# Patient Record
Sex: FEMALE | Race: BLACK OR AFRICAN AMERICAN | NOT HISPANIC OR LATINO | Employment: OTHER | URBAN - METROPOLITAN AREA
[De-identification: names, ages, dates, MRNs, and addresses within clinical notes are randomized per-mention and may not be internally consistent; named-entity substitution may affect disease eponyms.]

---

## 2017-05-31 RX ORDER — SEVELAMER CARBONATE FOR ORAL SUSPENSION 2400 MG/1
2.4 POWDER, FOR SUSPENSION ORAL
Qty: 216 G | Refills: 11 | Status: SHIPPED | OUTPATIENT
Start: 2017-05-31 | End: 2018-12-26

## 2017-06-14 ENCOUNTER — HOSPITAL ENCOUNTER (EMERGENCY)
Facility: HOSPITAL | Age: 52
Discharge: HOME | End: 2017-06-14
Attending: EMERGENCY MEDICINE
Payer: COMMERCIAL

## 2017-06-14 VITALS
HEIGHT: 66 IN | HEART RATE: 80 BPM | BODY MASS INDEX: 43.55 KG/M2 | TEMPERATURE: 99 F | DIASTOLIC BLOOD PRESSURE: 70 MMHG | SYSTOLIC BLOOD PRESSURE: 157 MMHG | WEIGHT: 271 LBS | RESPIRATION RATE: 12 BRPM | OXYGEN SATURATION: 100 %

## 2017-06-14 DIAGNOSIS — R00.2 PALPITATIONS: Primary | ICD-10-CM

## 2017-06-14 DIAGNOSIS — N18.6 END STAGE RENAL DISEASE: ICD-10-CM

## 2017-06-14 DIAGNOSIS — R07.9 CHEST PAIN: ICD-10-CM

## 2017-06-14 LAB
ALBUMIN SERPL BCP-MCNC: 3.2 G/DL
ALP SERPL-CCNC: 64 U/L
ALT SERPL W/O P-5'-P-CCNC: 12 U/L
ANION GAP SERPL CALC-SCNC: 12 MMOL/L
APTT BLDCRRT: 25.4 SEC
AST SERPL-CCNC: 12 U/L
BASOPHILS # BLD AUTO: 0.02 K/UL
BASOPHILS NFR BLD: 0.2 %
BILIRUB SERPL-MCNC: 0.4 MG/DL
BNP SERPL-MCNC: 221 PG/ML
BUN SERPL-MCNC: 24 MG/DL
CALCIUM SERPL-MCNC: 9.3 MG/DL
CHLORIDE SERPL-SCNC: 100 MMOL/L
CO2 SERPL-SCNC: 27 MMOL/L
CREAT SERPL-MCNC: 6.4 MG/DL
DIFFERENTIAL METHOD: ABNORMAL
EOSINOPHIL # BLD AUTO: 0.2 K/UL
EOSINOPHIL NFR BLD: 2 %
ERYTHROCYTE [DISTWIDTH] IN BLOOD BY AUTOMATED COUNT: 14.9 %
EST. GFR  (AFRICAN AMERICAN): 8 ML/MIN/1.73 M^2
EST. GFR  (NON AFRICAN AMERICAN): 7 ML/MIN/1.73 M^2
GLUCOSE SERPL-MCNC: 264 MG/DL
HCT VFR BLD AUTO: 31.9 %
HGB BLD-MCNC: 10 G/DL
INR PPP: 1
LYMPHOCYTES # BLD AUTO: 1.9 K/UL
LYMPHOCYTES NFR BLD: 19.4 %
MAGNESIUM SERPL-MCNC: 2 MG/DL
MCH RBC QN AUTO: 27.3 PG
MCHC RBC AUTO-ENTMCNC: 31.3 %
MCV RBC AUTO: 87 FL
MONOCYTES # BLD AUTO: 0.6 K/UL
MONOCYTES NFR BLD: 5.7 %
NEUTROPHILS # BLD AUTO: 7.2 K/UL
NEUTROPHILS NFR BLD: 72.7 %
PHOSPHATE SERPL-MCNC: 3.6 MG/DL
PLATELET # BLD AUTO: 305 K/UL
PMV BLD AUTO: 8.3 FL
POTASSIUM SERPL-SCNC: 3.8 MMOL/L
PROT SERPL-MCNC: 7.5 G/DL
PROTHROMBIN TIME: 10.5 SEC
RBC # BLD AUTO: 3.66 M/UL
SODIUM SERPL-SCNC: 139 MMOL/L
TROPONIN I SERPL DL<=0.01 NG/ML-MCNC: 0.01 NG/ML
WBC # BLD AUTO: 9.89 K/UL

## 2017-06-14 PROCEDURE — 85025 COMPLETE CBC W/AUTO DIFF WBC: CPT

## 2017-06-14 PROCEDURE — 84100 ASSAY OF PHOSPHORUS: CPT

## 2017-06-14 PROCEDURE — 84484 ASSAY OF TROPONIN QUANT: CPT

## 2017-06-14 PROCEDURE — 85610 PROTHROMBIN TIME: CPT

## 2017-06-14 PROCEDURE — 93010 ELECTROCARDIOGRAM REPORT: CPT | Mod: S$GLB,,, | Performed by: INTERNAL MEDICINE

## 2017-06-14 PROCEDURE — 83880 ASSAY OF NATRIURETIC PEPTIDE: CPT

## 2017-06-14 PROCEDURE — 83735 ASSAY OF MAGNESIUM: CPT

## 2017-06-14 PROCEDURE — 93005 ELECTROCARDIOGRAM TRACING: CPT

## 2017-06-14 PROCEDURE — 25000003 PHARM REV CODE 250: Performed by: EMERGENCY MEDICINE

## 2017-06-14 PROCEDURE — 96374 THER/PROPH/DIAG INJ IV PUSH: CPT

## 2017-06-14 PROCEDURE — 99284 EMERGENCY DEPT VISIT MOD MDM: CPT | Mod: 25

## 2017-06-14 PROCEDURE — 85730 THROMBOPLASTIN TIME PARTIAL: CPT

## 2017-06-14 PROCEDURE — 80053 COMPREHEN METABOLIC PANEL: CPT

## 2017-06-14 RX ORDER — METOPROLOL SUCCINATE 50 MG/1
100 TABLET, EXTENDED RELEASE ORAL DAILY
Status: DISCONTINUED | OUTPATIENT
Start: 2017-06-14 | End: 2017-06-15 | Stop reason: HOSPADM

## 2017-06-14 RX ORDER — METOPROLOL TARTRATE 1 MG/ML
5 INJECTION, SOLUTION INTRAVENOUS
Status: COMPLETED | OUTPATIENT
Start: 2017-06-14 | End: 2017-06-14

## 2017-06-14 RX ADMIN — METOPROLOL TARTRATE 5 MG: 5 INJECTION INTRAVENOUS at 08:06

## 2017-06-14 RX ADMIN — METOPROLOL SUCCINATE 100 MG: 50 TABLET, EXTENDED RELEASE ORAL at 08:06

## 2017-06-15 NOTE — ED PROVIDER NOTES
SCRIBE #1 NOTE: I, Corinne Mack, am scribing for, and in the presence of, Denise Fraser MD. I have scribed the entire note.      History      Chief Complaint   Patient presents with    Chest Pain     reports feeling like she keeps going into a fib, had dialysis treatment today        Review of patient's allergies indicates:   Allergen Reactions    Sulfa (sulfonamide antibiotics) Rash        HPI   HPI    6/14/2017, 7:18 PM   History obtained from the patient      History of Present Illness: Cordell Angulo is a 52 y.o. female patient who presents to the Emergency Department for palpitations which onset 3 days ago. Symptoms are intermittent and moderate in severity. Pt's sxs first appeared while she was in bed on Monday. Since then, pt reports having palpitations intermittently since then. Pt had dialysis today. No mitigating or exacerbating factors reported. Associated sxs include nausea and light-headedness. Patient denies any fever, chills, SOB, CP, V/D, back pain, HA, and all other sxs at this time. No prior Tx reported outside of daily medications. Pt has Hx of CHF, CKD, PAF, and HTN. No further complaints or concerns at this time.         Arrival mode: Personal vehicle      PCP: Primary Doctor No       Past Medical History:  Past Medical History:   Diagnosis Date    Asthma     CHF (congestive heart failure)     CKD (chronic kidney disease) stage 5, GFR less than 15 ml/min     Depression     Diabetes mellitus     Hypercholesterolemia     Hypertension     PAF (paroxysmal atrial fibrillation)        Past Surgical History:  Past Surgical History:   Procedure Laterality Date    AV FISTULA PLACEMENT      CARDIAC ELECTROPHYSIOLOGY MAPPING AND ABLATION      cesarian section      TUMOR REMOVAL      L lung         Family History:  Family History   Problem Relation Age of Onset    Heart disease Mother     Diabetes Father        Social History:  Social History     Social History Main Topics     Smoking status: Never Smoker    Smokeless tobacco: Not given    Alcohol use No    Drug use: No    Sexual activity: Not given       ROS   Review of Systems   Constitutional: Negative for chills and fever.   Respiratory: Negative for cough and shortness of breath.    Cardiovascular: Positive for palpitations. Negative for chest pain.   Gastrointestinal: Positive for nausea. Negative for abdominal pain, diarrhea and vomiting.   Genitourinary: Negative for dysuria, flank pain and hematuria.   Musculoskeletal: Negative for back pain, neck pain and neck stiffness.   Skin: Negative for rash and wound.   Neurological: Positive for light-headedness. Negative for dizziness, numbness and headaches.   All other systems reviewed and are negative.    Physical Exam      Initial Vitals [06/14/17 1915]   BP Pulse Resp Temp SpO2   (!) 201/93 (!) 114 20 98.5 °F (36.9 °C) 99 %      Physical Exam  Nursing Notes and Vital Signs Reviewed.  Constitutional: Patient is in no apparent distress. Well-developed and well-nourished. Obese. Shunt to L upper arm.  Head: Atraumatic. Normocephalic.  Eyes: PERRL. EOM intact. Conjunctivae are not pale. No scleral icterus.  ENT: Mucous membranes are moist. Oropharynx is clear and symmetric.    Neck: Supple. Full ROM. No lymphadenopathy.  Cardiovascular: Mildly tachycardic. Regular rhythm. No murmurs, rubs, or gallops. Distal pulses are 2+ and symmetric. Shunt to left upper arm with good thrill.   Pulmonary/Chest: No respiratory distress. Clear to auscultation bilaterally. No wheezing, rales, or rhonchi.  Abdominal: Soft and non-distended.  There is no tenderness.  No rebound, guarding, or rigidity.   Musculoskeletal: Moves all extremities. No obvious deformities. No edema. No calf tenderness.  Skin: Warm and dry.  Neurological:  Alert, awake, and appropriate.  Normal speech.  No acute focal neurological deficits are appreciated.  Psychiatric: Normal affect. Good eye contact. Appropriate in  "content.    ED Course    Procedures  ED Vital Signs:  Vitals:    06/14/17 1915 06/14/17 2014 06/14/17 2059   BP: (!) 201/93  (!) 157/70   Pulse: (!) 114 84 80   Resp: 20  12   Temp: 98.5 °F (36.9 °C)     TempSrc: Oral     SpO2: 99%  100%   Weight: 122.9 kg (271 lb)     Height: 5' 6" (1.676 m)         Abnormal Lab Results:  Labs Reviewed   CBC W/ AUTO DIFFERENTIAL - Abnormal; Notable for the following:        Result Value    RBC 3.66 (*)     Hemoglobin 10.0 (*)     Hematocrit 31.9 (*)     MCHC 31.3 (*)     RDW 14.9 (*)     MPV 8.3 (*)     All other components within normal limits   COMPREHENSIVE METABOLIC PANEL - Abnormal; Notable for the following:     Glucose 264 (*)     BUN, Bld 24 (*)     Creatinine 6.4 (*)     Albumin 3.2 (*)     eGFR if  8 (*)     eGFR if non  7 (*)     All other components within normal limits   B-TYPE NATRIURETIC PEPTIDE - Abnormal; Notable for the following:      (*)     All other components within normal limits   TROPONIN I   MAGNESIUM   PHOSPHORUS   APTT   PROTIME-INR        All Lab Results:  Results for orders placed or performed during the hospital encounter of 06/14/17   CBC auto differential   Result Value Ref Range    WBC 9.89 3.90 - 12.70 K/uL    RBC 3.66 (L) 4.00 - 5.40 M/uL    Hemoglobin 10.0 (L) 12.0 - 16.0 g/dL    Hematocrit 31.9 (L) 37.0 - 48.5 %    MCV 87 82 - 98 fL    MCH 27.3 27.0 - 31.0 pg    MCHC 31.3 (L) 32.0 - 36.0 %    RDW 14.9 (H) 11.5 - 14.5 %    Platelets 305 150 - 350 K/uL    MPV 8.3 (L) 9.2 - 12.9 fL    Gran # 7.2 1.8 - 7.7 K/uL    Lymph # 1.9 1.0 - 4.8 K/uL    Mono # 0.6 0.3 - 1.0 K/uL    Eos # 0.2 0.0 - 0.5 K/uL    Baso # 0.02 0.00 - 0.20 K/uL    Gran% 72.7 38.0 - 73.0 %    Lymph% 19.4 18.0 - 48.0 %    Mono% 5.7 4.0 - 15.0 %    Eosinophil% 2.0 0.0 - 8.0 %    Basophil% 0.2 0.0 - 1.9 %    Differential Method Automated    Comprehensive metabolic panel   Result Value Ref Range    Sodium 139 136 - 145 mmol/L    Potassium 3.8 3.5 - " 5.1 mmol/L    Chloride 100 95 - 110 mmol/L    CO2 27 23 - 29 mmol/L    Glucose 264 (H) 70 - 110 mg/dL    BUN, Bld 24 (H) 6 - 20 mg/dL    Creatinine 6.4 (H) 0.5 - 1.4 mg/dL    Calcium 9.3 8.7 - 10.5 mg/dL    Total Protein 7.5 6.0 - 8.4 g/dL    Albumin 3.2 (L) 3.5 - 5.2 g/dL    Total Bilirubin 0.4 0.1 - 1.0 mg/dL    Alkaline Phosphatase 64 55 - 135 U/L    AST 12 10 - 40 U/L    ALT 12 10 - 44 U/L    Anion Gap 12 8 - 16 mmol/L    eGFR if African American 8 (A) >60 mL/min/1.73 m^2    eGFR if non African American 7 (A) >60 mL/min/1.73 m^2   Troponin I #1   Result Value Ref Range    Troponin I 0.010 0.000 - 0.026 ng/mL   B-Type natriuretic peptide (BNP)   Result Value Ref Range     (H) 0 - 99 pg/mL   Magnesium   Result Value Ref Range    Magnesium 2.0 1.6 - 2.6 mg/dL   Phosphorus   Result Value Ref Range    Phosphorus 3.6 2.7 - 4.5 mg/dL   APTT   Result Value Ref Range    aPTT 25.4 21.0 - 32.0 sec   Protime-INR   Result Value Ref Range    Prothrombin Time 10.5 9.0 - 12.5 sec    INR 1.0 0.8 - 1.2       Imaging Results:  Imaging Results          X-Ray Chest PA And Lateral (Final result)  Result time 06/14/17 20:40:32    Final result by Mirta Garcia MD (06/14/17 20:40:32)                 Impression:         No acute cardiopulmonary disease      Electronically signed by: MIRTA GARCIA MD  Date:     06/14/17  Time:    20:40              Narrative:    Exam: Two-view chest xray    History:     Chest Pain     Findings:     The heart is normal and the lungs are clear.                             The EKG was ordered, reviewed, and independently interpreted by the ED provider.  Interpretation time: 1919  Rate: 93 BPM  Rhythm: normal sinus rhythm  Interpretation: Moderate voltage criteria for LVH. Nonspecific ST and T wave abnormality. Abnormal ECG. No STEMI.         The Emergency Provider reviewed the vital signs and test results, which are outlined above.    ED Discussion     9:24 PM: Reassessed pt at this time. Pt is awake,  alert, and in no distress. Discussed with pt all pertinent ED information and results. Discussed pt dx and plan of tx. Gave pt all f/u and return to the ED instructions. All questions and concerns were addressed at this time. Pt expresses understanding of information and instructions, and is comfortable with plan to discharge. Pt is stable for discharge.    I discussed with patient and/or family/caretaker that evaluation in the ED does not suggest any emergent or life threatening medical conditions requiring immediate intervention beyond what was provided in the ED, and I believe patient is safe for discharge.  Regardless, an unremarkable evaluation in the ED does not preclude the development or presence of a serious of life threatening condition. As such, patient was instructed to return immediately for any worsening or change in current symptoms.    I have discussed with patient and/or family/caretaker chest pain precautions, specifically to return for worsening chest pain, shortness of breath, fever, or any concern.  I have low suspicion for cardiopulmonary, vascular, infectious, respiratory, or other emergent medical condition based on my evaluation in the ED.      ED Medication(s):  Medications   metoprolol injection 5 mg (5 mg Intravenous Given 6/14/17 2006)       Discharge Medication List as of 6/14/2017  9:29 PM          Follow-up Information     Local Primary Care Doctor. Schedule an appointment as soon as possible for a visit in 2 days.    Why:  Return to the [facility], If symptoms worsen                   Medical Decision Making    Medical Decision Making:   Clinical Tests:   Lab Tests: Reviewed and Ordered  Radiological Study: Reviewed and Ordered  Medical Tests: Reviewed and Ordered           Scribe Attestation:   Scribe #1: I performed the above scribed service and the documentation accurately describes the services I performed. I attest to the accuracy of the note.    Attending:   Physician  Attestation Statement for Scribe #1: I, Denise Fraser MD, personally performed the services described in this documentation, as scribed by Corinne Mack, in my presence, and it is both accurate and complete.          Clinical Impression       ICD-10-CM ICD-9-CM   1. Palpitations R00.2 785.1   2. Chest pain R07.9 786.50   3. End stage renal disease N18.6 585.6       Disposition:   Disposition: Discharged  Condition: Stable         Denise Fraser MD  06/16/17 1125

## 2017-12-11 ENCOUNTER — HOSPITAL ENCOUNTER (EMERGENCY)
Facility: HOSPITAL | Age: 52
Discharge: HOME | End: 2017-12-11
Attending: EMERGENCY MEDICINE
Payer: COMMERCIAL

## 2017-12-11 VITALS
HEART RATE: 81 BPM | HEIGHT: 67 IN | BODY MASS INDEX: 41.35 KG/M2 | RESPIRATION RATE: 24 BRPM | OXYGEN SATURATION: 97 % | TEMPERATURE: 98 F | WEIGHT: 263.44 LBS | DIASTOLIC BLOOD PRESSURE: 69 MMHG | SYSTOLIC BLOOD PRESSURE: 157 MMHG

## 2017-12-11 DIAGNOSIS — R06.02 SOB (SHORTNESS OF BREATH): ICD-10-CM

## 2017-12-11 DIAGNOSIS — N18.6 ESRD ON HEMODIALYSIS: Primary | ICD-10-CM

## 2017-12-11 DIAGNOSIS — Z99.2 ESRD ON HEMODIALYSIS: Primary | ICD-10-CM

## 2017-12-11 DIAGNOSIS — J81.0 ACUTE PULMONARY EDEMA: ICD-10-CM

## 2017-12-11 LAB
ALBUMIN SERPL BCP-MCNC: 2.9 G/DL
ALP SERPL-CCNC: 69 U/L
ALT SERPL W/O P-5'-P-CCNC: 49 U/L
ANION GAP SERPL CALC-SCNC: 11 MMOL/L
AST SERPL-CCNC: 43 U/L
BASOPHILS # BLD AUTO: 0.02 K/UL
BASOPHILS NFR BLD: 0.2 %
BILIRUB SERPL-MCNC: 0.5 MG/DL
BNP SERPL-MCNC: 1047 PG/ML
BUN SERPL-MCNC: 59 MG/DL
CALCIUM SERPL-MCNC: 9.5 MG/DL
CHLORIDE SERPL-SCNC: 108 MMOL/L
CK SERPL-CCNC: 62 U/L
CO2 SERPL-SCNC: 20 MMOL/L
CREAT SERPL-MCNC: 9 MG/DL
DIFFERENTIAL METHOD: ABNORMAL
EOSINOPHIL # BLD AUTO: 0.3 K/UL
EOSINOPHIL NFR BLD: 2.7 %
ERYTHROCYTE [DISTWIDTH] IN BLOOD BY AUTOMATED COUNT: 15.3 %
EST. GFR  (AFRICAN AMERICAN): 5 ML/MIN/1.73 M^2
EST. GFR  (NON AFRICAN AMERICAN): 4 ML/MIN/1.73 M^2
GLUCOSE SERPL-MCNC: 125 MG/DL
HCT VFR BLD AUTO: 36.5 %
HGB BLD-MCNC: 11.4 G/DL
LYMPHOCYTES # BLD AUTO: 1.8 K/UL
LYMPHOCYTES NFR BLD: 17.6 %
MCH RBC QN AUTO: 26.9 PG
MCHC RBC AUTO-ENTMCNC: 31.2 G/DL
MCV RBC AUTO: 86 FL
MONOCYTES # BLD AUTO: 0.5 K/UL
MONOCYTES NFR BLD: 4.7 %
NEUTROPHILS # BLD AUTO: 7.8 K/UL
NEUTROPHILS NFR BLD: 74.8 %
PLATELET # BLD AUTO: 246 K/UL
PMV BLD AUTO: 9.4 FL
POTASSIUM SERPL-SCNC: 5.2 MMOL/L
PROT SERPL-MCNC: 7.1 G/DL
RBC # BLD AUTO: 4.24 M/UL
SODIUM SERPL-SCNC: 139 MMOL/L
TROPONIN I SERPL DL<=0.01 NG/ML-MCNC: 0.02 NG/ML
WBC # BLD AUTO: 10.36 K/UL

## 2017-12-11 PROCEDURE — 83880 ASSAY OF NATRIURETIC PEPTIDE: CPT

## 2017-12-11 PROCEDURE — 84484 ASSAY OF TROPONIN QUANT: CPT

## 2017-12-11 PROCEDURE — 99284 EMERGENCY DEPT VISIT MOD MDM: CPT | Mod: 25

## 2017-12-11 PROCEDURE — 93010 ELECTROCARDIOGRAM REPORT: CPT | Mod: ,,, | Performed by: INTERNAL MEDICINE

## 2017-12-11 PROCEDURE — 80053 COMPREHEN METABOLIC PANEL: CPT

## 2017-12-11 PROCEDURE — 85025 COMPLETE CBC W/AUTO DIFF WBC: CPT

## 2017-12-11 PROCEDURE — 93005 ELECTROCARDIOGRAM TRACING: CPT

## 2017-12-11 PROCEDURE — 82550 ASSAY OF CK (CPK): CPT

## 2017-12-11 NOTE — ED NOTES
Received report from ANIYAH Bryant. Pt in NAD,VSS, RR equal and unlabored. Pt awaiting results. Pt's bed is low, locked, and call light in reach. SR up x 2. Will continue to monitor pt.

## 2017-12-11 NOTE — ED PROVIDER NOTES
SCRIBE #1 NOTE: I, Kaitlin Nieves, am scribing for, and in the presence of, Rivas Bobo MD. I have scribed the entire note.      History      Chief Complaint   Patient presents with    Shortness of Breath     with exertion and c/o chest tightness       Review of patient's allergies indicates:   Allergen Reactions    Sulfa (sulfonamide antibiotics) Rash        HPI   HPI    12/11/2017, 6:01 AM   History obtained from the patient      History of Present Illness: Cordell Angulo is a 52 y.o. female patient who presents to the Emergency Department for SOB which onset gradually 2 days ago. Pt states that her sxs worsened this morning. Pt reports a PMHx of asthma, DM,  kidney failure with dialysis, CHF, and HTN.  Pt states that she is suppose to undergo dialysis this morning, but decided to come to the ED instead secondary to worsening SOB with exertion. Symptoms are constant and moderate in severity.  No mitigating or exacerbating factors reported. Associated sxs include acute substernal chest tightness, subjective fever, and fluid retention. Patient denies any recent travel, chills, long car trips,  leg pain, cough, N/V, diaphoresis, palpitations, extremity weakness/numbness, dizziness, HA, syncope, and all other sxs at this time. No prior Tx reported. No further complaints or concerns at this time.         Arrival mode: Personal vehicle    PCP: Primary Doctor No       Past Medical History:  Past Medical History:   Diagnosis Date    Asthma     CHF (congestive heart failure)     CKD (chronic kidney disease) stage 5, GFR less than 15 ml/min     Depression     Diabetes mellitus     Hypercholesterolemia     Hypertension     PAF (paroxysmal atrial fibrillation)        Past Surgical History:  Past Surgical History:   Procedure Laterality Date    AV FISTULA PLACEMENT      CARDIAC ELECTROPHYSIOLOGY MAPPING AND ABLATION      cesarian section      TUMOR REMOVAL      L lung         Family History:  Family History    Problem Relation Age of Onset    Heart disease Mother     Diabetes Father        Social History:  Social History     Social History Main Topics    Smoking status: Never Smoker    Smokeless tobacco: Not on file    Alcohol use No    Drug use: No    Sexual activity: Not on file       ROS   Review of Systems   Constitutional: Positive for fever. Negative for chills.        + fluid retention   - recent travel/long car trips   HENT: Negative for sore throat.    Respiratory: Positive for chest tightness and shortness of breath. Negative for cough.    Cardiovascular: Negative for chest pain and palpitations.   Gastrointestinal: Negative for nausea and vomiting.   Genitourinary: Negative for dysuria.   Musculoskeletal: Negative for back pain.        - leg pain    Skin: Negative for rash.   Neurological: Negative for dizziness, syncope, weakness, numbness and headaches.   Hematological: Does not bruise/bleed easily.       Physical Exam      Initial Vitals [12/11/17 0548]   BP Pulse Resp Temp SpO2   (!) 187/90 88 20 98.3 °F (36.8 °C) (!) 94 %      MAP       122.33          Physical Exam  Nursing Notes and Vital Signs Reviewed.  Constitutional: Patient is in no apparent distress. Well-developed and well-nourished.  Head: Atraumatic. Normocephalic.  Eyes: PERRL. EOM intact. Conjunctivae are not pale. No scleral icterus.  ENT: Mucous membranes are moist. Oropharynx is clear and symmetric.    Neck: Supple. Full ROM. No lymphadenopathy.  Cardiovascular: Regular rate. Regular rhythm. No murmurs, rubs, or gallops. Distal pulses are 2+ and symmetric.  Pulmonary/Chest: No respiratory distress. Clear to auscultation bilaterally. No wheezing or rales.  Abdominal: Soft and non-distended.  There is no tenderness.  No rebound, guarding, or rigidity. Good bowel sounds.  Genitourinary: No CVA tenderness  Musculoskeletal: Moves all extremities. No obvious deformities. 1+ BLE edema. No calf tenderness.  Skin: Warm and  "dry.  Neurological:  Alert, awake, and appropriate.  Normal speech.  No acute focal neurological deficits are appreciated.  Psychiatric: Normal affect. Good eye contact. Appropriate in content.    ED Course    Procedures  ED Vital Signs:  Vitals:    12/11/17 0548 12/11/17 0620 12/11/17 0626 12/11/17 0647   BP: (!) 187/90 (!) 164/73  (!) 163/89   Pulse: 88 86 86 83   Resp: 20 (!) 26  (!) 23   Temp: 98.3 °F (36.8 °C)      SpO2: (!) 94% 98%  96%   Weight: 119.5 kg (263 lb 7.2 oz)      Height: 5' 7" (1.702 m)          Abnormal Lab Results:  Labs Reviewed   CBC W/ AUTO DIFFERENTIAL - Abnormal; Notable for the following:        Result Value    Hemoglobin 11.4 (*)     Hematocrit 36.5 (*)     MCH 26.9 (*)     MCHC 31.2 (*)     RDW 15.3 (*)     Gran # 7.8 (*)     Gran% 74.8 (*)     Lymph% 17.6 (*)     All other components within normal limits   COMPREHENSIVE METABOLIC PANEL - Abnormal; Notable for the following:     Potassium 5.2 (*)     CO2 20 (*)     Glucose 125 (*)     BUN, Bld 59 (*)     Creatinine 9.0 (*)     Albumin 2.9 (*)     AST 43 (*)     ALT 49 (*)     eGFR if  5 (*)     eGFR if non  4 (*)     All other components within normal limits   B-TYPE NATRIURETIC PEPTIDE - Abnormal; Notable for the following:     BNP 1,047 (*)     All other components within normal limits   CK   TROPONIN I        All Lab Results:  Results for orders placed or performed during the hospital encounter of 12/11/17   CBC auto differential   Result Value Ref Range    WBC 10.36 3.90 - 12.70 K/uL    RBC 4.24 4.00 - 5.40 M/uL    Hemoglobin 11.4 (L) 12.0 - 16.0 g/dL    Hematocrit 36.5 (L) 37.0 - 48.5 %    MCV 86 82 - 98 fL    MCH 26.9 (L) 27.0 - 31.0 pg    MCHC 31.2 (L) 32.0 - 36.0 g/dL    RDW 15.3 (H) 11.5 - 14.5 %    Platelets 246 150 - 350 K/uL    MPV 9.4 9.2 - 12.9 fL    Gran # 7.8 (H) 1.8 - 7.7 K/uL    Lymph # 1.8 1.0 - 4.8 K/uL    Mono # 0.5 0.3 - 1.0 K/uL    Eos # 0.3 0.0 - 0.5 K/uL    Baso # 0.02 0.00 - 0.20 " K/uL    Gran% 74.8 (H) 38.0 - 73.0 %    Lymph% 17.6 (L) 18.0 - 48.0 %    Mono% 4.7 4.0 - 15.0 %    Eosinophil% 2.7 0.0 - 8.0 %    Basophil% 0.2 0.0 - 1.9 %    Differential Method Automated    Comprehensive metabolic panel   Result Value Ref Range    Sodium 139 136 - 145 mmol/L    Potassium 5.2 (H) 3.5 - 5.1 mmol/L    Chloride 108 95 - 110 mmol/L    CO2 20 (L) 23 - 29 mmol/L    Glucose 125 (H) 70 - 110 mg/dL    BUN, Bld 59 (H) 6 - 20 mg/dL    Creatinine 9.0 (H) 0.5 - 1.4 mg/dL    Calcium 9.5 8.7 - 10.5 mg/dL    Total Protein 7.1 6.0 - 8.4 g/dL    Albumin 2.9 (L) 3.5 - 5.2 g/dL    Total Bilirubin 0.5 0.1 - 1.0 mg/dL    Alkaline Phosphatase 69 55 - 135 U/L    AST 43 (H) 10 - 40 U/L    ALT 49 (H) 10 - 44 U/L    Anion Gap 11 8 - 16 mmol/L    eGFR if African American 5 (A) >60 mL/min/1.73 m^2    eGFR if non African American 4 (A) >60 mL/min/1.73 m^2   Brain natriuretic peptide   Result Value Ref Range    BNP 1,047 (H) 0 - 99 pg/mL   CK   Result Value Ref Range    CPK 62 20 - 180 U/L   Troponin I   Result Value Ref Range    Troponin I 0.016 0.000 - 0.026 ng/mL         Imaging Results:  Imaging Results          X-Ray Chest AP Portable (Final result)  Result time 12/11/17 07:43:23    Final result by Milton Mccann MD (12/11/17 07:43:23)                 Impression:     Mild pulmonary vascular congestion suspected.       Electronically signed by: MILTON MCCANN MD  Date:     12/11/17  Time:    07:43              Narrative:    Clinical Data:SOB    Comparison:  6/14/17    Findings:  Single view of the chest.      Cardiac silhouette is normal. Mild pulmonary vascular congestion suspected. The lungs demonstrate no evidence of consolidation.  No evidence of pleural effusion or pneumothorax.  Bones demonstrate mild degenerative changes.                           Impression: No acute findings.          The EKG was ordered, reviewed, and independently interpreted by the ED provider.  Interpretation time: 0606  Rate: 83 BPM  Rhythm:  normal sinus rhythm  Interpretation: ST and T wave abnormality. Prolonged QT. No STEMI.        The Emergency Provider reviewed the vital signs and test results, which are outlined above.    ED Discussion   7:37 AM: Reassessed pt at this time.  Discussed with pt all pertinent ED information, results, and need for dialysis at this time. Discussed pt dx and plan of tx. Gave pt all f/u and return to the ED instructions. Pt should go to dialysis after discharged. All questions and concerns were addressed at this time. Pt expresses understanding of information and instructions, and is comfortable with plan to discharge. Pt is stable for discharge.          ED Medication(s):  Medications - No data to display    New Prescriptions    No medications on file       Follow-up Information     Lola Pina MD In 2 days.    Specialty:  Nephrology  Contact information:  [address]  [place] [address] [phone]                     Medical Decision Making    Medical Decision Making:   Clinical Tests:   Lab Tests: Ordered and Reviewed  Radiological Study: Ordered and Reviewed  Medical Tests: Ordered and Reviewed           Scribe Attestation:   Scribe #1: I performed the above scribed service and the documentation accurately describes the services I performed. I attest to the accuracy of the note.    Attending:   Physician Attestation Statement for Scribe #1: I, Rivas Bobo MD, personally performed the services described in this documentation, as scribed by Kaitlin Nieves, in my presence, and it is both accurate and complete.          Clinical Impression       ICD-10-CM ICD-9-CM   1. ESRD on hemodialysis N18.6 585.6    Z99.2 V45.11   2. SOB (shortness of breath) R06.02 786.05   3. Acute pulmonary edema J81.0 518.4       Disposition:   Disposition: Discharged  Condition: Stable         Rivas Bobo MD  12/11/17 0746

## 2018-06-06 ENCOUNTER — TELEPHONE (OUTPATIENT)
Dept: NEPHROLOGY | Facility: CLINIC | Age: 53
End: 2018-06-06

## 2018-06-06 DIAGNOSIS — G47.33 OSA (OBSTRUCTIVE SLEEP APNEA): Primary | ICD-10-CM

## 2018-06-06 NOTE — TELEPHONE ENCOUNTER
----- Message from Lola Pina MD sent at 6/6/2018 10:38 AM CDT -----  Needs appt with pul for sleep study ---Kelly Montgomery are fine too

## 2018-06-11 ENCOUNTER — TELEPHONE (OUTPATIENT)
Dept: NEPHROLOGY | Facility: CLINIC | Age: 53
End: 2018-06-11

## 2018-06-13 ENCOUNTER — TELEPHONE (OUTPATIENT)
Dept: PULMONOLOGY | Facility: CLINIC | Age: 53
End: 2018-06-13

## 2018-06-18 ENCOUNTER — HOSPITAL ENCOUNTER (EMERGENCY)
Facility: HOSPITAL | Age: 53
Discharge: HOME | End: 2018-06-18
Attending: EMERGENCY MEDICINE
Payer: COMMERCIAL

## 2018-06-18 VITALS
TEMPERATURE: 100 F | RESPIRATION RATE: 18 BRPM | DIASTOLIC BLOOD PRESSURE: 82 MMHG | HEIGHT: 67 IN | BODY MASS INDEX: 39.71 KG/M2 | SYSTOLIC BLOOD PRESSURE: 169 MMHG | HEART RATE: 94 BPM | OXYGEN SATURATION: 96 % | WEIGHT: 253 LBS

## 2018-06-18 DIAGNOSIS — H43.11 VITREOUS HEMORRHAGE OF RIGHT EYE: Primary | ICD-10-CM

## 2018-06-18 DIAGNOSIS — I63.9 STROKE: ICD-10-CM

## 2018-06-18 LAB
ALBUMIN SERPL BCP-MCNC: 3.2 G/DL
ALP SERPL-CCNC: 91 U/L
ALT SERPL W/O P-5'-P-CCNC: 16 U/L
ANION GAP SERPL CALC-SCNC: 17 MMOL/L
APTT BLDCRRT: 25.1 SEC
AST SERPL-CCNC: 13 U/L
BASOPHILS # BLD AUTO: 0.01 K/UL
BASOPHILS NFR BLD: 0.1 %
BILIRUB SERPL-MCNC: 0.3 MG/DL
BUN SERPL-MCNC: 23 MG/DL
CALCIUM SERPL-MCNC: 9.5 MG/DL
CHLORIDE SERPL-SCNC: 95 MMOL/L
CO2 SERPL-SCNC: 25 MMOL/L
CREAT SERPL-MCNC: 6.2 MG/DL
DIFFERENTIAL METHOD: ABNORMAL
EOSINOPHIL # BLD AUTO: 0.2 K/UL
EOSINOPHIL NFR BLD: 2.6 %
ERYTHROCYTE [DISTWIDTH] IN BLOOD BY AUTOMATED COUNT: 15.7 %
EST. GFR  (AFRICAN AMERICAN): 8 ML/MIN/1.73 M^2
EST. GFR  (NON AFRICAN AMERICAN): 7 ML/MIN/1.73 M^2
GLUCOSE SERPL-MCNC: 253 MG/DL
HCT VFR BLD AUTO: 37 %
HGB BLD-MCNC: 11.9 G/DL
INR PPP: 0.9
LYMPHOCYTES # BLD AUTO: 2 K/UL
LYMPHOCYTES NFR BLD: 21.8 %
MCH RBC QN AUTO: 28.4 PG
MCHC RBC AUTO-ENTMCNC: 32.2 G/DL
MCV RBC AUTO: 88 FL
MONOCYTES # BLD AUTO: 0.5 K/UL
MONOCYTES NFR BLD: 5.8 %
NEUTROPHILS # BLD AUTO: 6.5 K/UL
NEUTROPHILS NFR BLD: 69.7 %
PLATELET # BLD AUTO: 257 K/UL
PMV BLD AUTO: 8.3 FL
POTASSIUM SERPL-SCNC: 3.7 MMOL/L
PROT SERPL-MCNC: 7.5 G/DL
PROTHROMBIN TIME: 9.8 SEC
RBC # BLD AUTO: 4.19 M/UL
SODIUM SERPL-SCNC: 137 MMOL/L
TSH SERPL DL<=0.005 MIU/L-ACNC: 2.57 UIU/ML
WBC # BLD AUTO: 9.31 K/UL

## 2018-06-18 PROCEDURE — 80061 LIPID PANEL: CPT

## 2018-06-18 PROCEDURE — 99285 EMERGENCY DEPT VISIT HI MDM: CPT | Mod: 25

## 2018-06-18 PROCEDURE — 25000003 PHARM REV CODE 250: Performed by: EMERGENCY MEDICINE

## 2018-06-18 PROCEDURE — 36415 COLL VENOUS BLD VENIPUNCTURE: CPT

## 2018-06-18 PROCEDURE — 80053 COMPREHEN METABOLIC PANEL: CPT

## 2018-06-18 PROCEDURE — 84443 ASSAY THYROID STIM HORMONE: CPT

## 2018-06-18 PROCEDURE — 93010 ELECTROCARDIOGRAM REPORT: CPT | Mod: ,,, | Performed by: INTERNAL MEDICINE

## 2018-06-18 PROCEDURE — 93005 ELECTROCARDIOGRAM TRACING: CPT

## 2018-06-18 PROCEDURE — 85730 THROMBOPLASTIN TIME PARTIAL: CPT

## 2018-06-18 PROCEDURE — 85025 COMPLETE CBC W/AUTO DIFF WBC: CPT

## 2018-06-18 PROCEDURE — 85610 PROTHROMBIN TIME: CPT

## 2018-06-18 RX ORDER — MEDROXYPROGESTERONE ACETATE 10 MG/1
10 TABLET ORAL DAILY
Status: ON HOLD | COMMUNITY
End: 2020-06-27 | Stop reason: HOSPADM

## 2018-06-18 RX ORDER — AMOXICILLIN 875 MG/1
875 TABLET, FILM COATED ORAL
COMMUNITY
End: 2018-08-05 | Stop reason: SINTOL

## 2018-06-18 RX ORDER — TRAMADOL HYDROCHLORIDE AND ACETAMINOPHEN 37.5; 325 MG/1; MG/1
1 TABLET, FILM COATED ORAL EVERY 4 HOURS PRN
Status: ON HOLD | COMMUNITY
End: 2018-12-26

## 2018-06-18 RX ORDER — TETRACAINE HYDROCHLORIDE 5 MG/ML
2 SOLUTION OPHTHALMIC
Status: COMPLETED | OUTPATIENT
Start: 2018-06-18 | End: 2018-06-18

## 2018-06-18 RX ADMIN — TETRACAINE HYDROCHLORIDE 2 DROP: 5 SOLUTION OPHTHALMIC at 08:06

## 2018-06-19 ENCOUNTER — OFFICE VISIT (OUTPATIENT)
Dept: OPHTHALMOLOGY | Facility: CLINIC | Age: 53
End: 2018-06-19
Payer: COMMERCIAL

## 2018-06-19 DIAGNOSIS — H53.131 SUDDEN VISUAL LOSS OF RIGHT EYE: Primary | ICD-10-CM

## 2018-06-19 DIAGNOSIS — H26.20 CATARACT WITH COMPLICATION OF RIGHT EYE, UNSPECIFIED COMPLICATED CATARACT TYPE: ICD-10-CM

## 2018-06-19 LAB
CHOLEST SERPL-MCNC: 200 MG/DL
CHOLEST/HDLC SERPL: 9.1 {RATIO}
HDLC SERPL-MCNC: 22 MG/DL
HDLC SERPL: 11 %
LDLC SERPL CALC-MCNC: ABNORMAL MG/DL
NONHDLC SERPL-MCNC: 178 MG/DL
TRIGL SERPL-MCNC: 812 MG/DL

## 2018-06-19 PROCEDURE — 92004 COMPRE OPH EXAM NEW PT 1/>: CPT | Mod: S$GLB,,, | Performed by: OPHTHALMOLOGY

## 2018-06-19 PROCEDURE — 99999 PR PBB SHADOW E&M-EST. PATIENT-LVL II: CPT | Mod: PBBFAC,,, | Performed by: OPHTHALMOLOGY

## 2018-06-19 NOTE — ED NOTES
Per Dr. Laird, pt does not need cardiac monitoring, continuous pulse ox, swallow assessment, oxygen, neuro checks, or glucose checked.

## 2018-06-19 NOTE — ED PROVIDER NOTES
SCRIBE #1 NOTE: IMore, am scribing for, and in the presence of, Dilip Perez MD. I have scribed the HPI, ROS, and PEx.     SCRIBE #2 NOTE: I, Alis Oconnor , am scribing for, and in the presence of,  Mónica Laird MD. I have scribed the remaining portions of the note not scribed by Scribe #1.     History      Chief Complaint   Patient presents with    Loss of Vision     patient states she was vomiting last night and lost vision after vomiting, went to dialysis today and sent here after dialysis       Review of patient's allergies indicates:   Allergen Reactions    Sulfa (sulfonamide antibiotics) Rash        HPI   HPI    6/18/2018, 7:29 PM    History obtained from the patient      History of Present Illness: Cordell Angulo is a 53 y.o. female patient with PMHx of DM, HTN, CKD, CHF who presents to the Emergency Department for loss of vision in her R eye onset this morning when she woke up. Pt states she woke up with a pressure in her R eye and states she can see a little light but denies vision. Pt states she had dialysis today and was referred to ED. Symptoms are constant and moderate in severity.  No mitigating or exacerbating factors reported. No associated sxs reported. Patient denies any eye discharge,eye itching, eye redness, photophobia, eye redness and all other sxs at this time. No prior Tx ireported. No further complaints or concerns at this time.         Arrival mode: Personal vehicle    PCP: Primary Doctor No       Past Medical History:  Past Medical History:   Diagnosis Date    Asthma     CHF (congestive heart failure)     CKD (chronic kidney disease) stage 5, GFR less than 15 ml/min     Depression     Diabetes mellitus     Hypercholesterolemia     Hypertension     PAF (paroxysmal atrial fibrillation)        Past Surgical History:  Past Surgical History:   Procedure Laterality Date    AV FISTULA PLACEMENT      CARDIAC ELECTROPHYSIOLOGY MAPPING AND ABLATION      cesarian section       TUMOR REMOVAL      L lung         Family History:  Family History   Problem Relation Age of Onset    Heart disease Mother     Diabetes Father        Social History:  Social History     Social History Main Topics    Smoking status: Never Smoker    Smokeless tobacco: Never Used    Alcohol use No    Drug use: No    Sexual activity: Unknown       ROS   Review of Systems   Constitutional: Negative for activity change, appetite change, chills, diaphoresis and fever.   HENT: Negative for congestion, drooling, ear pain, mouth sores, rhinorrhea, sinus pain, sore throat and trouble swallowing.    Eyes: Positive for pain and visual disturbance. Negative for photophobia, discharge, redness and itching.   Respiratory: Negative for cough, chest tightness, shortness of breath, wheezing and stridor.    Cardiovascular: Negative for chest pain, palpitations and leg swelling.   Gastrointestinal: Negative for abdominal distention, abdominal pain, blood in stool, constipation, diarrhea, nausea and vomiting.   Genitourinary: Negative for difficulty urinating, dysuria, flank pain, frequency, hematuria and urgency.   Musculoskeletal: Negative for arthralgias, back pain and myalgias.   Skin: Negative for pallor, rash and wound.   Neurological: Negative for dizziness, syncope, weakness, light-headedness and numbness.   All other systems reviewed and are negative.      Physical Exam      Initial Vitals [06/18/18 1855]   BP Pulse Resp Temp SpO2   (!) 183/85 105 20 99.5 °F (37.5 °C) 95 %      MAP       --          Physical Exam  Nursing Notes and Vital Signs Reviewed.  Constitutional: Patient is in no acute distress. Well-developed and well-nourished.  Head: Atraumatic. Normocephalic.  ENT: Mucous membranes are moist. Oropharynx is clear and symmetric.    Neck: Supple. Full ROM. No lymphadenopathy.  Cardiovascular: Regular rate. Regular rhythm. No murmurs, rubs, or gallops. Distal pulses are 2+ and symmetric.  Pulmonary/Chest: No  "respiratory distress. Clear to auscultation bilaterally. No wheezing or rales.  Abdominal: Soft and non-distended.  There is no tenderness.  No rebound, guarding, or rigidity. Good bowel sounds.  Musculoskeletal: Moves all extremities. No obvious deformities. No edema. No calf tenderness.  Skin: Warm and dry.  Neurological:  Alert, awake, and appropriate.  Normal speech.  No acute focal neurological deficits are appreciated.  Psychiatric: Normal affect. Good eye contact. Appropriate in content.    ED Course    Procedures  ED Vital Signs:  Vitals:    06/18/18 1855 06/18/18 2131   BP: (!) 183/85 (!) 169/82   Pulse: 105 94   Resp: 20 18   Temp: 99.5 °F (37.5 °C)    TempSrc: Oral    SpO2: 95% 96%   Weight: 114.8 kg (253 lb)    Height: 5' 7" (1.702 m)        Abnormal Lab Results:  Labs Reviewed   CBC W/ AUTO DIFFERENTIAL - Abnormal; Notable for the following:        Result Value    Hemoglobin 11.9 (*)     RDW 15.7 (*)     MPV 8.3 (*)     All other components within normal limits   COMPREHENSIVE METABOLIC PANEL - Abnormal; Notable for the following:     Glucose 253 (*)     BUN, Bld 23 (*)     Creatinine 6.2 (*)     Albumin 3.2 (*)     Anion Gap 17 (*)     eGFR if  8 (*)     eGFR if non  7 (*)     All other components within normal limits   PROTIME-INR   TSH   APTT   LIPID PANEL        All Lab Results:  Results for orders placed or performed during the hospital encounter of 06/18/18   CBC W/ AUTO DIFFERENTIAL   Result Value Ref Range    WBC 9.31 3.90 - 12.70 K/uL    RBC 4.19 4.00 - 5.40 M/uL    Hemoglobin 11.9 (L) 12.0 - 16.0 g/dL    Hematocrit 37.0 37.0 - 48.5 %    MCV 88 82 - 98 fL    MCH 28.4 27.0 - 31.0 pg    MCHC 32.2 32.0 - 36.0 g/dL    RDW 15.7 (H) 11.5 - 14.5 %    Platelets 257 150 - 350 K/uL    MPV 8.3 (L) 9.2 - 12.9 fL    Gran # (ANC) 6.5 1.8 - 7.7 K/uL    Lymph # 2.0 1.0 - 4.8 K/uL    Mono # 0.5 0.3 - 1.0 K/uL    Eos # 0.2 0.0 - 0.5 K/uL    Baso # 0.01 0.00 - 0.20 K/uL    Gran% " 69.7 38.0 - 73.0 %    Lymph% 21.8 18.0 - 48.0 %    Mono% 5.8 4.0 - 15.0 %    Eosinophil% 2.6 0.0 - 8.0 %    Basophil% 0.1 0.0 - 1.9 %    Differential Method Automated    Comprehensive metabolic panel   Result Value Ref Range    Sodium 137 136 - 145 mmol/L    Potassium 3.7 3.5 - 5.1 mmol/L    Chloride 95 95 - 110 mmol/L    CO2 25 23 - 29 mmol/L    Glucose 253 (H) 70 - 110 mg/dL    BUN, Bld 23 (H) 6 - 20 mg/dL    Creatinine 6.2 (H) 0.5 - 1.4 mg/dL    Calcium 9.5 8.7 - 10.5 mg/dL    Total Protein 7.5 6.0 - 8.4 g/dL    Albumin 3.2 (L) 3.5 - 5.2 g/dL    Total Bilirubin 0.3 0.1 - 1.0 mg/dL    Alkaline Phosphatase 91 55 - 135 U/L    AST 13 10 - 40 U/L    ALT 16 10 - 44 U/L    Anion Gap 17 (H) 8 - 16 mmol/L    eGFR if African American 8 (A) >60 mL/min/1.73 m^2    eGFR if non African American 7 (A) >60 mL/min/1.73 m^2   Protime-INR   Result Value Ref Range    Prothrombin Time 9.8 9.0 - 12.5 sec    INR 0.9 0.8 - 1.2   TSH   Result Value Ref Range    TSH 2.568 0.400 - 4.000 uIU/mL   APTT   Result Value Ref Range    aPTT 25.1 21.0 - 32.0 sec       Imaging Results:  Imaging Results          CT Head Without Contrast (Final result)  Result time 06/18/18 20:25:22    Final result by Curt Ball MD (06/18/18 20:25:22)                 Impression:      No acute findings.  Intracranial vascular calcification.      Electronically signed by: Curt Ball MD  Date:    06/18/2018  Time:    20:25             Narrative:    EXAMINATION:  CT HEAD WITHOUT CONTRAST    CLINICAL HISTORY:  Visual disturbance.  TIA.    TECHNIQUE:  Standard noncontrast CT of the brain.    All CT scans at this facility use dose modulation, iterative reconstruction and/or weight based dosing when appropriate to reduce radiation dose to as low as reasonably achievable.    COMPARISON:  None.    FINDINGS:  The ventricles are nonenlarged.  No acute hemorrhage edema or mass effect is identified.  Calcification of the cavernous sinus segment of the internal  "carotid artery and of the vertebral arteries is identified.    The skull is grossly normal.                               X-Ray Chest AP Portable (Final result)  Result time 06/18/18 20:19:13    Final result by Curt Ball MD (06/18/18 20:19:13)                 Impression:      No acute findings.      Electronically signed by: Curt Ball MD  Date:    06/18/2018  Time:    20:19             Narrative:    EXAMINATION:  XR CHEST AP PORTABLE    CLINICAL HISTORY:  Respiratory distress., Stroke;    COMPARISON:  12/11/2017    FINDINGS:  Cardiac size is upper limit of normal.  The lung volumes are decreased.  No acute infiltrate is evident this time.                               The EKG was ordered, reviewed, and independently interpreted by the ED provider.  Interpretation time: 1956  Rate: 93 BPM  Rhythm: normal sinus rhythm  Interpretation: Possible left atrial enlargement. LVH. Nonspecific T wave abnormality. Prolonged QT. Abnormal ECG. No STEMI.         The Emergency Provider reviewed the vital signs and test results, which are outlined above.    ED Discussion     8:03 PM: Dr. Perez transfers care of pt to Dr. Laird, pending lab and imaging results.    8:34 PM: Dr. Laird evaluated pt. Pt is resting comfortably and is in no acute distress. D/w pt all pertinent results. D/w pt any concerns expressed at this time. Answered all questions. Pt expresses understanding at this time. Patient c/o blurred vision in R eye onset suddenly when waking up this morning. Patient reports waking up at 1:30am with vomiting and diarrhea and then went back to sleep and then woke up a few hours later with loss of vision in R eye. Associated sxs includes R eye "pressure". Patient reports only being able to see light and movement. Patient denies any HA. Patient reports having dialysis today. Patient reports hx of cataract surgery in L eye.  Eyes:   External: Lids are normal without edema or erythema. No conjunctival injection " or edema. Normal sclera. No drainage. No proptosis.   EOM: Normal. Conjugate gaze.   Pupils: PERRL. No photophobia. R pupil cloudy, cannot see retina.   Visual fields are intact.  Intraocular Pressure: Right eye 12 mmHg. Left eye 42 mmHg.   Funduscopic exam: No hyphema. No papilledema. Disc margins sharp. No foreign body.    9:14 PM: Dr. Laird discussed the pt's case with Dr. Johnson (Ophthalmology) who recommends outpatient f/u tomorrow morning. Dr. Johnson feels patient has vitreous hemorrhage of R eye and recommends patient sleep in upright position. Phone number provided by Dr. Johnson is [phone].     9:22 PM: Reassessed pt at this time.  Discussed with pt all pertinent ED information and results. Discussed pt dx and plan of tx. Gave pt all f/u and return to the ED instructions. All questions and concerns were addressed at this time. Pt expresses understanding of information and instructions, and is comfortable with plan to discharge. Pt is stable for discharge.  Discussed with patient Dr. Johnson's recommendations. Advised patient to sleep in upright position and f/u with ophthalmology.    I discussed with patient and/or family/caretaker that evaluation in the ED does not suggest any emergent or life threatening medical conditions requiring immediate intervention beyond what was provided in the ED, and I believe patient is safe for discharge.  Regardless, an unremarkable evaluation in the ED does not preclude the development or presence of a serious of life threatening condition. As such, patient was instructed to return immediately for any worsening or change in current symptoms.      ED Medication(s):  Medications   tetracaine HCl (PF) 0.5 % Drop 2 drop (2 drops Right Eye Given 6/18/18 2049)       Discharge Medication List as of 6/18/2018  9:25 PM          Follow-up Information     Justus Johnson MD In 1 day.    Specialties:  Ophthalmology, Surgery  Contact information:  [address]  [place]  [address]  [phone]             [facility]    Specialty:  Emergency Medicine  Why:  As needed, If symptoms worsen  Contact information:  [address]  [facility] [address] [phone]                   Medical Decision Making    Medical Decision Making:   Clinical Tests:   Lab Tests: Ordered and Reviewed  Radiological Study: Ordered and Reviewed  Medical Tests: Ordered and Reviewed           Scribe Attestation:   Scribe #1: I performed the above scribed service and the documentation accurately describes the services I performed. I attest to the accuracy of the note.    Attending:   Physician Attestation Statement for Scribe #1: I, Dilip Perez MD, personally performed the services described in this documentation, as scribed by More Bui, in my presence, and it is both accurate and complete.       Scribe Attestation:   Scribe #2: I performed the above scribed service and the documentation accurately describes the services I performed. I attest to the accuracy of the note.    Attending Attestation:           Physician Attestation for Scribe:    Physician Attestation Statement for Scribe #2: I, Mónica Laird MD , reviewed documentation, as scribed by Alis Oconnor  in my presence, and it is both accurate and complete. I also acknowledge and confirm the content of the note done by Scribe #1.          Clinical Impression       ICD-10-CM ICD-9-CM   1. Vitreous hemorrhage of right eye H43.11 379.23   2. Stroke I63.9 434.91       Disposition:   Disposition: Discharged  Condition: Stable         Mónica Laird MD  06/19/18 8630

## 2018-06-19 NOTE — PROGRESS NOTES
===============================  06/19/2018   Cordell Angulo,   53 y.o. female   Last visit [facility]: :Visit date not found   Last visit eye dept. Visit date not found  [facility]:  Uncorrected distance visual acuity was HM in the right eye and 20/20 in the left eye.  Tonometry     Tonometry (Applanation, 10:32 AM)       Right Left    Pressure 24 26               Not recorded         Not recorded        Chief Complaint   Patient presents with    Blurred Vision     loss of vision OD since yesterday. Pressure in right eye. Pt was sick sunday night and vomited. When she woke up her right eye vision was blurry. Hasn't changed since then. no pain in the eye. states her depth perception is a little off since the vision got worse.      Ophthalmic Medications     Ophthalmic - Local Anesthetic Esters Start End    tetracaine HCl (PF) 0.5 % Drop 2 drop 6/18/2018 6/18/2018    Sig: Place 2 drops into the right eye ED 1 Time.    Route: Right Eye         HPI     Blurred Vision    Additional comments: loss of vision OD since yesterday. Pressure in right   eye. Pt was sick sunday night and vomited. When she woke up her right eye   vision was blurry. Hasn't changed since then. no pain in the eye. states   her depth perception is a little off since the vision got worse.            Comments   [facility] OS  DM- on dialysis       Last edited by Marcus Sahu on 6/19/2018 10:48 AM. (History)          ________________  6/19/2018  Problem List Items Addressed This Visit        Eye/Vision problems    Cataract with complication of right eye      Other Visit Diagnoses     Sudden visual loss of right eye    -  Primary        Seen in ER with complaint of acute od vision loss    Gonio narrow 1-2, but functional - but plateau iris    OD white cataract, no view in  B Scan ok  No obvious v heme, single disc drusen  Pre retinal fibrosis on bscan  OS thinning Left PMB  No NV, no ME  Previous OS surgery with Dr. Roman  Per patient no previous OD surgery or  laser      Recommend CE OD, guarded prognosis  rtc with Dr. Roman   .       ===========================

## 2018-06-19 NOTE — DISCHARGE INSTRUCTIONS
Sleep in upright position.  Call [phone] in the morning to set up appointment with Dr Johnson (Ophthalmologist).

## 2018-06-19 NOTE — ED NOTES
Will try to move patient to bed 20 when cleared and clean in order to put patient on cardiac monitor.

## 2018-07-22 ENCOUNTER — HOSPITAL ENCOUNTER (EMERGENCY)
Facility: HOSPITAL | Age: 53
Discharge: HOME | End: 2018-07-22
Payer: COMMERCIAL

## 2018-07-22 VITALS
WEIGHT: 262 LBS | HEIGHT: 67 IN | SYSTOLIC BLOOD PRESSURE: 163 MMHG | BODY MASS INDEX: 41.12 KG/M2 | RESPIRATION RATE: 16 BRPM | DIASTOLIC BLOOD PRESSURE: 68 MMHG | HEART RATE: 85 BPM | TEMPERATURE: 98 F

## 2018-07-22 DIAGNOSIS — M17.11 PRIMARY OSTEOARTHRITIS OF RIGHT KNEE: ICD-10-CM

## 2018-07-22 DIAGNOSIS — M25.561 RIGHT MEDIAL KNEE PAIN: Primary | ICD-10-CM

## 2018-07-22 PROCEDURE — 99283 EMERGENCY DEPT VISIT LOW MDM: CPT

## 2018-07-22 NOTE — ED PROVIDER NOTES
Encounter Date: 7/22/2018       History     Chief Complaint   Patient presents with    Knee Pain     pt states her knee locked up on friday and she is having severe pain, R side     Subjective:    Cordell Angulo is a 53 y.o. female who presents with knee pain involving the right knee. Onset is chronic with increased pain starting about 3 months ago. She has seen her Primary Care Provider, Dr. Dutta about June 20, 2018 for same complaint of right knee pain. She was prescribed Tramadol/acetaminphen 37.5/325 mg every 4 hrs as needed for pain. Patient currently taking Tramadol twice daily. She finds Tramadol makes her sleepy, then when awakens pain is still present.    Inciting event: began after a fall injury which occurred 2008.   Current symptoms include: crepitus sensation, giving out, locking, pain located medial region right knee, popping sensation, stiffness and swelling.   Pain is aggravated by going from sit to stand and when first up in morning out of bed. If at rest no pain of right knee. Patient has had prior knee problems. Evaluation to date: Orthropedics at [facility], no orthopedics over past 3-4 years. Treatment to date: tramadol/acetaminphen 37.5/325mg. No ice, no bracing, no PT.   No recent trauma.             Review of patient's allergies indicates:   Allergen Reactions    Sulfa (sulfonamide antibiotics) Rash     Past Medical History:   Diagnosis Date    Asthma     CHF (congestive heart failure)     CKD (chronic kidney disease) stage 5, GFR less than 15 ml/min     Depression     Diabetes mellitus     Hypercholesterolemia     Hypertension     PAF (paroxysmal atrial fibrillation)      Past Surgical History:   Procedure Laterality Date    AV FISTULA PLACEMENT      CARDIAC ELECTROPHYSIOLOGY MAPPING AND ABLATION      cesarian section      TUMOR REMOVAL      L lung     Family History   Problem Relation Age of Onset    Heart disease Mother     Diabetes Father      Social History   Substance Use  Topics    Smoking status: Never Smoker    Smokeless tobacco: Never Used    Alcohol use No     Review of Systems   Constitutional: Positive for activity change (right knee pain with walking).   HENT: Negative.    Eyes: Negative.    Respiratory: Negative.    Cardiovascular: Negative.    Gastrointestinal: Negative.    Genitourinary: Negative.    Musculoskeletal: Positive for arthralgias (right knee).   Skin: Negative.  Negative for color change.   Allergic/Immunologic: Negative for environmental allergies and food allergies.   Neurological: Negative.    Psychiatric/Behavioral: Negative.        Physical Exam     Initial Vitals [07/22/18 1409]   BP Pulse Resp Temp SpO2   (!) 163/68 85 16 98.4 °F (36.9 °C) --      MAP       --         Physical Exam    Nursing note and vitals reviewed.  Constitutional: She appears well-developed and well-nourished.   HENT:   Right Ear: External ear normal.   Left Ear: External ear normal.   Nose: Nose normal.   Mouth/Throat: Oropharynx is clear and moist. No oropharyngeal exudate.   Eyes: Conjunctivae are normal.   Neck: Normal range of motion. Neck supple.   Cardiovascular: Normal rate, regular rhythm, normal heart sounds and intact distal pulses.   Pulmonary/Chest: Breath sounds normal.   Abdominal: Soft.   Musculoskeletal: She exhibits tenderness (right medial knee). She exhibits no edema.        Right hip: Normal.        Left hip: Normal.        Right knee: She exhibits decreased range of motion, swelling ( mild to moderate) and effusion (small ). She exhibits no laceration, no erythema, normal alignment, no LCL laxity, normal patellar mobility, no bony tenderness, normal meniscus and no MCL laxity. Tenderness found. Medial joint line tenderness noted. No lateral joint line, no MCL and no patellar tendon tenderness noted.        Left knee: Normal.        Right ankle: Normal.        Left ankle: Normal.   Neurological: She is alert and oriented to person, place, and time. She has  normal strength and normal reflexes. No cranial nerve deficit.   Skin: Skin is warm and dry. Capillary refill takes less than 2 seconds. No erythema.         ED Course   Procedures  Labs Reviewed - No data to display       Imaging Results    None        2016 xray right knee revealed Tricompartmental djd.                        Clinical Impression:   The primary encounter diagnosis was Right medial knee pain. A diagnosis of Primary osteoarthritis of right knee was also pertinent to this visit.       follow up with Primary Care Provider, [facility], patient opts to determine which orthopedic physician her Primary Care Provider would like for her to be evaluated by.    Other conservative care per after visit summary                    Lee Ann Montgomery, TARAH  07/22/18 4566

## 2018-07-22 NOTE — Clinical Note
Improved pain with ace wrap, declines pain med in ER, has tramadol at home, NSAIDS contraindicated with renal failure.   Bp slightly elevated, patient did not take BP medication this morning.

## 2018-07-22 NOTE — DISCHARGE INSTRUCTIONS
Follow up with Primary Care Provider and or orthopedics for continued care. Patient's opts to follow up with her Primary Care Provider to determine who he would like for her to see at [facility].

## 2018-07-25 ENCOUNTER — TELEPHONE (OUTPATIENT)
Dept: ORTHOPEDICS | Facility: CLINIC | Age: 53
End: 2018-07-25

## 2018-07-25 RX ORDER — MELOXICAM 15 MG/1
TABLET ORAL
Qty: 90 TABLET | Refills: 0 | Status: SHIPPED | OUTPATIENT
Start: 2018-07-25 | End: 2018-08-05 | Stop reason: SINTOL

## 2018-07-25 RX ORDER — MELOXICAM 15 MG/1
15 TABLET ORAL DAILY
Qty: 15 TABLET | Refills: 0 | Status: SHIPPED | OUTPATIENT
Start: 2018-07-25 | End: 2018-07-25 | Stop reason: SDUPTHER

## 2018-07-25 NOTE — TELEPHONE ENCOUNTER
----- Message from Lola Pina MD sent at 7/25/2018  9:58 AM CDT -----  Patient was seen in ER for right knee pain. She has DJD with effusion. She is on dialysis MWF.     Can yall have someone in your staff see her for right knee pain with possible aspiration and steroid injection    I would really appreciate it     Willis Pina MD

## 2018-07-26 ENCOUNTER — TELEPHONE (OUTPATIENT)
Dept: ORTHOPEDICS | Facility: CLINIC | Age: 53
End: 2018-07-26

## 2018-07-26 NOTE — TELEPHONE ENCOUNTER
Called pt to schedule EDFU for Right knee pain. Pt was unavailable. Left message for pt to call back at earliest convenience.

## 2018-07-26 NOTE — TELEPHONE ENCOUNTER
----- Message from Jennifer Isidro sent at 7/26/2018  9:06 AM CDT -----  Contact: pt  She's returning a missed call from yesterday, please advise [phone] (home)

## 2018-08-05 ENCOUNTER — HOSPITAL ENCOUNTER (EMERGENCY)
Facility: HOSPITAL | Age: 53
Discharge: HOME | End: 2018-08-05
Attending: EMERGENCY MEDICINE
Payer: MEDICARE

## 2018-08-05 VITALS
HEIGHT: 67 IN | OXYGEN SATURATION: 99 % | SYSTOLIC BLOOD PRESSURE: 154 MMHG | DIASTOLIC BLOOD PRESSURE: 67 MMHG | RESPIRATION RATE: 20 BRPM | HEART RATE: 66 BPM | TEMPERATURE: 98 F

## 2018-08-05 DIAGNOSIS — E11.43 GASTROPARESIS DUE TO DM: Primary | ICD-10-CM

## 2018-08-05 DIAGNOSIS — R07.9 CHEST PAIN: ICD-10-CM

## 2018-08-05 DIAGNOSIS — K31.84 GASTROPARESIS DUE TO DM: Primary | ICD-10-CM

## 2018-08-05 LAB
ALBUMIN SERPL BCP-MCNC: 3.1 G/DL
ALP SERPL-CCNC: 75 U/L
ALT SERPL W/O P-5'-P-CCNC: 5 U/L
ANION GAP SERPL CALC-SCNC: 14 MMOL/L
AST SERPL-CCNC: 8 U/L
BACTERIA #/AREA URNS HPF: NORMAL /HPF
BASOPHILS # BLD AUTO: 0.01 K/UL
BASOPHILS NFR BLD: 0.1 %
BILIRUB SERPL-MCNC: 0.5 MG/DL
BILIRUB UR QL STRIP: NEGATIVE
BNP SERPL-MCNC: 243 PG/ML
BUN SERPL-MCNC: 33 MG/DL
CALCIUM SERPL-MCNC: 9.2 MG/DL
CHLORIDE SERPL-SCNC: 102 MMOL/L
CLARITY UR: CLEAR
CO2 SERPL-SCNC: 23 MMOL/L
COLOR UR: YELLOW
CREAT SERPL-MCNC: 8.2 MG/DL
DIFFERENTIAL METHOD: ABNORMAL
EOSINOPHIL # BLD AUTO: 0.3 K/UL
EOSINOPHIL NFR BLD: 3.6 %
ERYTHROCYTE [DISTWIDTH] IN BLOOD BY AUTOMATED COUNT: 16.6 %
EST. GFR  (AFRICAN AMERICAN): 6 ML/MIN/1.73 M^2
EST. GFR  (NON AFRICAN AMERICAN): 5 ML/MIN/1.73 M^2
GLUCOSE SERPL-MCNC: 178 MG/DL
GLUCOSE UR QL STRIP: ABNORMAL
HCT VFR BLD AUTO: 32.2 %
HGB BLD-MCNC: 10.1 G/DL
HGB UR QL STRIP: ABNORMAL
HYALINE CASTS #/AREA URNS LPF: 0 /LPF
KETONES UR QL STRIP: NEGATIVE
LEUKOCYTE ESTERASE UR QL STRIP: NEGATIVE
LIPASE SERPL-CCNC: 41 U/L
LYMPHOCYTES # BLD AUTO: 1.9 K/UL
LYMPHOCYTES NFR BLD: 21.1 %
MCH RBC QN AUTO: 28.3 PG
MCHC RBC AUTO-ENTMCNC: 31.4 G/DL
MCV RBC AUTO: 90 FL
MICROSCOPIC COMMENT: NORMAL
MONOCYTES # BLD AUTO: 0.5 K/UL
MONOCYTES NFR BLD: 5.3 %
NEUTROPHILS # BLD AUTO: 6.3 K/UL
NEUTROPHILS NFR BLD: 69.9 %
NITRITE UR QL STRIP: NEGATIVE
PH UR STRIP: >8 [PH] (ref 5–8)
PLATELET # BLD AUTO: 237 K/UL
PMV BLD AUTO: 8.2 FL
POTASSIUM SERPL-SCNC: 4 MMOL/L
PROT SERPL-MCNC: 6.6 G/DL
PROT UR QL STRIP: ABNORMAL
RBC # BLD AUTO: 3.57 M/UL
RBC #/AREA URNS HPF: 0 /HPF (ref 0–4)
SODIUM SERPL-SCNC: 139 MMOL/L
SP GR UR STRIP: 1.01 (ref 1–1.03)
SQUAMOUS #/AREA URNS HPF: 20 /HPF
TROPONIN I SERPL DL<=0.01 NG/ML-MCNC: 0.01 NG/ML
URN SPEC COLLECT METH UR: ABNORMAL
UROBILINOGEN UR STRIP-ACNC: NEGATIVE EU/DL
WBC # BLD AUTO: 9.05 K/UL
WBC #/AREA URNS HPF: 3 /HPF (ref 0–5)

## 2018-08-05 PROCEDURE — 84484 ASSAY OF TROPONIN QUANT: CPT

## 2018-08-05 PROCEDURE — 99284 EMERGENCY DEPT VISIT MOD MDM: CPT | Mod: 25

## 2018-08-05 PROCEDURE — 81000 URINALYSIS NONAUTO W/SCOPE: CPT

## 2018-08-05 PROCEDURE — 96374 THER/PROPH/DIAG INJ IV PUSH: CPT

## 2018-08-05 PROCEDURE — 25000003 PHARM REV CODE 250: Performed by: EMERGENCY MEDICINE

## 2018-08-05 PROCEDURE — 85025 COMPLETE CBC W/AUTO DIFF WBC: CPT

## 2018-08-05 PROCEDURE — 93010 ELECTROCARDIOGRAM REPORT: CPT | Mod: ,,, | Performed by: INTERNAL MEDICINE

## 2018-08-05 PROCEDURE — 83690 ASSAY OF LIPASE: CPT

## 2018-08-05 PROCEDURE — 83880 ASSAY OF NATRIURETIC PEPTIDE: CPT

## 2018-08-05 PROCEDURE — 80053 COMPREHEN METABOLIC PANEL: CPT

## 2018-08-05 PROCEDURE — 63600175 PHARM REV CODE 636 W HCPCS: Performed by: EMERGENCY MEDICINE

## 2018-08-05 RX ORDER — LORAZEPAM 1 MG/1
1 TABLET ORAL NIGHTLY
Qty: 30 TABLET | Refills: 2 | Status: ON HOLD | OUTPATIENT
Start: 2018-08-05 | End: 2018-12-28 | Stop reason: HOSPADM

## 2018-08-05 RX ORDER — ASPIRIN 325 MG
325 TABLET ORAL
Status: COMPLETED | OUTPATIENT
Start: 2018-08-05 | End: 2018-08-05

## 2018-08-05 RX ORDER — ONDANSETRON 2 MG/ML
4 INJECTION INTRAMUSCULAR; INTRAVENOUS
Status: COMPLETED | OUTPATIENT
Start: 2018-08-05 | End: 2018-08-05

## 2018-08-05 RX ORDER — LISINOPRIL 40 MG/1
40 TABLET ORAL DAILY
COMMUNITY

## 2018-08-05 RX ORDER — INSULIN GLARGINE 100 [IU]/ML
10 INJECTION, SOLUTION SUBCUTANEOUS NIGHTLY
COMMUNITY
End: 2020-11-10

## 2018-08-05 RX ORDER — LORAZEPAM 1 MG/1
1 TABLET ORAL NIGHTLY
Qty: 30 TABLET | Refills: 2 | Status: SHIPPED | OUTPATIENT
Start: 2018-08-05 | End: 2018-08-05

## 2018-08-05 RX ADMIN — ONDANSETRON 4 MG: 2 INJECTION, SOLUTION INTRAMUSCULAR; INTRAVENOUS at 04:08

## 2018-08-05 RX ADMIN — ASPIRIN 325 MG ORAL TABLET 325 MG: 325 PILL ORAL at 04:08

## 2018-08-05 NOTE — ED NOTES
Pt AAOx3, resting in bed, side rails up x 2, call bell within reach. Family at bedside. NAD at this time. Will continue to monitor.

## 2018-08-05 NOTE — DISCHARGE INSTRUCTIONS
With take Ativan at nighttime for anxiety and gastroparesis.  Make sure that you walk everyday at least 15 min a day to improve the gastric emptying.  Avoid eating any food after 7:00 p.m.     Place blocks underneath the head end of the bed to raise the head end of the bed by 45°    Elevate head end 45 degrees with blocks under the head end of the bed     Zantac 150 mg bed time    No food after 7 pm     No caffeine or alcohol or tea or smoking exposure at night

## 2018-08-05 NOTE — ED PROVIDER NOTES
"SCRIBE #1 NOTE: IAlis, am scribing for, and in the presence of, Iain Mcclendon Jr., MD. I have scribed the HPI, ROS, and PEx.     SCRIBE #2 NOTE: I, More Bui, am scribing for, and in the presence of,  Willis Pina MD. I have scribed the remaining portions of the note not scribed by Scribe #1.     History      Chief Complaint   Patient presents with    Emesis      chest pain        Review of patient's allergies indicates:   Allergen Reactions    Sulfa (sulfonamide antibiotics) Rash        HPI   HPI    8/5/2018, 4:52 AM   History obtained from the patient      History of Present Illness: Cordell Angulo is a 53 y.o. female dialysis patient with PMHx of CHF, CKD, DM, and HTN who presents to the Emergency Department for emesis which onset gradually at 3am. Patient reports waking up from sleep diaphoretic and then vomited. Symptoms are episodic and moderate in severity. Patient reports 1 episode of emesis at home. No mitigating or exacerbating factors reported. Patient reports having "burning" CP after vomiting, which has improved at this time. Patient denies any fever, chills, HA, dizziness, SOB, abd pain, diarrhea, dysuria, extremity weakness/numbness, and all other sxs at this time. Patient states she sees Dr. Pina for nephrology, has dialysis on Mondays, Wednesdays, and Fridays. No further complaints or concerns at this time.     Arrival mode: EMS      PCP: Primary Doctor No       Past Medical History:  Past Medical History:   Diagnosis Date    Asthma     CHF (congestive heart failure)     CKD (chronic kidney disease) stage 5, GFR less than 15 ml/min     Depression     Diabetes mellitus     Hypercholesterolemia     Hypertension     PAF (paroxysmal atrial fibrillation)        Past Surgical History:  Past Surgical History:   Procedure Laterality Date    AV FISTULA PLACEMENT      CARDIAC ELECTROPHYSIOLOGY MAPPING AND ABLATION      cesarian section      TUMOR REMOVAL      L lung         Family " History:  Family History   Problem Relation Age of Onset    Heart disease Mother     Diabetes Father        Social History:  Social History     Social History Main Topics    Smoking status: Never Smoker    Smokeless tobacco: Never Used    Alcohol use No    Drug use: No    Sexual activity: Unknown        ROS   Review of Systems   Constitutional: Positive for diaphoresis. Negative for chills and fever.   HENT: Negative for sore throat.    Respiratory: Negative for shortness of breath.    Cardiovascular: Positive for chest pain.   Gastrointestinal: Positive for nausea and vomiting. Negative for abdominal pain and diarrhea.   Genitourinary: Negative for dysuria.   Musculoskeletal: Negative for back pain.   Skin: Negative for rash.   Neurological: Negative for dizziness, weakness, numbness and headaches.   Hematological: Does not bruise/bleed easily.   All other systems reviewed and are negative.    Physical Exam      Initial Vitals [08/05/18 0403]   BP Pulse Resp Temp SpO2   (!) 161/96 (!) 18 20 98.3 °F (36.8 °C) 96 %      MAP       --          Physical Exam  Nursing Notes and Vital Signs Reviewed.  Constitutional: Patient is in no acute distress. Well-developed and well-nourished.  Head: Atraumatic. Normocephalic.  Eyes: PERRL. EOM intact. Conjunctivae are not pale. No scleral icterus.  ENT: Mucous membranes are moist. Oropharynx is clear and symmetric.    Neck: Supple. Full ROM. No lymphadenopathy.  Cardiovascular: Regular rate. Regular rhythm. Systolic murmur. No rubs or gallops. Distal pulses are 2+ and symmetric.  Pulmonary/Chest: No respiratory distress. Clear to auscultation bilaterally. No wheezing or rales.  Abdominal: Soft and non-distended.  There is epigastric tenderness.  No rebound, guarding, or rigidity. Good bowel sounds.  Genitourinary: No CVA tenderness  Musculoskeletal: Moves all extremities. No obvious deformities. No edema. No calf tenderness. L fistula, good thrill.  Skin: Warm and  "dry.  Neurological:  Alert, awake, and appropriate.  Normal speech.  No acute focal neurological deficits are appreciated.  Psychiatric: Normal affect. Good eye contact. Appropriate in content.    ED Course    Procedures  ED Vital Signs:  Vitals:    08/05/18 0403 08/05/18 0430 08/05/18 0505 08/05/18 0626   BP: (!) 161/96   (!) 146/69   Pulse: (!) 18 73 86 68   Resp: 20   18   Temp: 98.3 °F (36.8 °C)   98.4 °F (36.9 °C)   TempSrc: Oral   Oral   SpO2: 96%   99%   Height: 5' 7" (1.702 m)       08/05/18 0743   BP: (!) 154/67   Pulse: 66   Resp: 20   Temp:    TempSrc:    SpO2: 99%   Height:        Abnormal Lab Results:  Labs Reviewed   CBC W/ AUTO DIFFERENTIAL - Abnormal; Notable for the following:        Result Value    RBC 3.57 (*)     Hemoglobin 10.1 (*)     Hematocrit 32.2 (*)     MCHC 31.4 (*)     RDW 16.6 (*)     MPV 8.2 (*)     All other components within normal limits   COMPREHENSIVE METABOLIC PANEL - Abnormal; Notable for the following:     Glucose 178 (*)     BUN, Bld 33 (*)     Creatinine 8.2 (*)     Albumin 3.1 (*)     AST 8 (*)     ALT 5 (*)     eGFR if  6 (*)     eGFR if non  5 (*)     All other components within normal limits   B-TYPE NATRIURETIC PEPTIDE - Abnormal; Notable for the following:      (*)     All other components within normal limits   URINALYSIS - Abnormal; Notable for the following:     pH, UA >8.0 (*)     Protein, UA 2+ (*)     Glucose, UA 1+ (*)     Occult Blood UA Trace (*)     All other components within normal limits   TROPONIN I   LIPASE   URINALYSIS MICROSCOPIC   LIPASE        All Lab Results:  Results for orders placed or performed during the hospital encounter of 08/05/18   CBC auto differential   Result Value Ref Range    WBC 9.05 3.90 - 12.70 K/uL    RBC 3.57 (L) 4.00 - 5.40 M/uL    Hemoglobin 10.1 (L) 12.0 - 16.0 g/dL    Hematocrit 32.2 (L) 37.0 - 48.5 %    MCV 90 82 - 98 fL    MCH 28.3 27.0 - 31.0 pg    MCHC 31.4 (L) 32.0 - 36.0 g/dL    RDW " 16.6 (H) 11.5 - 14.5 %    Platelets 237 150 - 350 K/uL    MPV 8.2 (L) 9.2 - 12.9 fL    Gran # (ANC) 6.3 1.8 - 7.7 K/uL    Lymph # 1.9 1.0 - 4.8 K/uL    Mono # 0.5 0.3 - 1.0 K/uL    Eos # 0.3 0.0 - 0.5 K/uL    Baso # 0.01 0.00 - 0.20 K/uL    Gran% 69.9 38.0 - 73.0 %    Lymph% 21.1 18.0 - 48.0 %    Mono% 5.3 4.0 - 15.0 %    Eosinophil% 3.6 0.0 - 8.0 %    Basophil% 0.1 0.0 - 1.9 %    Differential Method Automated    Comprehensive metabolic panel   Result Value Ref Range    Sodium 139 136 - 145 mmol/L    Potassium 4.0 3.5 - 5.1 mmol/L    Chloride 102 95 - 110 mmol/L    CO2 23 23 - 29 mmol/L    Glucose 178 (H) 70 - 110 mg/dL    BUN, Bld 33 (H) 6 - 20 mg/dL    Creatinine 8.2 (H) 0.5 - 1.4 mg/dL    Calcium 9.2 8.7 - 10.5 mg/dL    Total Protein 6.6 6.0 - 8.4 g/dL    Albumin 3.1 (L) 3.5 - 5.2 g/dL    Total Bilirubin 0.5 0.1 - 1.0 mg/dL    Alkaline Phosphatase 75 55 - 135 U/L    AST 8 (L) 10 - 40 U/L    ALT 5 (L) 10 - 44 U/L    Anion Gap 14 8 - 16 mmol/L    eGFR if African American 6 (A) >60 mL/min/1.73 m^2    eGFR if non African American 5 (A) >60 mL/min/1.73 m^2   Troponin I #1   Result Value Ref Range    Troponin I 0.010 0.000 - 0.026 ng/mL   B-Type natriuretic peptide (BNP)   Result Value Ref Range     (H) 0 - 99 pg/mL   Urinalysis - Clean Catch   Result Value Ref Range    Specimen UA Urine, Clean Catch     Color, UA Yellow Yellow, Straw, Hallie    Appearance, UA Clear Clear    pH, UA >8.0 (A) 5.0 - 8.0    Specific Gravity, UA 1.015 1.005 - 1.030    Protein, UA 2+ (A) Negative    Glucose, UA 1+ (A) Negative    Ketones, UA Negative Negative    Bilirubin (UA) Negative Negative    Occult Blood UA Trace (A) Negative    Nitrite, UA Negative Negative    Urobilinogen, UA Negative <2.0 EU/dL    Leukocytes, UA Negative Negative   Urinalysis Microscopic   Result Value Ref Range    RBC, UA 0 0 - 4 /hpf    WBC, UA 3 0 - 5 /hpf    Bacteria, UA Rare None-Occ /hpf    Squam Epithel, UA 20 /hpf    Hyaline Casts, UA 0 0-1/lpf /lpf     Microscopic Comment SEE COMMENT    Lipase   Result Value Ref Range    Lipase 41 4 - 60 U/L     Imaging Results:  Imaging Results          CT Abdomen Pelvis  Without Contrast (Final result)  Result time 08/05/18 09:59:42    Final result by Kvng Banks MD (08/05/18 09:59:42)                 Impression:      1.  Negative for acute process involving the abdomen or pelvis.  Negative for inflammatory changes.  Normal appendix.  Negative for renal stone disease or hydronephrosis.    2. Numerous nonemergent findings as described above, to include large rim calcified uterine fibroid, atrophy of the kidneys, increased density within the liver which can be within the broad range of normal or related to a deposition disorder, marked degenerative changes of the spine, 1.5 cm left adrenal adenoma, 2 mm left lower lobe pulmonary nodule with dependent atelectasis in the lung bases and small fat filled umbilical hernia.    3.  I agree with the preliminary interpretation rendered.    All CT scans at this facility are performed  using dose modulation techniques as appropriate to performed exam including the following:  automated exposure control; adjustment of mA and/or kV according to the patients size (this includes techniques or standardized protocols for targeted exams where dose is matched to indication/reason for exam: i.e. extremities or head);  iterative reconstruction technique.      Electronically signed by: Kvng Banks MD  Date:    08/05/2018  Time:    09:59             Narrative:    EXAMINATION:  CT ABDOMEN PELVIS WITHOUT CONTRAST    CLINICAL HISTORY:  Generalized abdominal pain.  Vomiting    TECHNIQUE:  Axial images through the abdomen and pelvis were obtained without the use of IV contrast.  Sagittal and coronal reconstructions are provided for review.  Oral contrast was not utilized.    COMPARISON:  None    FINDINGS:  LUNG BASES: Mild to moderate dependent atelectasis in the lung bases.  2 mm left lower lobe  pulmonary nodule incidentally noted.  Lung bases are otherwise clear.  Negative for pleural or pericardial effusions. The distal esophagus is normal.  Cardiac chambers are enlarged.    ABDOMEN: 1.5 cm low-density left adrenal nodule with some calcification within it, consistent with an adrenal adenoma.  Moderate atrophy of the bilateral kidneys.  Possible mild increased density in the liver.  The liver, spleen and gallbladder appear normal.  The pancreas is unremarkable.  The right adrenal gland is normal.    Negative for adenopathy, free fluid or inflammatory change noted within the abdomen or pelvis.  Vascular calcifications are present without aneurysmal changes. Portal vein is patent.    The bowel appears normal. Normal appendix.    PELVIS: The urinary bladder is unremarkable.  There is a rim calcified 8.7 cm uterine lesion.  The rest of the female pelvic organs are normal. There are pelvic phleboliths.    No significant osseous abnormality is identified.  Negative for significant spinal canal stenosis. Moderate to severe multilevel marginal spondylosis and degenerative facet arthropathy most significantly involving the thoracic region.    Negative for groin adenopathy.    Small fat filled umbilical hernia.  The abdominal wall is otherwise intact.                               X-Ray Chest AP Portable (Final result)  Result time 08/05/18 08:13:32    Final result by Kvng Banks MD (08/05/18 08:13:32)                 Impression:      1.  Negative for acute process involving the chest, considering there are low lung volumes on this lordotic lead position radiograph.    2.  Stable findings as noted above.      Electronically signed by: Kvng Banks MD  Date:    08/05/2018  Time:    08:13             Narrative:    EXAMINATION:  XR CHEST AP PORTABLE    CLINICAL HISTORY:  Chest Pain;    COMPARISON:  June 18, 2018, as well as studies dating back to August 23, 2016.    FINDINGS:  EKG leads overlie the chest which is  lordotic in position.  Low lung volumes.  The lungs are clear. The cardiac silhouette size is enlarged.  The trachea is midline and the mediastinal width is normal. Negative for focal infiltrate, effusion or pneumothorax. Pulmonary vasculature is normal. Negative for osseous abnormalities. Marginal spondylosis.  Tortuous aorta.  Degenerative changes of the shoulder girdles again seen.                               RADIOLOGY REPORT (Final result)  Result time 08/10/18 08:46:32               The EKG was ordered, reviewed, and independently interpreted by the ED provider.  Interpretation time: 0413  Rate: 78 BPM  Rhythm: normal sinus rhythm  Interpretation: Voltage criteria for left ventricular hypertrophy. T wave abnormality, consider lateral ischemia. Prolonged QT. Abnormal ECG. No STEMI.    Per [facility], pt's CT results mild diffuse increase in the attenuation of the live. Wide-ranging differential including iron deposition, glycogen storage diseases, and amiodarone hepatotoxicity. Enlarged fibroid uterus. Watery throughout the colon, which may represent diarrheal disease.            The Emergency Provider reviewed the vital signs and test results, which are outlined above.    ED Discussion     6:00 AM: Dr. Mcclendon transfers care of pt to Dr. Pina, pending lab and imaging results.    6:49 AM: Reassessed pt at this time. Discussed with pt all pertinent ED information and results. Discussed pt dx and plan of tx. Instructed to pt to take Ativan at night for her anxiety. Recommended pt f/u with a GI doctor regarding her gastroparesis. Gave pt all f/u and return to the ED instructions. All questions and concerns were addressed at this time. Pt expresses understanding of information and instructions, and is comfortable with plan to discharge. Pt is stable for discharge.    I discussed with patient and/or family/caretaker that evaluation in the ED does not suggest any emergent or life threatening medical  conditions requiring immediate intervention beyond what was provided in the ED, and I believe patient is safe for discharge.  Regardless, an unremarkable evaluation in the ED does not preclude the development or presence of a serious of life threatening condition. As such, patient was instructed to return immediately for any worsening or change in current symptoms.      ED Medication(s):  Medications   aspirin tablet 325 mg (325 mg Oral Given 8/5/18 0441)   ondansetron injection 4 mg (4 mg Intravenous Given 8/5/18 0441)       Discharge Medication List as of 8/5/2018  6:51 AM          Follow-up Information     Go to  [facility] - .    Specialty:  Emergency Medicine  Why:  If symptoms worsen  Contact information:  [id number]  [facility] [address] [phone]                   Medical Decision Making    Medical Decision Making:   Clinical Tests:   Lab Tests: Reviewed and Ordered  Radiological Study: Reviewed and Ordered  Medical Tests: Reviewed and Ordered           Scribe Attestation:   Scribe #1: I performed the above scribed service and the documentation accurately describes the services I performed. I attest to the accuracy of the note.    Attending:   Physician Attestation Statement for Scribe #1: I, Iain Mcclendon Jr., MD, personally performed the services described in this documentation, as scribed by Alis Oconnor, in my presence, and it is both accurate and complete.       Scribe Attestation:   Scribe #2: I performed the above scribed service and the documentation accurately describes the services I performed. I attest to the accuracy of the note.    Attending Attestation:           Physician Attestation for Scribe:    Physician Attestation Statement for Scribe #2: I, Willis Pina MD, reviewed documentation, as scribed by More Bui in my presence, and it is both accurate and complete. I also acknowledge and confirm the content of the note done by Shahriar  #1.          Clinical Impression       ICD-10-CM ICD-9-CM   1. Gastroparesis due to DM E11.43 250.60    K31.84 536.3   2. Chest pain R07.9 786.50       Disposition:   Disposition: Discharged  Condition: Stable         Lola Pina MD  08/10/18 [id number]

## 2018-08-10 DIAGNOSIS — M25.562 PAIN IN BOTH KNEES, UNSPECIFIED CHRONICITY: Primary | ICD-10-CM

## 2018-08-10 DIAGNOSIS — M25.561 PAIN IN BOTH KNEES, UNSPECIFIED CHRONICITY: Primary | ICD-10-CM

## 2018-09-14 RX ORDER — LEVOCETIRIZINE DIHYDROCHLORIDE 2.5 MG/5ML
2.5 SOLUTION ORAL NIGHTLY
Qty: 150 ML | Refills: 0 | Status: ON HOLD | OUTPATIENT
Start: 2018-09-14 | End: 2018-12-28 | Stop reason: HOSPADM

## 2018-10-10 ENCOUNTER — TELEPHONE (OUTPATIENT)
Dept: PULMONOLOGY | Facility: CLINIC | Age: 53
End: 2018-10-10

## 2018-10-10 NOTE — TELEPHONE ENCOUNTER
----- Message from Lola Pina MD sent at 10/10/2018  8:42 AM CDT -----  Patient has ESRD with sleep apnea.  Also having a lot of cough and upper respiratory symptoms.  She can be seen at your convenience.  She also works.  Her cell phone [phone]    Any provider would be fine    Her dialysis days are Monday Wednesday Friday in the morning

## 2018-10-14 RX ORDER — MELOXICAM 15 MG/1
TABLET ORAL
Qty: 15 TABLET | Refills: 0 | Status: ON HOLD | OUTPATIENT
Start: 2018-10-14 | End: 2018-12-26

## 2018-11-02 DIAGNOSIS — R11.2 NAUSEA AND VOMITING, INTRACTABILITY OF VOMITING NOT SPECIFIED, UNSPECIFIED VOMITING TYPE: Primary | ICD-10-CM

## 2018-11-28 DIAGNOSIS — I48.0 PAF (PAROXYSMAL ATRIAL FIBRILLATION): ICD-10-CM

## 2018-11-28 DIAGNOSIS — I50.32 CHRONIC DIASTOLIC CONGESTIVE HEART FAILURE: ICD-10-CM

## 2018-11-28 DIAGNOSIS — I20.89 STABLE ANGINA: ICD-10-CM

## 2018-11-28 DIAGNOSIS — I10 ESSENTIAL HYPERTENSION: Primary | Chronic | ICD-10-CM

## 2018-11-29 ENCOUNTER — HOSPITAL ENCOUNTER (EMERGENCY)
Facility: HOSPITAL | Age: 53
Discharge: HOME | End: 2018-11-30
Attending: EMERGENCY MEDICINE
Payer: MEDICARE

## 2018-11-29 DIAGNOSIS — R07.9 CHEST PAIN: Primary | ICD-10-CM

## 2018-11-29 DIAGNOSIS — N18.5 CKD (CHRONIC KIDNEY DISEASE) STAGE 5, GFR LESS THAN 15 ML/MIN: ICD-10-CM

## 2018-11-29 LAB
ALBUMIN SERPL BCP-MCNC: 3.3 G/DL
ALP SERPL-CCNC: 71 U/L
ALT SERPL W/O P-5'-P-CCNC: 13 U/L
ANION GAP SERPL CALC-SCNC: 16 MMOL/L
APTT BLDCRRT: <21 SEC
AST SERPL-CCNC: 15 U/L
BASOPHILS # BLD AUTO: 0.02 K/UL
BASOPHILS NFR BLD: 0.2 %
BILIRUB SERPL-MCNC: 0.4 MG/DL
BNP SERPL-MCNC: 973 PG/ML
BUN SERPL-MCNC: 31 MG/DL
CALCIUM SERPL-MCNC: 10.1 MG/DL
CHLORIDE SERPL-SCNC: 102 MMOL/L
CK MB SERPL-MCNC: 0.5 NG/ML
CK MB SERPL-RTO: 0.9 %
CK SERPL-CCNC: 53 U/L
CK SERPL-CCNC: 53 U/L
CO2 SERPL-SCNC: 24 MMOL/L
CREAT SERPL-MCNC: 7.3 MG/DL
DIFFERENTIAL METHOD: ABNORMAL
EOSINOPHIL # BLD AUTO: 0.3 K/UL
EOSINOPHIL NFR BLD: 3.2 %
ERYTHROCYTE [DISTWIDTH] IN BLOOD BY AUTOMATED COUNT: 15.7 %
EST. GFR  (AFRICAN AMERICAN): 7 ML/MIN/1.73 M^2
EST. GFR  (NON AFRICAN AMERICAN): 6 ML/MIN/1.73 M^2
GLUCOSE SERPL-MCNC: 138 MG/DL
HCT VFR BLD AUTO: 36.4 %
HGB BLD-MCNC: 11.7 G/DL
INR PPP: 0.9
LYMPHOCYTES # BLD AUTO: 2.1 K/UL
LYMPHOCYTES NFR BLD: 23.6 %
MCH RBC QN AUTO: 28.3 PG
MCHC RBC AUTO-ENTMCNC: 32.1 G/DL
MCV RBC AUTO: 88 FL
MONOCYTES # BLD AUTO: 0.6 K/UL
MONOCYTES NFR BLD: 6.5 %
NEUTROPHILS # BLD AUTO: 5.9 K/UL
NEUTROPHILS NFR BLD: 66.5 %
PLATELET # BLD AUTO: 250 K/UL
PMV BLD AUTO: 10 FL
POTASSIUM SERPL-SCNC: 4 MMOL/L
PROT SERPL-MCNC: 7.2 G/DL
PROTHROMBIN TIME: 10.2 SEC
RBC # BLD AUTO: 4.13 M/UL
SODIUM SERPL-SCNC: 142 MMOL/L
TROPONIN I SERPL DL<=0.01 NG/ML-MCNC: 0.01 NG/ML
WBC # BLD AUTO: 8.88 K/UL

## 2018-11-29 PROCEDURE — 85025 COMPLETE CBC W/AUTO DIFF WBC: CPT

## 2018-11-29 PROCEDURE — 93005 ELECTROCARDIOGRAM TRACING: CPT

## 2018-11-29 PROCEDURE — 82553 CREATINE MB FRACTION: CPT

## 2018-11-29 PROCEDURE — 99285 EMERGENCY DEPT VISIT HI MDM: CPT | Mod: 25

## 2018-11-29 PROCEDURE — 83880 ASSAY OF NATRIURETIC PEPTIDE: CPT

## 2018-11-29 PROCEDURE — 80053 COMPREHEN METABOLIC PANEL: CPT

## 2018-11-29 PROCEDURE — 93010 ELECTROCARDIOGRAM REPORT: CPT | Mod: ,,, | Performed by: INTERNAL MEDICINE

## 2018-11-29 PROCEDURE — 84484 ASSAY OF TROPONIN QUANT: CPT

## 2018-11-29 PROCEDURE — 85610 PROTHROMBIN TIME: CPT

## 2018-11-29 PROCEDURE — 82550 ASSAY OF CK (CPK): CPT

## 2018-11-29 PROCEDURE — 85730 THROMBOPLASTIN TIME PARTIAL: CPT

## 2018-11-30 VITALS
OXYGEN SATURATION: 97 % | BODY MASS INDEX: 39.87 KG/M2 | TEMPERATURE: 99 F | SYSTOLIC BLOOD PRESSURE: 146 MMHG | WEIGHT: 254 LBS | DIASTOLIC BLOOD PRESSURE: 67 MMHG | RESPIRATION RATE: 22 BRPM | HEART RATE: 67 BPM | HEIGHT: 67 IN

## 2018-11-30 NOTE — ED PROVIDER NOTES
SCRIBE #1 NOTE: I, Andreia Glover, am scribing for, and in the presence of, Bernard Miller MD. I have scribed the entire note.       History     Chief Complaint   Patient presents with    Chest Pain     states having chest tightness with exertion and weak feeling past 5 days Dialysis yesterday, Dr Pina states no fluid overload. Appt with cardiology Monday     Review of patient's allergies indicates:   Allergen Reactions    Sulfa (sulfonamide antibiotics) Rash         History of Present Illness     HPI    11/29/2018, 9:10 PM  History obtained from the patient      History of Present Illness: Cordell Angulo is a 53 y.o. female patient with a PMHx of asthma, CHF, CKD, DM, and HTN who presents to the Emergency Department for evaluation of chest tightness which onset gradually 4 days ago. Symptoms are constant and moderate in severity.  No mitigating or exacerbating factors reported. Associated sxs include SOB w/ exertion and generalized weakness. Patient denies any fever, chills, cough, leg swelling, diaphoresis, dizziness, nausea, vomiting, and all other sxs at this time. Pt received dialysis Tx and was told she doesn't have fluid overload. Pt has an appointment with cardiology in 4 days. No prior Tx. No further complaints or concerns at this time.         Arrival mode: Personal vehicle     PCP: Primary Doctor No        Past Medical History:  Past Medical History:   Diagnosis Date    Asthma     CHF (congestive heart failure)     CKD (chronic kidney disease) stage 5, GFR less than 15 ml/min     Depression     Diabetes mellitus     Dialysis patient     Hypercholesterolemia     Hypertension     PAF (paroxysmal atrial fibrillation)        Past Surgical History:  Past Surgical History:   Procedure Laterality Date    AV FISTULA PLACEMENT      CARDIAC ELECTROPHYSIOLOGY MAPPING AND ABLATION      cesarian section      TUMOR REMOVAL      L lung    VASCULAR CATH INSERTION N/A 10/19/2015    Performed by Oren  MD Randy at [facility] CATH LAB         Family History:  Family History   Problem Relation Age of Onset    Heart disease Mother     Diabetes Father        Social History:  Social History     Tobacco Use    Smoking status: Never Smoker    Smokeless tobacco: Never Used   Substance and Sexual Activity    Alcohol use: No    Drug use: No    Sexual activity: Unknown        Review of Systems     Review of Systems   Constitutional: Negative for chills, diaphoresis and fever.   HENT: Negative for sore throat.    Respiratory: Positive for chest tightness and shortness of breath.    Cardiovascular: Negative for palpitations and leg swelling.   Gastrointestinal: Negative for nausea and vomiting.   Genitourinary: Negative for dysuria.   Musculoskeletal: Negative for back pain.   Skin: Negative for rash.   Neurological: Positive for weakness (generalized). Negative for dizziness.   Hematological: Does not bruise/bleed easily.   All other systems reviewed and are negative.       Physical Exam     Initial Vitals [11/29/18 2007]   BP Pulse Resp Temp SpO2   (!) 202/101 100 (!) 24 98.5 °F (36.9 °C) 100 %      MAP       --          Physical Exam  Nursing Notes and Vital Signs Reviewed.  Constitutional: Patient is in no acute distress. Well-developed and well-nourished.  Head: Atraumatic. Normocephalic.  Eyes: PERRL. EOM intact. Conjunctivae are not pale. No scleral icterus.  ENT: Mucous membranes are moist. Oropharynx is clear and symmetric.    Neck: Supple. Full ROM. No lymphadenopathy.  Cardiovascular: Regular rate. Regular rhythm. No murmurs, rubs, or gallops. Distal pulses are 2+ and symmetric.  Pulmonary/Chest: No respiratory distress. Clear to auscultation bilaterally. No wheezing or rales.  Abdominal: Soft and non-distended.  There is no tenderness.  No rebound, guarding, or rigidity. Good bowel sounds.  Genitourinary: No CVA tenderness  Musculoskeletal: Moves all extremities. No obvious deformities. No edema. No calf  "tenderness.  Skin: Warm and dry. L AV fistula positive thrill.  Neurological:  Alert, awake, and appropriate.  Normal speech.  No acute focal neurological deficits are appreciated.  Psychiatric: Normal affect. Good eye contact. Appropriate in content.     ED Course   Procedures  ED Vital Signs:  Vitals:    11/29/18 2007 11/29/18 2120 11/29/18 2203 11/29/18 2232   BP: (!) 202/101  (!) 164/70 (!) 158/72   Pulse: 100 92 67 66   Resp: (!) 24  (!) 22 (!) 22   Temp: 98.5 °F (36.9 °C)      TempSrc: Oral      SpO2: 100%  96% 96%   Weight: 115.2 kg (253 lb 15.5 oz)      Height: 5' 7" (1.702 m)       11/29/18 2302 11/29/18 2332 11/30/18 0002   BP: (!) 159/72 (!) 148/68 (!) 146/67   Pulse: 68 68 67   Resp: (!) 22 (!) 22 (!) 22   Temp:      TempSrc:      SpO2: 98% 97% 97%   Weight:      Height:          Abnormal Lab Results:  Labs Reviewed   CBC W/ AUTO DIFFERENTIAL - Abnormal; Notable for the following components:       Result Value    Hemoglobin 11.7 (*)     Hematocrit 36.4 (*)     RDW 15.7 (*)     All other components within normal limits   COMPREHENSIVE METABOLIC PANEL - Abnormal; Notable for the following components:    Glucose 138 (*)     BUN, Bld 31 (*)     Creatinine 7.3 (*)     Albumin 3.3 (*)     eGFR if  7 (*)     eGFR if non  6 (*)     All other components within normal limits   B-TYPE NATRIURETIC PEPTIDE - Abnormal; Notable for the following components:     (*)     All other components within normal limits   CK-MB   CK   TROPONIN I   APTT   PROTIME-INR        All Lab Results:  Results for orders placed or performed during the hospital encounter of 11/29/18   CBC auto differential   Result Value Ref Range    WBC 8.88 3.90 - 12.70 K/uL    RBC 4.13 4.00 - 5.40 M/uL    Hemoglobin 11.7 (L) 12.0 - 16.0 g/dL    Hematocrit 36.4 (L) 37.0 - 48.5 %    MCV 88 82 - 98 fL    MCH 28.3 27.0 - 31.0 pg    MCHC 32.1 32.0 - 36.0 g/dL    RDW 15.7 (H) 11.5 - 14.5 %    Platelets 250 150 - 350 K/uL "    MPV 10.0 9.2 - 12.9 fL    Gran # (ANC) 5.9 1.8 - 7.7 K/uL    Lymph # 2.1 1.0 - 4.8 K/uL    Mono # 0.6 0.3 - 1.0 K/uL    Eos # 0.3 0.0 - 0.5 K/uL    Baso # 0.02 0.00 - 0.20 K/uL    Gran% 66.5 38.0 - 73.0 %    Lymph% 23.6 18.0 - 48.0 %    Mono% 6.5 4.0 - 15.0 %    Eosinophil% 3.2 0.0 - 8.0 %    Basophil% 0.2 0.0 - 1.9 %    Differential Method Automated    Comprehensive metabolic panel   Result Value Ref Range    Sodium 142 136 - 145 mmol/L    Potassium 4.0 3.5 - 5.1 mmol/L    Chloride 102 95 - 110 mmol/L    CO2 24 23 - 29 mmol/L    Glucose 138 (H) 70 - 110 mg/dL    BUN, Bld 31 (H) 6 - 20 mg/dL    Creatinine 7.3 (H) 0.5 - 1.4 mg/dL    Calcium 10.1 8.7 - 10.5 mg/dL    Total Protein 7.2 6.0 - 8.4 g/dL    Albumin 3.3 (L) 3.5 - 5.2 g/dL    Total Bilirubin 0.4 0.1 - 1.0 mg/dL    Alkaline Phosphatase 71 55 - 135 U/L    AST 15 10 - 40 U/L    ALT 13 10 - 44 U/L    Anion Gap 16 8 - 16 mmol/L    eGFR if African American 7 (A) >60 mL/min/1.73 m^2    eGFR if non African American 6 (A) >60 mL/min/1.73 m^2   CK-MB   Result Value Ref Range    CPK 53 20 - 180 U/L    CPK MB 0.5 0.1 - 6.5 ng/mL    MB% 0.9 0.0 - 5.0 %   CK   Result Value Ref Range    CPK 53 20 - 180 U/L   Troponin I   Result Value Ref Range    Troponin I 0.010 0.000 - 0.026 ng/mL   Brain natriuretic peptide   Result Value Ref Range     (H) 0 - 99 pg/mL   APTT   Result Value Ref Range    aPTT <21.0 21.0 - 32.0 sec   Protime-INR   Result Value Ref Range    Prothrombin Time 10.2 9.0 - 12.5 sec    INR 0.9 0.8 - 1.2         Imaging Results:  Imaging Results          X-Ray Chest 1 View (Final result)  Result time 11/29/18 22:20:49    Final result by Milton Hernandez III, MD (11/29/18 22:20:49)                 Impression:      Negative one view chest x-ray.      Electronically signed by: Milton Hernandez MD  Date:    11/29/2018  Time:    22:20             Narrative:    EXAMINATION:  XR CHEST 1 VIEW    CLINICAL  HISTORY:  cp;    COMPARISON:  August    FINDINGS:  Heart size is upper limits of normal.  Lung fields are clear.                                 The EKG was ordered, reviewed, and independently interpreted by the ED provider.  Interpretation time: 2014  Rate: 83 BPM  Rhythm: normal sinus rhythm  Interpretation: Possible Left atrial enlargement. LVH. Cannot rule out septal infarct, age undetermined.  No STEMI.  When compared to EKG performed 8/5/18, minimal criteria for septal infarct are now present.          The Emergency Provider reviewed the vital signs and test results, which are outlined above.     ED Discussion     12:15 AM: Reassessed pt at this time.  Pt states her condition has improved at this time. Discussed with pt all pertinent ED information and results. Discussed pt dx and plan of tx. Gave pt all f/u and return to the ED instructions. All questions and concerns were addressed at this time. Pt expresses understanding of information and instructions, and is comfortable with plan to discharge. Pt is stable for discharge.    I discussed with patient and/or family/caretaker that evaluation in the ED does not suggest any emergent or life threatening medical conditions requiring immediate intervention beyond what was provided in the ED, and I believe patient is safe for discharge.  Regardless, an unremarkable evaluation in the ED does not preclude the development or presence of a serious of life threatening condition. As such, patient was instructed to return immediately for any worsening or change in current symptoms.      ED Medication(s):  Medications - No data to display    Follow-up Information     [facility] In 3 days.    Specialty:  Cardiology  Contact information:  [address] [facility] [address] [phone]  Additional information:  (off [facility]) 2nd floor           [facility]    Specialty:  Emergency Medicine  Why:  If symptoms  worsen  Contact information:  [address] [facility] [address] [phone]                   Medical Decision Making:   Clinical Tests:   Lab Tests: Ordered and Reviewed  Radiological Study: Ordered and Reviewed  Medical Tests: Ordered and Reviewed             Scribe Attestation:   Scribe #1: I performed the above scribed service and the documentation accurately describes the services I performed. I attest to the accuracy of the note.     Attending:   Physician Attestation Statement for Scribe #1: I, Bernard Miller MD, personally performed the services described in this documentation, as scribed by Andreia Glover, in my presence, and it is both accurate and complete.           Clinical Impression       ICD-10-CM ICD-9-CM   1. Chest pain R07.9 786.50   2. CKD (chronic kidney disease) stage 5, GFR less than 15 ml/min N18.5 585.5       Disposition:   Disposition: Discharged  Condition: Stable         Bernard Miller MD  11/30/18 0052

## 2018-12-02 ENCOUNTER — HOSPITAL ENCOUNTER (EMERGENCY)
Facility: HOSPITAL | Age: 53
Discharge: HOME | End: 2018-12-02
Attending: EMERGENCY MEDICINE
Payer: MEDICARE

## 2018-12-02 VITALS
WEIGHT: 254 LBS | RESPIRATION RATE: 18 BRPM | OXYGEN SATURATION: 100 % | BODY MASS INDEX: 39.87 KG/M2 | HEIGHT: 67 IN | SYSTOLIC BLOOD PRESSURE: 138 MMHG | HEART RATE: 82 BPM | DIASTOLIC BLOOD PRESSURE: 70 MMHG | TEMPERATURE: 99 F

## 2018-12-02 DIAGNOSIS — N18.6 ESRD (END STAGE RENAL DISEASE): ICD-10-CM

## 2018-12-02 DIAGNOSIS — R06.02 SHORTNESS OF BREATH: ICD-10-CM

## 2018-12-02 DIAGNOSIS — J81.0 ACUTE PULMONARY EDEMA: Primary | ICD-10-CM

## 2018-12-02 LAB
ALBUMIN SERPL BCP-MCNC: 3 G/DL
ALP SERPL-CCNC: 65 U/L
ALT SERPL W/O P-5'-P-CCNC: 19 U/L
ANION GAP SERPL CALC-SCNC: 11 MMOL/L
APTT BLDCRRT: 26.5 SEC
AST SERPL-CCNC: 16 U/L
BASOPHILS # BLD AUTO: 0.02 K/UL
BASOPHILS NFR BLD: 0.2 %
BILIRUB SERPL-MCNC: 0.4 MG/DL
BUN SERPL-MCNC: 39 MG/DL
CALCIUM SERPL-MCNC: 9.9 MG/DL
CHLORIDE SERPL-SCNC: 105 MMOL/L
CO2 SERPL-SCNC: 25 MMOL/L
CREAT SERPL-MCNC: 8 MG/DL
D DIMER PPP IA.FEU-MCNC: 0.55 MG/L FEU
DIFFERENTIAL METHOD: ABNORMAL
EOSINOPHIL # BLD AUTO: 0.2 K/UL
EOSINOPHIL NFR BLD: 2.5 %
ERYTHROCYTE [DISTWIDTH] IN BLOOD BY AUTOMATED COUNT: 15.9 %
EST. GFR  (AFRICAN AMERICAN): 6 ML/MIN/1.73 M^2
EST. GFR  (NON AFRICAN AMERICAN): 5 ML/MIN/1.73 M^2
GLUCOSE SERPL-MCNC: 154 MG/DL
HCT VFR BLD AUTO: 36 %
HGB BLD-MCNC: 11.3 G/DL
INR PPP: 0.9
LYMPHOCYTES # BLD AUTO: 1.8 K/UL
LYMPHOCYTES NFR BLD: 20 %
MCH RBC QN AUTO: 28 PG
MCHC RBC AUTO-ENTMCNC: 31.4 G/DL
MCV RBC AUTO: 89 FL
MONOCYTES # BLD AUTO: 0.4 K/UL
MONOCYTES NFR BLD: 4.9 %
NEUTROPHILS # BLD AUTO: 6.5 K/UL
NEUTROPHILS NFR BLD: 72.4 %
PLATELET # BLD AUTO: 264 K/UL
PMV BLD AUTO: 8.7 FL
POTASSIUM SERPL-SCNC: 3.8 MMOL/L
PROT SERPL-MCNC: 6.6 G/DL
PROTHROMBIN TIME: 10.3 SEC
RBC # BLD AUTO: 4.04 M/UL
SODIUM SERPL-SCNC: 141 MMOL/L
TROPONIN I SERPL DL<=0.01 NG/ML-MCNC: 0.01 NG/ML
WBC # BLD AUTO: 9.02 K/UL

## 2018-12-02 PROCEDURE — 96374 THER/PROPH/DIAG INJ IV PUSH: CPT

## 2018-12-02 PROCEDURE — 85025 COMPLETE CBC W/AUTO DIFF WBC: CPT

## 2018-12-02 PROCEDURE — 85610 PROTHROMBIN TIME: CPT

## 2018-12-02 PROCEDURE — 99284 EMERGENCY DEPT VISIT MOD MDM: CPT | Mod: 25

## 2018-12-02 PROCEDURE — 36415 COLL VENOUS BLD VENIPUNCTURE: CPT

## 2018-12-02 PROCEDURE — 85379 FIBRIN DEGRADATION QUANT: CPT

## 2018-12-02 PROCEDURE — 84484 ASSAY OF TROPONIN QUANT: CPT

## 2018-12-02 PROCEDURE — 80053 COMPREHEN METABOLIC PANEL: CPT

## 2018-12-02 PROCEDURE — 63600175 PHARM REV CODE 636 W HCPCS: Performed by: EMERGENCY MEDICINE

## 2018-12-02 PROCEDURE — 85730 THROMBOPLASTIN TIME PARTIAL: CPT

## 2018-12-02 RX ORDER — FLUTICASONE PROPIONATE 50 MCG
1 SPRAY, SUSPENSION (ML) NASAL 2 TIMES DAILY PRN
Qty: 15 G | Refills: 0 | Status: ON HOLD | OUTPATIENT
Start: 2018-12-02 | End: 2018-12-26

## 2018-12-02 RX ORDER — FUROSEMIDE 10 MG/ML
60 INJECTION INTRAMUSCULAR; INTRAVENOUS
Status: COMPLETED | OUTPATIENT
Start: 2018-12-02 | End: 2018-12-02

## 2018-12-02 RX ORDER — ONDANSETRON 4 MG/1
4 TABLET, FILM COATED ORAL EVERY 8 HOURS PRN
Qty: 12 TABLET | Refills: 0 | Status: ON HOLD | OUTPATIENT
Start: 2018-12-02 | End: 2018-12-26

## 2018-12-02 RX ADMIN — FUROSEMIDE 60 MG: 10 INJECTION, SOLUTION INTRAMUSCULAR; INTRAVENOUS at 08:12

## 2018-12-03 NOTE — ED PROVIDER NOTES
SCRIBE #1 NOTE: I, Padmini Cuba, am scribing for, and in the presence of, Milton Vega MD. I have scribed the HPI, ROS, and PEx of the note.     SCRIBE #2 NOTE: I, Zoila Wood, am scribing for, and in the presence of,  Jonny Swenson MD. I have scribed the remaining portions of the note not scribed by Scribe #1.     History      Chief Complaint   Patient presents with    Shortness of Breath     with exertion x 1 week- also c/o chest wall pain       Review of patient's allergies indicates:   Allergen Reactions    Sulfa (sulfonamide antibiotics) Rash        HPI   HPI    12/2/2018, 6:02 PM   History obtained from the patient      History of Present Illness: Cordell Angulo is a 53 y.o. female patient with hx of CHF, CKD, HTN, and DM who presents to the Emergency Department for shortness of breath which onset gradually 1 week ago. Symptoms are constant and moderate in severity. Pt reports sxs are exacerbated with mild exertion. Pt states she is a dialysis pt and her last dialysis was 2 days ago. Pt reports her last stress test and heart catheter was in 2006. Pt states she has an appointment to set up her next stress test. Pt reports her sister passed away due to blood clots. No mitigating or exacerbating factors reported. Pt denies any recent long distance traveling. Patient denies any fever, chills, lightheadedness, diaphoresis, cough, CP, nausea, emesis, leg swelling, and all other sxs at this time. No further complaints or concerns at this time.         Arrival mode: Personal vehicle     PCP: Primary Doctor No       Past Medical History:  Past Medical History:   Diagnosis Date    Asthma     CHF (congestive heart failure)     CKD (chronic kidney disease) stage 5, GFR less than 15 ml/min     Depression     Diabetes mellitus     Dialysis patient     Hypercholesterolemia     Hypertension     PAF (paroxysmal atrial fibrillation)        Past Surgical History:  Past Surgical History:   Procedure  Laterality Date    AV FISTULA PLACEMENT      CARDIAC ELECTROPHYSIOLOGY MAPPING AND ABLATION      cesarian section      TUMOR REMOVAL      L lung    VASCULAR CATH INSERTION N/A 10/19/2015    Performed by Oren Padilla MD at [facility] CATH LAB         Family History:  Family History   Problem Relation Age of Onset    Heart disease Mother     Diabetes Father        Social History:  Social History     Tobacco Use    Smoking status: Never Smoker    Smokeless tobacco: Never Used   Substance and Sexual Activity    Alcohol use: No    Drug use: No    Sexual activity: Not given       ROS   Review of Systems   Constitutional: Negative for chills, diaphoresis, fatigue and fever.   HENT: Negative for congestion, rhinorrhea and sore throat.    Respiratory: Positive for shortness of breath. Negative for cough and chest tightness.    Cardiovascular: Negative for chest pain, palpitations and leg swelling.   Gastrointestinal: Negative for abdominal pain, diarrhea, nausea and vomiting.   Musculoskeletal: Negative for joint swelling.   Skin: Negative for rash.   Neurological: Negative for dizziness, syncope, weakness, numbness and headaches.   All other systems reviewed and are negative.      Physical Exam      Initial Vitals [12/02/18 1737]   BP Pulse Resp Temp SpO2   (!) 161/87 90 (!) 21 99 °F (37.2 °C) 97 %      MAP       --          Physical Exam  Nursing Notes and Vital Signs Reviewed.  Constitutional: Patient is in mild distress. Well-developed and well-nourished.  Head: Atraumatic. Normocephalic.  Eyes: PERRL. EOM intact. Conjunctivae are not pale. No scleral icterus.  ENT: Mucous membranes are moist. Oropharynx is clear and symmetric.    Neck: Supple. Full ROM. No lymphadenopathy.  Cardiovascular: Regular rate. Regular rhythm. No murmurs, rubs, or gallops. Distal pulses are 2+ and symmetric.  Pulmonary/Chest: No respiratory distress. Clear to auscultation bilaterally. No wheezing or rales.  Abdominal: Soft and  "non-distended.  There is no tenderness.  No rebound, guarding, or rigidity. Good bowel sounds.  Genitourinary: No CVA tenderness  Musculoskeletal: Moves all extremities. No obvious deformities. No edema. No calf tenderness.  Skin: Warm and dry.  Neurological:  Alert, awake, and appropriate.  Normal speech.  No acute focal neurological deficits are appreciated.  Psychiatric: Normal affect. Good eye contact. Appropriate in content.    ED Course    Procedures  ED Vital Signs:  Vitals:    12/02/18 1737 12/02/18 1748 12/02/18 2011   BP: (!) 161/87  (!) 144/61   Pulse: 90 85    Resp: (!) 21     Temp: 99 °F (37.2 °C)     TempSrc: Oral     SpO2: 97%  95%   Weight: 115.2 kg (253 lb 15.5 oz)     Height: 5' 7" (1.702 m)         Abnormal Lab Results:  Labs Reviewed   CBC W/ AUTO DIFFERENTIAL - Abnormal; Notable for the following components:       Result Value    Hemoglobin 11.3 (*)     Hematocrit 36.0 (*)     MCHC 31.4 (*)     RDW 15.9 (*)     MPV 8.7 (*)     All other components within normal limits   COMPREHENSIVE METABOLIC PANEL - Abnormal; Notable for the following components:    Glucose 154 (*)     BUN, Bld 39 (*)     Creatinine 8.0 (*)     Albumin 3.0 (*)     eGFR if  6 (*)     eGFR if non  5 (*)     All other components within normal limits   D DIMER, QUANTITATIVE - Abnormal; Notable for the following components:    D-Dimer 0.55 (*)     All other components within normal limits   TROPONIN I   APTT   PROTIME-INR        All Lab Results:  Results for orders placed or performed during the hospital encounter of 12/02/18   CBC auto differential   Result Value Ref Range    WBC 9.02 3.90 - 12.70 K/uL    RBC 4.04 4.00 - 5.40 M/uL    Hemoglobin 11.3 (L) 12.0 - 16.0 g/dL    Hematocrit 36.0 (L) 37.0 - 48.5 %    MCV 89 82 - 98 fL    MCH 28.0 27.0 - 31.0 pg    MCHC 31.4 (L) 32.0 - 36.0 g/dL    RDW 15.9 (H) 11.5 - 14.5 %    Platelets 264 150 - 350 K/uL    MPV 8.7 (L) 9.2 - 12.9 fL    Gran # (ANC) 6.5 1.8 " - 7.7 K/uL    Lymph # 1.8 1.0 - 4.8 K/uL    Mono # 0.4 0.3 - 1.0 K/uL    Eos # 0.2 0.0 - 0.5 K/uL    Baso # 0.02 0.00 - 0.20 K/uL    Gran% 72.4 38.0 - 73.0 %    Lymph% 20.0 18.0 - 48.0 %    Mono% 4.9 4.0 - 15.0 %    Eosinophil% 2.5 0.0 - 8.0 %    Basophil% 0.2 0.0 - 1.9 %    Differential Method Automated    Comprehensive metabolic panel   Result Value Ref Range    Sodium 141 136 - 145 mmol/L    Potassium 3.8 3.5 - 5.1 mmol/L    Chloride 105 95 - 110 mmol/L    CO2 25 23 - 29 mmol/L    Glucose 154 (H) 70 - 110 mg/dL    BUN, Bld 39 (H) 6 - 20 mg/dL    Creatinine 8.0 (H) 0.5 - 1.4 mg/dL    Calcium 9.9 8.7 - 10.5 mg/dL    Total Protein 6.6 6.0 - 8.4 g/dL    Albumin 3.0 (L) 3.5 - 5.2 g/dL    Total Bilirubin 0.4 0.1 - 1.0 mg/dL    Alkaline Phosphatase 65 55 - 135 U/L    AST 16 10 - 40 U/L    ALT 19 10 - 44 U/L    Anion Gap 11 8 - 16 mmol/L    eGFR if African American 6 (A) >60 mL/min/1.73 m^2    eGFR if non African American 5 (A) >60 mL/min/1.73 m^2   D dimer, quantitative   Result Value Ref Range    D-Dimer 0.55 (H) <0.50 mg/L FEU   Troponin I   Result Value Ref Range    Troponin I 0.013 0.000 - 0.026 ng/mL   APTT   Result Value Ref Range    aPTT 26.5 21.0 - 32.0 sec   Protime-INR   Result Value Ref Range    Prothrombin Time 10.3 9.0 - 12.5 sec    INR 0.9 0.8 - 1.2       Imaging Results:  Imaging Results          X-Ray Chest PA And Lateral (Final result)  Result time 12/02/18 19:07:18    Final result by Eblert Lombardi MD (12/02/18 19:07:18)                 Impression:      Congestive heart failure with interstitial pulmonary edema and mild cardiomegaly.      Electronically signed by: Elbert Lombardi MD  Date:    12/02/2018  Time:    19:07             Narrative:    EXAMINATION:  XR CHEST PA AND LATERAL    CLINICAL HISTORY:  Shortness of breath    COMPARISON:  Chest x-ray, 11/29/2018.    FINDINGS:  An implantable cardiac monitors noted overlying the aortic arch.  There is interstitial pulmonary edema.  No consolidation.  The  heart size is mildly enlarged.  No pleural effusion or pneumothorax. The bones are intact.                               US Lower Extremity Veins Bilateral (Final result)  Result time 12/02/18 18:55:42    Final result by Elbert Lombardi MD (12/02/18 18:55:42)                 Impression:      Negative for bilateral lower extremity DVT.      Electronically signed by: Elbert Lombardi MD  Date:    12/02/2018  Time:    18:55             Narrative:    EXAMINATION:  US LOWER EXTREMITY VEINS BILATERAL    CLINICAL HISTORY:  Shortness of breath    COMPARISON:  None    FINDINGS:  Grayscale and color Doppler sonography was performed through the bilateral lower extremities.    The bilateral lower extremity veins are widely patent with normal augmentation, and compressibility and respiratory variability.                               The EKG was ordered, reviewed, and independently interpreted by the ED provider.  Interpretation time: 1743  Rate: 82 BPM  Rhythm: normal sinus rhythm  Interpretation: 1st degree AV block. Normal QRS intervals. Normal QT intervals. No STEMI.  When compared to EKG performed 8/5/18, there are old T wave conversion changes. No ST depression. No ST elevation.             The Emergency Provider reviewed the vital signs and test results, which are outlined above.    ED Discussion     8:10 PM: Dr. Vega transfers care of pt to Dr. Swenson, pending renal consult.    8:18 PM: Evaluated pt at this time. Pt is AAOx3, in NAD, and VSS. O2 saturation is 98%. She reports orthopnea and that the SOB is consistent with pulmonary edema.     8:29 PM: Discussed case with Dr. Christy (Nephrology) who states it is okay to d/c pt and have her f/u at dialysis tomorrow, he will contact the dialysis clinic for further orders. Pt's D-dimer is unremarkable and she has very low probability for a PE with negative BLE US.     8:31 PM: Reassessed pt at this time.  Pt is resting comfortably and in NAD. VSS. She understands she needs to  f/u with the dialysis clinic tomorrow. Pt is reports she has been suffering from nausea and is requesting a Rx for an anti-emetic. Discussed with pt all pertinent ED information and results. Discussed pt dx and plan of tx. Gave pt all f/u and return to the ED instructions. All questions and concerns were addressed at this time. Pt expresses understanding of information and instructions, and is comfortable with plan to discharge. Pt is stable for discharge.    I discussed with patient and/or family/caretaker that evaluation in the ED does not suggest any emergent or life threatening medical conditions requiring immediate intervention beyond what was provided in the ED, and I believe patient is safe for discharge.  Regardless, an unremarkable evaluation in the ED does not preclude the development or presence of a serious of life threatening condition. As such, patient was instructed to return immediately for any worsening or change in current symptoms.    ED Medication(s):  Medications   furosemide injection 60 mg (not administered)     Current Discharge Medication List      START taking these medications    Details   fluticasone (FLONASE) 50 mcg/actuation nasal spray 1 spray (50 mcg total) by Each Nare route 2 (two) times daily as needed for Rhinitis.  Qty: 15 g, Refills: 0      ondansetron (ZOFRAN) 4 MG tablet Take 1 tablet (4 mg total) by mouth every 8 (eight) hours as needed.  Qty: 12 tablet, Refills: 0               Medical Decision Making    Medical Decision Making:   Clinical Tests:   Lab Tests: Ordered and Reviewed  Radiological Study: Reviewed and Ordered  Medical Tests: Reviewed and Ordered           Scribe Attestation:   Scribe #1: I performed the above scribed service and the documentation accurately describes the services I performed. I attest to the accuracy of the note.    Attending:   Physician Attestation Statement for Scribe #1: I, Milton Vega MD, personally performed the services described in  this documentation, as scribed by Padmini Cuba, in my presence, and it is both accurate and complete.       Scribe Attestation:   Scribe #2: I performed the above scribed service and the documentation accurately describes the services I performed. I attest to the accuracy of the note.    Attending Attestation:           Physician Attestation for Scribe:    Physician Attestation Statement for Scribe #2: I, Jonny Swenson MD, reviewed documentation, as scribed by Zoila Wood in my presence, and it is both accurate and complete. I also acknowledge and confirm the content of the note done by Scribe #1.          Clinical Impression       ICD-10-CM ICD-9-CM   1. Acute pulmonary edema J81.0 518.4   2. Shortness of breath R06.02 786.05   3. ESRD (end stage renal disease) N18.6 585.6       Disposition:   Disposition: Discharged  Condition: Stable         Jonny Swenson MD  12/03/18 0422

## 2018-12-26 ENCOUNTER — HOSPITAL ENCOUNTER (OUTPATIENT)
Facility: HOSPITAL | Age: 53
Discharge: HOME | End: 2018-12-28
Attending: EMERGENCY MEDICINE | Admitting: INTERNAL MEDICINE
Payer: COMMERCIAL

## 2018-12-26 DIAGNOSIS — Z99.2 DEPENDENT ON HEMODIALYSIS: ICD-10-CM

## 2018-12-26 DIAGNOSIS — R79.89 ELEVATED TROPONIN: ICD-10-CM

## 2018-12-26 DIAGNOSIS — E11.65 TYPE 2 DIABETES MELLITUS WITH HYPERGLYCEMIA, WITH LONG-TERM CURRENT USE OF INSULIN: ICD-10-CM

## 2018-12-26 DIAGNOSIS — Z79.4 TYPE 2 DIABETES MELLITUS WITH HYPERGLYCEMIA, WITH LONG-TERM CURRENT USE OF INSULIN: ICD-10-CM

## 2018-12-26 DIAGNOSIS — J40 BRONCHITIS: ICD-10-CM

## 2018-12-26 DIAGNOSIS — R09.02 HYPOXIA: Primary | ICD-10-CM

## 2018-12-26 DIAGNOSIS — I10 ESSENTIAL HYPERTENSION: Chronic | ICD-10-CM

## 2018-12-26 DIAGNOSIS — E87.70 HYPERVOLEMIA, UNSPECIFIED HYPERVOLEMIA TYPE: ICD-10-CM

## 2018-12-26 DIAGNOSIS — R06.02 SHORTNESS OF BREATH: ICD-10-CM

## 2018-12-26 DIAGNOSIS — E11.69 DIABETES MELLITUS TYPE 2 IN OBESE: Chronic | ICD-10-CM

## 2018-12-26 DIAGNOSIS — R06.00 DYSPNEA: ICD-10-CM

## 2018-12-26 DIAGNOSIS — N18.6 ESRD (END STAGE RENAL DISEASE): ICD-10-CM

## 2018-12-26 DIAGNOSIS — R07.9 CHEST PAIN: ICD-10-CM

## 2018-12-26 DIAGNOSIS — I16.9 HYPERTENSIVE CRISIS: ICD-10-CM

## 2018-12-26 DIAGNOSIS — E66.9 DIABETES MELLITUS TYPE 2 IN OBESE: Chronic | ICD-10-CM

## 2018-12-26 DIAGNOSIS — I50.9 CHF (CONGESTIVE HEART FAILURE): ICD-10-CM

## 2018-12-26 DIAGNOSIS — I50.32 CHRONIC DIASTOLIC CONGESTIVE HEART FAILURE: ICD-10-CM

## 2018-12-26 DIAGNOSIS — I50.30 DIASTOLIC CONGESTIVE HEART FAILURE, UNSPECIFIED HF CHRONICITY: ICD-10-CM

## 2018-12-26 LAB
ALBUMIN SERPL BCP-MCNC: 3.1 G/DL
ALLENS TEST: ABNORMAL
ALP SERPL-CCNC: 76 U/L
ALT SERPL W/O P-5'-P-CCNC: 20 U/L
ANION GAP SERPL CALC-SCNC: 19 MMOL/L
APTT BLDCRRT: 21.9 SEC
AST SERPL-CCNC: 32 U/L
BASOPHILS # BLD AUTO: 0.03 K/UL
BASOPHILS NFR BLD: 0.2 %
BILIRUB SERPL-MCNC: 0.5 MG/DL
BNP SERPL-MCNC: 919 PG/ML
BUN SERPL-MCNC: 48 MG/DL
CALCIUM SERPL-MCNC: 9.3 MG/DL
CHLORIDE SERPL-SCNC: 100 MMOL/L
CO2 SERPL-SCNC: 19 MMOL/L
CREAT SERPL-MCNC: 9.5 MG/DL
DELSYS: ABNORMAL
DIFFERENTIAL METHOD: ABNORMAL
EOSINOPHIL # BLD AUTO: 0.3 K/UL
EOSINOPHIL NFR BLD: 2.2 %
ERYTHROCYTE [DISTWIDTH] IN BLOOD BY AUTOMATED COUNT: 16.1 %
EST. GFR  (AFRICAN AMERICAN): 5 ML/MIN/1.73 M^2
EST. GFR  (NON AFRICAN AMERICAN): 4 ML/MIN/1.73 M^2
FIO2: 100
GLUCOSE SERPL-MCNC: 375 MG/DL
GLUCOSE SERPL-MCNC: 404 MG/DL (ref 70–110)
HCO3 UR-SCNC: 24.1 MMOL/L (ref 24–28)
HCT VFR BLD AUTO: 36 %
HCT VFR BLD CALC: 36 %PCV (ref 36–54)
HGB BLD-MCNC: 10.9 G/DL
INR PPP: 1
LYMPHOCYTES # BLD AUTO: 2.6 K/UL
LYMPHOCYTES NFR BLD: 18.4 %
MAGNESIUM SERPL-MCNC: 2.2 MG/DL
MCH RBC QN AUTO: 27.6 PG
MCHC RBC AUTO-ENTMCNC: 30.3 G/DL
MCV RBC AUTO: 91 FL
MODE: ABNORMAL
MONOCYTES # BLD AUTO: 0.7 K/UL
MONOCYTES NFR BLD: 4.8 %
NEUTROPHILS # BLD AUTO: 10.6 K/UL
NEUTROPHILS NFR BLD: 74.4 %
PCO2 BLDA: 60.6 MMHG (ref 35–45)
PEEP: 5
PH SMN: 7.21 [PH] (ref 7.35–7.45)
PLATELET # BLD AUTO: 332 K/UL
PMV BLD AUTO: 9.1 FL
PO2 BLDA: 122 MMHG (ref 80–100)
POC BE: -4 MMOL/L
POC IONIZED CALCIUM: 1.22 MMOL/L (ref 1.06–1.42)
POC SATURATED O2: 98 % (ref 95–100)
POCT GLUCOSE: 146 MG/DL (ref 70–110)
POCT GLUCOSE: 204 MG/DL (ref 70–110)
POTASSIUM BLD-SCNC: 3.7 MMOL/L (ref 3.5–5.1)
POTASSIUM SERPL-SCNC: 4.2 MMOL/L
PROT SERPL-MCNC: 6.9 G/DL
PROTHROMBIN TIME: 10.8 SEC
RBC # BLD AUTO: 3.95 M/UL
SAMPLE: ABNORMAL
SITE: ABNORMAL
SODIUM BLD-SCNC: 138 MMOL/L (ref 136–145)
SODIUM SERPL-SCNC: 138 MMOL/L
SPONT RATE: 30
TROPONIN I SERPL DL<=0.01 NG/ML-MCNC: 0.01 NG/ML
TROPONIN I SERPL DL<=0.01 NG/ML-MCNC: 0.54 NG/ML
WBC # BLD AUTO: 14.27 K/UL

## 2018-12-26 PROCEDURE — 36415 COLL VENOUS BLD VENIPUNCTURE: CPT

## 2018-12-26 PROCEDURE — 82565 ASSAY OF CREATININE: CPT | Mod: 91

## 2018-12-26 PROCEDURE — 27000221 HC OXYGEN, UP TO 24 HOURS

## 2018-12-26 PROCEDURE — 93010 ELECTROCARDIOGRAM REPORT: CPT | Mod: ,,, | Performed by: INTERNAL MEDICINE

## 2018-12-26 PROCEDURE — 25000003 PHARM REV CODE 250: Performed by: NURSE PRACTITIONER

## 2018-12-26 PROCEDURE — 82803 BLOOD GASES ANY COMBINATION: CPT

## 2018-12-26 PROCEDURE — 83735 ASSAY OF MAGNESIUM: CPT

## 2018-12-26 PROCEDURE — 36600 WITHDRAWAL OF ARTERIAL BLOOD: CPT

## 2018-12-26 PROCEDURE — 99284 PR EMERGENCY DEPT VISIT,LEVEL IV: ICD-10-PCS | Mod: ,,, | Performed by: INTERNAL MEDICINE

## 2018-12-26 PROCEDURE — 96374 THER/PROPH/DIAG INJ IV PUSH: CPT

## 2018-12-26 PROCEDURE — 63600175 PHARM REV CODE 636 W HCPCS: Performed by: NURSE PRACTITIONER

## 2018-12-26 PROCEDURE — S5571 INSULIN DISPOS PEN 3 ML: HCPCS | Performed by: NURSE PRACTITIONER

## 2018-12-26 PROCEDURE — 80100016 HC MAINTENANCE HEMODIALYSIS

## 2018-12-26 PROCEDURE — 63600175 PHARM REV CODE 636 W HCPCS: Performed by: EMERGENCY MEDICINE

## 2018-12-26 PROCEDURE — 27000190 HC CPAP FULL FACE MASK W/VALVE

## 2018-12-26 PROCEDURE — 84132 ASSAY OF SERUM POTASSIUM: CPT

## 2018-12-26 PROCEDURE — 99900035 HC TECH TIME PER 15 MIN (STAT)

## 2018-12-26 PROCEDURE — 94640 AIRWAY INHALATION TREATMENT: CPT

## 2018-12-26 PROCEDURE — 82330 ASSAY OF CALCIUM: CPT

## 2018-12-26 PROCEDURE — 25000003 PHARM REV CODE 250: Performed by: EMERGENCY MEDICINE

## 2018-12-26 PROCEDURE — 84484 ASSAY OF TROPONIN QUANT: CPT

## 2018-12-26 PROCEDURE — 85025 COMPLETE CBC W/AUTO DIFF WBC: CPT

## 2018-12-26 PROCEDURE — 99291 CRITICAL CARE FIRST HOUR: CPT | Mod: 25

## 2018-12-26 PROCEDURE — G0378 HOSPITAL OBSERVATION PER HR: HCPCS

## 2018-12-26 PROCEDURE — 99284 EMERGENCY DEPT VISIT MOD MDM: CPT | Mod: ,,, | Performed by: INTERNAL MEDICINE

## 2018-12-26 PROCEDURE — 93010 EKG 12-LEAD: ICD-10-PCS | Mod: ,,, | Performed by: INTERNAL MEDICINE

## 2018-12-26 PROCEDURE — 83036 HEMOGLOBIN GLYCOSYLATED A1C: CPT

## 2018-12-26 PROCEDURE — 25000242 PHARM REV CODE 250 ALT 637 W/ HCPCS: Performed by: NURSE PRACTITIONER

## 2018-12-26 PROCEDURE — 80053 COMPREHEN METABOLIC PANEL: CPT

## 2018-12-26 PROCEDURE — 25000242 PHARM REV CODE 250 ALT 637 W/ HCPCS: Performed by: INTERNAL MEDICINE

## 2018-12-26 PROCEDURE — 84484 ASSAY OF TROPONIN QUANT: CPT | Mod: 91

## 2018-12-26 PROCEDURE — 82962 GLUCOSE BLOOD TEST: CPT

## 2018-12-26 PROCEDURE — 84295 ASSAY OF SERUM SODIUM: CPT | Mod: 91

## 2018-12-26 PROCEDURE — 94660 CPAP INITIATION&MGMT: CPT

## 2018-12-26 PROCEDURE — 85730 THROMBOPLASTIN TIME PARTIAL: CPT

## 2018-12-26 PROCEDURE — 83880 ASSAY OF NATRIURETIC PEPTIDE: CPT

## 2018-12-26 PROCEDURE — 85610 PROTHROMBIN TIME: CPT

## 2018-12-26 RX ORDER — LISINOPRIL 20 MG/1
40 TABLET ORAL DAILY
Status: DISCONTINUED | OUTPATIENT
Start: 2018-12-27 | End: 2018-12-28 | Stop reason: HOSPADM

## 2018-12-26 RX ORDER — ONDANSETRON 2 MG/ML
4 INJECTION INTRAMUSCULAR; INTRAVENOUS ONCE
Status: DISCONTINUED | OUTPATIENT
Start: 2018-12-26 | End: 2018-12-26

## 2018-12-26 RX ORDER — IBUPROFEN 200 MG
16 TABLET ORAL
Status: DISCONTINUED | OUTPATIENT
Start: 2018-12-26 | End: 2018-12-28 | Stop reason: HOSPADM

## 2018-12-26 RX ORDER — IBUPROFEN 200 MG
24 TABLET ORAL
Status: DISCONTINUED | OUTPATIENT
Start: 2018-12-26 | End: 2018-12-28 | Stop reason: HOSPADM

## 2018-12-26 RX ORDER — SEVELAMER CARBONATE FOR ORAL SUSPENSION 2400 MG/1
2.4 POWDER, FOR SUSPENSION ORAL
Status: DISCONTINUED | OUTPATIENT
Start: 2018-12-27 | End: 2018-12-26

## 2018-12-26 RX ORDER — METOPROLOL SUCCINATE 50 MG/1
200 TABLET, EXTENDED RELEASE ORAL NIGHTLY
Status: DISCONTINUED | OUTPATIENT
Start: 2018-12-26 | End: 2018-12-28 | Stop reason: HOSPADM

## 2018-12-26 RX ORDER — IPRATROPIUM BROMIDE AND ALBUTEROL SULFATE 2.5; .5 MG/3ML; MG/3ML
3 SOLUTION RESPIRATORY (INHALATION)
Status: DISCONTINUED | OUTPATIENT
Start: 2018-12-26 | End: 2018-12-27

## 2018-12-26 RX ORDER — AZITHROMYCIN 250 MG/1
250 TABLET, FILM COATED ORAL DAILY
Status: DISCONTINUED | OUTPATIENT
Start: 2018-12-27 | End: 2018-12-28 | Stop reason: HOSPADM

## 2018-12-26 RX ORDER — SEVELAMER CARBONATE 800 MG/1
1600 TABLET, FILM COATED ORAL
Status: DISCONTINUED | OUTPATIENT
Start: 2018-12-27 | End: 2018-12-28 | Stop reason: HOSPADM

## 2018-12-26 RX ORDER — NITROGLYCERIN 20 MG/100ML
5 INJECTION INTRAVENOUS CONTINUOUS
Status: DISCONTINUED | OUTPATIENT
Start: 2018-12-26 | End: 2018-12-26

## 2018-12-26 RX ORDER — ONDANSETRON 8 MG/1
8 TABLET, ORALLY DISINTEGRATING ORAL EVERY 8 HOURS PRN
Status: DISCONTINUED | OUTPATIENT
Start: 2018-12-26 | End: 2018-12-28 | Stop reason: HOSPADM

## 2018-12-26 RX ORDER — GLUCAGON 1 MG
1 KIT INJECTION
Status: DISCONTINUED | OUTPATIENT
Start: 2018-12-26 | End: 2018-12-28 | Stop reason: HOSPADM

## 2018-12-26 RX ORDER — FUROSEMIDE 10 MG/ML
80 INJECTION INTRAMUSCULAR; INTRAVENOUS
Status: COMPLETED | OUTPATIENT
Start: 2018-12-26 | End: 2018-12-26

## 2018-12-26 RX ORDER — ONDANSETRON 4 MG/1
4 TABLET, ORALLY DISINTEGRATING ORAL
Status: COMPLETED | OUTPATIENT
Start: 2018-12-26 | End: 2018-12-26

## 2018-12-26 RX ORDER — HEPARIN SODIUM 1000 [USP'U]/ML
2000 INJECTION, SOLUTION INTRAVENOUS; SUBCUTANEOUS ONCE
Status: DISCONTINUED | OUTPATIENT
Start: 2018-12-26 | End: 2018-12-26

## 2018-12-26 RX ORDER — PRAVASTATIN SODIUM 20 MG/1
40 TABLET ORAL NIGHTLY
Status: DISCONTINUED | OUTPATIENT
Start: 2018-12-26 | End: 2018-12-28 | Stop reason: HOSPADM

## 2018-12-26 RX ORDER — MEDROXYPROGESTERONE ACETATE 5 MG/1
10 TABLET ORAL 2 TIMES DAILY
Status: DISCONTINUED | OUTPATIENT
Start: 2018-12-26 | End: 2018-12-28 | Stop reason: HOSPADM

## 2018-12-26 RX ORDER — POLYETHYLENE GLYCOL 3350 17 G/17G
17 POWDER, FOR SOLUTION ORAL EVERY 12 HOURS PRN
Status: DISCONTINUED | OUTPATIENT
Start: 2018-12-26 | End: 2018-12-28 | Stop reason: HOSPADM

## 2018-12-26 RX ORDER — ACETAMINOPHEN 325 MG/1
650 TABLET ORAL EVERY 4 HOURS PRN
Status: DISCONTINUED | OUTPATIENT
Start: 2018-12-26 | End: 2018-12-28 | Stop reason: HOSPADM

## 2018-12-26 RX ORDER — HEPARIN SODIUM 5000 [USP'U]/ML
5000 INJECTION, SOLUTION INTRAVENOUS; SUBCUTANEOUS EVERY 8 HOURS
Status: DISCONTINUED | OUTPATIENT
Start: 2018-12-26 | End: 2018-12-27

## 2018-12-26 RX ORDER — AZITHROMYCIN 250 MG/1
500 TABLET, FILM COATED ORAL DAILY
Status: COMPLETED | OUTPATIENT
Start: 2018-12-26 | End: 2018-12-26

## 2018-12-26 RX ORDER — INSULIN ASPART 100 [IU]/ML
0-5 INJECTION, SOLUTION INTRAVENOUS; SUBCUTANEOUS
Status: DISCONTINUED | OUTPATIENT
Start: 2018-12-26 | End: 2018-12-28 | Stop reason: HOSPADM

## 2018-12-26 RX ORDER — BUDESONIDE 0.5 MG/2ML
0.5 INHALANT ORAL EVERY 12 HOURS
Status: DISCONTINUED | OUTPATIENT
Start: 2018-12-26 | End: 2018-12-28 | Stop reason: HOSPADM

## 2018-12-26 RX ORDER — SODIUM CHLORIDE 0.9 % (FLUSH) 0.9 %
5 SYRINGE (ML) INJECTION
Status: DISCONTINUED | OUTPATIENT
Start: 2018-12-26 | End: 2018-12-28 | Stop reason: HOSPADM

## 2018-12-26 RX ORDER — PANTOPRAZOLE SODIUM 40 MG/1
40 TABLET, DELAYED RELEASE ORAL DAILY
Status: DISCONTINUED | OUTPATIENT
Start: 2018-12-27 | End: 2018-12-28 | Stop reason: HOSPADM

## 2018-12-26 RX ADMIN — BUDESONIDE 0.5 MG: 0.5 SUSPENSION RESPIRATORY (INHALATION) at 07:12

## 2018-12-26 RX ADMIN — ONDANSETRON 4 MG: 4 TABLET, ORALLY DISINTEGRATING ORAL at 09:12

## 2018-12-26 RX ADMIN — IPRATROPIUM BROMIDE AND ALBUTEROL SULFATE 3 ML: .5; 3 SOLUTION RESPIRATORY (INHALATION) at 07:12

## 2018-12-26 RX ADMIN — FUROSEMIDE 80 MG: 10 INJECTION, SOLUTION INTRAVENOUS at 07:12

## 2018-12-26 RX ADMIN — AZITHROMYCIN 500 MG: 250 TABLET, FILM COATED ORAL at 09:12

## 2018-12-26 RX ADMIN — INSULIN DETEMIR 10 UNITS: 100 INJECTION, SOLUTION SUBCUTANEOUS at 10:12

## 2018-12-26 RX ADMIN — PRAVASTATIN SODIUM 40 MG: 20 TABLET ORAL at 09:12

## 2018-12-26 RX ADMIN — MEDROXYPROGESTERONE ACETATE 10 MG: 5 TABLET ORAL at 09:12

## 2018-12-26 RX ADMIN — HEPARIN SODIUM 5000 UNITS: 5000 INJECTION, SOLUTION INTRAVENOUS; SUBCUTANEOUS at 09:12

## 2018-12-26 RX ADMIN — METOPROLOL SUCCINATE 200 MG: 50 TABLET, EXTENDED RELEASE ORAL at 09:12

## 2018-12-26 NOTE — HPI
Cordell Angulo is a 53 y old AA woman with history of end-stage kidney disease on hemodialysis, [place], [facility] [place] Pina , her last dialysis about 2 days ago, due to holiday schedule, she went to the dialysis unit today with significant shortness of breath, it was felt that it was not safe to dialyze at the unit so patient was transferred to the emergency room by ambulance, her blood pressure on presentation was significantly elevated, patient was started on nitroglycerin gtt , this was stopped once her blood pressure improved, patient was started on emergent dialysis in the emergency room, clinically improved after starting dialysis, she was placed on BiPAP support,

## 2018-12-26 NOTE — ED NOTES
Received report from Annette DILL, assumed care of pt. Pt found sitting up in bed with bipap in place. CM showing ST rate of 104, pt responded by opening her eyes to loud verbal. Respirations labored at 32 per minute. Loud crackles noted in all anterior lobes. Skin warm and dry.

## 2018-12-26 NOTE — ED NOTES
Pt more awake, asking if I saw her son. Advised pt that I have not seen any family since I arrived in her room. Pt also reported that she goes to [facility] for dialysis. Pt normally goes on M,W, & F. Monday was replaced with Sunday because of the holiday, and pt was supposed to go to dialysis today.

## 2018-12-26 NOTE — SUBJECTIVE & OBJECTIVE
Past Medical History:   Diagnosis Date    Asthma     CHF (congestive heart failure)     CKD (chronic kidney disease) stage 5, GFR less than 15 ml/min     Depression     Diabetes mellitus     Dialysis patient     Hypercholesterolemia     Hypertension     PAF (paroxysmal atrial fibrillation)        Past Surgical History:   Procedure Laterality Date    AV FISTULA PLACEMENT      CARDIAC ELECTROPHYSIOLOGY MAPPING AND ABLATION      cesarian section      TUMOR REMOVAL      L lung    VASCULAR CATH INSERTION N/A 10/19/2015    Performed by Oren Padilla MD at [facility]       Review of patient's allergies indicates:   Allergen Reactions    Sulfa (sulfonamide antibiotics) Rash     No current facility-administered medications for this encounter.      Current Outpatient Medications   Medication    albuterol 90 mcg/actuation inhaler    aspirin 81 MG Chew    fluticasone (FLONASE) 50 mcg/actuation nasal spray    fluticasone (VERAMYST) 27.5 mcg/actuation nasal spray    fluticasone-salmeterol 100-50 mcg/dose (ADVAIR) 100-50 mcg/dose diskus inhaler    hydrALAZINE (APRESOLINE) 25 MG tablet    insulin glargine (LANTUS) 100 unit/mL injection    insulin NPH-insulin regular, 70/30, (NOVOLIN 70/30) 100 unit/mL (70-30) injection    levocetirizine (XYZAL) 2.5 mg/5 mL solution    liraglutide (VICTOZA 3-SHYANN) 0.6 mg/0.1 mL (18 mg/3 mL) PnIj    lisinopril (PRINIVIL,ZESTRIL) 40 MG tablet    LORazepam (ATIVAN) 1 MG tablet    medroxyPROGESTERone (PROVERA) 10 MG tablet    meloxicam (MOBIC) 15 MG tablet    metoprolol succinate (TOPROL-XL) 100 MG 24 hr tablet    montelukast (SINGULAIR) 10 mg tablet    ondansetron (ZOFRAN) 4 MG tablet    pravastatin (PRAVACHOL) 40 MG tablet    sevelamer carbonate (RENVELA) 2.4 gram PwPk    tramadol-acetaminophen 37.5-325 mg (ULTRACET) 37.5-325 mg Tab    vitamin renal formula, B-complex-vitamin c-folic acid, (NEPHROCAP) 1 mg Cap     Family History     Problem Relation (Age of  Onset)    Diabetes Father    Heart disease Mother        Tobacco Use    Smoking status: Never Smoker    Smokeless tobacco: Never Used   Substance and Sexual Activity    Alcohol use: No    Drug use: No    Sexual activity: Not on file     Review of Systems   Constitutional: Positive for activity change. Negative for appetite change, fatigue and fever.   HENT: Negative for congestion, facial swelling, sore throat, trouble swallowing and voice change.    Eyes: Negative for redness and visual disturbance.   Respiratory: Positive for cough and shortness of breath. Negative for apnea, chest tightness and wheezing.    Cardiovascular: Negative for chest pain, palpitations and leg swelling.   Gastrointestinal: Negative for abdominal distention, abdominal pain, blood in stool, constipation, diarrhea, nausea and vomiting.   Genitourinary: Negative for decreased urine volume, difficulty urinating, dysuria, flank pain, frequency, hematuria, pelvic pain and urgency.   Musculoskeletal: Negative for back pain, gait problem and joint swelling.   Skin: Negative for color change and rash.   Neurological: Negative for dizziness, syncope, weakness and headaches.   Hematological: Does not bruise/bleed easily.   Psychiatric/Behavioral: Negative for agitation, behavioral problems and confusion. The patient is not nervous/anxious.      Objective:     Vital Signs (Most Recent):  Temp: 98.7 °F (37.1 °C) (12/26/18 0628)  Pulse: 70 (12/26/18 1115)  Resp: (!) 25 (12/26/18 1115)  BP: (!) 95/55 (12/26/18 1115)  SpO2: 100 % (12/26/18 1115)  O2 Device (Oxygen Therapy): BiPAP (12/26/18 0928) Vital Signs (24h Range):  Temp:  [98.7 °F (37.1 °C)] 98.7 °F (37.1 °C)  Pulse:  [] 70  Resp:  [23-39] 25  SpO2:  [85 %-100 %] 100 %  BP: ()/() 95/55     Weight: 118.6 kg (261 lb 9 oz) (12/26/18 0709)  Body mass index is 40.97 kg/m².  Body surface area is 2.37 meters squared.    No intake/output data recorded.    Physical Exam    Constitutional: She is oriented to person, place, and time. She appears well-developed and well-nourished. No distress.   HENT:   Head: Normocephalic and atraumatic.   Mouth/Throat: Oropharynx is clear and moist. No oropharyngeal exudate.   BIPAP in place    Eyes: Conjunctivae and EOM are normal. Pupils are equal, round, and reactive to light.   Neck: Normal range of motion. Neck supple. No JVD present. Carotid bruit is not present. No tracheal deviation present. No thyroid mass and no thyromegaly present.   Cardiovascular: Normal rate, regular rhythm and intact distal pulses. Exam reveals no gallop and no friction rub.   Murmur heard.  Pulmonary/Chest: Effort normal. No respiratory distress. She has wheezes. She has rales. She exhibits no tenderness.   Abdominal: Soft. Bowel sounds are normal. She exhibits no distension, no abdominal bruit, no ascites and no mass. There is no hepatosplenomegaly. There is no tenderness. There is no rebound, no guarding and no CVA tenderness.   Musculoskeletal: She exhibits no edema or tenderness.   Lymphadenopathy:     She has no cervical adenopathy.   Neurological: She is alert and oriented to person, place, and time. She has normal reflexes. She displays normal reflexes. No cranial nerve deficit. She exhibits normal muscle tone. Coordination normal.   Skin: Skin is warm and intact. No rash noted. No erythema. No pallor.   Psychiatric: She has a normal mood and affect. Her behavior is normal. Judgment normal.       Significant Labs:  Cardiac Markers: No results for input(s): CKMB, TROPONINT, MYOGLOBIN in the last 168 hours.  CBC:   Recent Labs   Lab 12/26/18  0644   WBC 14.27*   RBC 3.95*   HGB 10.9*   HCT 36.0*      MCV 91   MCH 27.6   MCHC 30.3*     CMP:   Recent Labs   Lab 12/26/18  0644   *   CALCIUM 9.3   ALBUMIN 3.1*   PROT 6.9      K 4.2   CO2 19*      BUN 48*   CREATININE 9.5*   ALKPHOS 76   ALT 20   AST 32   BILITOT 0.5     Coagulation:   Recent  Labs   Lab 12/26/18  0644   INR 1.0   APTT 21.9     All labs within the past 24 hours have been reviewed.    Significant Imaging:  Reviewed     Lab Results   Component Value Date    .0 (H) 05/08/2015    CALCIUM 9.3 12/26/2018    PHOS 3.6 06/14/2017       Lab Results   Component Value Date    ALBUMIN 3.1 (L) 12/26/2018

## 2018-12-26 NOTE — CONSULTS
[facility] -   Nephrology  Consult Note    Patient Name: Cordell Angulo  MRN: [id number]  Admission Date: 12/26/2018  Hospital Length of Stay: 0 days  Attending Provider: Nena Guerra DO   Primary Care Physician: Primary Doctor No  Principal Problem:<principal problem not specified>    Consults  Subjective:     HPI: Cordell Angulo is a 53 y old AA woman with history of end-stage kidney disease on hemodialysis, [facility], [facility] Pina , her last dialysis about 2 days ago, due to holiday schedule, she went to the dialysis unit today with significant shortness of breath, it was felt that it was not safe to dialyze at the unit so patient was transferred to the emergency room by ambulance, her blood pressure on presentation was significantly elevated, patient was started on nitroglycerin gtt , this was stopped once her blood pressure improved, patient was started on emergent dialysis in the emergency room, clinically improved after starting dialysis, she was placed on BiPAP support,    Past Medical History:   Diagnosis Date    Asthma     CHF (congestive heart failure)     CKD (chronic kidney disease) stage 5, GFR less than 15 ml/min     Depression     Diabetes mellitus     Dialysis patient     Hypercholesterolemia     Hypertension     PAF (paroxysmal atrial fibrillation)        Past Surgical History:   Procedure Laterality Date    AV FISTULA PLACEMENT      CARDIAC ELECTROPHYSIOLOGY MAPPING AND ABLATION      cesarian section      TUMOR REMOVAL      L lung    VASCULAR CATH INSERTION N/A 10/19/2015    Performed by Oren Padilla MD at [facility] CATH [facility]       Review of patient's allergies indicates:   Allergen Reactions    Sulfa (sulfonamide antibiotics) Rash     No current facility-administered medications for this encounter.      Current Outpatient Medications   Medication    albuterol 90 mcg/actuation inhaler    aspirin 81 MG Chew    fluticasone (FLONASE) 50 mcg/actuation nasal spray     fluticasone (VERAMYST) 27.5 mcg/actuation nasal spray    fluticasone-salmeterol 100-50 mcg/dose (ADVAIR) 100-50 mcg/dose diskus inhaler    hydrALAZINE (APRESOLINE) 25 MG tablet    insulin glargine (LANTUS) 100 unit/mL injection    insulin NPH-insulin regular, 70/30, (NOVOLIN 70/30) 100 unit/mL (70-30) injection    levocetirizine (XYZAL) 2.5 mg/5 mL solution    liraglutide (VICTOZA 3-SHYANN) 0.6 mg/0.1 mL (18 mg/3 mL) PnIj    lisinopril (PRINIVIL,ZESTRIL) 40 MG tablet    LORazepam (ATIVAN) 1 MG tablet    medroxyPROGESTERone (PROVERA) 10 MG tablet    meloxicam (MOBIC) 15 MG tablet    metoprolol succinate (TOPROL-XL) 100 MG 24 hr tablet    montelukast (SINGULAIR) 10 mg tablet    ondansetron (ZOFRAN) 4 MG tablet    pravastatin (PRAVACHOL) 40 MG tablet    sevelamer carbonate (RENVELA) 2.4 gram PwPk    tramadol-acetaminophen 37.5-325 mg (ULTRACET) 37.5-325 mg Tab    vitamin renal formula, B-complex-vitamin c-folic acid, (NEPHROCAP) 1 mg Cap     Family History     Problem Relation (Age of Onset)    Diabetes Father    Heart disease Mother        Tobacco Use    Smoking status: Never Smoker    Smokeless tobacco: Never Used   Substance and Sexual Activity    Alcohol use: No    Drug use: No    Sexual activity: Not on file     Review of Systems   Constitutional: Positive for activity change. Negative for appetite change, fatigue and fever.   HENT: Negative for congestion, facial swelling, sore throat, trouble swallowing and voice change.    Eyes: Negative for redness and visual disturbance.   Respiratory: Positive for cough and shortness of breath. Negative for apnea, chest tightness and wheezing.    Cardiovascular: Negative for chest pain, palpitations and leg swelling.   Gastrointestinal: Negative for abdominal distention, abdominal pain, blood in stool, constipation, diarrhea, nausea and vomiting.   Genitourinary: Negative for decreased urine volume, difficulty urinating, dysuria, flank pain, frequency,  hematuria, pelvic pain and urgency.   Musculoskeletal: Negative for back pain, gait problem and joint swelling.   Skin: Negative for color change and rash.   Neurological: Negative for dizziness, syncope, weakness and headaches.   Hematological: Does not bruise/bleed easily.   Psychiatric/Behavioral: Negative for agitation, behavioral problems and confusion. The patient is not nervous/anxious.      Objective:     Vital Signs (Most Recent):  Temp: 98.7 °F (37.1 °C) (12/26/18 0628)  Pulse: 70 (12/26/18 1115)  Resp: (!) 25 (12/26/18 1115)  BP: (!) 95/55 (12/26/18 1115)  SpO2: 100 % (12/26/18 1115)  O2 Device (Oxygen Therapy): BiPAP (12/26/18 0928) Vital Signs (24h Range):  Temp:  [98.7 °F (37.1 °C)] 98.7 °F (37.1 °C)  Pulse:  [] 70  Resp:  [23-39] 25  SpO2:  [85 %-100 %] 100 %  BP: ()/() 95/55     Weight: 118.6 kg (261 lb 9 oz) (12/26/18 0709)  Body mass index is 40.97 kg/m².  Body surface area is 2.37 meters squared.    No intake/output data recorded.    Physical Exam   Constitutional: She is oriented to person, place, and time. She appears well-developed and well-nourished. No distress.   HENT:   Head: Normocephalic and atraumatic.   Mouth/Throat: Oropharynx is clear and moist. No oropharyngeal exudate.   BIPAP in place    Eyes: Conjunctivae and EOM are normal. Pupils are equal, round, and reactive to light.   Neck: Normal range of motion. Neck supple. No JVD present. Carotid bruit is not present. No tracheal deviation present. No thyroid mass and no thyromegaly present.   Cardiovascular: Normal rate, regular rhythm and intact distal pulses. Exam reveals no gallop and no friction rub.   Murmur heard.  Pulmonary/Chest: Effort normal. No respiratory distress. She has wheezes. She has rales. She exhibits no tenderness.   Abdominal: Soft. Bowel sounds are normal. She exhibits no distension, no abdominal bruit, no ascites and no mass. There is no hepatosplenomegaly. There is no tenderness. There is no  rebound, no guarding and no CVA tenderness.   Musculoskeletal: She exhibits no edema or tenderness.   Lymphadenopathy:     She has no cervical adenopathy.   Neurological: She is alert and oriented to person, place, and time. She has normal reflexes. She displays normal reflexes. No cranial nerve deficit. She exhibits normal muscle tone. Coordination normal.   Skin: Skin is warm and intact. No rash noted. No erythema. No pallor.   Psychiatric: She has a normal mood and affect. Her behavior is normal. Judgment normal.       Significant Labs:  Cardiac Markers: No results for input(s): CKMB, TROPONINT, MYOGLOBIN in the last 168 hours.  CBC:   Recent Labs   Lab 12/26/18  0644   WBC 14.27*   RBC 3.95*   HGB 10.9*   HCT 36.0*      MCV 91   MCH 27.6   MCHC 30.3*     CMP:   Recent Labs   Lab 12/26/18  0644   *   CALCIUM 9.3   ALBUMIN 3.1*   PROT 6.9      K 4.2   CO2 19*      BUN 48*   CREATININE 9.5*   ALKPHOS 76   ALT 20   AST 32   BILITOT 0.5     Coagulation:   Recent Labs   Lab 12/26/18  0644   INR 1.0   APTT 21.9     All labs within the past 24 hours have been reviewed.    Significant Imaging:  Reviewed     Lab Results   Component Value Date    .0 (H) 05/08/2015    CALCIUM 9.3 12/26/2018    PHOS 3.6 06/14/2017       Lab Results   Component Value Date    ALBUMIN 3.1 (L) 12/26/2018           Assessment/Plan:     ESRD (end stage renal disease)    1. ESRD on HD : MWF , LANA Perez, seen at HD , presents to the hospital with shortness of breath with fluid overload, ultrafiltration on dialysis as tolerated,    2.  Hypertension - it blood pressure improved, UF as tolerated,    3.  Anemia of chronic kidney disease - CAL with HD     4. SHPT - renal diet     5. Disposition - d/c home is stable after HD and f/u out pt HD on Friday          Thank you for your consult. I will follow-up with patient. Please contact us if you have any additional questions.     Total time spent 60 minutes including time  needed to review the records,  patient  evaluation, documentation, face-to-face discussion with the patient, family, primary team,   more than 50% of the time was spent on coordination of care and counseling.       Donovan Gardner MD   Nephrology  [facility]

## 2018-12-26 NOTE — ED PROVIDER NOTES
"SCRIBE #1 NOTE: I, Corinne Mack, am scribing for, and in the presence of, Nena Guerra DO. I have scribed the entire note.      History      Chief Complaint   Patient presents with    Chest Pain     chest tightness, SOB, arrived to ED on CPAP       Review of patient's allergies indicates:   Allergen Reactions    Sulfa (sulfonamide antibiotics) Rash        HPI   HPI    12/26/2018, 6:29 AM   History obtained from the patient      History of Present Illness: Cordell Angulo is a 53 y.o. female patient with PMHx of asthma, CHF, CKD, DM, HTN, and PAF who presents to the Emergency Department for CP which onset gradually today. Pt's family states that the pt was "perfect" yesterday, but awoke today "gasping for breath". Pt was at dialysis this morning, but did not get dialyzed. Pt's last dialysis was 3 days ago. Symptoms are constant and moderate in severity. No mitigating or exacerbating factors reported. Associated sxs include diaphoresis, chest tightness, SOB, and cough. Patient denies any fever, chills, congestion, rhinorrhea, wheezing, N/V/D, abd pain, back pain, neck pain, HA, dizziness, numbness, and all other sxs at this time. Prior Tx includes 2 NTG given en route. Pt was placed on CPAP en route. No further complaints or concerns at this time.         Arrival mode: EMS    PCP: Primary Doctor No       Past Medical History:  Past Medical History:   Diagnosis Date    Asthma     CHF (congestive heart failure)     CKD (chronic kidney disease) stage 5, GFR less than 15 ml/min     Depression     Diabetes mellitus     Dialysis patient     Hypercholesterolemia     Hypertension     PAF (paroxysmal atrial fibrillation)        Past Surgical History:  Past Surgical History:   Procedure Laterality Date    AV FISTULA PLACEMENT      CARDIAC ELECTROPHYSIOLOGY MAPPING AND ABLATION      cesarian section      TUMOR REMOVAL      L lung    VASCULAR CATH INSERTION N/A 10/19/2015    Performed by Oren Padilla MD " at [facility] CATH LAB         Family History:  Family History   Problem Relation Age of Onset    Heart disease Mother     Diabetes Father        Social History:  Social History     Tobacco Use    Smoking status: Never Smoker    Smokeless tobacco: Never Used   Substance and Sexual Activity    Alcohol use: No    Drug use: No    Sexual activity: Unknown       ROS   Review of Systems   Constitutional: Positive for diaphoresis. Negative for chills and fever.   HENT: Negative for congestion, rhinorrhea and sore throat.    Respiratory: Positive for cough, chest tightness and shortness of breath.    Cardiovascular: Positive for chest pain. Negative for leg swelling.   Gastrointestinal: Negative for abdominal pain, diarrhea, nausea and vomiting.   Genitourinary: Negative for dysuria.   Musculoskeletal: Negative for back pain, neck pain and neck stiffness.   Skin: Negative for rash and wound.   Neurological: Negative for dizziness, weakness, light-headedness, numbness and headaches.   Hematological: Does not bruise/bleed easily.   Psychiatric/Behavioral: The patient is nervous/anxious.    All other systems reviewed and are negative.    Physical Exam      Initial Vitals   BP Pulse Resp Temp SpO2   12/26/18 0622 12/26/18 0622 12/26/18 0622 12/26/18 0628 12/26/18 0622   (!) 170/110 (!) 120 (!) 32 98.7 °F (37.1 °C) (!) 85 %      MAP       --                 Physical Exam  Nursing Notes and Vital Signs Reviewed.  Constitutional:  Patient is in moderate distress.  She is diaphoretic.  On BiPAP.  Head: Atraumatic. Normocephalic.  Eyes: PERRL. EOM intact. Conjunctivae are not pale. No scleral icterus.  ENT: Mucous membranes are moist. Oropharynx is clear and symmetric.    Neck: Supple. Full ROM. No lymphadenopathy.  Cardiovascular: Tachycardic. Regular rhythm. Positive JVD. No murmurs, rubs, or gallops. Distal pulses are 2+ and symmetric.  Pulmonary/Chest: Mild respiratory distress. Tachypnic. Diffuse crackles. No  wheezing.  Abdominal: Soft and non-distended.  There is no tenderness.  No rebound, guarding, or rigidity. Good bowel sounds. Obese.  Musculoskeletal: Moves all extremities. No obvious deformities. No edema. No calf tenderness.  Skin: cool and clammy.  Neurological:  Awake. Pt opens her eyes to verbal stimuli, but then quickly falls asleep.    No acute focal neurological deficits are appreciated.  Psychiatric: Normal affect. Good eye contact. Appropriate in content.    ED Course    Critical Care  Date/Time: 12/26/2018 1:45 PM  Performed by: Nena Guerra DO  Authorized by: Nena Guerra DO   Direct patient critical care time: 23 minutes  Additional history critical care time: 7 minutes  Ordering / reviewing critical care time: 8 minutes  Documentation critical care time: 10 minutes  Consulting other physicians critical care time: 12 minutes  Total critical care time (exclusive of procedural time) : 60 minutes  Critical care time was exclusive of separately billable procedures and treating other patients and teaching time.  Critical care was necessary to treat or prevent imminent or life-threatening deterioration of the following conditions: respiratory failure (hypoxia).  Critical care was time spent personally by me on the following activities: blood draw for specimens, development of treatment plan with patient or surrogate, discussions with consultants, interpretation of cardiac output measurements, evaluation of patient's response to treatment, examination of patient, ordering and performing treatments and interventions, obtaining history from patient or surrogate, ordering and review of laboratory studies, ordering and review of radiographic studies, pulse oximetry, review of old charts and re-evaluation of patient's condition.        ED Vital Signs:  12/26/18 1302 -- -- -- 82 -- 19 123/61 -- -- 86 -- -- -- 88 % Abnormal  -- LV   12/26/18 1236 -- -- -- 74 -- 23 Abnormal  -- -- -- -- -- -- -- 100 %  nasal cannula VTT   12/26/18 1217 -- -- -- 81 -- 23 Abnormal  127/64 -- -- 89 -- -- -- 100 % --    12/26/18 1130 -- -- -- -- -- -- 108/57 Abnormal  -- -- 79 -- -- -- -- --    12/26/18 1130 -- -- -- 67 -- 23 Abnormal  -- -- -- -- -- -- -- 100 % BiPAP VTT   12/26/18 1120 -- -- -- 68 -- 23 Abnormal  100/60 -- -- 70 -- -- -- 100 % --    12/26/18 1115 -- -- -- 70 -- 25 Abnormal  95/55 Abnormal  -- -- 70 -- -- -- 100 % --    12/26/18 1100 -- -- -- 68 -- 28 Abnormal  92/55 Abnormal  -- -- 66 -- -- -- 100 % --    12/26/18 1045 -- -- -- 69 -- 27 Abnormal  96/55 Abnormal  -- -- 69 -- -- -- 100 % --    12/26/18 1030 -- -- -- 72 -- 34 Abnormal  97/56 Abnormal  -- -- 71 -- -- -- 100 % --    12/26/18 1015 -- -- -- 66 -- 24 Abnormal  102/57 Abnormal  -- -- 67 -- -- -- 100 % --    12/26/18 1000 -- -- -- 71 -- 25 Abnormal  95/58 Abnormal  -- -- 72 -- -- -- 100 % --    12/26/18 0945 -- -- -- 78 -- 27 Abnormal  90/55 Abnormal  -- -- 69 -- -- -- 100 % --    12/26/18 0930 -- -- -- 79 -- 30 Abnormal  97/55 Abnormal  -- -- 70 -- -- -- 100 % --    12/26/18 0928 -- -- -- 80 -- 28 Abnormal  -- -- -- -- -- -- -- 100 % BiPAP VTT   12/26/18 0915 -- -- -- 75 -- 23 Abnormal  100/61 -- -- 76 -- -- -- 100 % --    12/26/18 0905 -- -- -- 82 -- 30 Abnormal  99/57 Abnormal  -- -- 72 -- -- -- 100 % --    12/26/18 0900 -- -- -- 81 -- 29 Abnormal  81/56 Abnormal  -- -- 64 -- -- -- 100 % --    12/26/18 0845 -- -- -- 88 -- 27 Abnormal  110/49 Abnormal  -- -- 71 -- -- -- 100 % --    12/26/18 0830 -- -- -- 87 -- 32 Abnormal  108/63 -- -- 78 -- -- -- 100 % --    12/26/18 0815 -- -- -- 91 -- 30 Abnormal  114/56 Abnormal  -- -- 80 -- -- -- 100 % --    12/26/18 0800 -- -- -- 93 -- 31 Abnormal  123/62 Right arm Automatic 83 Lying -- -- 100 % --    12/26/18 0746 -- -- -- 97 -- 33 Abnormal  127/70 -- -- 93 -- -- -- 100 % -- LV   12/26/18 0743 -- -- -- 98 -- 33 Abnormal  133/72 -- -- 96 -- -- -- 100 % -- LV   12/26/18 0740 -- -- --  99 -- 34 Abnormal  133/76 -- -- 100 -- -- -- 100 % -- LV   12/26/18 0738 -- -- -- 98 -- 34 Abnormal  -- -- -- -- -- -- -- 100 % BiPAP VTT   12/26/18 0734 -- -- -- 98 -- 33 Abnormal  139/73 -- -- 98 -- -- -- 100 % -- LV   12/26/18 0731 -- -- -- 99 -- 34 Abnormal  135/73 -- -- 98 -- -- -- 99 % -- LV   12/26/18 0728 -- -- -- 98 -- 36 Abnormal  143/73 Abnormal  -- -- 100 -- -- -- 99 % -- LV   12/26/18 0725 -- -- -- 100 -- 35 Abnormal  145/72 Abnormal  -- -- 102 -- -- -- 99 % -- LV   12/26/18 0722 -- -- -- 101 -- 36 Abnormal  150/79 Abnormal  -- -- 108 -- -- -- 100 % -- LV   12/26/18 0719 -- -- -- 103 -- 37 Abnormal  156/92 Abnormal  -- -- 118 -- -- -- 98 % -- LV   12/26/18 0716 -- -- -- 103 -- 36 Abnormal  157/89 Abnormal  -- -- 117 -- -- -- 99 % -- LV   12/26/18 0713 -- -- -- 103 -- 38 Abnormal  160/91 Abnormal  -- -- 120 -- -- -- 97 % -- LV   12/26/18 0710 -- -- -- 103 -- 37 Abnormal  161/90 Abnormal  -- -- 119 -- -- -- 98 % -- LV   12/26/18 0700 -- -- -- 102 Monitor 39 Abnormal  154/81 Abnormal  Right arm Automatic -- Sitting -- -- 100 % BiPAP MV   12/26/18 0647 -- -- -- 106 -- -- -- -- -- -- -- -- -- -- -- MV   12/26/18 0630 -- -- -- 118 Abnormal  Monitor 28 Abnormal  170/93 Abnormal  Right arm Automatic -- Sitting -- -- 100 % BiPAP MV   12/26/18 0628 -- 98.7 °F (37.1 °C) Axillary -- -- -- -- -- -- -- -- -- -- -- -- JS   12/26/18 0622 -- -- -- 120 Abnormal  -- 32 Abnormal  170/110 Abnormal  -- -- -- -- -- -- 85 % Abnormal  BiPAP JS         Abnormal Lab Results:  Labs Reviewed   CBC W/ AUTO DIFFERENTIAL - Abnormal; Notable for the following components:       Result Value    WBC 14.27 (*)     RBC 3.95 (*)     Hemoglobin 10.9 (*)     Hematocrit 36.0 (*)     MCHC 30.3 (*)     RDW 16.1 (*)     MPV 9.1 (*)     Gran # (ANC) 10.6 (*)     Gran% 74.4 (*)     All other components within normal limits   COMPREHENSIVE METABOLIC PANEL - Abnormal; Notable for the following components:    CO2 19 (*)     Glucose 375 (*)     BUN, Bld  48 (*)     Creatinine 9.5 (*)     Albumin 3.1 (*)     Anion Gap 19 (*)     eGFR if  5 (*)     eGFR if non  4 (*)     All other components within normal limits   B-TYPE NATRIURETIC PEPTIDE - Abnormal; Notable for the following components:     (*)     All other components within normal limits   ISTAT PROCEDURE - Abnormal; Notable for the following components:    POC PH 7.207 (*)     POC PCO2 60.6 (*)     POC PO2 122 (*)     POC Glucose 404 (*)     All other components within normal limits   POCT GLUCOSE - Abnormal; Notable for the following components:    POCT Glucose 204 (*)     All other components within normal limits   PROTIME-INR   APTT   MAGNESIUM   TROPONIN I   POCT GLUCOSE MONITORING CONTINUOUS        All Lab Results:  Results for orders placed or performed during the hospital encounter of 12/26/18   CBC auto differential   Result Value Ref Range    WBC 14.27 (H) 3.90 - 12.70 K/uL    RBC 3.95 (L) 4.00 - 5.40 M/uL    Hemoglobin 10.9 (L) 12.0 - 16.0 g/dL    Hematocrit 36.0 (L) 37.0 - 48.5 %    MCV 91 82 - 98 fL    MCH 27.6 27.0 - 31.0 pg    MCHC 30.3 (L) 32.0 - 36.0 g/dL    RDW 16.1 (H) 11.5 - 14.5 %    Platelets 332 150 - 350 K/uL    MPV 9.1 (L) 9.2 - 12.9 fL    Gran # (ANC) 10.6 (H) 1.8 - 7.7 K/uL    Lymph # 2.6 1.0 - 4.8 K/uL    Mono # 0.7 0.3 - 1.0 K/uL    Eos # 0.3 0.0 - 0.5 K/uL    Baso # 0.03 0.00 - 0.20 K/uL    Gran% 74.4 (H) 38.0 - 73.0 %    Lymph% 18.4 18.0 - 48.0 %    Mono% 4.8 4.0 - 15.0 %    Eosinophil% 2.2 0.0 - 8.0 %    Basophil% 0.2 0.0 - 1.9 %    Differential Method Automated    Comprehensive metabolic panel   Result Value Ref Range    Sodium 138 136 - 145 mmol/L    Potassium 4.2 3.5 - 5.1 mmol/L    Chloride 100 95 - 110 mmol/L    CO2 19 (L) 23 - 29 mmol/L    Glucose 375 (H) 70 - 110 mg/dL    BUN, Bld 48 (H) 6 - 20 mg/dL    Creatinine 9.5 (H) 0.5 - 1.4 mg/dL    Calcium 9.3 8.7 - 10.5 mg/dL    Total Protein 6.9 6.0 - 8.4 g/dL    Albumin 3.1 (L) 3.5 - 5.2  g/dL    Total Bilirubin 0.5 0.1 - 1.0 mg/dL    Alkaline Phosphatase 76 55 - 135 U/L    AST 32 10 - 40 U/L    ALT 20 10 - 44 U/L    Anion Gap 19 (H) 8 - 16 mmol/L    eGFR if African American 5 (A) >60 mL/min/1.73 m^2    eGFR if non African American 4 (A) >60 mL/min/1.73 m^2   Protime-INR   Result Value Ref Range    Prothrombin Time 10.8 9.0 - 12.5 sec    INR 1.0 0.8 - 1.2   APTT   Result Value Ref Range    aPTT 21.9 21.0 - 32.0 sec   Magnesium   Result Value Ref Range    Magnesium 2.2 1.6 - 2.6 mg/dL   Troponin I   Result Value Ref Range    Troponin I 0.012 0.000 - 0.026 ng/mL   Brain natriuretic peptide   Result Value Ref Range     (H) 0 - 99 pg/mL   ISTAT PROCEDURE   Result Value Ref Range    POC PH 7.207 (LL) 7.35 - 7.45    POC PCO2 60.6 (HH) 35 - 45 mmHg    POC PO2 122 (H) 80 - 100 mmHg    POC HCO3 24.1 24 - 28 mmol/L    POC BE -4 -2 to 2 mmol/L    POC SATURATED O2 98 95 - 100 %    POC Glucose 404 (H) 70 - 110 mg/dL    POC Sodium 138 136 - 145 mmol/L    POC Potassium 3.7 3.5 - 5.1 mmol/L    POC Ionized Calcium 1.22 1.06 - 1.42 mmol/L    POC Hematocrit 36 36 - 54 %PCV    Sample ARTERIAL     Site RR     Allens Test Pass     DelSys CPAP/BiPAP     Mode CPAP     PEEP 5     FiO2 100     Spont Rate 30    POCT glucose   Result Value Ref Range    POCT Glucose 204 (H) 70 - 110 mg/dL       Imaging Results:  Imaging Results          X-Ray Chest AP Portable (Final result)  Result time 12/26/18 08:19:49    Final result by Milton Jacobson MD (12/26/18 08:19:49)                 Impression:      Findings again consistent with CHF.      Electronically signed by: Milton Jacobson MD  Date:    12/26/2018  Time:    08:19             Narrative:    EXAMINATION:  XR CHEST AP PORTABLE    CLINICAL HISTORY:  Dyspnea, unspecified    TECHNIQUE:  Single frontal view of the chest was performed.    FINDINGS:  Cardiomegaly pulmonary vascular congestion and bilateral interstitial edema suspected.  Small bilateral pleural effusions suspected.   No pneumothorax.  Aorta demonstrates atherosclerotic disease.  Bones demonstrate degenerative changes.  In comparison to the prior study, there is no adverse interval changes.                                 The EKG was ordered, reviewed, and independently interpreted by the ED provider.  Interpretation time: 0629  Rate: 112 BPM  Rhythm: sinus tachycardia  Interpretation: Possible L atrial enlargement. LVH with repolarization abnormality. No STEMI.             The Emergency Provider reviewed the vital signs and test results, which are outlined above.    ED Discussion     6:39 AM: Dr. Guerra discussed the pt's case with Dr. Gardner (Nephroogy) who recommends intubating the pt if necessary. Dr. Gardner will dialyze the pt.    6:57 AM: Reevaluated pt. Pt is more awake and looking around the room.  No longer diaphoretic.    6:59 AM: Dr. Guerra discussed the pt's case with Dr. Gardner (Nephrology) who states that the pt can likely be d/c after HD.    8:02 AM: Pt is getting dialysis now in ED bed 1.    12:30 PM: Re-evaluated pt. Pt is resting comfortably and is in no acute distress.  D/w pt all pertinent results. D/w pt any concerns expressed at this time. Answered all questions. Pt expresses understanding at this time.  Patient currently on BiPAP.  Will remove BiPAP and re-evaluate.    1:09 PM:  On re-examination after removal of BiPAP as well as supplemental oxygen, patient's  SpO2 dropped to 89% on RA.  D/w pt all pertinent results. D/w pt any concerns expressed at this time. Answered all questions. Pt expresses understanding at this time.    1:43 PM: Discussed case with Leatha Woods NP (Hospital Medicine). Dr. Potter agrees with current care and management of pt and accepts admission.   Admitting Service: Hospital medicine   Admitting Physician: Dr. Potter  Admit to: Obs Tele    1:45 PM discussed with  regarding patient's pulse ox.  Will arrange for further dialysis tomorrow.    1:47 PM:  Re-evaluated pt. I have discussed test results, shared treatment plan, and the need for admission with patient and family at bedside. Pt and family express understanding at this time and agree with all information. All questions answered. Pt and family have no further questions or concerns at this time. Pt is ready for admit.      ED Medication(s):  Medications   furosemide injection 80 mg (80 mg Intravenous Given 12/26/18 0706)   ondansetron disintegrating tablet 4 mg (4 mg Oral Given 12/26/18 0930)          Medication List      ASK your doctor about these medications    albuterol 90 mcg/actuation inhaler  Commonly known as:  PROVENTIL/VENTOLIN HFA  Inhale 1-2 puffs into the lungs every 6 (six) hours as needed for Wheezing.     aspirin 81 MG Chew     fluticasone 27.5 mcg/actuation nasal spray  Commonly known as:  VERAMYST     fluticasone 50 mcg/actuation nasal spray  Commonly known as:  FLONASE  1 spray (50 mcg total) by Each Nare route 2 (two) times daily as needed for Rhinitis.     fluticasone-salmeterol 100-50 mcg/dose 100-50 mcg/dose diskus inhaler  Commonly known as:  ADVAIR     hydrALAZINE 25 MG tablet  Commonly known as:  APRESOLINE  Take 1 tablet (25 mg total) by mouth 3 (three) times daily.     insulin glargine 100 unit/mL injection  Commonly known as:  LANTUS     insulin NPH-insulin regular (70/30) 100 unit/mL (70-30) injection  Commonly known as:  NOVOLIN 70/30  Take 60 units subcutaneously in the morning and 40 units subcutaneously in the evening     levocetirizine 2.5 mg/5 mL solution  Commonly known as:  XYZAL  Take 5 mLs (2.5 mg total) by mouth every evening.     lisinopril 40 MG tablet  Commonly known as:  PRINIVIL,ZESTRIL     LORazepam 1 MG tablet  Commonly known as:  ATIVAN  Take 1 tablet (1 mg total) by mouth every evening. Do not take if your drowsy.  Careful driving once his start this medication     medroxyPROGESTERone 10 MG tablet  Commonly known as:  PROVERA     meloxicam 15 MG  tablet  Commonly known as:  MOBIC  TAKE 1 TABLET(15 MG) BY MOUTH EVERY DAY     metoprolol succinate 100 MG 24 hr tablet  Commonly known as:  TOPROL-XL  Take 1 tablet (100 mg total) by mouth once daily.     montelukast 10 mg tablet  Commonly known as:  SINGULAIR     ondansetron 4 MG tablet  Commonly known as:  ZOFRAN  Take 1 tablet (4 mg total) by mouth every 8 (eight) hours as needed.     pravastatin 40 MG tablet  Commonly known as:  PRAVACHOL     sevelamer carbonate 2.4 gram Pwpk  Commonly known as:  RENVELA  Take 2.4 g by mouth 3 (three) times daily with meals.     tramadol-acetaminophen 37.5-325 mg 37.5-325 mg Tab  Commonly known as:  ULTRACET     VICTOZA 3-SHYANN 0.6 mg/0.1 mL (18 mg/3 mL) Pnij  Generic drug:  liraglutide 0.6 mg/0.1 mL (18 mg/3 mL) subq PNIJ     vitamin renal formula (B-complex-vitamin c-folic acid) 1 mg Cap  Commonly known as:  NEPHROCAP  Take 1 capsule by mouth once daily.                  Medical Decision Making    Medical Decision Making:   Initial Assessment:   Patient is a 53-year-old female presents to emergency room in extremis.  Patient dialysis patient.  Due for dialysis this morning.  Was found with difficulty breathing, diaphoretic and hypertensive.  Was placed on BiPAP via EMS.  Decreased LOC.  Positive gag.  Differential Diagnosis:   Volume overload, hypertensive crisis, congestive heart failure, pneumonia  Independently Interpreted Test(s):   I have ordered and independently interpreted X-rays - see prior notes.  Clinical Tests:   Lab Tests: Ordered and Reviewed  Radiological Study: Ordered and Reviewed  Medical Tests: Ordered and Reviewed  ED Management:  Patient was given IV Lasix as well as IV nitroglycerin to help control blood pressure as well as lead to vasodilation to help with breathing.  Patient had improved mentation while on Lasix, BiPAP,  nitroglycerin drip.  Patient systolic blood pressure had decreased to 150.  Nitroglycerin drip was discontinued.  Nephrology,   Kendra, was consulted shortly after patient's arrival to the emergency department.  Patient underwent emergent dialysis.  On re-evaluation, patient was awake alert and oriented. When weaned off of BiPAP and oxygen, sats decreased to 88-90%.  Case was discussed with hospital medicine, Leatha Woods, NP for observation.  Patient will most likely need further dialysis tomorrow.  Case was also discussed with Nephrology, Dr. Gardner.  Patient placed in observation  Other:   I have discussed this case with another health care provider.       <> Summary of the Discussion: As above.     Additional MDM:   Hypertension: The patient has hypertensive crisis (systolic BP > 180 and/or diastolic BP > 120 with end organ damage). Medications given: Lasix IVP and NTG IVPB. Response: The patient's BP required multiple drugs to control their hypertension. Consultants: Internal Medicine. The patient's condition was felt to be potentially life-threatening.        Scribe Attestation:   Scribe #1: I performed the above scribed service and the documentation accurately describes the services I performed. I attest to the accuracy of the note 12/26/2018 1:47 PM.    Attending:   Physician Attestation Statement for Scribe #1: I, Nena Guerra DO, personally performed the services described in this documentation, as scribed by Corinne Mack, in my presence, and it is both accurate and complete.          Clinical Impression       ICD-10-CM ICD-9-CM   1. Hypoxia R09.02 799.02   2. Dyspnea R06.00 786.09   3. Chest pain R07.9 786.50   4. Shortness of breath R06.02 786.05   5. ESRD (end stage renal disease) N18.6 585.6   6. Hypervolemia, unspecified hypervolemia type E87.70 276.69   7. Dependent on hemodialysis Z99.2 V45.11       Disposition:   Disposition: Placed in Observation  Condition: Fair           Nena Guerra DO  12/26/18 1801

## 2018-12-26 NOTE — ASSESSMENT & PLAN NOTE
1. ESRD on HD : [facility]DO[facility] , ROOPA Aan, seen at HD , presents to the hospital with shortness of breath with fluid overload, ultrafiltration on dialysis as tolerated,    2.  Hypertension - it blood pressure improved, UF as tolerated,    3.  Anemia of chronic kidney disease - CAL with HD     4. SHPT - renal diet     5. Disposition - d/c home is stable after HD and f/u out pt HD on Friday

## 2018-12-26 NOTE — PLAN OF CARE
12/26/18 1424   OWENS Message   Medicare Outpatient and Observation Notification regarding financial responsibility Given to patient/caregiver;Explained to patient/caregiver;Signed/date by patient/caregiver   Date OWENS was signed 12/26/18   Time OWENS was signed [id number]

## 2018-12-26 NOTE — PLAN OF CARE
"Patient received urgent hd today. Net removal 1.8 liters. No access issues. Dr. Gardner visited during hd. Patient had low b/p during hd. Patients stated "breathing better" after hd.  "

## 2018-12-26 NOTE — PLAN OF CARE
Met with patient to complete discharge planning assessment. Patient reports that she lives at home with her  Laureano Angulo [phone] who provides support. Patient reports that she plans to use [employer] public transportation at time of d/c. Patient denies any post hospital needs or services at this time and reports she is independent with ADL's. Provided Transitional Care Folder with information on Advance Directives, Discharge Planning Begins on Admission pamphlet, and [facility] Bedside Delivery pamphlet. Updated patient white board with current d/c plan and CM contact information. Encouraged patient to contact CM with any questions or concerns that arise.     PCP: Dr. Grider @ [facility]  [facility]       12/26/18 8830   Discharge Assessment   Assessment Type Discharge Planning Assessment   Confirmed/corrected address and phone number on facesheet? Yes   Assessment information obtained from? Patient   Communicated expected length of stay with patient/caregiver yes   Prior to hospitilization cognitive status: Alert/Oriented   Prior to hospitalization functional status: Independent   Current cognitive status: Alert/Oriented   Current Functional Status: Independent   Lives With spouse   Able to Return to Prior Arrangements yes   Is patient able to care for self after discharge? Yes   Who are your caregiver(s) and their phone number(s)? Laureano Angulo,  [phone]   Readmission Within the Last 30 Days no previous admission in last 30 days   Patient currently being followed by outpatient case management? No   Patient currently receives any other outside agency services? No   Equipment Currently Used at Home none   Do you have any problems affording any of your prescribed medications? No   Is the patient taking medications as prescribed? yes   Does the patient have transportation home? Yes   Transportation Anticipated public transportation   Dialysis  Name and Scheduled days [facility] location MWF @ 6:00am   Does the patient receive services at the [facility]madin Clinic? No   Discharge Plan A Home with family   Patient/Family in Agreement with Plan yes

## 2018-12-27 PROBLEM — R79.89 ELEVATED TROPONIN: Status: ACTIVE | Noted: 2018-12-27

## 2018-12-27 LAB
ANION GAP SERPL CALC-SCNC: 12 MMOL/L
APTT BLDCRRT: 27.2 SEC
APTT BLDCRRT: 49.4 SEC
BASOPHILS # BLD AUTO: 0.02 K/UL
BASOPHILS NFR BLD: 0.2 %
BUN SERPL-MCNC: 43 MG/DL
CALCIUM SERPL-MCNC: 9.2 MG/DL
CHLORIDE SERPL-SCNC: 101 MMOL/L
CO2 SERPL-SCNC: 27 MMOL/L
CREAT SERPL-MCNC: 8.3 MG/DL
DIASTOLIC DYSFUNCTION: YES
DIFFERENTIAL METHOD: ABNORMAL
EOSINOPHIL # BLD AUTO: 0.3 K/UL
EOSINOPHIL NFR BLD: 2.9 %
ERYTHROCYTE [DISTWIDTH] IN BLOOD BY AUTOMATED COUNT: 15.9 %
EST. GFR  (AFRICAN AMERICAN): 6 ML/MIN/1.73 M^2
EST. GFR  (NON AFRICAN AMERICAN): 5 ML/MIN/1.73 M^2
ESTIMATED AVG GLUCOSE: 151 MG/DL
ESTIMATED PA SYSTOLIC PRESSURE: 35.04
GLUCOSE SERPL-MCNC: 140 MG/DL
HBA1C MFR BLD HPLC: 6.9 %
HCT VFR BLD AUTO: 30.7 %
HGB BLD-MCNC: 9.4 G/DL
INR PPP: 1
LYMPHOCYTES # BLD AUTO: 2 K/UL
LYMPHOCYTES NFR BLD: 16.6 %
MAGNESIUM SERPL-MCNC: 2.2 MG/DL
MCH RBC QN AUTO: 27.6 PG
MCHC RBC AUTO-ENTMCNC: 30.6 G/DL
MCV RBC AUTO: 90 FL
MITRAL VALVE MOBILITY: NORMAL
MITRAL VALVE REGURGITATION: ABNORMAL
MONOCYTES # BLD AUTO: 0.5 K/UL
MONOCYTES NFR BLD: 4.6 %
NEUTROPHILS # BLD AUTO: 8.9 K/UL
NEUTROPHILS NFR BLD: 75.7 %
PHOSPHATE SERPL-MCNC: 5.4 MG/DL
PLATELET # BLD AUTO: 254 K/UL
PMV BLD AUTO: 8.9 FL
POCT GLUCOSE: 127 MG/DL (ref 70–110)
POCT GLUCOSE: 178 MG/DL (ref 70–110)
POCT GLUCOSE: 194 MG/DL (ref 70–110)
POCT GLUCOSE: 202 MG/DL (ref 70–110)
POTASSIUM SERPL-SCNC: 4 MMOL/L
PROTHROMBIN TIME: 10.6 SEC
RBC # BLD AUTO: 3.41 M/UL
RETIRED EF AND QEF - SEE NOTES: 60 (ref 55–65)
SODIUM SERPL-SCNC: 140 MMOL/L
TRICUSPID VALVE REGURGITATION: ABNORMAL
TROPONIN I SERPL DL<=0.01 NG/ML-MCNC: 0.52 NG/ML
TROPONIN I SERPL DL<=0.01 NG/ML-MCNC: 0.54 NG/ML
WBC # BLD AUTO: 11.8 K/UL

## 2018-12-27 PROCEDURE — 94640 AIRWAY INHALATION TREATMENT: CPT

## 2018-12-27 PROCEDURE — 25000003 PHARM REV CODE 250: Performed by: NURSE PRACTITIONER

## 2018-12-27 PROCEDURE — 85025 COMPLETE CBC W/AUTO DIFF WBC: CPT

## 2018-12-27 PROCEDURE — 25000242 PHARM REV CODE 250 ALT 637 W/ HCPCS: Performed by: NURSE PRACTITIONER

## 2018-12-27 PROCEDURE — 25000003 PHARM REV CODE 250: Performed by: INTERNAL MEDICINE

## 2018-12-27 PROCEDURE — 93005 ELECTROCARDIOGRAM TRACING: CPT

## 2018-12-27 PROCEDURE — 99214 OFFICE O/P EST MOD 30 MIN: CPT | Mod: ,,, | Performed by: INTERNAL MEDICINE

## 2018-12-27 PROCEDURE — 94761 N-INVAS EAR/PLS OXIMETRY MLT: CPT

## 2018-12-27 PROCEDURE — 84484 ASSAY OF TROPONIN QUANT: CPT | Mod: 91

## 2018-12-27 PROCEDURE — 84484 ASSAY OF TROPONIN QUANT: CPT

## 2018-12-27 PROCEDURE — 36415 COLL VENOUS BLD VENIPUNCTURE: CPT

## 2018-12-27 PROCEDURE — 25500020 PHARM REV CODE 255: Performed by: INTERNAL MEDICINE

## 2018-12-27 PROCEDURE — 93306 2D ECHO WITH COLOR FLOW DOPPLER: ICD-10-PCS | Mod: 26,,, | Performed by: INTERNAL MEDICINE

## 2018-12-27 PROCEDURE — 99204 PR OFFICE/OUTPT VISIT, NEW, LEVL IV, 45-59 MIN: ICD-10-PCS | Mod: 25,,, | Performed by: INTERNAL MEDICINE

## 2018-12-27 PROCEDURE — 25000003 PHARM REV CODE 250: Performed by: EMERGENCY MEDICINE

## 2018-12-27 PROCEDURE — 85730 THROMBOPLASTIN TIME PARTIAL: CPT | Mod: 91

## 2018-12-27 PROCEDURE — 85730 THROMBOPLASTIN TIME PARTIAL: CPT

## 2018-12-27 PROCEDURE — 63600175 PHARM REV CODE 636 W HCPCS: Performed by: EMERGENCY MEDICINE

## 2018-12-27 PROCEDURE — 99214 PR OFFICE/OUTPT VISIT, EST, LEVL IV, 30-39 MIN: ICD-10-PCS | Mod: ,,, | Performed by: INTERNAL MEDICINE

## 2018-12-27 PROCEDURE — 93306 TTE W/DOPPLER COMPLETE: CPT | Mod: 26,,, | Performed by: INTERNAL MEDICINE

## 2018-12-27 PROCEDURE — 25000242 PHARM REV CODE 250 ALT 637 W/ HCPCS: Performed by: INTERNAL MEDICINE

## 2018-12-27 PROCEDURE — 85610 PROTHROMBIN TIME: CPT

## 2018-12-27 PROCEDURE — 93010 EKG 12-LEAD: ICD-10-PCS | Mod: ,,, | Performed by: INTERNAL MEDICINE

## 2018-12-27 PROCEDURE — G0378 HOSPITAL OBSERVATION PER HR: HCPCS

## 2018-12-27 PROCEDURE — 63600175 PHARM REV CODE 636 W HCPCS: Performed by: NURSE PRACTITIONER

## 2018-12-27 PROCEDURE — 99204 OFFICE O/P NEW MOD 45 MIN: CPT | Mod: 25,,, | Performed by: INTERNAL MEDICINE

## 2018-12-27 PROCEDURE — 93306 TTE W/DOPPLER COMPLETE: CPT

## 2018-12-27 PROCEDURE — 93010 ELECTROCARDIOGRAM REPORT: CPT | Mod: ,,, | Performed by: INTERNAL MEDICINE

## 2018-12-27 PROCEDURE — 84100 ASSAY OF PHOSPHORUS: CPT

## 2018-12-27 PROCEDURE — 80048 BASIC METABOLIC PNL TOTAL CA: CPT

## 2018-12-27 PROCEDURE — 83735 ASSAY OF MAGNESIUM: CPT

## 2018-12-27 RX ORDER — ASPIRIN 81 MG/1
81 TABLET ORAL DAILY
Status: DISCONTINUED | OUTPATIENT
Start: 2018-12-27 | End: 2018-12-28 | Stop reason: HOSPADM

## 2018-12-27 RX ORDER — IPRATROPIUM BROMIDE AND ALBUTEROL SULFATE 2.5; .5 MG/3ML; MG/3ML
3 SOLUTION RESPIRATORY (INHALATION)
Status: DISCONTINUED | OUTPATIENT
Start: 2018-12-27 | End: 2018-12-28 | Stop reason: HOSPADM

## 2018-12-27 RX ORDER — HEPARIN SODIUM,PORCINE/D5W 25000/250
12 INTRAVENOUS SOLUTION INTRAVENOUS CONTINUOUS
Status: DISCONTINUED | OUTPATIENT
Start: 2018-12-27 | End: 2018-12-28 | Stop reason: HOSPADM

## 2018-12-27 RX ORDER — NITROGLYCERIN 0.4 MG/1
0.4 TABLET SUBLINGUAL EVERY 5 MIN PRN
Status: DISCONTINUED | OUTPATIENT
Start: 2018-12-27 | End: 2018-12-28 | Stop reason: HOSPADM

## 2018-12-27 RX ADMIN — SEVELAMER CARBONATE 1600 MG: 800 TABLET, FILM COATED ORAL at 05:12

## 2018-12-27 RX ADMIN — IPRATROPIUM BROMIDE AND ALBUTEROL SULFATE 3 ML: .5; 3 SOLUTION RESPIRATORY (INHALATION) at 07:12

## 2018-12-27 RX ADMIN — SEVELAMER CARBONATE 1600 MG: 800 TABLET, FILM COATED ORAL at 11:12

## 2018-12-27 RX ADMIN — HEPARIN SODIUM AND DEXTROSE 12 UNITS/KG/HR: 10000; 5 INJECTION INTRAVENOUS at 11:12

## 2018-12-27 RX ADMIN — NEPHROCAP 1 CAPSULE: 1 CAP ORAL at 08:12

## 2018-12-27 RX ADMIN — AZITHROMYCIN 250 MG: 250 TABLET, FILM COATED ORAL at 08:12

## 2018-12-27 RX ADMIN — ASPIRIN 81 MG: 81 TABLET, COATED ORAL at 11:12

## 2018-12-27 RX ADMIN — IPRATROPIUM BROMIDE AND ALBUTEROL SULFATE 3 ML: .5; 3 SOLUTION RESPIRATORY (INHALATION) at 03:12

## 2018-12-27 RX ADMIN — HEPARIN SODIUM 5000 UNITS: 5000 INJECTION, SOLUTION INTRAVENOUS; SUBCUTANEOUS at 05:12

## 2018-12-27 RX ADMIN — INSULIN DETEMIR 10 UNITS: 100 INJECTION, SOLUTION SUBCUTANEOUS at 09:12

## 2018-12-27 RX ADMIN — BUDESONIDE 0.5 MG: 0.5 SUSPENSION RESPIRATORY (INHALATION) at 07:12

## 2018-12-27 RX ADMIN — SEVELAMER CARBONATE 1600 MG: 800 TABLET, FILM COATED ORAL at 08:12

## 2018-12-27 RX ADMIN — IOHEXOL 100 ML: 350 INJECTION, SOLUTION INTRAVENOUS at 10:12

## 2018-12-27 RX ADMIN — PANTOPRAZOLE SODIUM 40 MG: 40 TABLET, DELAYED RELEASE ORAL at 08:12

## 2018-12-27 RX ADMIN — INSULIN ASPART 2 UNITS: 100 INJECTION, SOLUTION INTRAVENOUS; SUBCUTANEOUS at 04:12

## 2018-12-27 RX ADMIN — MEDROXYPROGESTERONE ACETATE 10 MG: 5 TABLET ORAL at 09:12

## 2018-12-27 RX ADMIN — PRAVASTATIN SODIUM 40 MG: 20 TABLET ORAL at 09:12

## 2018-12-27 RX ADMIN — LISINOPRIL 40 MG: 20 TABLET ORAL at 08:12

## 2018-12-27 RX ADMIN — IPRATROPIUM BROMIDE AND ALBUTEROL SULFATE 3 ML: .5; 3 SOLUTION RESPIRATORY (INHALATION) at 12:12

## 2018-12-27 RX ADMIN — MEDROXYPROGESTERONE ACETATE 10 MG: 5 TABLET ORAL at 08:12

## 2018-12-27 RX ADMIN — METOPROLOL SUCCINATE 200 MG: 50 TABLET, EXTENDED RELEASE ORAL at 09:12

## 2018-12-27 NOTE — PLAN OF CARE
Problem: Adult Inpatient Plan of Care  Goal: Plan of Care Review  Outcome: Ongoing (interventions implemented as appropriate)  POC reviewed, verbalized understanding. Pt remained free from falls, fall precautions in place. Pt is NSR on monitor, 60-80s. 2L O2 NC. O2 sats drop to 80s on room air. No other c/o at this time. PIV intact. Troponin monitored - Felicia, NP notified of increase in value. Call bell and personal belongings within reach. Hourly rounding complete. Reminded to call for assistance. Will continue to monitor.

## 2018-12-27 NOTE — PLAN OF CARE
Problem: Adult Inpatient Plan of Care  Goal: Plan of Care Review  Outcome: Ongoing (interventions implemented as appropriate)  Pt is on nc 2l/m; tolerates txs well.

## 2018-12-27 NOTE — H&P
[facility] Hospital Medicine  History & Physical    Patient Name: Cordell Angulo  MRN: [id number]  Admission Date: 12/26/2018  Attending Physician: Mauricio Potter MD   Primary Care Provider: Chuck Grider MD         Patient information was obtained from patient, past medical records and ER records.     Subjective:     Principal Problem:Hypoxia    Chief Complaint:   Chief Complaint   Patient presents with    Chest Pain     chest tightness, SOB, arrived to ED on CPAP        HPI: 52 y/o female with PMHx of asthma, HTN, CHF, DM, HLD, and ESRD on HD who presented to the ED with c/o SOB that onset gradually over the past 1 day. She reports she was SOB at the dialysis clinic and was sent to the ED from there. She had HD in the ED but the technician was only able to pull 1.8 L of fluid because of hypotension. After HD in our ED, she became hypoxic with a pulse ox of 88. Associated symptoms include fever, chills, cough, wheezing, chest pain, nausea, vomiting, diaphoresis, and stomach pain. Symptoms are constant and moderate. No exacerbating or mitigating factors reported. Pt reports she feels like she reached her dry weight and had too much fluid pulled off which caused her post HD symptoms.   ED workup shows WBC coutn 14K, H/H 10.9/36.0, glucose 375. She is a full code and her surrogate decision maker is her daughter, Diego Angulo.    Past Medical History:   Diagnosis Date    Asthma     CHF (congestive heart failure)     CKD (chronic kidney disease) stage 5, GFR less than 15 ml/min     Depression     Diabetes mellitus     Dialysis patient     Hypercholesterolemia     Hypertension     PAF (paroxysmal atrial fibrillation)        Past Surgical History:   Procedure Laterality Date    AV FISTULA PLACEMENT      CARDIAC ELECTROPHYSIOLOGY MAPPING AND ABLATION      cesarian section      TUMOR REMOVAL      L lung    VASCULAR CATH INSERTION N/A 10/19/2015    Performed by Oren Padilla MD  at [facility] CATH LAB       Review of patient's allergies indicates:   Allergen Reactions    Sulfa (sulfonamide antibiotics) Rash       No current facility-administered medications on file prior to encounter.      Current Outpatient Medications on File Prior to Encounter   Medication Sig    albuterol 90 mcg/actuation inhaler Inhale 1-2 puffs into the lungs every 6 (six) hours as needed for Wheezing.    hydrALAZINE (APRESOLINE) 25 MG tablet Take 1 tablet (25 mg total) by mouth 3 (three) times daily.    insulin glargine (LANTUS) 100 unit/mL injection Inject into the skin every evening.    insulin NPH-insulin regular, 70/30, (NOVOLIN 70/30) 100 unit/mL (70-30) injection Take 60 units subcutaneously in the morning and 40 units subcutaneously in the evening    levocetirizine (XYZAL) 2.5 mg/5 mL solution Take 5 mLs (2.5 mg total) by mouth every evening.    liraglutide (VICTOZA 3-SHYANN) 0.6 mg/0.1 mL (18 mg/3 mL) PnIj Inject into the skin.    lisinopril (PRINIVIL,ZESTRIL) 40 MG tablet Take 40 mg by mouth once daily.    LORazepam (ATIVAN) 1 MG tablet Take 1 tablet (1 mg total) by mouth every evening. Do not take if your drowsy.  Careful driving once his start this medication    medroxyPROGESTERone (PROVERA) 10 MG tablet Take 10 mg by mouth once daily.    meloxicam (MOBIC) 15 MG tablet TAKE 1 TABLET(15 MG) BY MOUTH EVERY DAY    metoprolol succinate (TOPROL-XL) 100 MG 24 hr tablet Take 1 tablet (100 mg total) by mouth once daily. (Patient taking differently: Take 200 mg by mouth once daily. )    montelukast (SINGULAIR) 10 mg tablet Take 10 mg by mouth daily as needed.     ondansetron (ZOFRAN) 4 MG tablet Take 1 tablet (4 mg total) by mouth every 8 (eight) hours as needed.    pravastatin (PRAVACHOL) 40 MG tablet Take 40 mg by mouth once daily.    sevelamer carbonate (RENVELA) 2.4 gram PwPk Take 2.4 g by mouth 3 (three) times daily with meals.    tramadol-acetaminophen 37.5-325 mg (ULTRACET) 37.5-325 mg Tab Take 1  tablet by mouth every 4 (four) hours as needed for Pain.    vitamin renal formula, B-complex-vitamin c-folic acid, (NEPHROCAP) 1 mg Cap Take 1 capsule by mouth once daily.    [DISCONTINUED] aspirin 81 MG Chew Take 81 mg by mouth once daily.    [DISCONTINUED] fluticasone (FLONASE) 50 mcg/actuation nasal spray 1 spray (50 mcg total) by Each Nare route 2 (two) times daily as needed for Rhinitis.    [DISCONTINUED] fluticasone (VERAMYST) 27.5 mcg/actuation nasal spray 2 sprays by Nasal route once daily.    [DISCONTINUED] fluticasone-salmeterol 100-50 mcg/dose (ADVAIR) 100-50 mcg/dose diskus inhaler Inhale 1 puff into the lungs 2 (two) times daily.     Family History     Problem Relation (Age of Onset)    Diabetes Father    Heart disease Mother        Tobacco Use    Smoking status: Never Smoker    Smokeless tobacco: Never Used   Substance and Sexual Activity    Alcohol use: No    Drug use: No    Sexual activity: Not on file     Review of Systems   Constitutional: Positive for activity change, chills, diaphoresis and fever.   HENT: Positive for congestion. Negative for postnasal drip, rhinorrhea and sore throat.    Eyes: Negative for pain and visual disturbance.   Respiratory: Positive for cough, shortness of breath and wheezing.    Cardiovascular: Positive for chest pain. Negative for palpitations and leg swelling.   Gastrointestinal: Positive for abdominal pain, nausea and vomiting. Negative for abdominal distention, constipation and diarrhea.   Endocrine: Negative for cold intolerance and heat intolerance.   Genitourinary: Negative for difficulty urinating, dysuria and hematuria.   Musculoskeletal: Negative for arthralgias, gait problem and myalgias.   Skin: Negative for color change and wound.   Allergic/Immunologic: Negative.    Neurological: Negative for dizziness, tremors, seizures, syncope, facial asymmetry, speech difficulty, light-headedness and headaches.   Hematological: Negative.     Psychiatric/Behavioral: Negative for agitation, behavioral problems and confusion.     Objective:     Vital Signs (Most Recent):  Temp: 98.2 °F (36.8 °C) (12/26/18 1657)  Pulse: 74 (12/26/18 1657)  Resp: 18 (12/26/18 1657)  BP: (!) 142/67 (12/26/18 1657)  SpO2: 100 % (12/26/18 1657) Vital Signs (24h Range):  Temp:  [98.2 °F (36.8 °C)-98.9 °F (37.2 °C)] 98.2 °F (36.8 °C)  Pulse:  [] 74  Resp:  [18-39] 18  SpO2:  [85 %-100 %] 100 %  BP: ()/() 142/67     Weight: 118.6 kg (261 lb 9 oz)  Body mass index is 40.97 kg/m².    Physical Exam   Constitutional: She is oriented to person, place, and time. She appears well-developed and well-nourished. No distress.   HENT:   Head: Normocephalic and atraumatic.   Nose: Nose normal.   Mouth/Throat: Oropharynx is clear and moist.   Eyes: Conjunctivae and EOM are normal. No scleral icterus.   Neck: Normal range of motion. Neck supple. No JVD present.   Cardiovascular: Normal rate, regular rhythm, normal heart sounds and intact distal pulses. Exam reveals no gallop and no friction rub.   No murmur heard.  Pulmonary/Chest: Effort normal and breath sounds normal. No respiratory distress. She has no wheezes.   Abdominal: Soft. Bowel sounds are normal. She exhibits no distension. There is no tenderness.   Genitourinary:   Genitourinary Comments: deferred   Musculoskeletal: Normal range of motion. She exhibits no edema.   Neurological: She is alert and oriented to person, place, and time. No cranial nerve deficit.   Skin: Skin is warm and dry. Capillary refill takes 2 to 3 seconds. She is not diaphoretic. No erythema.   Psychiatric: She has a normal mood and affect. Her behavior is normal.   Nursing note and vitals reviewed.        CRANIAL NERVES     CN III, IV, VI   Extraocular motions are normal.        Significant Labs:   Recent Lab Results       12/26/18  1710   12/26/18  0901   12/26/18  0644   12/26/18  0635        Albumin     3.1       Alkaline Phosphatase     76        Allens Test       Pass     ALT     20       Anion Gap     19       aPTT     21.9  Comment:  aPTT therapeutic range = 39-69 seconds       AST     32       Baso #     0.03       Basophil%     0.2       Total Bilirubin     0.5  Comment:  For infants and newborns, interpretation of results should be based  on gestational age, weight and in agreement with clinical  observations.  Premature Infant recommended reference ranges:  Up to 24 hours.............<8.0 mg/dL  Up to 48 hours............<12.0 mg/dL  3-5 days..................<15.0 mg/dL  6-29 days.................<15.0 mg/dL         BNP     919  Comment:  Values of less than 100 pg/ml are consistent with non-CHF populations.       Site       RR     BUN, Bld     48       Calcium     9.3       Chloride     100       CO2     19       Creatinine     9.5       DelSys       CPAP/BiPAP     Differential Method     Automated       eGFR if      5       eGFR if non      4  Comment:  Calculation used to obtain the estimated glomerular filtration  rate (eGFR) is the CKD-EPI equation.          Eos #     0.3       Eosinophil%     2.2       FiO2       100     Glucose     375       Gran # (ANC)     10.6       Gran%     74.4       Hematocrit     36.0       Hemoglobin     10.9       Coumadin Monitoring INR     1.0  Comment:  Coumadin Therapy:  2.0 - 3.0 for INR for all indicators except mechanical heart valves  and antiphospholipid syndromes which should use 2.5 - 3.5.         Lymph #     2.6       Lymph%     18.4       Magnesium     2.2       MCH     27.6       MCHC     30.3       MCV     91       Mode       CPAP     Mono #     0.7       Mono%     4.8       MPV     9.1       PEEP       5     Platelets     332       POC BE       -4     POC Glucose       404     POC HCO3       24.1     POC Hematocrit       36     POC Ionized Calcium       1.22     POC PCO2       60.6     POC PH       7.207     POC PO2       122     POC Potassium       3.7     POC  SATURATED O2       98     POC Sodium       138     POCT Glucose 146 204         Potassium     4.2       Total Protein     6.9       Protime     10.8       RBC     3.95       RDW     16.1       Sample       ARTERIAL     Sodium     138       Spont Rate       30     Troponin I     0.012  Comment:  The reference interval for Troponin I represents the 99th percentile   cutoff   for our facility and is consistent with 3rd generation assay   performance.         WBC     14.27           All pertinent labs within the past 24 hours have been reviewed.    Significant Imaging: I have reviewed all pertinent imaging results/findings within the past 24 hours.   Imaging Results          X-Ray Chest AP Portable (Final result)  Result time 12/26/18 08:19:49    Final result by Milton Jacobson MD (12/26/18 08:19:49)                 Impression:      Findings again consistent with CHF.      Electronically signed by: Milton Jacobson MD  Date:    12/26/2018  Time:    08:19             Narrative:    EXAMINATION:  XR CHEST AP PORTABLE    CLINICAL HISTORY:  Dyspnea, unspecified    TECHNIQUE:  Single frontal view of the chest was performed.    FINDINGS:  Cardiomegaly pulmonary vascular congestion and bilateral interstitial edema suspected.  Small bilateral pleural effusions suspected.  No pneumothorax.  Aorta demonstrates atherosclerotic disease.  Bones demonstrate degenerative changes.  In comparison to the prior study, there is no adverse interval changes.                                  Assessment/Plan:     * Hypoxia    --pulse ox 88% post HD  --now 100% on room air  --supplemental oxygen to maintain sats >92%       Chest pain    --resolved at present  --initial troponin 0.012  --trend troponin  --2D echo pending  --monitor       ESRD (end stage renal disease)    --continue Nephro yvrose, sevelamer   --renal diet  --nephrology following  --had HD today  --monitor        Diabetes mellitus type 2 in obese    --Levemir 10U q HS  --accuchecks and  SSI  --diabetic diet  --HA1C pending       HTN (hypertension)    --continue lisinopril, metoprolol  --cardiac diet  --monitor       Patient is Advent    --declines blood if needed       Chronic diastolic congestive heart failure    --pt reported CP earlier today  --f/u troponin pending  --2D echo pending  --continue ACE I  --strict I&O  --daily weights         Asthma exacerbation    --patient reports she has not taken any asthma medications in over a year  --duo nebs and budesonide via nebulizer  --PO azithromycin  --supplemental oxygen to maintain sats >92%         VTE Risk Mitigation (From admission, onward)        Ordered     heparin (porcine) injection 5,000 Units  Every 8 hours      12/26/18 1633     IP VTE HIGH RISK PATIENT  Once      12/26/18 1633             SOUMYA Padilla-C  Department of Hospital Medicine   [facility]

## 2018-12-27 NOTE — ASSESSMENT & PLAN NOTE
-Possibly from demand ischemia from ESRD and hypotension, but does have chest pain concerning for angina  -Trop 0.012, 0.538, 0.536, 0.520   -EKG shows LVH  -Continue Heparin gtt   -Chest CT negative for PE  -Will discuss patient details with Interventional Cardiology to see if agreeable to LHC during admission.   -Patient states that her primary Cardiologist was planning to arrange LHC, but hasn't yet. Will request records from Dr. Reeves.   -Keep NPO after MN  -Nephrology on board for HD.

## 2018-12-27 NOTE — ASSESSMENT & PLAN NOTE
--patient reports she has not taken any asthma medications in over a year  --duo nebs and budesonide via nebulizer  --PO azithromycin  --supplemental oxygen to maintain sats >92%

## 2018-12-27 NOTE — SUBJECTIVE & OBJECTIVE
Interval History:     12/27/18 - feels better , cards note reviewed ,     Review of patient's allergies indicates:   Allergen Reactions    Sulfa (sulfonamide antibiotics) Rash     Current Facility-Administered Medications   Medication Frequency    acetaminophen tablet 650 mg Q4H PRN    albuterol-ipratropium 2.5 mg-0.5 mg/3 mL nebulizer solution 3 mL Q6H WAKE    aspirin EC tablet 81 mg Daily    azithromycin tablet 250 mg Daily    budesonide nebulizer solution 0.5 mg Q12H    dextrose 50% injection 12.5 g PRN    dextrose 50% injection 25 g PRN    glucagon (human recombinant) injection 1 mg PRN    glucose chewable tablet 16 g PRN    glucose chewable tablet 24 g PRN    heparin 25,000 units in dextrose 5% (100 units/ml) IV bolus from bag - ADDITIONAL PRN BOLUS - 30 units/kg (max bolus 4000 units) PRN    heparin 25,000 units in dextrose 5% (100 units/ml) IV bolus from bag - ADDITIONAL PRN BOLUS - 60 units/kg (max bolus 4000 units) PRN    heparin 25,000 units in dextrose 5% 250 mL (100 units/mL) infusion LOW INTENSITY nomogram - [facility] Continuous    insulin aspart U-100 pen 0-5 Units QID (AC + HS) PRN    insulin detemir U-100 pen 10 Units QHS    lisinopril tablet 40 mg Daily    medroxyPROGESTERone tablet 10 mg BID    metoprolol succinate (TOPROL-XL) 24 hr tablet 200 mg QHS    nitroGLYCERIN SL tablet 0.4 mg Q5 Min PRN    ondansetron disintegrating tablet 8 mg Q8H PRN    pantoprazole EC tablet 40 mg Daily    polyethylene glycol packet 17 g Q12H PRN    pravastatin tablet 40 mg QHS    promethazine (PHENERGAN) 6.25 mg in dextrose 5 % 50 mL IVPB Q6H PRN    sevelamer carbonate tablet 1,600 mg TID WM    sodium chloride 0.9% flush 5 mL PRN    vitamin renal formula (B-complex-vitamin c-folic acid) 1 mg per capsule 1 capsule Daily       Objective:     Vital Signs (Most Recent):  Temp: 98.4 °F (36.9 °C) (12/27/18 1547)  Pulse: 79 (12/27/18 1547)  Resp: 17 (12/27/18 1547)  BP: 131/62 (12/27/18 1547)  SpO2: 96 %  (12/27/18 1547)  O2 Device (Oxygen Therapy): room air (12/27/18 1534) Vital Signs (24h Range):  Temp:  [97.9 °F (36.6 °C)-98.9 °F (37.2 °C)] 98.4 °F (36.9 °C)  Pulse:  [63-87] 79  Resp:  [16-20] 17  SpO2:  [92 %-99 %] 96 %  BP: (108-144)/(55-73) 131/62     Weight: 114.5 kg (252 lb 6.8 oz) (12/27/18 0405)  Body mass index is 39.54 kg/m².  Body surface area is 2.33 meters squared.    I/O last 3 completed shifts:  In: 240 [P.O.:240]  Out: 3200 [Urine:350; Other:2850]    Physical Exam   Constitutional: She is oriented to person, place, and time. She appears well-developed and well-nourished. No distress.   HENT:   Head: Normocephalic and atraumatic.   Mouth/Throat: Oropharynx is clear and moist. No oropharyngeal exudate.   Eyes: Conjunctivae and EOM are normal. Pupils are equal, round, and reactive to light.   Neck: Normal range of motion. Neck supple. No JVD present. Carotid bruit is not present. No tracheal deviation present. No thyroid mass and no thyromegaly present.   Cardiovascular: Normal rate, regular rhythm and intact distal pulses. Exam reveals no gallop and no friction rub.   Murmur heard.  Pulmonary/Chest: Effort normal. No respiratory distress. She exhibits no tenderness.   Abdominal: Soft. Bowel sounds are normal. She exhibits no distension, no abdominal bruit, no ascites and no mass. There is no hepatosplenomegaly. There is no tenderness. There is no rebound, no guarding and no CVA tenderness.   Musculoskeletal: She exhibits no edema or tenderness.   Lymphadenopathy:     She has no cervical adenopathy.   Neurological: She is alert and oriented to person, place, and time. She has normal reflexes. She displays normal reflexes. No cranial nerve deficit. She exhibits normal muscle tone. Coordination normal.   Skin: Skin is warm and intact. No rash noted. No erythema. No pallor.   Psychiatric: She has a normal mood and affect. Her behavior is normal. Judgment normal.       Significant Labs:  Cardiac Markers:  No results for input(s): CKMB, TROPONINT, MYOGLOBIN in the last 168 hours.  CBC:   Recent Labs   Lab 12/27/18  0522   WBC 11.80   RBC 3.41*   HGB 9.4*   HCT 30.7*      MCV 90   MCH 27.6   MCHC 30.6*     CMP:   Recent Labs   Lab 12/26/18  0644 12/27/18  0522   * 140*   CALCIUM 9.3 9.2   ALBUMIN 3.1*  --    PROT 6.9  --     140   K 4.2 4.0   CO2 19* 27    101   BUN 48* 43*   CREATININE 9.5* 8.3*   ALKPHOS 76  --    ALT 20  --    AST 32  --    BILITOT 0.5  --      Coagulation:   Recent Labs   Lab 12/27/18  0932   INR 1.0   APTT 27.2     All labs within the past 24 hours have been reviewed.     Significant Imaging:  Reviewed     Lab Results   Component Value Date    .0 (H) 05/08/2015    CALCIUM 9.2 12/27/2018    PHOS 5.4 (H) 12/27/2018       Lab Results   Component Value Date    ALBUMIN 3.1 (L) 12/26/2018

## 2018-12-27 NOTE — HPI
54 y/o female with PMHx of asthma, HTN, CHF, DM, HLD, and ESRD on HD who presented to the ED with c/o SOB that onset gradually over the past 1 day. She reports she was SOB at the dialysis clinic and was sent to the ED from there. She had HD in the ED but the technician was only able to pull 1.8 L of fluid because of hypotension. After HD in our ED, she became hypoxic with a pulse ox of 88. Associated symptoms include fever, chills, cough, wheezing, chest pain, nausea, vomiting, diaphoresis, and stomach pain. Symptoms are constant and moderate. No exacerbating or mitigating factors reported. Pt reports she feels like she reached her dry weight and had too much fluid pulled off which caused her post HD symptoms.   ED workup shows WBC coutn 14K, H/H 10.9/36.0, glucose 375. She is a full code and her surrogate decision maker is her daughter, Diego Angulo.

## 2018-12-27 NOTE — PLAN OF CARE
Problem: Adult Inpatient Plan of Care  Goal: Plan of Care Review  Outcome: Ongoing (interventions implemented as appropriate)  Patient AAO x4, VSS, resting in bed free from falls and injury, normal sinus rhythm on monitor, PIV saline locked, oxygen 2 L NC, denies pain at this time, voids spontaneously, POC reviewed with patient, bed low locked, call light within reach, family at bedside, will continue to monitor

## 2018-12-27 NOTE — HPI
52 y/o female with PMHx of asthma, HTN, CHF, DM, HLD, and ESRD on HD who presented to the ED with c/o SOB that onset gradually over the past 1 day. She reports she was SOB and had chest tightness at the dialysis clinic and was sent to the ED from there. She had HD in the ED but the technician was only able to pull 1.8 L of fluid because of hypotension. After HD in our ED, she became hypoxic with a pulse ox of 88. Associated symptoms include fever, chills, cough, wheezing, chest pain, nausea, vomiting, diaphoresis, and stomach pain. Symptoms are constant and moderate. No exacerbating or mitigating factors reported. Pt reports she feels like she reached her dry weight and had too much fluid pulled off which caused her post HD symptoms.   ED workup shows WBC coutn 14K, H/H 10.9/36.0, glucose 375. Has been started on Heparin gtt.

## 2018-12-27 NOTE — ASSESSMENT & PLAN NOTE
--pulse ox 88% post HD  --now 94 - 100% on room air  --supplemental oxygen to maintain sats >92%

## 2018-12-27 NOTE — PROGRESS NOTES
[facility] Hospital Medicine  Progress Note    Patient Name: Cordell Angulo  MRN: [id number]  Patient Class: OP- Observation   Admission Date: 12/26/2018  Length of Stay: 0 days  Attending Physician: Mauricio Potter MD  Primary Care Provider: Chuck Grider MD        Subjective:     Principal Problem:Hypoxia    HPI:  54 y/o female with PMHx of asthma, HTN, CHF, DM, HLD, and ESRD on HD who presented to the ED with c/o SOB that onset gradually over the past 1 day. She reports she was SOB at the dialysis clinic and was sent to the ED from there. She had HD in the ED but the technician was only able to pull 1.8 L of fluid because of hypotension. After HD in our ED, she became hypoxic with a pulse ox of 88. Associated symptoms include fever, chills, cough, wheezing, chest pain, nausea, vomiting, diaphoresis, and stomach pain. Symptoms are constant and moderate. No exacerbating or mitigating factors reported. Pt reports she feels like she reached her dry weight and had too much fluid pulled off which caused her post HD symptoms.   ED workup shows WBC coutn 14K, H/H 10.9/36.0, glucose 375. She is a full code and her surrogate decision maker is her daughter, Diego Angulo.      Interval History: Patient was kept on OBS for acute asthma exacerbation under the care of Hospital Medicine. Troponin elevated overnight - Cardiology consulted, heparin gtt initiated, CTA chest shows no PE. Cardiology planning on [facility] tomorrow.    Review of Systems   Constitutional: Negative for activity change, chills, diaphoresis and fever.   HENT: Negative for congestion, postnasal drip, rhinorrhea and sore throat.    Eyes: Negative for pain and visual disturbance.   Respiratory: Positive for shortness of breath and wheezing. Negative for cough.    Cardiovascular: Negative for chest pain, palpitations and leg swelling.   Gastrointestinal: Negative for abdominal distention, abdominal pain, constipation, diarrhea, nausea  and vomiting.   Endocrine: Negative for cold intolerance and heat intolerance.   Genitourinary: Negative for difficulty urinating, dysuria and hematuria.   Musculoskeletal: Negative for arthralgias, gait problem and myalgias.   Skin: Negative for color change and wound.   Allergic/Immunologic: Negative.    Neurological: Negative for dizziness, tremors, seizures, syncope, facial asymmetry, speech difficulty, light-headedness and headaches.   Hematological: Negative.    Psychiatric/Behavioral: Negative for agitation, behavioral problems and confusion.     Objective:     Vital Signs (Most Recent):  Temp: 97.9 °F (36.6 °C) (12/27/18 0717)  Pulse: 74 (12/27/18 0721)  Resp: 18 (12/27/18 0721)  BP: (!) 144/65 (12/27/18 0717)  SpO2: 95 % (12/27/18 0721) Vital Signs (24h Range):  Temp:  [97.9 °F (36.6 °C)-98.9 °F (37.2 °C)] 97.9 °F (36.6 °C)  Pulse:  [63-83] 74  Resp:  [18-34] 18  SpO2:  [88 %-100 %] 95 %  BP: ()/(55-73) 144/65     Weight: 114.5 kg (252 lb 6.8 oz)  Body mass index is 39.54 kg/m².    Intake/Output Summary (Last 24 hours) at 12/27/2018 0916  Last data filed at 12/27/2018 0631  Gross per 24 hour   Intake 240 ml   Output 3200 ml   Net -2960 ml      Physical Exam   Constitutional: She is oriented to person, place, and time. She appears well-developed and well-nourished. No distress.   HENT:   Head: Normocephalic and atraumatic.   Nose: Nose normal.   Mouth/Throat: Oropharynx is clear and moist.   Eyes: Conjunctivae and EOM are normal. No scleral icterus.   Neck: Normal range of motion. Neck supple. No JVD present.   Cardiovascular: Normal rate, regular rhythm, normal heart sounds and intact distal pulses. Exam reveals no gallop and no friction rub.   No murmur heard.  Pulmonary/Chest: Effort normal and breath sounds normal. No respiratory distress. She has no wheezes.   Abdominal: Soft. Bowel sounds are normal. She exhibits no distension. There is no tenderness.   Genitourinary:   Genitourinary Comments:  deferred   Musculoskeletal: Normal range of motion. She exhibits no edema.   Neurological: She is alert and oriented to person, place, and time. No cranial nerve deficit.   Skin: Skin is warm and dry. Capillary refill takes 2 to 3 seconds. She is not diaphoretic. No erythema.   Psychiatric: She has a normal mood and affect. Her behavior is normal.   Nursing note and vitals reviewed.      Significant Labs:   Recent Lab Results       12/27/18  0557   12/27/18  0522   12/27/18  0048   12/26/18  2202   12/26/18  1922        Anion Gap   12           Baso #   0.02           Basophil%   0.2           BUN, Bld   43           Calcium   9.2           Chloride   101           CO2   27           Creatinine   8.3           Differential Method   Automated           eGFR if    6           eGFR if non    5  Comment:  Calculation used to obtain the estimated glomerular filtration  rate (eGFR) is the CKD-EPI equation.              Eos #   0.3           Eosinophil%   2.9           Glucose   140           Gran # (ANC)   8.9           Gran%   75.7           Hematocrit   30.7           Hemoglobin   9.4           Lymph #   2.0           Lymph%   16.6           Magnesium   2.2           MCH   27.6           MCHC   30.6           MCV   90           Mono #   0.5           Mono%   4.6           MPV   8.9           Phosphorus   5.4           Platelets   254           POCT Glucose 127     194       Potassium   4.0           RBC   3.41           RDW   15.9           Sodium   140           Troponin I   0.520  Comment:  The reference interval for Troponin I represents the 99th percentile   cutoff   for our facility and is consistent with 3rd generation assay   performance.   0.536  Comment:  The reference interval for Troponin I represents the 99th percentile   cutoff   for our facility and is consistent with 3rd generation assay   performance.     0.538  Comment:  The reference interval for Troponin I represents  the 99th percentile   cutoff   for our facility and is consistent with 3rd generation assay   performance.       WBC   11.80                            12/26/18  1710        Anion Gap       Baso #       Basophil%       BUN, Bld       Calcium       Chloride       CO2       Creatinine       Differential Method       eGFR if        eGFR if non        Eos #       Eosinophil%       Glucose       Gran # (ANC)       Gran%       Hematocrit       Hemoglobin       Lymph #       Lymph%       Magnesium       MCH       MCHC       MCV       Mono #       Mono%       MPV       Phosphorus       Platelets       POCT Glucose 146     Potassium       RBC       RDW       Sodium       Troponin I       WBC           All pertinent labs within the past 24 hours have been reviewed.    Significant Imaging: I have reviewed all pertinent imaging results/findings within the past 24 hours.    Assessment/Plan:      * Hypoxia    --pulse ox 88% post HD  --now 94 - 100% on room air  --supplemental oxygen to maintain sats >92%       Chest pain    --resolved at present  --troponin 0.012<<0.538<<0.536<<0.530  --heparin gtt  --cardiology consulted  --possible [facility] tomorrow  --monitor       ESRD (end stage renal disease)    --continue Nephro yvrose, sevelamer   --renal diet  --nephrology following  --had HD today  --monitor        Diabetes mellitus type 2 in obese    --Levemir 10U q HS  --accuchecks and SSI  --diabetic diet  --HA1C 6.9       HTN (hypertension)    --continue lisinopril, metoprolol  --cardiac diet  --monitor       Elevated troponin    --0.012<<0.538<<0.536<<0.530  --pt denies CP today  --EKG shows LVH  --CT chest negative for PE  --heparin gtt   --Cardiology following  --possible [facility] tomorrow         Patient is Confucianist    --declines blood if needed       Chronic diastolic congestive heart failure    --pt reported CP on admit  --troponin elevated   --2D echo shows   --continue ACE I  --strict  I&O  --daily weights  --cardiac diet         Asthma exacerbation    --patient reports she has not taken any asthma medications in over a year  --duo nebs and budesonide via nebulizer  --PO azithromycin  --supplemental oxygen to maintain sats >92%         VTE Risk Mitigation (From admission, onward)        Ordered     heparin 25,000 units in dextrose 5% 250 mL (100 units/mL) infusion LOW INTENSITY nomogram - [facility]  Continuous      12/27/18 0900     heparin 25,000 units in dextrose 5% (100 units/ml) IV bolus from bag - ADDITIONAL PRN BOLUS - 60 units/kg (max bolus 4000 units)  As needed (PRN)      12/27/18 0900     heparin 25,000 units in dextrose 5% (100 units/ml) IV bolus from bag - ADDITIONAL PRN BOLUS - 30 units/kg (max bolus 4000 units)  As needed (PRN)      12/27/18 0900     IP VTE HIGH RISK PATIENT  Once      12/26/18 1633              SOUMYA Padilla-C  Department of Hospital Medicine   [facility]

## 2018-12-27 NOTE — ASSESSMENT & PLAN NOTE
--pt reported CP on admit  --troponin elevated   --2D echo shows   --continue ACE I  --strict I&O  --daily weights  --cardiac diet

## 2018-12-27 NOTE — ASSESSMENT & PLAN NOTE
--pt reported CP earlier today  --f/u troponin pending  --2D echo pending  --continue ACE I  --strict I&O  --daily weights

## 2018-12-27 NOTE — SUBJECTIVE & OBJECTIVE
Past Medical History:   Diagnosis Date    Asthma     CHF (congestive heart failure)     CKD (chronic kidney disease) stage 5, GFR less than 15 ml/min     Depression     Diabetes mellitus     Dialysis patient     Hypercholesterolemia     Hypertension     PAF (paroxysmal atrial fibrillation)        Past Surgical History:   Procedure Laterality Date    AV FISTULA PLACEMENT      CARDIAC ELECTROPHYSIOLOGY MAPPING AND ABLATION      cesarian section      TUMOR REMOVAL      L lung    VASCULAR CATH INSERTION N/A 10/19/2015    Performed by Oren Padilla MD at [facility] CATH LAB       Review of patient's allergies indicates:   Allergen Reactions    Sulfa (sulfonamide antibiotics) Rash       No current facility-administered medications on file prior to encounter.      Current Outpatient Medications on File Prior to Encounter   Medication Sig    albuterol 90 mcg/actuation inhaler Inhale 1-2 puffs into the lungs every 6 (six) hours as needed for Wheezing.    hydrALAZINE (APRESOLINE) 25 MG tablet Take 1 tablet (25 mg total) by mouth 3 (three) times daily.    insulin glargine (LANTUS) 100 unit/mL injection Inject into the skin every evening.    insulin NPH-insulin regular, 70/30, (NOVOLIN 70/30) 100 unit/mL (70-30) injection Take 60 units subcutaneously in the morning and 40 units subcutaneously in the evening    levocetirizine (XYZAL) 2.5 mg/5 mL solution Take 5 mLs (2.5 mg total) by mouth every evening.    liraglutide (VICTOZA 3-SHYANN) 0.6 mg/0.1 mL (18 mg/3 mL) PnIj Inject into the skin.    lisinopril (PRINIVIL,ZESTRIL) 40 MG tablet Take 40 mg by mouth once daily.    LORazepam (ATIVAN) 1 MG tablet Take 1 tablet (1 mg total) by mouth every evening. Do not take if your drowsy.  Careful driving once his start this medication    medroxyPROGESTERone (PROVERA) 10 MG tablet Take 10 mg by mouth once daily.    meloxicam (MOBIC) 15 MG tablet TAKE 1 TABLET(15 MG) BY MOUTH EVERY DAY    metoprolol succinate (TOPROL-XL)  100 MG 24 hr tablet Take 1 tablet (100 mg total) by mouth once daily. (Patient taking differently: Take 200 mg by mouth once daily. )    montelukast (SINGULAIR) 10 mg tablet Take 10 mg by mouth daily as needed.     ondansetron (ZOFRAN) 4 MG tablet Take 1 tablet (4 mg total) by mouth every 8 (eight) hours as needed.    pravastatin (PRAVACHOL) 40 MG tablet Take 40 mg by mouth once daily.    sevelamer carbonate (RENVELA) 2.4 gram PwPk Take 2.4 g by mouth 3 (three) times daily with meals.    tramadol-acetaminophen 37.5-325 mg (ULTRACET) 37.5-325 mg Tab Take 1 tablet by mouth every 4 (four) hours as needed for Pain.    vitamin renal formula, B-complex-vitamin c-folic acid, (NEPHROCAP) 1 mg Cap Take 1 capsule by mouth once daily.    [DISCONTINUED] aspirin 81 MG Chew Take 81 mg by mouth once daily.    [DISCONTINUED] fluticasone (FLONASE) 50 mcg/actuation nasal spray 1 spray (50 mcg total) by Each Nare route 2 (two) times daily as needed for Rhinitis.    [DISCONTINUED] fluticasone (VERAMYST) 27.5 mcg/actuation nasal spray 2 sprays by Nasal route once daily.    [DISCONTINUED] fluticasone-salmeterol 100-50 mcg/dose (ADVAIR) 100-50 mcg/dose diskus inhaler Inhale 1 puff into the lungs 2 (two) times daily.     Family History     Problem Relation (Age of Onset)    Diabetes Father    Heart disease Mother        Tobacco Use    Smoking status: Never Smoker    Smokeless tobacco: Never Used   Substance and Sexual Activity    Alcohol use: No    Drug use: No    Sexual activity: Not on file     Review of Systems   Constitutional: Positive for activity change, chills, diaphoresis and fever.   HENT: Positive for congestion. Negative for postnasal drip, rhinorrhea and sore throat.    Eyes: Negative for pain and visual disturbance.   Respiratory: Positive for cough, shortness of breath and wheezing.    Cardiovascular: Positive for chest pain. Negative for palpitations and leg swelling.   Gastrointestinal: Positive for  abdominal pain, nausea and vomiting. Negative for abdominal distention, constipation and diarrhea.   Endocrine: Negative for cold intolerance and heat intolerance.   Genitourinary: Negative for difficulty urinating, dysuria and hematuria.   Musculoskeletal: Negative for arthralgias, gait problem and myalgias.   Skin: Negative for color change and wound.   Allergic/Immunologic: Negative.    Neurological: Negative for dizziness, tremors, seizures, syncope, facial asymmetry, speech difficulty, light-headedness and headaches.   Hematological: Negative.    Psychiatric/Behavioral: Negative for agitation, behavioral problems and confusion.     Objective:     Vital Signs (Most Recent):  Temp: 98.2 °F (36.8 °C) (12/26/18 1657)  Pulse: 74 (12/26/18 1657)  Resp: 18 (12/26/18 1657)  BP: (!) 142/67 (12/26/18 1657)  SpO2: 100 % (12/26/18 1657) Vital Signs (24h Range):  Temp:  [98.2 °F (36.8 °C)-98.9 °F (37.2 °C)] 98.2 °F (36.8 °C)  Pulse:  [] 74  Resp:  [18-39] 18  SpO2:  [85 %-100 %] 100 %  BP: ()/() 142/67     Weight: 118.6 kg (261 lb 9 oz)  Body mass index is 40.97 kg/m².    Physical Exam   Constitutional: She is oriented to person, place, and time. She appears well-developed and well-nourished. No distress.   HENT:   Head: Normocephalic and atraumatic.   Nose: Nose normal.   Mouth/Throat: Oropharynx is clear and moist.   Eyes: Conjunctivae and EOM are normal. No scleral icterus.   Neck: Normal range of motion. Neck supple. No JVD present.   Cardiovascular: Normal rate, regular rhythm, normal heart sounds and intact distal pulses. Exam reveals no gallop and no friction rub.   No murmur heard.  Pulmonary/Chest: Effort normal and breath sounds normal. No respiratory distress. She has no wheezes.   Abdominal: Soft. Bowel sounds are normal. She exhibits no distension. There is no tenderness.   Genitourinary:   Genitourinary Comments: deferred   Musculoskeletal: Normal range of motion. She exhibits no edema.    Neurological: She is alert and oriented to person, place, and time. No cranial nerve deficit.   Skin: Skin is warm and dry. Capillary refill takes 2 to 3 seconds. She is not diaphoretic. No erythema.   Psychiatric: She has a normal mood and affect. Her behavior is normal.   Nursing note and vitals reviewed.        CRANIAL NERVES     CN III, IV, VI   Extraocular motions are normal.        Significant Labs:   Recent Lab Results       12/26/18  1710   12/26/18  0901   12/26/18  0644   12/26/18  0635        Albumin     3.1       Alkaline Phosphatase     76       Allens Test       Pass     ALT     20       Anion Gap     19       aPTT     21.9  Comment:  aPTT therapeutic range = 39-69 seconds       AST     32       Baso #     0.03       Basophil%     0.2       Total Bilirubin     0.5  Comment:  For infants and newborns, interpretation of results should be based  on gestational age, weight and in agreement with clinical  observations.  Premature Infant recommended reference ranges:  Up to 24 hours.............<8.0 mg/dL  Up to 48 hours............<12.0 mg/dL  3-5 days..................<15.0 mg/dL  6-29 days.................<15.0 mg/dL         BNP     919  Comment:  Values of less than 100 pg/ml are consistent with non-CHF populations.       Site       RR     BUN, Bld     48       Calcium     9.3       Chloride     100       CO2     19       Creatinine     9.5       DelSys       CPAP/BiPAP     Differential Method     Automated       eGFR if      5       eGFR if non      4  Comment:  Calculation used to obtain the estimated glomerular filtration  rate (eGFR) is the CKD-EPI equation.          Eos #     0.3       Eosinophil%     2.2       FiO2       100     Glucose     375       Gran # (ANC)     10.6       Gran%     74.4       Hematocrit     36.0       Hemoglobin     10.9       Coumadin Monitoring INR     1.0  Comment:  Coumadin Therapy:  2.0 - 3.0 for INR for all indicators except mechanical  heart valves  and antiphospholipid syndromes which should use 2.5 - 3.5.         Lymph #     2.6       Lymph%     18.4       Magnesium     2.2       MCH     27.6       MCHC     30.3       MCV     91       Mode       CPAP     Mono #     0.7       Mono%     4.8       MPV     9.1       PEEP       5     Platelets     332       POC BE       -4     POC Glucose       404     POC HCO3       24.1     POC Hematocrit       36     POC Ionized Calcium       1.22     POC PCO2       60.6     POC PH       7.207     POC PO2       122     POC Potassium       3.7     POC SATURATED O2       98     POC Sodium       138     POCT Glucose 146 204         Potassium     4.2       Total Protein     6.9       Protime     10.8       RBC     3.95       RDW     16.1       Sample       ARTERIAL     Sodium     138       Spont Rate       30     Troponin I     0.012  Comment:  The reference interval for Troponin I represents the 99th percentile   cutoff   for our facility and is consistent with 3rd generation assay   performance.         WBC     14.27           All pertinent labs within the past 24 hours have been reviewed.    Significant Imaging: I have reviewed all pertinent imaging results/findings within the past 24 hours.   Imaging Results          X-Ray Chest AP Portable (Final result)  Result time 12/26/18 08:19:49    Final result by Milton Jacobson MD (12/26/18 08:19:49)                 Impression:      Findings again consistent with CHF.      Electronically signed by: Milton Jacobson MD  Date:    12/26/2018  Time:    08:19             Narrative:    EXAMINATION:  XR CHEST AP PORTABLE    CLINICAL HISTORY:  Dyspnea, unspecified    TECHNIQUE:  Single frontal view of the chest was performed.    FINDINGS:  Cardiomegaly pulmonary vascular congestion and bilateral interstitial edema suspected.  Small bilateral pleural effusions suspected.  No pneumothorax.  Aorta demonstrates atherosclerotic disease.  Bones demonstrate degenerative changes.  In  comparison to the prior study, there is no adverse interval changes.

## 2018-12-27 NOTE — ASSESSMENT & PLAN NOTE
--0.012<<0.538<<0.536<<0.530  --pt denies CP today  --EKG shows LVH  --CT chest negative for PE  --heparin gtt   --Cardiology following  --possible C tomorrow

## 2018-12-27 NOTE — CONSULTS
[facility]  Cardiology  Consult Note    Patient Name: Cordell Angulo  MRN: [id number]  Admission Date: 12/26/2018  Hospital Length of Stay: 0 days  Code Status: Full Code   Attending Provider: Mauricio Potter MD   Consulting Provider: SOUMYA Bateman  Primary Care Physician: Chuck Grider MD  Principal Problem:Hypoxia    Patient information was obtained from patient and ER records.     Inpatient consult to Cardiology  Consult performed by: SOUMYA Bateman  Consult ordered by: TIGRE Yates  Reason for consult: chest pain with elevated troponin         Subjective:     Chief Complaint:  Chest pain      HPI:    52 y/o female with PMHx of asthma, HTN, CHF, DM, HLD, and ESRD on HD who presented to the ED with c/o SOB that onset gradually over the past 1 day. She reports she was SOB and had chest tightness at the dialysis clinic and was sent to the ED from there. She had HD in the ED but the technician was only able to pull 1.8 L of fluid because of hypotension. After HD in our ED, she became hypoxic with a pulse ox of 88. Associated symptoms include fever, chills, cough, wheezing, chest pain, nausea, vomiting, diaphoresis, and stomach pain. Symptoms are constant and moderate. No exacerbating or mitigating factors reported. Pt reports she feels like she reached her dry weight and had too much fluid pulled off which caused her post HD symptoms.   ED workup shows WBC coutn 14K, H/H 10.9/36.0, glucose 375. Has been started on Heparin gtt.         Past Medical History:   Diagnosis Date    Asthma     CHF (congestive heart failure)     CKD (chronic kidney disease) stage 5, GFR less than 15 ml/min     Depression     Diabetes mellitus     Dialysis patient     Hypercholesterolemia     Hypertension     PAF (paroxysmal atrial fibrillation)        Past Surgical History:   Procedure Laterality Date    AV FISTULA PLACEMENT      CARDIAC ELECTROPHYSIOLOGY MAPPING AND  ABLATION      cesarian section      TUMOR REMOVAL      L lung    VASCULAR CATH INSERTION N/A 10/19/2015    Performed by Oren Padilla MD at [facility] CATH LAB       Review of patient's allergies indicates:   Allergen Reactions    Sulfa (sulfonamide antibiotics) Rash       No current facility-administered medications on file prior to encounter.      Current Outpatient Medications on File Prior to Encounter   Medication Sig    insulin glargine (LANTUS) 100 unit/mL injection Inject 10 Units into the skin every evening.     lisinopril (PRINIVIL,ZESTRIL) 40 MG tablet Take 40 mg by mouth once daily.    medroxyPROGESTERone (PROVERA) 10 MG tablet Take 10 mg by mouth once daily.    metoprolol succinate (TOPROL-XL) 100 MG 24 hr tablet Take 1 tablet (100 mg total) by mouth once daily. (Patient taking differently: Take 200 mg by mouth once daily. )    pravastatin (PRAVACHOL) 40 MG tablet Take 40 mg by mouth once daily.    sevelamer carbonate (RENVELA) 2.4 gram PwPk Take 2.4 g by mouth 3 (three) times daily with meals.    vitamin renal formula, B-complex-vitamin c-folic acid, (NEPHROCAP) 1 mg Cap Take 1 capsule by mouth once daily.    albuterol 90 mcg/actuation inhaler Inhale 1-2 puffs into the lungs every 6 (six) hours as needed for Wheezing.    hydrALAZINE (APRESOLINE) 25 MG tablet Take 1 tablet (25 mg total) by mouth 3 (three) times daily.    levocetirizine (XYZAL) 2.5 mg/5 mL solution Take 5 mLs (2.5 mg total) by mouth every evening.    LORazepam (ATIVAN) 1 MG tablet Take 1 tablet (1 mg total) by mouth every evening. Do not take if your drowsy.  Careful driving once his start this medication     Family History     Problem Relation (Age of Onset)    Diabetes Father    Heart disease Mother        Tobacco Use    Smoking status: Never Smoker    Smokeless tobacco: Never Used   Substance and Sexual Activity    Alcohol use: No    Drug use: No    Sexual activity: Not on file     Review of Systems   Constitution:  Negative for diaphoresis, weakness, malaise/fatigue, weight gain and weight loss.   HENT: Negative for congestion and nosebleeds.    Cardiovascular: Positive for chest pain. Negative for claudication, cyanosis, dyspnea on exertion, irregular heartbeat, leg swelling, near-syncope, orthopnea, palpitations, paroxysmal nocturnal dyspnea and syncope.   Respiratory: Negative for cough, hemoptysis, sleep disturbances due to breathing, snoring, sputum production and wheezing.    Hematologic/Lymphatic: Negative for bleeding problem. Does not bruise/bleed easily.   Skin: Negative for rash.   Musculoskeletal: Negative for arthritis, back pain, falls, joint pain, muscle cramps and muscle weakness.   Gastrointestinal: Positive for nausea and vomiting. Negative for abdominal pain, constipation, diarrhea, heartburn, hematemesis, hematochezia and melena.   Genitourinary:        On HD MWF    Neurological: Negative for excessive daytime sleepiness, dizziness, headaches, light-headedness, loss of balance, numbness and vertigo.     Objective:     Vital Signs (Most Recent):  Temp: 98.6 °F (37 °C) (12/27/18 1223)  Pulse: 87 (12/27/18 1237)  Resp: 16 (12/27/18 1237)  BP: 134/63 (12/27/18 1223)  SpO2: (!) 94 % (12/27/18 1237) Vital Signs (24h Range):  Temp:  [97.9 °F (36.6 °C)-98.9 °F (37.2 °C)] 98.6 °F (37 °C)  Pulse:  [63-87] 87  Resp:  [16-20] 16  SpO2:  [92 %-100 %] 94 %  BP: (108-144)/(55-73) 134/63     Weight: 114.5 kg (252 lb 6.8 oz)  Body mass index is 39.54 kg/m².    SpO2: (!) 94 %  O2 Device (Oxygen Therapy): room air      Intake/Output Summary (Last 24 hours) at 12/27/2018 1400  Last data filed at 12/27/2018 1124  Gross per 24 hour   Intake 280 ml   Output 350 ml   Net -70 ml       Lines/Drains/Airways     Peripheral Intravenous Line                 Peripheral IV - Single Lumen Right Forearm -- days         Peripheral IV - Single Lumen 12/26/18 0647 Right Hand 1 day                Physical Exam   Constitutional: She is oriented  to person, place, and time. She appears well-developed and well-nourished.   Neck: Neck supple. No JVD present.   Cardiovascular: Normal rate, regular rhythm, normal heart sounds and normal pulses. Exam reveals no friction rub.   No murmur heard.  Pulmonary/Chest: Effort normal and breath sounds normal. No respiratory distress. She has no wheezes. She has no rales.   Abdominal: Soft. Bowel sounds are normal. She exhibits no distension.   Musculoskeletal: She exhibits no edema or tenderness.   Neurological: She is alert and oriented to person, place, and time.   Skin: Skin is warm and dry. No rash noted.   LUE HD access      Psychiatric: She has a normal mood and affect. Her behavior is normal.   Nursing note and vitals reviewed.      Significant Labs:   All pertinent lab results from the last 24 hours have been reviewed. and   Recent Lab Results       12/27/18  1142   12/27/18  0932   12/27/18  0557   12/27/18  0522   12/27/18  0048        Anion Gap       12       aPTT   27.2  Comment:  aPTT therapeutic range = 39-69 seconds           Baso #       0.02       Basophil%       0.2       BUN, Bld       43       Calcium       9.2       Chloride       101       CO2       27       Creatinine       8.3       Differential Method       Automated       eGFR if        6       eGFR if non        5  Comment:  Calculation used to obtain the estimated glomerular filtration  rate (eGFR) is the CKD-EPI equation.          Eos #       0.3       Eosinophil%       2.9       Estimated Avg Glucose               Glucose       140       Gran # (ANC)       8.9       Gran%       75.7       Hematocrit       30.7       Hemoglobin       9.4       Hemoglobin A1C               Coumadin Monitoring INR   1.0  Comment:  Coumadin Therapy:  2.0 - 3.0 for INR for all indicators except mechanical heart valves  and antiphospholipid syndromes which should use 2.5 - 3.5.             Lymph #       2.0       Lymph%       16.6        Magnesium       2.2       MCH       27.6       MCHC       30.6       MCV       90       Mono #       0.5       Mono%       4.6       MPV       8.9       Phosphorus       5.4       Platelets       254       POCT Glucose 178   127         Potassium       4.0       Protime   10.6           RBC       3.41       RDW       15.9       Sodium       140       Troponin I       0.520  Comment:  The reference interval for Troponin I represents the 99th percentile   cutoff   for our facility and is consistent with 3rd generation assay   performance.   0.536  Comment:  The reference interval for Troponin I represents the 99th percentile   cutoff   for our facility and is consistent with 3rd generation assay   performance.       WBC       11.80                        12/26/18  2202   12/26/18  1922   12/26/18  1710   12/26/18  1709        Anion Gap             aPTT             Baso #             Basophil%             BUN, Bld             Calcium             Chloride             CO2             Creatinine             Differential Method             eGFR if              eGFR if non              Eos #             Eosinophil%             Estimated Avg Glucose       151     Glucose             Gran # (ANC)             Gran%             Hematocrit             Hemoglobin             Hemoglobin A1C       6.9  Comment:  ADA Screening Guidelines:  5.7-6.4%  Consistent with prediabetes  >or=6.5%  Consistent with diabetes  High levels of fetal hemoglobin interfere with the HbA1C  assay. Heterozygous hemoglobin variants (HbS, HgC, etc)do  not significantly interfere with this assay.   However, presence of multiple variants may affect accuracy.       Coumadin Monitoring INR             Lymph #             Lymph%             Magnesium             MCH             MCHC             MCV             Mono #             Mono%             MPV             Phosphorus             Platelets             POCT Glucose 194    146       Potassium             Protime             RBC             RDW             Sodium             Troponin I   0.538  Comment:  The reference interval for Troponin I represents the 99th percentile   cutoff   for our facility and is consistent with 3rd generation assay   performance.           WBC                   Significant Imaging: Echocardiogram:   2D echo with color flow doppler:   Results for orders placed or performed during the hospital encounter of 03/21/15   2D echo with color flow doppler   Result Value Ref Range    QEF 60 55 - 65    Diastolic Dysfunction Yes (A)     Est. PA Systolic Pressure 7.84     and X-Ray:     FINDINGS:  Cardiomegaly pulmonary vascular congestion and bilateral interstitial edema suspected.  Small bilateral pleural effusions suspected.  No pneumothorax.  Aorta demonstrates atherosclerotic disease.  Bones demonstrate degenerative changes.  In comparison to the prior study, there is no adverse interval changes.      Impression       Findings again consistent with CHF.      Electronically signed by: Milton Jacobson MD  Date: 12/26/2018  Time: 08:19     Chest CT     Impression       1. Pulmonary arteries well opacified although there is streak artifact related to large body habitus without discrete intraluminal filling defect to suggest pulmonary embolism.  2. Enlarged pulmonary arteries suggesting pulmonary hypertension sequela.  3. There is a pattern of CHF with mild cardiomegaly, vascular congestion, bilateral dependent tiny pleural effusions, right greater left, and bibasilar septal thickening and patchy ground-glass opacities suggesting pulmonary edema.  Mild compressive atelectasis dependent lower lobe lung bases.  All CT scans at this facility are performed  using dose modulation techniques as appropriate to performed exam including the following:  automated exposure control; adjustment of mA and/or kV according to the patients size (this includes techniques or standardized  protocols for targeted exams where dose is matched to indication/reason for exam: i.e. extremities or head);  iterative reconstruction technique.      Electronically signed by: Dl Swain MD  Date: 12/27/2018  Time: 11:06         Assessment and Plan:     Elevated troponin    -Possibly from demand ischemia from ESRD and hypotension, but does have chest pain concerning for angina  -Trop 0.012, 0.538, 0.536, 0.520   -EKG shows LVH  -Continue Heparin gtt   -Chest CT negative for PE  -Will discuss patient details with Interventional Cardiology to see if agreeable to C during admission.   -Patient states that her primary Cardiologist was planning to arrange LHC, but hasn't yet. Will request records from Dr. Reeves.   -Keep NPO after MN  -Nephrology on board for HD.      Chest pain    See plan for elevated troponin          VTE Risk Mitigation (From admission, onward)        Ordered     heparin 25,000 units in dextrose 5% 250 mL (100 units/mL) infusion LOW INTENSITY nomogram - [facility]  Continuous      12/27/18 0900     heparin 25,000 units in dextrose 5% (100 units/ml) IV bolus from bag - ADDITIONAL PRN BOLUS - 60 units/kg (max bolus 4000 units)  As needed (PRN)      12/27/18 0900     heparin 25,000 units in dextrose 5% (100 units/ml) IV bolus from bag - ADDITIONAL PRN BOLUS - 30 units/kg (max bolus 4000 units)  As needed (PRN)      12/27/18 0900     IP VTE HIGH RISK PATIENT  Once      12/26/18 1633        Chart reviewed. Patient examined by Dr. Sims and agrees with plan that has been outlined.     Thank you for your consult. I will follow-up with patient. Please contact us if you have any additional questions.    SOUMYA Bateman  Cardiology   [facility]

## 2018-12-27 NOTE — ASSESSMENT & PLAN NOTE
--resolved at present  --troponin 0.012<<0.538<<0.536<<0.530  --heparin gtt  --cardiology consulted  --possible [facility] tomorrow  --monitor

## 2018-12-27 NOTE — SUBJECTIVE & OBJECTIVE
Interval History: Patient was kept on OBS for acute asthma exacerbation under the care of Hospital Medicine. Troponin elevated overnight - Cardiology consulted, heparin gtt initiated, CTA chest shows no PE. Cardiology planning on [facility] tomorrow.    Review of Systems   Constitutional: Negative for activity change, chills, diaphoresis and fever.   HENT: Negative for congestion, postnasal drip, rhinorrhea and sore throat.    Eyes: Negative for pain and visual disturbance.   Respiratory: Positive for shortness of breath and wheezing. Negative for cough.    Cardiovascular: Negative for chest pain, palpitations and leg swelling.   Gastrointestinal: Negative for abdominal distention, abdominal pain, constipation, diarrhea, nausea and vomiting.   Endocrine: Negative for cold intolerance and heat intolerance.   Genitourinary: Negative for difficulty urinating, dysuria and hematuria.   Musculoskeletal: Negative for arthralgias, gait problem and myalgias.   Skin: Negative for color change and wound.   Allergic/Immunologic: Negative.    Neurological: Negative for dizziness, tremors, seizures, syncope, facial asymmetry, speech difficulty, light-headedness and headaches.   Hematological: Negative.    Psychiatric/Behavioral: Negative for agitation, behavioral problems and confusion.     Objective:     Vital Signs (Most Recent):  Temp: 97.9 °F (36.6 °C) (12/27/18 0717)  Pulse: 74 (12/27/18 0721)  Resp: 18 (12/27/18 0721)  BP: (!) 144/65 (12/27/18 0717)  SpO2: 95 % (12/27/18 0721) Vital Signs (24h Range):  Temp:  [97.9 °F (36.6 °C)-98.9 °F (37.2 °C)] 97.9 °F (36.6 °C)  Pulse:  [63-83] 74  Resp:  [18-34] 18  SpO2:  [88 %-100 %] 95 %  BP: ()/(55-73) 144/65     Weight: 114.5 kg (252 lb 6.8 oz)  Body mass index is 39.54 kg/m².    Intake/Output Summary (Last 24 hours) at 12/27/2018 0916  Last data filed at 12/27/2018 0631  Gross per 24 hour   Intake 240 ml   Output 3200 ml   Net -2960 ml      Physical Exam   Constitutional: She is  oriented to person, place, and time. She appears well-developed and well-nourished. No distress.   HENT:   Head: Normocephalic and atraumatic.   Nose: Nose normal.   Mouth/Throat: Oropharynx is clear and moist.   Eyes: Conjunctivae and EOM are normal. No scleral icterus.   Neck: Normal range of motion. Neck supple. No JVD present.   Cardiovascular: Normal rate, regular rhythm, normal heart sounds and intact distal pulses. Exam reveals no gallop and no friction rub.   No murmur heard.  Pulmonary/Chest: Effort normal and breath sounds normal. No respiratory distress. She has no wheezes.   Abdominal: Soft. Bowel sounds are normal. She exhibits no distension. There is no tenderness.   Genitourinary:   Genitourinary Comments: deferred   Musculoskeletal: Normal range of motion. She exhibits no edema.   Neurological: She is alert and oriented to person, place, and time. No cranial nerve deficit.   Skin: Skin is warm and dry. Capillary refill takes 2 to 3 seconds. She is not diaphoretic. No erythema.   Psychiatric: She has a normal mood and affect. Her behavior is normal.   Nursing note and vitals reviewed.      Significant Labs:   Recent Lab Results       12/27/18  0557   12/27/18  0522   12/27/18  0048   12/26/18  2202   12/26/18  1922        Anion Gap   12           Baso #   0.02           Basophil%   0.2           BUN, Bld   43           Calcium   9.2           Chloride   101           CO2   27           Creatinine   8.3           Differential Method   Automated           eGFR if    6           eGFR if non    5  Comment:  Calculation used to obtain the estimated glomerular filtration  rate (eGFR) is the CKD-EPI equation.              Eos #   0.3           Eosinophil%   2.9           Glucose   140           Gran # (ANC)   8.9           Gran%   75.7           Hematocrit   30.7           Hemoglobin   9.4           Lymph #   2.0           Lymph%   16.6           Magnesium   2.2           MCH    27.6           MCHC   30.6           MCV   90           Mono #   0.5           Mono%   4.6           MPV   8.9           Phosphorus   5.4           Platelets   254           POCT Glucose 127     194       Potassium   4.0           RBC   3.41           RDW   15.9           Sodium   140           Troponin I   0.520  Comment:  The reference interval for Troponin I represents the 99th percentile   cutoff   for our facility and is consistent with 3rd generation assay   performance.   0.536  Comment:  The reference interval for Troponin I represents the 99th percentile   cutoff   for our facility and is consistent with 3rd generation assay   performance.     0.538  Comment:  The reference interval for Troponin I represents the 99th percentile   cutoff   for our facility and is consistent with 3rd generation assay   performance.       WBC   11.80                            12/26/18  1710        Anion Gap       Baso #       Basophil%       BUN, Bld       Calcium       Chloride       CO2       Creatinine       Differential Method       eGFR if        eGFR if non        Eos #       Eosinophil%       Glucose       Gran # (ANC)       Gran%       Hematocrit       Hemoglobin       Lymph #       Lymph%       Magnesium       MCH       MCHC       MCV       Mono #       Mono%       MPV       Phosphorus       Platelets       POCT Glucose 146     Potassium       RBC       RDW       Sodium       Troponin I       WBC           All pertinent labs within the past 24 hours have been reviewed.    Significant Imaging: I have reviewed all pertinent imaging results/findings within the past 24 hours.

## 2018-12-27 NOTE — ASSESSMENT & PLAN NOTE
--continue Nephro yvrose, sevelamer   --renal diet  --nephrology following  --had HD today  --monitor

## 2018-12-27 NOTE — SUBJECTIVE & OBJECTIVE
Past Medical History:   Diagnosis Date    Asthma     CHF (congestive heart failure)     CKD (chronic kidney disease) stage 5, GFR less than 15 ml/min     Depression     Diabetes mellitus     Dialysis patient     Hypercholesterolemia     Hypertension     PAF (paroxysmal atrial fibrillation)        Past Surgical History:   Procedure Laterality Date    AV FISTULA PLACEMENT      CARDIAC ELECTROPHYSIOLOGY MAPPING AND ABLATION      cesarian section      TUMOR REMOVAL      L lung    VASCULAR CATH INSERTION N/A 10/19/2015    Performed by Oren Padilla MD at [facility] CATH LAB       Review of patient's allergies indicates:   Allergen Reactions    Sulfa (sulfonamide antibiotics) Rash       No current facility-administered medications on file prior to encounter.      Current Outpatient Medications on File Prior to Encounter   Medication Sig    insulin glargine (LANTUS) 100 unit/mL injection Inject 10 Units into the skin every evening.     lisinopril (PRINIVIL,ZESTRIL) 40 MG tablet Take 40 mg by mouth once daily.    medroxyPROGESTERone (PROVERA) 10 MG tablet Take 10 mg by mouth once daily.    metoprolol succinate (TOPROL-XL) 100 MG 24 hr tablet Take 1 tablet (100 mg total) by mouth once daily. (Patient taking differently: Take 200 mg by mouth once daily. )    pravastatin (PRAVACHOL) 40 MG tablet Take 40 mg by mouth once daily.    sevelamer carbonate (RENVELA) 2.4 gram PwPk Take 2.4 g by mouth 3 (three) times daily with meals.    vitamin renal formula, B-complex-vitamin c-folic acid, (NEPHROCAP) 1 mg Cap Take 1 capsule by mouth once daily.    albuterol 90 mcg/actuation inhaler Inhale 1-2 puffs into the lungs every 6 (six) hours as needed for Wheezing.    hydrALAZINE (APRESOLINE) 25 MG tablet Take 1 tablet (25 mg total) by mouth 3 (three) times daily.    levocetirizine (XYZAL) 2.5 mg/5 mL solution Take 5 mLs (2.5 mg total) by mouth every evening.    LORazepam (ATIVAN) 1 MG tablet Take 1 tablet (1 mg total)  by mouth every evening. Do not take if your drowsy.  Careful driving once his start this medication     Family History     Problem Relation (Age of Onset)    Diabetes Father    Heart disease Mother        Tobacco Use    Smoking status: Never Smoker    Smokeless tobacco: Never Used   Substance and Sexual Activity    Alcohol use: No    Drug use: No    Sexual activity: Not on file     Review of Systems   Constitution: Negative for diaphoresis, weakness, malaise/fatigue, weight gain and weight loss.   HENT: Negative for congestion and nosebleeds.    Cardiovascular: Positive for chest pain. Negative for claudication, cyanosis, dyspnea on exertion, irregular heartbeat, leg swelling, near-syncope, orthopnea, palpitations, paroxysmal nocturnal dyspnea and syncope.   Respiratory: Negative for cough, hemoptysis, sleep disturbances due to breathing, snoring, sputum production and wheezing.    Hematologic/Lymphatic: Negative for bleeding problem. Does not bruise/bleed easily.   Skin: Negative for rash.   Musculoskeletal: Negative for arthritis, back pain, falls, joint pain, muscle cramps and muscle weakness.   Gastrointestinal: Positive for nausea and vomiting. Negative for abdominal pain, constipation, diarrhea, heartburn, hematemesis, hematochezia and melena.   Genitourinary:        On HD MWF    Neurological: Negative for excessive daytime sleepiness, dizziness, headaches, light-headedness, loss of balance, numbness and vertigo.     Objective:     Vital Signs (Most Recent):  Temp: 98.6 °F (37 °C) (12/27/18 1223)  Pulse: 87 (12/27/18 1237)  Resp: 16 (12/27/18 1237)  BP: 134/63 (12/27/18 1223)  SpO2: (!) 94 % (12/27/18 1237) Vital Signs (24h Range):  Temp:  [97.9 °F (36.6 °C)-98.9 °F (37.2 °C)] 98.6 °F (37 °C)  Pulse:  [63-87] 87  Resp:  [16-20] 16  SpO2:  [92 %-100 %] 94 %  BP: (108-144)/(55-73) 134/63     Weight: 114.5 kg (252 lb 6.8 oz)  Body mass index is 39.54 kg/m².    SpO2: (!) 94 %  O2 Device (Oxygen Therapy): room  air      Intake/Output Summary (Last 24 hours) at 12/27/2018 1400  Last data filed at 12/27/2018 1124  Gross per 24 hour   Intake 280 ml   Output 350 ml   Net -70 ml       Lines/Drains/Airways     Peripheral Intravenous Line                 Peripheral IV - Single Lumen Right Forearm -- days         Peripheral IV - Single Lumen 12/26/18 0647 Right Hand 1 day                Physical Exam   Constitutional: She is oriented to person, place, and time. She appears well-developed and well-nourished.   Neck: Neck supple. No JVD present.   Cardiovascular: Normal rate, regular rhythm, normal heart sounds and normal pulses. Exam reveals no friction rub.   No murmur heard.  Pulmonary/Chest: Effort normal and breath sounds normal. No respiratory distress. She has no wheezes. She has no rales.   Abdominal: Soft. Bowel sounds are normal. She exhibits no distension.   Musculoskeletal: She exhibits no edema or tenderness.   Neurological: She is alert and oriented to person, place, and time.   Skin: Skin is warm and dry. No rash noted.   LUE HD access      Psychiatric: She has a normal mood and affect. Her behavior is normal.   Nursing note and vitals reviewed.      Significant Labs:   All pertinent lab results from the last 24 hours have been reviewed. and   Recent Lab Results       12/27/18  1142   12/27/18  0932   12/27/18  0557   12/27/18  0522   12/27/18  0048        Anion Gap       12       aPTT   27.2  Comment:  aPTT therapeutic range = 39-69 seconds           Baso #       0.02       Basophil%       0.2       BUN, Bld       43       Calcium       9.2       Chloride       101       CO2       27       Creatinine       8.3       Differential Method       Automated       eGFR if        6       eGFR if non        5  Comment:  Calculation used to obtain the estimated glomerular filtration  rate (eGFR) is the CKD-EPI equation.          Eos #       0.3       Eosinophil%       2.9       Estimated Avg  Glucose               Glucose       140       Gran # (ANC)       8.9       Gran%       75.7       Hematocrit       30.7       Hemoglobin       9.4       Hemoglobin A1C               Coumadin Monitoring INR   1.0  Comment:  Coumadin Therapy:  2.0 - 3.0 for INR for all indicators except mechanical heart valves  and antiphospholipid syndromes which should use 2.5 - 3.5.             Lymph #       2.0       Lymph%       16.6       Magnesium       2.2       MCH       27.6       MCHC       30.6       MCV       90       Mono #       0.5       Mono%       4.6       MPV       8.9       Phosphorus       5.4       Platelets       254       POCT Glucose 178   127         Potassium       4.0       Protime   10.6           RBC       3.41       RDW       15.9       Sodium       140       Troponin I       0.520  Comment:  The reference interval for Troponin I represents the 99th percentile   cutoff   for our facility and is consistent with 3rd generation assay   performance.   0.536  Comment:  The reference interval for Troponin I represents the 99th percentile   cutoff   for our facility and is consistent with 3rd generation assay   performance.       WBC       11.80                        12/26/18  2202   12/26/18  1922   12/26/18  1710   12/26/18  1709        Anion Gap             aPTT             Baso #             Basophil%             BUN, Bld             Calcium             Chloride             CO2             Creatinine             Differential Method             eGFR if              eGFR if non              Eos #             Eosinophil%             Estimated Avg Glucose       151     Glucose             Gran # (ANC)             Gran%             Hematocrit             Hemoglobin             Hemoglobin A1C       6.9  Comment:  ADA Screening Guidelines:  5.7-6.4%  Consistent with prediabetes  >or=6.5%  Consistent with diabetes  High levels of fetal hemoglobin interfere with the HbA1C  assay.  Heterozygous hemoglobin variants (HbS, HgC, etc)do  not significantly interfere with this assay.   However, presence of multiple variants may affect accuracy.       Coumadin Monitoring INR             Lymph #             Lymph%             Magnesium             MCH             MCHC             MCV             Mono #             Mono%             MPV             Phosphorus             Platelets             POCT Glucose 194   146       Potassium             Protime             RBC             RDW             Sodium             Troponin I   0.538  Comment:  The reference interval for Troponin I represents the 99th percentile   cutoff   for our facility and is consistent with 3rd generation assay   performance.           WBC                   Significant Imaging: Echocardiogram:   2D echo with color flow doppler:   Results for orders placed or performed during the hospital encounter of 03/21/15   2D echo with color flow doppler   Result Value Ref Range    QEF 60 55 - 65    Diastolic Dysfunction Yes (A)     Est. PA Systolic Pressure 7.84     and X-Ray:     FINDINGS:  Cardiomegaly pulmonary vascular congestion and bilateral interstitial edema suspected.  Small bilateral pleural effusions suspected.  No pneumothorax.  Aorta demonstrates atherosclerotic disease.  Bones demonstrate degenerative changes.  In comparison to the prior study, there is no adverse interval changes.      Impression       Findings again consistent with CHF.      Electronically signed by: Milton Jacobson MD  Date: 12/26/2018  Time: 08:19     Chest CT     Impression       1. Pulmonary arteries well opacified although there is streak artifact related to large body habitus without discrete intraluminal filling defect to suggest pulmonary embolism.  2. Enlarged pulmonary arteries suggesting pulmonary hypertension sequela.  3. There is a pattern of CHF with mild cardiomegaly, vascular congestion, bilateral dependent tiny pleural effusions, right greater  left, and bibasilar septal thickening and patchy ground-glass opacities suggesting pulmonary edema.  Mild compressive atelectasis dependent lower lobe lung bases.  All CT scans at this facility are performed  using dose modulation techniques as appropriate to performed exam including the following:  automated exposure control; adjustment of mA and/or kV according to the patients size (this includes techniques or standardized protocols for targeted exams where dose is matched to indication/reason for exam: i.e. extremities or head);  iterative reconstruction technique.      Electronically signed by: Dl Swain MD  Date: 12/27/2018  Time: 11:06

## 2018-12-28 VITALS
DIASTOLIC BLOOD PRESSURE: 70 MMHG | HEIGHT: 67 IN | RESPIRATION RATE: 17 BRPM | HEART RATE: 87 BPM | TEMPERATURE: 99 F | SYSTOLIC BLOOD PRESSURE: 149 MMHG | BODY MASS INDEX: 39.62 KG/M2 | OXYGEN SATURATION: 94 % | WEIGHT: 252.44 LBS

## 2018-12-28 LAB
ALBUMIN SERPL BCP-MCNC: 3 G/DL
ANION GAP SERPL CALC-SCNC: 17 MMOL/L
APTT BLDCRRT: 23.5 SEC
APTT BLDCRRT: 55.6 SEC
BASOPHILS # BLD AUTO: 0.02 K/UL
BASOPHILS NFR BLD: 0.2 %
BUN SERPL-MCNC: 53 MG/DL
CALCIUM SERPL-MCNC: 9.5 MG/DL
CHLORIDE SERPL-SCNC: 102 MMOL/L
CO2 SERPL-SCNC: 20 MMOL/L
CREAT SERPL-MCNC: 9.7 MG/DL
DIFFERENTIAL METHOD: ABNORMAL
EOSINOPHIL # BLD AUTO: 0.4 K/UL
EOSINOPHIL NFR BLD: 3.3 %
ERYTHROCYTE [DISTWIDTH] IN BLOOD BY AUTOMATED COUNT: 16.1 %
EST. GFR  (AFRICAN AMERICAN): 5 ML/MIN/1.73 M^2
EST. GFR  (NON AFRICAN AMERICAN): 4 ML/MIN/1.73 M^2
GLUCOSE SERPL-MCNC: 137 MG/DL
HCT VFR BLD AUTO: 32.4 %
HGB BLD-MCNC: 10.2 G/DL
LYMPHOCYTES # BLD AUTO: 1.9 K/UL
LYMPHOCYTES NFR BLD: 18.5 %
MAGNESIUM SERPL-MCNC: 2.4 MG/DL
MCH RBC QN AUTO: 28 PG
MCHC RBC AUTO-ENTMCNC: 31.5 G/DL
MCV RBC AUTO: 89 FL
MONOCYTES # BLD AUTO: 0.6 K/UL
MONOCYTES NFR BLD: 5.7 %
NEUTROPHILS # BLD AUTO: 7.6 K/UL
NEUTROPHILS NFR BLD: 72.3 %
PHOSPHATE SERPL-MCNC: 6.3 MG/DL
PLATELET # BLD AUTO: 229 K/UL
PMV BLD AUTO: 9.4 FL
POCT GLUCOSE: 148 MG/DL (ref 70–110)
POCT GLUCOSE: 74 MG/DL (ref 70–110)
POTASSIUM SERPL-SCNC: 4.1 MMOL/L
RBC # BLD AUTO: 3.64 M/UL
SODIUM SERPL-SCNC: 139 MMOL/L
WBC # BLD AUTO: 10.47 K/UL

## 2018-12-28 PROCEDURE — 80100016 HC MAINTENANCE HEMODIALYSIS

## 2018-12-28 PROCEDURE — 99215 OFFICE O/P EST HI 40 MIN: CPT | Mod: ,,, | Performed by: INTERNAL MEDICINE

## 2018-12-28 PROCEDURE — 93458 CATH LAB PROCEDURE: ICD-10-PCS | Mod: 26,,, | Performed by: INTERNAL MEDICINE

## 2018-12-28 PROCEDURE — 25500020 PHARM REV CODE 255

## 2018-12-28 PROCEDURE — 94761 N-INVAS EAR/PLS OXIMETRY MLT: CPT

## 2018-12-28 PROCEDURE — 93458 L HRT ARTERY/VENTRICLE ANGIO: CPT

## 2018-12-28 PROCEDURE — 25000003 PHARM REV CODE 250: Performed by: EMERGENCY MEDICINE

## 2018-12-28 PROCEDURE — G0378 HOSPITAL OBSERVATION PER HR: HCPCS

## 2018-12-28 PROCEDURE — 63600175 PHARM REV CODE 636 W HCPCS: Performed by: NURSE PRACTITIONER

## 2018-12-28 PROCEDURE — 25000242 PHARM REV CODE 250 ALT 637 W/ HCPCS: Performed by: INTERNAL MEDICINE

## 2018-12-28 PROCEDURE — 99213 OFFICE O/P EST LOW 20 MIN: CPT | Mod: 25,,, | Performed by: INTERNAL MEDICINE

## 2018-12-28 PROCEDURE — 99152 CATH LAB PROCEDURE: ICD-10-PCS | Mod: ,,, | Performed by: INTERNAL MEDICINE

## 2018-12-28 PROCEDURE — 80069 RENAL FUNCTION PANEL: CPT

## 2018-12-28 PROCEDURE — 63600175 PHARM REV CODE 636 W HCPCS: Mod: JG | Performed by: INTERNAL MEDICINE

## 2018-12-28 PROCEDURE — 94640 AIRWAY INHALATION TREATMENT: CPT

## 2018-12-28 PROCEDURE — 99215 PR OFFICE/OUTPT VISIT, EST, LEVL V, 40-54 MIN: ICD-10-PCS | Mod: ,,, | Performed by: INTERNAL MEDICINE

## 2018-12-28 PROCEDURE — 25000003 PHARM REV CODE 250: Performed by: INTERNAL MEDICINE

## 2018-12-28 PROCEDURE — 25000003 PHARM REV CODE 250: Performed by: NURSE PRACTITIONER

## 2018-12-28 PROCEDURE — 99213 PR OFFICE/OUTPT VISIT, EST, LEVL III, 20-29 MIN: ICD-10-PCS | Mod: 25,,, | Performed by: INTERNAL MEDICINE

## 2018-12-28 PROCEDURE — 93458 L HRT ARTERY/VENTRICLE ANGIO: CPT | Mod: 26,,, | Performed by: INTERNAL MEDICINE

## 2018-12-28 PROCEDURE — 99152 MOD SED SAME PHYS/QHP 5/>YRS: CPT | Mod: ,,, | Performed by: INTERNAL MEDICINE

## 2018-12-28 PROCEDURE — 99152 MOD SED SAME PHYS/QHP 5/>YRS: CPT

## 2018-12-28 PROCEDURE — 83735 ASSAY OF MAGNESIUM: CPT

## 2018-12-28 PROCEDURE — 36415 COLL VENOUS BLD VENIPUNCTURE: CPT

## 2018-12-28 PROCEDURE — 85025 COMPLETE CBC W/AUTO DIFF WBC: CPT

## 2018-12-28 PROCEDURE — 85730 THROMBOPLASTIN TIME PARTIAL: CPT

## 2018-12-28 PROCEDURE — 63600175 PHARM REV CODE 636 W HCPCS

## 2018-12-28 PROCEDURE — 25000003 PHARM REV CODE 250

## 2018-12-28 PROCEDURE — 27000221 HC OXYGEN, UP TO 24 HOURS

## 2018-12-28 RX ORDER — ASPIRIN 81 MG/1
81 TABLET ORAL DAILY
Refills: 0 | COMMUNITY
Start: 2018-12-29 | End: 2020-12-07

## 2018-12-28 RX ORDER — HEPARIN SODIUM 1000 [USP'U]/ML
2000 INJECTION, SOLUTION INTRAVENOUS; SUBCUTANEOUS ONCE
Status: DISCONTINUED | OUTPATIENT
Start: 2018-12-28 | End: 2018-12-28 | Stop reason: HOSPADM

## 2018-12-28 RX ORDER — DIPHENHYDRAMINE HCL 50 MG
50 CAPSULE ORAL ONCE
Status: COMPLETED | OUTPATIENT
Start: 2018-12-28 | End: 2018-12-28

## 2018-12-28 RX ORDER — HYDROCODONE BITARTRATE AND ACETAMINOPHEN 5; 325 MG/1; MG/1
1 TABLET ORAL EVERY 4 HOURS PRN
Status: DISCONTINUED | OUTPATIENT
Start: 2018-12-28 | End: 2018-12-28 | Stop reason: HOSPADM

## 2018-12-28 RX ORDER — OXYCODONE HYDROCHLORIDE 5 MG/1
10 TABLET ORAL EVERY 4 HOURS PRN
Status: DISCONTINUED | OUTPATIENT
Start: 2018-12-28 | End: 2018-12-28 | Stop reason: HOSPADM

## 2018-12-28 RX ORDER — ATROPINE SULFATE 0.1 MG/ML
0.5 INJECTION INTRAVENOUS
Status: DISCONTINUED | OUTPATIENT
Start: 2018-12-28 | End: 2018-12-28 | Stop reason: HOSPADM

## 2018-12-28 RX ORDER — FLUTICASONE PROPIONATE AND SALMETEROL 250; 50 UG/1; UG/1
1 POWDER RESPIRATORY (INHALATION) 2 TIMES DAILY
Qty: 1 EACH | Refills: 1 | Status: ON HOLD | OUTPATIENT
Start: 2018-12-28 | End: 2020-06-27 | Stop reason: HOSPADM

## 2018-12-28 RX ORDER — AZITHROMYCIN 250 MG/1
250 TABLET, FILM COATED ORAL DAILY
Qty: 2 TABLET | Refills: 0 | Status: ON HOLD | OUTPATIENT
Start: 2018-12-29 | End: 2020-06-27 | Stop reason: HOSPADM

## 2018-12-28 RX ORDER — HYDROCODONE BITARTRATE AND ACETAMINOPHEN 5; 325 MG/1; MG/1
1 TABLET ORAL EVERY 4 HOURS PRN
Qty: 24 TABLET | Refills: 0 | Status: SHIPPED | OUTPATIENT
Start: 2018-12-28 | End: 2019-01-01

## 2018-12-28 RX ORDER — METOPROLOL SUCCINATE 200 MG/1
200 TABLET, EXTENDED RELEASE ORAL NIGHTLY
Qty: 30 TABLET | Refills: 1 | Status: ON HOLD | OUTPATIENT
Start: 2018-12-28 | End: 2020-06-27 | Stop reason: HOSPADM

## 2018-12-28 RX ORDER — ALBUTEROL SULFATE 90 UG/1
1-2 AEROSOL, METERED RESPIRATORY (INHALATION) EVERY 6 HOURS PRN
Qty: 1 INHALER | Refills: 0 | Status: SHIPPED | OUTPATIENT
Start: 2018-12-28 | End: 2019-05-06 | Stop reason: SDUPTHER

## 2018-12-28 RX ADMIN — DIPHENHYDRAMINE HYDROCHLORIDE 50 MG: 50 CAPSULE ORAL at 01:12

## 2018-12-28 RX ADMIN — ERYTHROPOIETIN 10000 UNITS: 10000 INJECTION, SOLUTION INTRAVENOUS; SUBCUTANEOUS at 01:12

## 2018-12-28 RX ADMIN — NEPHROCAP 1 CAPSULE: 1 CAP ORAL at 08:12

## 2018-12-28 RX ADMIN — HEPARIN SODIUM AND DEXTROSE 15 UNITS/KG/HR: 10000; 5 INJECTION INTRAVENOUS at 06:12

## 2018-12-28 RX ADMIN — BUDESONIDE 0.5 MG: 0.5 SUSPENSION RESPIRATORY (INHALATION) at 07:12

## 2018-12-28 RX ADMIN — PANTOPRAZOLE SODIUM 40 MG: 40 TABLET, DELAYED RELEASE ORAL at 08:12

## 2018-12-28 RX ADMIN — ASPIRIN 81 MG: 81 TABLET, COATED ORAL at 08:12

## 2018-12-28 RX ADMIN — MEDROXYPROGESTERONE ACETATE 10 MG: 5 TABLET ORAL at 08:12

## 2018-12-28 RX ADMIN — LISINOPRIL 40 MG: 20 TABLET ORAL at 08:12

## 2018-12-28 RX ADMIN — AZITHROMYCIN 250 MG: 250 TABLET, FILM COATED ORAL at 08:12

## 2018-12-28 RX ADMIN — SEVELAMER CARBONATE 1600 MG: 800 TABLET, FILM COATED ORAL at 05:12

## 2018-12-28 RX ADMIN — IPRATROPIUM BROMIDE AND ALBUTEROL SULFATE 3 ML: .5; 3 SOLUTION RESPIRATORY (INHALATION) at 07:12

## 2018-12-28 NOTE — ASSESSMENT & PLAN NOTE
--resolved at present  --troponin 0.012<<0.538<<0.536<<0.530  --heparin gtt  --cardiology consulted  --possible LHC tomorrow  --monitor  12/28: pt denies CP today  --LHC with no stents or blockage - maximize medical management

## 2018-12-28 NOTE — ASSESSMENT & PLAN NOTE
--pulse ox 88% post HD  --now 94 - 100% on room air  --supplemental oxygen to maintain sats >92%  12/28: Oxygen sats 97-99% on room air today

## 2018-12-28 NOTE — ASSESSMENT & PLAN NOTE
--continue Nephro yvrose, sevelamer   --renal diet  --nephrology following  --had HD today (12/28)  --monitor

## 2018-12-28 NOTE — ASSESSMENT & PLAN NOTE
--0.012<<0.538<<0.536<<0.530  --pt denies CP today  --EKG shows LVH  --CT chest negative for PE  --heparin gtt   --Cardiology following  --possible LHC tomorrow  12/28: LHC shows normal coronary arteries. Normal LVEF.  --needs to maximize medical management  --pt denies CP today

## 2018-12-28 NOTE — HOSPITAL COURSE
12/28/18- Patient denies any angina or equivalent overnight or this morning. Patient examined in HD unit. Tolerating HD well. Scheduled for C today.

## 2018-12-28 NOTE — PROGRESS NOTES
[facility] -   Nephrology  Progress Note    Patient Name: Cordell Angulo  MRN: [id number]  Admission Date: 12/26/2018  Hospital Length of Stay: 0 days  Attending Provider: Mauricio Potter MD   Primary Care Physician: Chuck Grider MD  Principal Problem:Hypoxia    Subjective:     HPI: Cordell Angulo is a 53 y old AA woman with history of end-stage kidney disease on hemodialysis, [facility], [facility], Dr Pina , her last dialysis about 2 days ago, due to holiday schedule, she went to the dialysis unit today with significant shortness of breath, it was felt that it was not safe to dialyze at the unit so patient was transferred to the emergency room by ambulance, her blood pressure on presentation was significantly elevated, patient was started on nitroglycerin gtt , this was stopped once her blood pressure improved, patient was started on emergent dialysis in the emergency room, clinically improved after starting dialysis, she was placed on BiPAP support,    Interval History:     12/27/18 - feels better , cards note reviewed ,     Review of patient's allergies indicates:   Allergen Reactions    Sulfa (sulfonamide antibiotics) Rash     Current Facility-Administered Medications   Medication Frequency    acetaminophen tablet 650 mg Q4H PRN    albuterol-ipratropium 2.5 mg-0.5 mg/3 mL nebulizer solution 3 mL Q6H WAKE    aspirin EC tablet 81 mg Daily    azithromycin tablet 250 mg Daily    budesonide nebulizer solution 0.5 mg Q12H    dextrose 50% injection 12.5 g PRN    dextrose 50% injection 25 g PRN    glucagon (human recombinant) injection 1 mg PRN    glucose chewable tablet 16 g PRN    glucose chewable tablet 24 g PRN    heparin 25,000 units in dextrose 5% (100 units/ml) IV bolus from bag - ADDITIONAL PRN BOLUS - 30 units/kg (max bolus 4000 units) PRN    heparin 25,000 units in dextrose 5% (100 units/ml) IV bolus from bag - ADDITIONAL PRN BOLUS - 60 units/kg (max bolus 4000 units) PRN     heparin 25,000 units in dextrose 5% 250 mL (100 units/mL) infusion LOW INTENSITY nomogram - [facility] Continuous    insulin aspart U-100 pen 0-5 Units QID (AC + HS) PRN    insulin detemir U-100 pen 10 Units QHS    lisinopril tablet 40 mg Daily    medroxyPROGESTERone tablet 10 mg BID    metoprolol succinate (TOPROL-XL) 24 hr tablet 200 mg QHS    nitroGLYCERIN SL tablet 0.4 mg Q5 Min PRN    ondansetron disintegrating tablet 8 mg Q8H PRN    pantoprazole EC tablet 40 mg Daily    polyethylene glycol packet 17 g Q12H PRN    pravastatin tablet 40 mg QHS    promethazine (PHENERGAN) 6.25 mg in dextrose 5 % 50 mL IVPB Q6H PRN    sevelamer carbonate tablet 1,600 mg TID WM    sodium chloride 0.9% flush 5 mL PRN    vitamin renal formula (B-complex-vitamin c-folic acid) 1 mg per capsule 1 capsule Daily       Objective:     Vital Signs (Most Recent):  Temp: 98.4 °F (36.9 °C) (12/27/18 1547)  Pulse: 79 (12/27/18 1547)  Resp: 17 (12/27/18 1547)  BP: 131/62 (12/27/18 1547)  SpO2: 96 % (12/27/18 1547)  O2 Device (Oxygen Therapy): room air (12/27/18 1534) Vital Signs (24h Range):  Temp:  [97.9 °F (36.6 °C)-98.9 °F (37.2 °C)] 98.4 °F (36.9 °C)  Pulse:  [63-87] 79  Resp:  [16-20] 17  SpO2:  [92 %-99 %] 96 %  BP: (108-144)/(55-73) 131/62     Weight: 114.5 kg (252 lb 6.8 oz) (12/27/18 0405)  Body mass index is 39.54 kg/m².  Body surface area is 2.33 meters squared.    I/O last 3 completed shifts:  In: 240 [P.O.:240]  Out: 3200 [Urine:350; Other:2850]    Physical Exam   Constitutional: She is oriented to person, place, and time. She appears well-developed and well-nourished. No distress.   HENT:   Head: Normocephalic and atraumatic.   Mouth/Throat: Oropharynx is clear and moist. No oropharyngeal exudate.   Eyes: Conjunctivae and EOM are normal. Pupils are equal, round, and reactive to light.   Neck: Normal range of motion. Neck supple. No JVD present. Carotid bruit is not present. No tracheal deviation present. No thyroid mass and  no thyromegaly present.   Cardiovascular: Normal rate, regular rhythm and intact distal pulses. Exam reveals no gallop and no friction rub.   Murmur heard.  Pulmonary/Chest: Effort normal. No respiratory distress. She exhibits no tenderness.   Abdominal: Soft. Bowel sounds are normal. She exhibits no distension, no abdominal bruit, no ascites and no mass. There is no hepatosplenomegaly. There is no tenderness. There is no rebound, no guarding and no CVA tenderness.   Musculoskeletal: She exhibits no edema or tenderness.   Lymphadenopathy:     She has no cervical adenopathy.   Neurological: She is alert and oriented to person, place, and time. She has normal reflexes. She displays normal reflexes. No cranial nerve deficit. She exhibits normal muscle tone. Coordination normal.   Skin: Skin is warm and intact. No rash noted. No erythema. No pallor.   Psychiatric: She has a normal mood and affect. Her behavior is normal. Judgment normal.       Significant Labs:  Cardiac Markers: No results for input(s): CKMB, TROPONINT, MYOGLOBIN in the last 168 hours.  CBC:   Recent Labs   Lab 12/27/18  0522   WBC 11.80   RBC 3.41*   HGB 9.4*   HCT 30.7*      MCV 90   MCH 27.6   MCHC 30.6*     CMP:   Recent Labs   Lab 12/26/18  0644 12/27/18  0522   * 140*   CALCIUM 9.3 9.2   ALBUMIN 3.1*  --    PROT 6.9  --     140   K 4.2 4.0   CO2 19* 27    101   BUN 48* 43*   CREATININE 9.5* 8.3*   ALKPHOS 76  --    ALT 20  --    AST 32  --    BILITOT 0.5  --      Coagulation:   Recent Labs   Lab 12/27/18  0932   INR 1.0   APTT 27.2     All labs within the past 24 hours have been reviewed.     Significant Imaging:  Reviewed     Lab Results   Component Value Date    .0 (H) 05/08/2015    CALCIUM 9.2 12/27/2018    PHOS 5.4 (H) 12/27/2018       Lab Results   Component Value Date    ALBUMIN 3.1 (L) 12/26/2018           Assessment/Plan:     ESRD (end stage renal disease)    1. ESRD on HD : ROOPA CASTILLO, seen at HD ,  presents to the hospital with shortness of breath with fluid overload, had emergent HD y'day , UF  About 3 L ,      plan HD in AM ,     2.  Hypertension - it blood pressure improved,     3.  Anemia of chronic kidney disease - CAL with HD     4. SHPT - renal diet     5. elevated troponin , likely LHC tomorrow ,          I will follow-up with patient. Please contact us if you have any additional questions.     Total time spent  40 minutes including time needed to review the records,  patient  evaluation, documentation, face-to-face discussion with the patient,  primary team ,   more than 50% of the time was spent on coordination of care and counseling.       Donovan Gardner MD  Nephrology  [facility]

## 2018-12-28 NOTE — DISCHARGE SUMMARY
[facility] Hospital Medicine  Discharge Summary      Patient Name: Cordell Angulo  MRN: [id number]  Admission Date: 12/26/2018  Hospital Length of Stay: 0 days  Discharge Date and Time:  12/28/2018 5:37 PM  Attending Physician: Mauricio Potter MD   Discharging Provider: SOUMYA Padilla-C  Primary Care Provider: Chuck Grider MD      HPI:   54 y/o female with PMHx of asthma, HTN, CHF, DM, HLD, and ESRD on HD who presented to the ED with c/o SOB that onset gradually over the past 1 day. She reports she was SOB at the dialysis clinic and was sent to the ED from there. She had HD in the ED but the technician was only able to pull 1.8 L of fluid because of hypotension. After HD in our ED, she became hypoxic with a pulse ox of 88. Associated symptoms include fever, chills, cough, wheezing, chest pain, nausea, vomiting, diaphoresis, and stomach pain. Symptoms are constant and moderate. No exacerbating or mitigating factors reported. Pt reports she feels like she reached her dry weight and had too much fluid pulled off which caused her post HD symptoms.   ED workup shows WBC coutn 14K, H/H 10.9/36.0, glucose 375. She is a full code and her surrogate decision maker is her daughter, Diego Angulo.    Procedure(s) (LRB):  CATHETERIZATION, HEART, BOTH LEFT AND RIGHT (N/A)      Hospital Course:   Patient was kept on OBS for acute asthma exacerbation under the care of Hospital Medicine. Troponin elevated overnight - Cardiology consulted, heparin gtt initiated, CTA chest shows no PE. Cardiology planning on [facility] tomorrow.  12/28: Patient had heart cath today. Findings: normal coronary arteries, normal LVEF - no stent, maximize medical management. Patient needs to lie flat X 2 hours post procedure. Patient is breathing much better, no wheezing, no SOB. Oxygen sat at 99% on RA. Discussed cardiac diet, daily exercise, and healthy lifestyle. Patient will see PCP within 3 business days and will finish  z pack and start daily advair and PRN ventolin inhaler. She will see her cardiologist within a month. Patient was seen and examined today and deemed stable for discharge home.       Consults:   Consults (From admission, onward)        Status Ordering Provider     Inpatient consult to Cardiology  Once     Provider:  Lawrence Sims MD    Completed GERRY CAPONE     Inpatient consult to Hospitalist  Once     Provider:  (Not yet assigned)    Acknowledged ABDIRIZAK LOPEZ     Inpatient consult to Nephrology  Once     Provider:  (Not yet assigned)    ABDIRIZAK Gifford          * Hypoxia    --pulse ox 88% post HD  --now 94 - 100% on room air  --supplemental oxygen to maintain sats >92%  12/28: Oxygen sats 97-99% on room air today       Chest pain    --resolved at present  --troponin 0.012<<0.538<<0.536<<0.530  --heparin gtt  --cardiology consulted  --possible LHC tomorrow  --monitor  12/28: pt denies CP today  --LHC with no stents or blockage - maximize medical management       ESRD (end stage renal disease)    --continue Nephro yvrose, sevelamer   --renal diet  --nephrology following  --had HD today (12/28)  --monitor        Diabetes mellitus type 2 in obese    --Levemir 10U q HS  --accuchecks and SSI  --diabetic diet  --HA1C 6.9       HTN (hypertension)    --continue lisinopril, metoprolol  --cardiac diet  --monitor       Dependent on hemodialysis    12/28: HD today  --nephrology following       CHF (congestive heart failure)           Elevated troponin    --0.012<<0.538<<0.536<<0.530  --pt denies CP today  --EKG shows LVH  --CT chest negative for PE  --heparin gtt   --Cardiology following  --possible LHC tomorrow  12/28: LHC shows normal coronary arteries. Normal LVEF.  --needs to maximize medical management  --pt denies CP today         Shortness of breath    --resolved       Asthma exacerbation    --patient reports she has not taken any asthma medications in over a year  --duo nebs and budesonide via  nebulizer  --PO azithromycin  --supplemental oxygen to maintain sats >92%         Final Active Diagnoses:    Diagnosis Date Noted POA    PRINCIPAL PROBLEM:  Hypoxia [R09.02] 12/26/2018 Yes    Chest pain [R07.9] 12/26/2018 Yes    ESRD (end stage renal disease) [N18.6] 12/26/2018 Yes    Diabetes mellitus type 2 in obese [E11.69, E66.9] 03/21/2015 Yes     Chronic    HTN (hypertension) [I10] 03/21/2015 Yes     Chronic    CHF (congestive heart failure) [I50.9]  Unknown    Dependent on hemodialysis [Z99.2]  Not Applicable    Elevated troponin [R74.8] 12/27/2018 Unknown    Shortness of breath [R06.02]  Yes    Patient is [employer] [Z78.9] 01/05/2017 Yes    Chronic diastolic congestive heart failure [I50.32] 10/18/2015 Yes    Asthma exacerbation [J45.901] 03/21/2015 Yes      Problems Resolved During this Admission:       Discharged Condition: stable    Disposition: Home or Self Care    Follow Up:  Follow-up Information     Chuck Grider MD In 3 days.    Specialty:  Internal Medicine  Why:  hospital follow up  Contact information:  [address] [facility] [address] [phone]                 Patient Instructions:      Diet renal     Diet Cardiac     Notify your health care provider if you experience any of the following:  temperature >100.4     Notify your health care provider if you experience any of the following:  difficulty breathing or increased cough     Remove dressing in 24 hours     Activity as tolerated       Significant Diagnostic Studies: Labs:   BMP:   Recent Labs   Lab 12/27/18 0522 12/28/18 0448   * 137*    139   K 4.0 4.1    102   CO2 27 20*   BUN 43* 53*   CREATININE 8.3* 9.7*   CALCIUM 9.2 9.5   MG 2.2 2.4   , CBC   Recent Labs   Lab 12/27/18  0522 12/28/18 0448   WBC 11.80 10.47   HGB 9.4* 10.2*   HCT 30.7* 32.4*    229    and All labs within the past 24 hours have been reviewed    Pending Diagnostic Studies:     Procedure Component Value Units  Date/Time    IR Heart Cath Images [[id number]] Updated:  12/28/18 1622    Order Status:  Sent Lab Status:  In process          Medications:  Reconciled Home Medications:      Medication List      START taking these medications    aspirin 81 MG EC tablet  Commonly known as:  ECOTRIN  Take 1 tablet (81 mg total) by mouth once daily.  Start taking on:  12/29/2018     azithromycin 250 MG tablet  Commonly known as:  Z-SHYANN  Take 1 tablet (250 mg total) by mouth once daily.  Start taking on:  12/29/2018     fluticasone-salmeterol 250-50 mcg/dose 250-50 mcg/dose diskus inhaler  Commonly known as:  ADVAIR  Inhale 1 puff into the lungs 2 (two) times daily. Controller     HYDROcodone-acetaminophen 5-325 mg per tablet  Commonly known as:  NORCO  Take 1 tablet by mouth every 4 (four) hours as needed.        CHANGE how you take these medications    metoprolol succinate 200 MG 24 hr tablet  Commonly known as:  TOPROL-XL  Take 1 tablet (200 mg total) by mouth every evening.  What changed:    · medication strength  · how much to take  · when to take this        CONTINUE taking these medications    albuterol 90 mcg/actuation inhaler  Commonly known as:  PROVENTIL/VENTOLIN HFA  Inhale 1-2 puffs into the lungs every 6 (six) hours as needed for Wheezing.     insulin glargine 100 unit/mL injection  Commonly known as:  LANTUS  Inject 10 Units into the skin every evening.     lisinopril 40 MG tablet  Commonly known as:  PRINIVIL,ZESTRIL  Take 40 mg by mouth once daily.     medroxyPROGESTERone 10 MG tablet  Commonly known as:  PROVERA  Take 10 mg by mouth once daily.     pravastatin 40 MG tablet  Commonly known as:  PRAVACHOL  Take 40 mg by mouth once daily.     sevelamer carbonate 2.4 gram Pwpk  Commonly known as:  RENVELA  Take 2.4 g by mouth 3 (three) times daily with meals.     vitamin renal formula (B-complex-vitamin c-folic acid) 1 mg Cap  Commonly known as:  NEPHROCAP  Take 1 capsule by mouth once daily.        STOP taking these  medications    hydrALAZINE 25 MG tablet  Commonly known as:  APRESOLINE     levocetirizine 2.5 mg/5 mL solution  Commonly known as:  XYZAL     LORazepam 1 MG tablet  Commonly known as:  ATIVAN            Indwelling Lines/Drains at time of discharge:   Lines/Drains/Airways          None          Time spent on the discharge of patient: 45 minutes  Patient was seen and examined on the date of discharge and determined to be suitable for discharge.         SOUMYA Padilla-LANA  Department of Hospital Medicine  [facility]

## 2018-12-28 NOTE — PROGRESS NOTES
Pt completed 3.5 hours of HD tx with 2 L net UF.  Pt tolerated tx well. No access issues. Dr. Christy rounded on pt at bedside during tx.  Post tx, blood rinsed back, needles removed, and hemostasis achieved. Report to nurse attending.

## 2018-12-28 NOTE — SUBJECTIVE & OBJECTIVE
Review of Systems   Constitution: Negative for diaphoresis, weakness, malaise/fatigue, weight gain and weight loss.   HENT: Negative for congestion and nosebleeds.    Cardiovascular: Positive for chest pain. Negative for claudication, cyanosis, dyspnea on exertion, irregular heartbeat, leg swelling, near-syncope, orthopnea, palpitations, paroxysmal nocturnal dyspnea and syncope.   Respiratory: Negative for cough, hemoptysis, sleep disturbances due to breathing, snoring, sputum production and wheezing.    Hematologic/Lymphatic: Negative for bleeding problem. Does not bruise/bleed easily.   Skin: Negative for rash.   Musculoskeletal: Negative for arthritis, back pain, falls, joint pain, muscle cramps and muscle weakness.   Gastrointestinal: Positive for nausea and vomiting. Negative for abdominal pain, constipation, diarrhea, heartburn, hematemesis, hematochezia and melena.   Genitourinary:        On HD MWF    Neurological: Negative for excessive daytime sleepiness, dizziness, headaches, light-headedness, loss of balance, numbness and vertigo.     Objective:     Vital Signs (Most Recent):  Temp: 96.8 °F (36 °C) (12/28/18 0747)  Pulse: 71 (12/28/18 0754)  Resp: 18 (12/28/18 0754)  BP: (!) 155/73 (12/28/18 0747)  SpO2: 98 % (12/28/18 0754) Vital Signs (24h Range):  Temp:  [96.8 °F (36 °C)-98.7 °F (37.1 °C)] 96.8 °F (36 °C)  Pulse:  [70-84] 71  Resp:  [17-20] 18  SpO2:  [95 %-98 %] 98 %  BP: (119-155)/(52-73) 155/73     Weight: 114.5 kg (252 lb 6.8 oz)  Body mass index is 39.54 kg/m².     SpO2: 98 %  O2 Device (Oxygen Therapy): nasal cannula w/ humidification      Intake/Output Summary (Last 24 hours) at 12/28/2018 1325  Last data filed at 12/28/2018 0620  Gross per 24 hour   Intake 238.86 ml   Output 100 ml   Net 138.86 ml       Lines/Drains/Airways     Peripheral Intravenous Line                 Peripheral IV - Single Lumen Right Forearm -- days         Peripheral IV - Single Lumen 12/26/18 0647 Right Hand 2 days                 Physical Exam   Constitutional: She is oriented to person, place, and time. She appears well-developed and well-nourished.   Neck: Neck supple. No JVD present.   Cardiovascular: Normal rate, regular rhythm, normal heart sounds and normal pulses. Exam reveals no friction rub.   No murmur heard.  Pulmonary/Chest: Effort normal and breath sounds normal. No respiratory distress. She has no wheezes. She has no rales.   Abdominal: Soft. Bowel sounds are normal. She exhibits no distension.   Musculoskeletal: She exhibits no edema or tenderness.   Neurological: She is alert and oriented to person, place, and time.   Skin: Skin is warm and dry. No rash noted.   LUE HD access      Psychiatric: She has a normal mood and affect. Her behavior is normal.   Nursing note and vitals reviewed.      Significant Labs:   All pertinent lab results from the last 24 hours have been reviewed. and   Recent Lab Results       12/28/18  0957   12/28/18  0625   12/28/18  0448   12/27/18  2322   12/27/18  1804        Albumin     3.0         Anion Gap     17         aPTT 55.6  Comment:  aPTT therapeutic range = 39-69 seconds     23.5  Comment:  aPTT therapeutic range = 39-69 seconds  Reviewed by Technologist.   49.4  Comment:  aPTT therapeutic range = 39-69 seconds  Results confirmed, test repeated       Baso #     0.02         Basophil%     0.2         BUN, Bld     53         Calcium     9.5         Chloride     102         CO2     20         Creatinine     9.7         Differential Method     Automated         eGFR if      5         eGFR if non      4  Comment:  Calculation used to obtain the estimated glomerular filtration  rate (eGFR) is the CKD-EPI equation.            Eos #     0.4         Eosinophil%     3.3         Glucose     137         Gran # (ANC)     7.6         Gran%     72.3         Hematocrit     32.4         Hemoglobin     10.2         Lymph #     1.9         Lymph%     18.5          Magnesium     2.4         MCH     28.0         MCHC     31.5         MCV     89         Mono #     0.6         Mono%     5.7         MPV     9.4         Phosphorus     6.3         Platelets     229         POCT Glucose   148           Potassium     4.1         RBC     3.64         RDW     16.1         Sodium     139         WBC     10.47                          12/27/18  1603        Albumin       Anion Gap       aPTT       Baso #       Basophil%       BUN, Bld       Calcium       Chloride       CO2       Creatinine       Differential Method       eGFR if        eGFR if non        Eos #       Eosinophil%       Glucose       Gran # (ANC)       Gran%       Hematocrit       Hemoglobin       Lymph #       Lymph%       Magnesium       MCH       MCHC       MCV       Mono #       Mono%       MPV       Phosphorus       Platelets       POCT Glucose 202     Potassium       RBC       RDW       Sodium       WBC             Significant Imaging: Echocardiogram:   2D echo with color flow doppler:   Results for orders placed or performed during the hospital encounter of 12/26/18   2D echo with color flow doppler   Result Value Ref Range    QEF 60 55 - 65    Mitral Valve Regurgitation MILD     Diastolic Dysfunction Yes (A)     Est. PA Systolic Pressure 35.04     Mitral Valve Mobility NORMAL     Tricuspid Valve Regurgitation MILD

## 2018-12-28 NOTE — SUBJECTIVE & OBJECTIVE
Interval History: Patient had heart cath today. Findings: normal coronary arteries, normal LVEF - no stent, maximize medical management. Patient needs to lie flat X 6 hours post procedure.    Review of Systems   Constitutional: Negative for activity change, chills, diaphoresis and fever.   HENT: Negative for congestion, postnasal drip, rhinorrhea and sore throat.    Eyes: Negative for pain and visual disturbance.   Respiratory: Negative for cough, shortness of breath and wheezing.    Cardiovascular: Negative for chest pain, palpitations and leg swelling.   Gastrointestinal: Negative for abdominal distention, abdominal pain, constipation, diarrhea, nausea and vomiting.   Endocrine: Negative for cold intolerance and heat intolerance.   Genitourinary: Negative for difficulty urinating, dysuria and hematuria.   Musculoskeletal: Negative for arthralgias, gait problem and myalgias.   Skin: Negative for color change and wound.   Allergic/Immunologic: Negative.    Neurological: Negative for dizziness, tremors, seizures, syncope, facial asymmetry, speech difficulty, light-headedness and headaches.   Hematological: Negative.    Psychiatric/Behavioral: Negative for agitation, behavioral problems and confusion.     Objective:     Vital Signs (Most Recent):  Temp: 99.2 °F (37.3 °C) (12/28/18 1556)  Pulse: 87 (12/28/18 1556)  Resp: 17 (12/28/18 1556)  BP: (!) 149/70 (12/28/18 1556)  SpO2: (!) 94 % (12/28/18 1556) Vital Signs (24h Range):  Temp:  [96.8 °F (36 °C)-99.2 °F (37.3 °C)] 99.2 °F (37.3 °C)  Pulse:  [54-91] 87  Resp:  [16-20] 17  SpO2:  [94 %-100 %] 94 %  BP: (119-156)/(48-84) 149/70     Weight: 114.5 kg (252 lb 6.8 oz)  Body mass index is 39.54 kg/m².    Intake/Output Summary (Last 24 hours) at 12/28/2018 1635  Last data filed at 12/28/2018 0620  Gross per 24 hour   Intake 238.86 ml   Output 100 ml   Net 138.86 ml      Physical Exam   Constitutional: She is oriented to person, place, and time. She appears well-developed  and well-nourished. No distress.   HENT:   Head: Normocephalic and atraumatic.   Mouth/Throat: Oropharynx is clear and moist. No oropharyngeal exudate.   Eyes: Conjunctivae and EOM are normal. Pupils are equal, round, and reactive to light. No scleral icterus.   Neck: Normal range of motion. Neck supple. No JVD present. Carotid bruit is not present. No thyroid mass present.   Cardiovascular: Normal rate, regular rhythm and intact distal pulses. Exam reveals no gallop and no friction rub.   Murmur heard.  Pulmonary/Chest: Effort normal and breath sounds normal. No respiratory distress. She has no wheezes. She has no rales.   Abdominal: Soft. Bowel sounds are normal. She exhibits no distension, no abdominal bruit and no ascites. There is no hepatosplenomegaly. There is no tenderness. There is no CVA tenderness.   Genitourinary:   Genitourinary Comments: deferred   Musculoskeletal: She exhibits no edema or tenderness.   Neurological: She is alert and oriented to person, place, and time. She has normal reflexes. She displays normal reflexes. No cranial nerve deficit. She exhibits normal muscle tone. Coordination normal.   Skin: Skin is warm and intact. Capillary refill takes 2 to 3 seconds. No rash noted. She is not diaphoretic. No erythema. No pallor.   Psychiatric: She has a normal mood and affect. Her behavior is normal. Judgment normal.       Significant Labs:   Recent Lab Results       12/28/18  0957   12/28/18  0625   12/28/18  0448   12/27/18  2322   12/27/18  1804        Albumin     3.0         Anion Gap     17         aPTT 55.6  Comment:  aPTT therapeutic range = 39-69 seconds     23.5  Comment:  aPTT therapeutic range = 39-69 seconds  Reviewed by Technologist.   49.4  Comment:  aPTT therapeutic range = 39-69 seconds  Results confirmed, test repeated       Baso #     0.02         Basophil%     0.2         BUN, Bld     53         Calcium     9.5         Chloride     102         CO2     20         Creatinine      9.7         Differential Method     Automated         eGFR if      5         eGFR if non      4  Comment:  Calculation used to obtain the estimated glomerular filtration  rate (eGFR) is the CKD-EPI equation.            Eos #     0.4         Eosinophil%     3.3         Glucose     137         Gran # (ANC)     7.6         Gran%     72.3         Hematocrit     32.4         Hemoglobin     10.2         Lymph #     1.9         Lymph%     18.5         Magnesium     2.4         MCH     28.0         MCHC     31.5         MCV     89         Mono #     0.6         Mono%     5.7         MPV     9.4         Phosphorus     6.3         Platelets     229         POCT Glucose   148           Potassium     4.1         RBC     3.64         RDW     16.1         Sodium     139         WBC     10.47                            All pertinent labs within the past 24 hours have been reviewed.    Significant Imaging: I have reviewed all pertinent imaging results/findings within the past 24 hours.

## 2018-12-28 NOTE — BRIEF OP NOTE
<[facility]  Surgery Department  Operative Note    SUMMARY     Date of Procedure: 12/28/2018     Procedure: Procedure(s) (LRB):  CATHETERIZATION, HEART, BOTH LEFT AND RIGHT (N/A)     Surgeon(s) and Role:     * Enrique Jj MD - Primary    Assisting Surgeon: None    Pre-Operative Diagnosis: Chest pain, unspecified type [R07.9]    Post-Operative Diagnosis: Post-Op Diagnosis Codes:     * Chest pain, unspecified type [R07.9]    Anesthesia: RN IV Sedation    Technical Procedures Used: [facility]    Description of the Findings of the Procedure: [facility], SX: (+) TROPONIN, FINDINGS : NORMAL CORONARIES    Significant Surgical Tasks Conducted by the Assistant(s), if Applicable: NONE    Complications: No    Estimated Blood Loss (EBL): < 50 cc           Implants: * No implants in log *    Specimens:   Specimen (12h ago, onward)    None                  Condition: Good    Disposition: PACU - hemodynamically stable.    Attestation: I was present and scrubbed for the entire procedure.

## 2018-12-28 NOTE — PROGRESS NOTES
"[facility] -   Nephrology  Progress Note    Patient Name: Cordell Angulo  MRN: [id number]  Admission Date: 12/26/2018  Hospital Length of Stay: 0 days  Attending Provider: Mauricio Potter MD   Primary Care Physician: Chuck Grider MD  Principal Problem:Hypoxia    Subjective:     HPI: Cordell Angulo is a 53 y old AA woman with history of end-stage kidney disease on hemodialysis, [facility], [facility], Dr Pina , her last dialysis about 2 days ago, due to holiday schedule, she went to the dialysis unit today with significant shortness of breath, it was felt that it was not safe to dialyze at the unit so patient was transferred to the emergency room by ambulance, her blood pressure on presentation was significantly elevated, patient was started on nitroglycerin gtt , this was stopped once her blood pressure improved, patient was started on emergent dialysis in the emergency room, clinically improved after starting dialysis, she was placed on BiPAP support,    Interval History: Pt was seen and examined. H/o was reviewed. Pt is a 54 y/o female with ESRD on chronic HD at [facility], h/o of DM, HTN, diastolic CHF, and obesity who was admitted for SOB. Troponin mildly elevated. No CP. Currently on HD, tolerating HD well, no c/o's, no fever, no CP, SOB "better." Cardiology considering a [facility].    Review of patient's allergies indicates:   Allergen Reactions    Sulfa (sulfonamide antibiotics) Rash     Current Facility-Administered Medications   Medication Frequency    acetaminophen tablet 650 mg Q4H PRN    albuterol-ipratropium 2.5 mg-0.5 mg/3 mL nebulizer solution 3 mL Q6H WAKE    aspirin EC tablet 81 mg Daily    azithromycin tablet 250 mg Daily    budesonide nebulizer solution 0.5 mg Q12H    dextrose 50% injection 12.5 g PRN    dextrose 50% injection 25 g PRN    glucagon (human recombinant) injection 1 mg PRN    glucose chewable tablet 16 g PRN    glucose chewable tablet 24 g PRN    heparin " 25,000 units in dextrose 5% (100 units/ml) IV bolus from bag - ADDITIONAL PRN BOLUS - 30 units/kg (max bolus 4000 units) PRN    heparin 25,000 units in dextrose 5% (100 units/ml) IV bolus from bag - ADDITIONAL PRN BOLUS - 60 units/kg (max bolus 4000 units) PRN    heparin 25,000 units in dextrose 5% 250 mL (100 units/mL) infusion LOW INTENSITY nomogram - [facility] Continuous    insulin aspart U-100 pen 0-5 Units QID (AC + HS) PRN    insulin detemir U-100 pen 10 Units QHS    lisinopril tablet 40 mg Daily    medroxyPROGESTERone tablet 10 mg BID    metoprolol succinate (TOPROL-XL) 24 hr tablet 200 mg QHS    nitroGLYCERIN SL tablet 0.4 mg Q5 Min PRN    ondansetron disintegrating tablet 8 mg Q8H PRN    pantoprazole EC tablet 40 mg Daily    polyethylene glycol packet 17 g Q12H PRN    pravastatin tablet 40 mg QHS    promethazine (PHENERGAN) 6.25 mg in dextrose 5 % 50 mL IVPB Q6H PRN    sevelamer carbonate tablet 1,600 mg TID WM    sodium chloride 0.9% flush 5 mL PRN    vitamin renal formula (B-complex-vitamin c-folic acid) 1 mg per capsule 1 capsule Daily       Objective:     Vital Signs (Most Recent):  Temp: 96.8 °F (36 °C) (12/28/18 0747)  Pulse: 71 (12/28/18 0754)  Resp: 18 (12/28/18 0754)  BP: (!) 155/73 (12/28/18 0747)  SpO2: 98 % (12/28/18 0754)  O2 Device (Oxygen Therapy): nasal cannula w/ humidification (12/28/18 0754) Vital Signs (24h Range):  Temp:  [96.8 °F (36 °C)-98.7 °F (37.1 °C)] 96.8 °F (36 °C)  Pulse:  [70-87] 71  Resp:  [16-20] 18  SpO2:  [94 %-98 %] 98 %  BP: (119-155)/(52-73) 155/73     Weight: 114.5 kg (252 lb 6.8 oz) (12/27/18 0405)  Body mass index is 39.54 kg/m².  Body surface area is 2.33 meters squared.    I/O last 3 completed shifts:  In: 518.9 [P.O.:240; I.V.:198.9; IV Piggyback:80]  Out: 450 [Urine:450]    Physical Exam   Constitutional: She is oriented to person, place, and time. She appears well-developed and well-nourished.   HENT:   Head: Normocephalic and atraumatic.   Neck: No  JVD present.   Cardiovascular: Normal rate, regular rhythm and normal heart sounds. Exam reveals no friction rub.   Pulmonary/Chest: Effort normal and breath sounds normal. No respiratory distress. She has no wheezes. She has no rales.   Abdominal: Soft.   Musculoskeletal: She exhibits no edema.   Neurological: She is alert and oriented to person, place, and time.   Skin: Skin is warm and dry.   Psychiatric: She has a normal mood and affect. Her behavior is normal.   Nursing note and vitals reviewed.      Significant Labs: reviewed  BMP  Lab Results   Component Value Date     12/28/2018    K 4.1 12/28/2018     12/28/2018    CO2 20 (L) 12/28/2018    BUN 53 (H) 12/28/2018    CREATININE 9.7 (H) 12/28/2018    CALCIUM 9.5 12/28/2018    ANIONGAP 17 (H) 12/28/2018    ESTGFRAFRICA 5 (A) 12/28/2018    EGFRNONAA 4 (A) 12/28/2018     Lab Results   Component Value Date    WBC 10.47 12/28/2018    HGB 10.2 (L) 12/28/2018    HCT 32.4 (L) 12/28/2018    MCV 89 12/28/2018     12/28/2018     Recent Labs   Lab 12/27/18  0522   TROPONINI 0.520*       Significant Imaging: reviewed CXR  Cardiomegaly pulmonary vascular congestion and bilateral interstitial edema suspected.  Small bilateral pleural effusions suspected.  No pneumothorax.  Aorta demonstrates atherosclerotic disease.  Bones demonstrate degenerative changes.  In comparison to the prior study, there is no adverse interval changes    Assessment/Plan:   52 y/o female with ESRD on chronic HD presented with SOB:    ESRD (end stage renal disease)    1. ESRD on HD: q MWF HD , [facility],   Tolerating HD, continue HD  K normal  Mild metabolic acidosis  Good O2 sat, improved with HD    Hypertension - BP slightly elevated  Likely will improve with UF/HD  Will monitor    Anemia of chronic kidney disease - CAL with HD     SHPT - renal diet      Shortness of breath    Presented with SOB  Mild increase in troponin  H/o of diastolic CHF  CXR shows pulmonary edema  Afebrile,  WBC not high, doubt pneumonia  DDx: secondary to pulmonary edema and fluid overload (had missed HD) vs coronary  Cardiology considering C  Will f/u.       Plans and recommendations:  As discussed above  Continue HD    Otilio Christy MD  Nephrology  [facility] [place]

## 2018-12-28 NOTE — SUBJECTIVE & OBJECTIVE
"Interval History: Pt was seen and examined. H/o was reviewed. Pt is a 52 y/o female with ESRD on chronic HD at [facility] MWF, h/o of DM, HTN, diastolic CHF, and obesity who was admitted for SOB. Troponin mildly elevated. No CP. Currently on HD, tolerating HD well, no c/o's, no fever, no CP, SOB "better." Cardiology considering a [facility].    Review of patient's allergies indicates:   Allergen Reactions    Sulfa (sulfonamide antibiotics) Rash     Current Facility-Administered Medications   Medication Frequency    acetaminophen tablet 650 mg Q4H PRN    albuterol-ipratropium 2.5 mg-0.5 mg/3 mL nebulizer solution 3 mL Q6H WAKE    aspirin EC tablet 81 mg Daily    azithromycin tablet 250 mg Daily    budesonide nebulizer solution 0.5 mg Q12H    dextrose 50% injection 12.5 g PRN    dextrose 50% injection 25 g PRN    glucagon (human recombinant) injection 1 mg PRN    glucose chewable tablet 16 g PRN    glucose chewable tablet 24 g PRN    heparin 25,000 units in dextrose 5% (100 units/ml) IV bolus from bag - ADDITIONAL PRN BOLUS - 30 units/kg (max bolus 4000 units) PRN    heparin 25,000 units in dextrose 5% (100 units/ml) IV bolus from bag - ADDITIONAL PRN BOLUS - 60 units/kg (max bolus 4000 units) PRN    heparin 25,000 units in dextrose 5% 250 mL (100 units/mL) infusion LOW INTENSITY nomogram - [facility] Continuous    insulin aspart U-100 pen 0-5 Units QID (AC + HS) PRN    insulin detemir U-100 pen 10 Units QHS    lisinopril tablet 40 mg Daily    medroxyPROGESTERone tablet 10 mg BID    metoprolol succinate (TOPROL-XL) 24 hr tablet 200 mg QHS    nitroGLYCERIN SL tablet 0.4 mg Q5 Min PRN    ondansetron disintegrating tablet 8 mg Q8H PRN    pantoprazole EC tablet 40 mg Daily    polyethylene glycol packet 17 g Q12H PRN    pravastatin tablet 40 mg QHS    promethazine (PHENERGAN) 6.25 mg in dextrose 5 % 50 mL IVPB Q6H PRN    sevelamer carbonate tablet 1,600 mg TID WM    sodium chloride 0.9% flush 5 mL PRN    " vitamin renal formula (B-complex-vitamin c-folic acid) 1 mg per capsule 1 capsule Daily       Objective:     Vital Signs (Most Recent):  Temp: 96.8 °F (36 °C) (12/28/18 0747)  Pulse: 71 (12/28/18 0754)  Resp: 18 (12/28/18 0754)  BP: (!) 155/73 (12/28/18 0747)  SpO2: 98 % (12/28/18 0754)  O2 Device (Oxygen Therapy): nasal cannula w/ humidification (12/28/18 0754) Vital Signs (24h Range):  Temp:  [96.8 °F (36 °C)-98.7 °F (37.1 °C)] 96.8 °F (36 °C)  Pulse:  [70-87] 71  Resp:  [16-20] 18  SpO2:  [94 %-98 %] 98 %  BP: (119-155)/(52-73) 155/73     Weight: 114.5 kg (252 lb 6.8 oz) (12/27/18 0405)  Body mass index is 39.54 kg/m².  Body surface area is 2.33 meters squared.    I/O last 3 completed shifts:  In: 518.9 [P.O.:240; I.V.:198.9; IV Piggyback:80]  Out: 450 [Urine:450]    Physical Exam   Constitutional: She is oriented to person, place, and time. She appears well-developed and well-nourished.   HENT:   Head: Normocephalic and atraumatic.   Neck: No JVD present.   Cardiovascular: Normal rate, regular rhythm and normal heart sounds. Exam reveals no friction rub.   Pulmonary/Chest: Effort normal and breath sounds normal. No respiratory distress. She has no wheezes. She has no rales.   Abdominal: Soft.   Musculoskeletal: She exhibits no edema.   Neurological: She is alert and oriented to person, place, and time.   Skin: Skin is warm and dry.   Psychiatric: She has a normal mood and affect. Her behavior is normal.   Nursing note and vitals reviewed.      Significant Labs: reviewed  BMP  Lab Results   Component Value Date     12/28/2018    K 4.1 12/28/2018     12/28/2018    CO2 20 (L) 12/28/2018    BUN 53 (H) 12/28/2018    CREATININE 9.7 (H) 12/28/2018    CALCIUM 9.5 12/28/2018    ANIONGAP 17 (H) 12/28/2018    ESTGFRAFRICA 5 (A) 12/28/2018    EGFRNONAA 4 (A) 12/28/2018     Lab Results   Component Value Date    WBC 10.47 12/28/2018    HGB 10.2 (L) 12/28/2018    HCT 32.4 (L) 12/28/2018    MCV 89 12/28/2018    PLT  229 12/28/2018     Recent Labs   Lab 12/27/18  0522   TROPONINI 0.520*       Significant Imaging: reviewed CXR  Cardiomegaly pulmonary vascular congestion and bilateral interstitial edema suspected.  Small bilateral pleural effusions suspected.  No pneumothorax.  Aorta demonstrates atherosclerotic disease.  Bones demonstrate degenerative changes.  In comparison to the prior study, there is no adverse interval changes

## 2018-12-28 NOTE — PROGRESS NOTES
[facility] -   Cardiology  Progress Note    Patient Name: Cordell Angulo  MRN: [id number]  Admission Date: 12/26/2018  Hospital Length of Stay: 0 days  Code Status: Full Code   Attending Physician: Mauricio Potter MD   Primary Care Physician: Chuck Grider MD  Expected Discharge Date:   Principal Problem:Hypoxia    Subjective:   Brief HPI:     54 y/o female with PMHx of asthma, HTN, CHF, DM, HLD, and ESRD on HD who presented to the ED with c/o SOB that onset gradually over the past 1 day. She reports she was SOB and had chest tightness at the dialysis clinic and was sent to the ED from there. She had HD in the ED but the technician was only able to pull 1.8 L of fluid because of hypotension. After HD in our ED, she became hypoxic with a pulse ox of 88. Associated symptoms include fever, chills, cough, wheezing, chest pain, nausea, vomiting, diaphoresis, and stomach pain. Symptoms are constant and moderate. No exacerbating or mitigating factors reported. Pt reports she feels like she reached her dry weight and had too much fluid pulled off which caused her post HD symptoms.   ED workup shows WBC coutn 14K, H/H 10.9/36.0, glucose 375. Has been started on Heparin gtt.         Hospital Course:   12/28/18- Patient denies any angina or equivalent overnight or this morning. Patient examined in HD unit. Tolerating HD well. Scheduled for [facility] today.         Review of Systems   Constitution: Negative for diaphoresis, weakness, malaise/fatigue, weight gain and weight loss.   HENT: Negative for congestion and nosebleeds.    Cardiovascular: Positive for chest pain. Negative for claudication, cyanosis, dyspnea on exertion, irregular heartbeat, leg swelling, near-syncope, orthopnea, palpitations, paroxysmal nocturnal dyspnea and syncope.   Respiratory: Negative for cough, hemoptysis, sleep disturbances due to breathing, snoring, sputum production and wheezing.    Hematologic/Lymphatic: Negative for bleeding  problem. Does not bruise/bleed easily.   Skin: Negative for rash.   Musculoskeletal: Negative for arthritis, back pain, falls, joint pain, muscle cramps and muscle weakness.   Gastrointestinal: Positive for nausea and vomiting. Negative for abdominal pain, constipation, diarrhea, heartburn, hematemesis, hematochezia and melena.   Genitourinary:        On HD MWF    Neurological: Negative for excessive daytime sleepiness, dizziness, headaches, light-headedness, loss of balance, numbness and vertigo.     Objective:     Vital Signs (Most Recent):  Temp: 96.8 °F (36 °C) (12/28/18 0747)  Pulse: 71 (12/28/18 0754)  Resp: 18 (12/28/18 0754)  BP: (!) 155/73 (12/28/18 0747)  SpO2: 98 % (12/28/18 0754) Vital Signs (24h Range):  Temp:  [96.8 °F (36 °C)-98.7 °F (37.1 °C)] 96.8 °F (36 °C)  Pulse:  [70-84] 71  Resp:  [17-20] 18  SpO2:  [95 %-98 %] 98 %  BP: (119-155)/(52-73) 155/73     Weight: 114.5 kg (252 lb 6.8 oz)  Body mass index is 39.54 kg/m².     SpO2: 98 %  O2 Device (Oxygen Therapy): nasal cannula w/ humidification      Intake/Output Summary (Last 24 hours) at 12/28/2018 1325  Last data filed at 12/28/2018 0620  Gross per 24 hour   Intake 238.86 ml   Output 100 ml   Net 138.86 ml       Lines/Drains/Airways     Peripheral Intravenous Line                 Peripheral IV - Single Lumen Right Forearm -- days         Peripheral IV - Single Lumen 12/26/18 0647 Right Hand 2 days                Physical Exam   Constitutional: She is oriented to person, place, and time. She appears well-developed and well-nourished.   Neck: Neck supple. No JVD present.   Cardiovascular: Normal rate, regular rhythm, normal heart sounds and normal pulses. Exam reveals no friction rub.   No murmur heard.  Pulmonary/Chest: Effort normal and breath sounds normal. No respiratory distress. She has no wheezes. She has no rales.   Abdominal: Soft. Bowel sounds are normal. She exhibits no distension.   Musculoskeletal: She exhibits no edema or tenderness.    Neurological: She is alert and oriented to person, place, and time.   Skin: Skin is warm and dry. No rash noted.   LUE HD access      Psychiatric: She has a normal mood and affect. Her behavior is normal.   Nursing note and vitals reviewed.      Significant Labs:   All pertinent lab results from the last 24 hours have been reviewed. and   Recent Lab Results       12/28/18  0957   12/28/18  0625   12/28/18  0448   12/27/18  2322   12/27/18  1804        Albumin     3.0         Anion Gap     17         aPTT 55.6  Comment:  aPTT therapeutic range = 39-69 seconds     23.5  Comment:  aPTT therapeutic range = 39-69 seconds  Reviewed by Technologist.   49.4  Comment:  aPTT therapeutic range = 39-69 seconds  Results confirmed, test repeated       Baso #     0.02         Basophil%     0.2         BUN, Bld     53         Calcium     9.5         Chloride     102         CO2     20         Creatinine     9.7         Differential Method     Automated         eGFR if      5         eGFR if non      4  Comment:  Calculation used to obtain the estimated glomerular filtration  rate (eGFR) is the CKD-EPI equation.            Eos #     0.4         Eosinophil%     3.3         Glucose     137         Gran # (ANC)     7.6         Gran%     72.3         Hematocrit     32.4         Hemoglobin     10.2         Lymph #     1.9         Lymph%     18.5         Magnesium     2.4         MCH     28.0         MCHC     31.5         MCV     89         Mono #     0.6         Mono%     5.7         MPV     9.4         Phosphorus     6.3         Platelets     229         POCT Glucose   148           Potassium     4.1         RBC     3.64         RDW     16.1         Sodium     139         WBC     10.47                          12/27/18  1603        Albumin       Anion Gap       aPTT       Baso #       Basophil%       BUN, Bld       Calcium       Chloride       CO2       Creatinine       Differential Method       eGFR if         eGFR if non        Eos #       Eosinophil%       Glucose       Gran # (ANC)       Gran%       Hematocrit       Hemoglobin       Lymph #       Lymph%       Magnesium       MCH       MCHC       MCV       Mono #       Mono%       MPV       Phosphorus       Platelets       POCT Glucose 202     Potassium       RBC       RDW       Sodium       WBC             Significant Imaging: Echocardiogram:   2D echo with color flow doppler:   Results for orders placed or performed during the hospital encounter of 12/26/18   2D echo with color flow doppler   Result Value Ref Range    QEF 60 55 - 65    Mitral Valve Regurgitation MILD     Diastolic Dysfunction Yes (A)     Est. PA Systolic Pressure 35.04     Mitral Valve Mobility NORMAL     Tricuspid Valve Regurgitation MILD         Assessment and Plan:       Elevated troponin    -Possibly from demand ischemia from ESRD and hypotension, but does have chest pain concerning for angina  -Trop 0.012, 0.538, 0.536, 0.520   -EKG shows LVH  -Continue Heparin gtt   -Chest CT negative for PE  -Will discuss patient details with Interventional Cardiology to see if agreeable to LHC during admission.   -Patient states that her primary Cardiologist was planning to arrange LHC, but hasn't yet. Will request records from Dr. Reeves.   -Keep NPO after MN  -Nephrology on board for HD.     12/28/18  -Will proceed with left and right heart cath today after HD  -Keep NPO    -Further recommendations to follow cath   -continue ASA, Statin, BB       Chest pain    See plan for elevated troponin      HTN (hypertension)    -BP stable on current medical management          VTE Risk Mitigation (From admission, onward)        Ordered     heparin (porcine) injection 2,000 Units  Once      12/28/18 1006     heparin 25,000 units in dextrose 5% 250 mL (100 units/mL) infusion LOW INTENSITY nomogram - [facility]  Continuous      12/27/18 0900     heparin 25,000 units in dextrose 5% (100  units/ml) IV bolus from bag - ADDITIONAL PRN BOLUS - 60 units/kg (max bolus 4000 units)  As needed (PRN)      12/27/18 0900     heparin 25,000 units in dextrose 5% (100 units/ml) IV bolus from bag - ADDITIONAL PRN BOLUS - 30 units/kg (max bolus 4000 units)  As needed (PRN)      12/27/18 0900     IP VTE HIGH RISK PATIENT  Once      12/26/18 1633        Chart reviewed. Patient examined by Dr. Sims and agrees with plan that has been outlined.     SOUMYA Bateman  Cardiology  [facility]

## 2018-12-28 NOTE — PLAN OF CARE
Problem: Adult Inpatient Plan of Care  Goal: Plan of Care Review  Outcome: Ongoing (interventions implemented as appropriate)  The patient has been sinus rhythm on the monitor. Blood glucose monitored. Heparin gtt infusing. Pt has had an uneventful night and is resting quietly, will continue to monitor.

## 2018-12-28 NOTE — ASSESSMENT & PLAN NOTE
-Possibly from demand ischemia from ESRD and hypotension, but does have chest pain concerning for angina  -Trop 0.012, 0.538, 0.536, 0.520   -EKG shows LVH  -Continue Heparin gtt   -Chest CT negative for PE  -Will discuss patient details with Interventional Cardiology to see if agreeable to LHC during admission.   -Patient states that her primary Cardiologist was planning to arrange LHC, but hasn't yet. Will request records from Dr. Reeves.   -Keep NPO after MN  -Nephrology on board for HD.     12/28/18  -Will proceed with left and right heart cath today after HD  -Keep NPO    -Further recommendations to follow cath   -continue ASA, Statin, BB

## 2018-12-28 NOTE — ASSESSMENT & PLAN NOTE
1. ESRD on HD : MWF , [facility], seen at HD , presents to the hospital with shortness of breath with fluid overload, had emergent HD y'day , UF  About 3 L ,      plan HD in AM ,     2.  Hypertension - it blood pressure improved,     3.  Anemia of chronic kidney disease - CAL with HD     4. SHPT - renal diet     5. elevated troponin , likely LHC tomorrow ,

## 2018-12-28 NOTE — NURSING
Patient returned from cath lab procedure. Patient groggy but arouseable to verbal stimuli. Right groin puncture site dressing intact, no bleeding, hematoma, or bruising noted to site. Pulses palpable distally with feet warm to touch. Instructed to call if groin sensation changes. No pain expressed at this time. Will continue to monitor for any changes.

## 2018-12-28 NOTE — HOSPITAL COURSE
Patient was kept on OBS for acute asthma exacerbation under the care of Hospital Medicine. Troponin elevated overnight - Cardiology consulted, heparin gtt initiated, CTA chest shows no PE. Cardiology planning on [facility] tomorrow.  12/28: Patient had heart cath today. Findings: normal coronary arteries, normal LVEF - no stent, maximize medical management. Patient needs to lie flat X 2 hours post procedure. Patient is breathing much better, no wheezing, no SOB. Oxygen sat at 99% on RA. Discussed cardiac diet, daily exercise, and healthy lifestyle. Patient will see PCP within 3 business days and will finish z pack and start daily advair and PRN ventolin inhaler. She will see her cardiologist within a month. Patient was seen and examined today and deemed stable for discharge home.

## 2018-12-28 NOTE — ASSESSMENT & PLAN NOTE
Presented with SOB  Mild increase in troponin  CXR shows pulmonary edema  Afebrile, WBC not high, doubt pneumonia  DDx: secondary to pulmonary edema and fluid overload (had missed HD) vs coronary  Cardiology considering LHC  Will f/u.

## 2018-12-29 NOTE — NURSING
Discharge instructions provided to patient. No questions asked and no concerns expressed. Patient wheeled to private vehicle.

## 2019-01-02 ENCOUNTER — TELEPHONE (OUTPATIENT)
Dept: NEPHROLOGY | Facility: CLINIC | Age: 54
End: 2019-01-02

## 2019-01-02 DIAGNOSIS — I50.32 CHRONIC DIASTOLIC CONGESTIVE HEART FAILURE: ICD-10-CM

## 2019-01-02 DIAGNOSIS — R05.9 COUGH: Primary | ICD-10-CM

## 2019-01-02 DIAGNOSIS — I20.89 STABLE ANGINA: ICD-10-CM

## 2019-01-09 ENCOUNTER — DOCUMENTATION ONLY (OUTPATIENT)
Dept: NEPHROLOGY | Facility: CLINIC | Age: 54
End: 2019-01-09

## 2019-01-09 NOTE — H&P
History & Physical      Chief Complaint:  H&P    HPI:            Ms. Angulo is a 53 y.o. AAF being evaluated for H and P with a PMHx of asthma, HTN, CHF, DM, HLD, and ESRD on HD. She currently receives HD at  [facility] via Left AVF. She began HD on 10/20/2015 for complications  R/t T2DM. She was recently hospitalized and under went a heart cath on 12/28 by Dr. Jj. Upon d/c back to HD we decreased her  EDW  To 113.5kg and she is tolerating this much better.       ROS:        Constitutional: Negative for fever, chills, weight loss, malaise/fatigue and diaphoresis.   HENT: Negative for hearing loss, ear pain, nosebleeds, congestion, sore throat, neck pain, tinnitus and ear discharge.    Eyes: Negative for blurred vision, double vision, photophobia, pain, discharge and redness.   Respiratory: Negative for cough, hemoptysis, sputum production, shortness of breath, wheezing and stridor.    Cardiovascular: Negative for chest pain, palpitations, orthopnea, claudication, leg swelling and PND.   Gastrointestinal: Negative for heartburn, nausea, vomiting, abdominal pain, diarrhea, constipation, blood in stool and melena.   Genitourinary: Negative for dysuria, urgency, frequency, hematuria and flank pain.   Musculoskeletal: Negative for myalgias, back pain, joint pain and falls.   Skin: Negative for itching and rash.   Neurological: Negative for dizziness, tingling, tremors, sensory change, speech change, focal weakness, seizures, loss of consciousness, weakness and headaches.   Endo/Heme/Allergies: Negative for environmental allergies and polydipsia. Does not bruise/bleed easily.   Psychiatric/Behavioral: Negative for depression, suicidal ideas, hallucinations, memory loss and substance abuse. The patient is not nervous/anxious and does not have insomnia.    All 14 systems reviewed and negative except as noted above.      PMHx:      Past Medical History:   Diagnosis Date    A-fib     On Xarelto 20 qd  since 3/2017    Anxiety     CHF (congestive heart failure) 03/24/2018    [facility]    ESRD (end stage renal disease) on dialysis     HCV antibody positive 04/13/2018    Hypertension     Kidney disease     Pancytopenia     Recurrent pleural effusion on right     Started following catheter placement c/b pneumothorax        PMSx:      Past Surgical History:   Procedure Laterality Date    ARTERIAL BYPASS SURGRY      bilateral breast cyst excisions      bilateral kidney cancer      s/p bilateral nephrectomy    CHOLECYSTECTOMY      COLONOSCOPY W/ POLYPECTOMY  02/04/2016    DR. SHABANA SERRANO / [place]. TUBALR ADENOMA MID TRANSVERSE AND DISTAL SIGMOID COLON, EARLY HYPERPLASTIC SPLENIC FLEXURE . REPEAT 3 YRS    left shoulder repair      NEPHRECTOMY Bilateral 2005    PARATHYROIDECTOMY          Social Hx:      Social History     Social History    Marital status: Single     Spouse name: N/A    Number of children: N/A    Years of education: N/A     Occupational History    Not on file.     Social History Main Topics    Smoking status: Light Tobacco Smoker    Smokeless tobacco: Never Used    Alcohol use No    Drug use: Unknown    Sexual activity: Not on file     Other Topics Concern    Not on file     Social History Narrative    No narrative on file        Family Hx:      No family history on file.     VITALS:          Physical Exam   Nursing Notes and Vital Signs Reviewed.     Constitutional: Well developed, well nourished. AAOx3, NAD, speech/ comprehension clear   Head: Atraumatic. Normocephalic.   Eyes: PERRL. EOMI. Conjunctivae are not pale. No scleral icterus.   ENT: Mucous membranes are dry. No tongue tremors. Throat clear.  Neck: Supple. No JVD or LN or Carotid Bruits noted B.  Cardiovascular: S1S2 RRR, no murmurs, rubs, or gallops. Distal pulses are 2+ and symmetric.   Pulmonary/Chest: No evidence of respiratory distress. Clear to auscultation bilaterally. No wheezing, rales or rhonchi. No  chest wall TTP.   Abdominal: Soft and non-distended. There is no tenderness. No rebound, guarding, or rigidity. No organomegaly. No mass or viscera palpable  Musculoskeletal: FROM in all extremities. No deformities, no TTP, no edema. No midline spinal TTP. No step-offs. Pelvis is stable to compression. No cyanosis. Moves all four extremities.   Skin: Skin is warm and dry.   Neurological: No gross neurological deficits, Strength 5/5 B, is equal in the upper and lower extremities bilaterally. No sensory deficits to light touch. No pronator drift.  DTRs are 2+ and equal throughout.   Psychiatric: Good eye contact. Normal Affect.      Laboratory Data:  Reviewed and noted in plan where applicable- Please see chart for full laboratory data.         Lab Results   Component Value Date    INR 1.1 01/02/2013    INR 1.1 11/06/2012       Lab Results   Component Value Date    WBC 3.83 (L) 04/11/2018    HGB 10.8 (L) 04/11/2018    HCT 32.8 (L) 04/11/2018     (H) 04/11/2018    PLT 90 (L) 04/11/2018       BMP  @[id number](GLU,NA,K,Cl,CO2,BUN,Creatinine,Calcium,MG)@      Radiology:  Reviewed and noted in plan where applicable- Please see chart for full radiology data.    Medications:  Current Outpatient Prescriptions   Medication Sig    atenolol (TENORMIN) 100 MG tablet take 1 tablet by mouth once daily    AURYXIA 210 mg iron Tab TAKE (2) TABLETS THREE TIMES DAILY WITH MEALS.    calcitRIOL (ROCALTROL) 0.5 MCG Cap TAKE  (1)  CAPSULE  TWICE DAILY.    clobetasol 0.05% (TEMOVATE) 0.05 % Oint Apply topically 2 (two) times daily.    cloNIDine (CATAPRES) 0.1 MG tablet TAKE ONE TABLET BY MOUTH THREE TIMES DAILY    hydrALAZINE (APRESOLINE) 100 MG tablet TAKE ONE TABLET BY MOUTH TWICE DAILY    hydrOXYzine pamoate (VISTARIL) 50 MG Cap Take 1 capsule (50 mg total) by mouth nightly as needed (insomnia, anxiety, itchiness).    morphine (MSIR) 15 MG tablet Take 15 mg by mouth 2 (two) times daily.    NIFEDIAC CC 90 mg TbSR TAKE ONE  TABLET BY MOUTH TWICE DAILY    ondansetron (ZOFRAN) 4 MG tablet Take 1 tablet (4 mg total) by mouth every 8 (eight) hours as needed for Nausea.    oxymorphone (OPANA) 10 MG tablet Take 10 mg by mouth 4 (four) times daily.    RENVELA 800 mg Tab TAKE 4 TABLETS THREE TIMES DAILY WITH MEALS AND 3 WITH SNACKS    XARELTO 20 mg Tab      No current facility-administered medications for this visit.          ASSESSMENT/PLAN:     ACTIVE PROBLEMS:    Patient Active Problem List   Diagnosis    Hypertension    Anemia in ESRD (end-stage renal disease)    Chronic hepatitis C without hepatic coma    Thrombocytopenia    History of colon polyps           PLAN:      Assessment and plan:    1.  ESRD: Doing very well on  dialysis    2.  Anemia continue Epogen and iron per protocol    3.  Hypertension much better controlled    4.  Hyperparathyroidism treat with vitamin D therapy.      Jada Ballard, FNP-C

## 2019-01-11 ENCOUNTER — TELEPHONE (OUTPATIENT)
Dept: CARDIOLOGY | Facility: CLINIC | Age: 54
End: 2019-01-11

## 2019-01-11 RX ORDER — CITALOPRAM 20 MG/1
20 TABLET, FILM COATED ORAL DAILY
Qty: 30 TABLET | Refills: 11 | Status: SHIPPED | OUTPATIENT
Start: 2019-01-11 | End: 2020-12-07

## 2019-01-11 NOTE — TELEPHONE ENCOUNTER
Called and moved her appt. Dr Jj has an emergency appt mon 14th, she agreed to be seen the following mon. cm

## 2019-01-22 ENCOUNTER — TELEPHONE (OUTPATIENT)
Dept: CARDIOLOGY | Facility: CLINIC | Age: 54
End: 2019-01-22

## 2019-01-22 NOTE — TELEPHONE ENCOUNTER
I called and someone picked up the phone and hung it up. I called mobile phone and left v/m to rtc. antonette

## 2019-02-08 DIAGNOSIS — G47.33 OSA (OBSTRUCTIVE SLEEP APNEA): Primary | ICD-10-CM

## 2019-02-08 RX ORDER — AZITHROMYCIN 250 MG/1
TABLET, FILM COATED ORAL
Qty: 6 TABLET | Refills: 0 | Status: SHIPPED | OUTPATIENT
Start: 2019-02-08 | End: 2019-04-25 | Stop reason: SDUPTHER

## 2019-02-13 NOTE — PROGRESS NOTES
Patient has following symptopms :    Loud or frequent snoring.  Silent pauses in breathing.  Choking or gasping sounds.  Daytime sleepiness or fatigue.  Unrefreshing sleep.  Insomnia.  Morning headaches.      Proceed with Sleep study for NEHAL     Lola Pina MD

## 2019-03-11 RX ORDER — FLUTICASONE PROPIONATE AND SALMETEROL 50; 250 UG/1; UG/1
POWDER RESPIRATORY (INHALATION)
Refills: 0 | OUTPATIENT
Start: 2019-03-11

## 2019-04-25 ENCOUNTER — HOSPITAL ENCOUNTER (EMERGENCY)
Facility: HOSPITAL | Age: 54
Discharge: HOME | End: 2019-04-25
Attending: EMERGENCY MEDICINE
Payer: MEDICARE

## 2019-04-25 VITALS
DIASTOLIC BLOOD PRESSURE: 77 MMHG | OXYGEN SATURATION: 99 % | RESPIRATION RATE: 26 BRPM | BODY MASS INDEX: 39.48 KG/M2 | TEMPERATURE: 100 F | HEIGHT: 67 IN | WEIGHT: 251.56 LBS | SYSTOLIC BLOOD PRESSURE: 155 MMHG | HEART RATE: 114 BPM

## 2019-04-25 DIAGNOSIS — R06.02 SHORTNESS OF BREATH: ICD-10-CM

## 2019-04-25 DIAGNOSIS — R06.02 SOB (SHORTNESS OF BREATH): ICD-10-CM

## 2019-04-25 DIAGNOSIS — J20.9 BRONCHITIS, ACUTE, WITH BRONCHOSPASM: ICD-10-CM

## 2019-04-25 DIAGNOSIS — R09.89 PULMONARY VASCULAR CONGESTION: ICD-10-CM

## 2019-04-25 DIAGNOSIS — Z99.2 ESRD (END STAGE RENAL DISEASE) ON DIALYSIS: Primary | ICD-10-CM

## 2019-04-25 DIAGNOSIS — N18.6 ESRD (END STAGE RENAL DISEASE) ON DIALYSIS: Primary | ICD-10-CM

## 2019-04-25 LAB
ALBUMIN SERPL BCP-MCNC: 3.3 G/DL (ref 3.5–5.2)
ALP SERPL-CCNC: 64 U/L (ref 55–135)
ALT SERPL W/O P-5'-P-CCNC: 14 U/L (ref 10–44)
ANION GAP SERPL CALC-SCNC: 15 MMOL/L (ref 8–16)
APTT BLDCRRT: 25.4 SEC (ref 21–32)
AST SERPL-CCNC: 16 U/L (ref 10–40)
BASOPHILS # BLD AUTO: 0.02 K/UL (ref 0–0.2)
BASOPHILS NFR BLD: 0.2 % (ref 0–1.9)
BILIRUB SERPL-MCNC: 0.4 MG/DL (ref 0.1–1)
BNP SERPL-MCNC: 451 PG/ML (ref 0–99)
BUN SERPL-MCNC: 44 MG/DL (ref 6–20)
CALCIUM SERPL-MCNC: 9.8 MG/DL (ref 8.7–10.5)
CHLORIDE SERPL-SCNC: 100 MMOL/L (ref 95–110)
CO2 SERPL-SCNC: 26 MMOL/L (ref 23–29)
CREAT SERPL-MCNC: 7.8 MG/DL (ref 0.5–1.4)
DIFFERENTIAL METHOD: ABNORMAL
EOSINOPHIL # BLD AUTO: 0.2 K/UL (ref 0–0.5)
EOSINOPHIL NFR BLD: 2.1 % (ref 0–8)
ERYTHROCYTE [DISTWIDTH] IN BLOOD BY AUTOMATED COUNT: 15.2 % (ref 11.5–14.5)
EST. GFR  (AFRICAN AMERICAN): 6 ML/MIN/1.73 M^2
EST. GFR  (NON AFRICAN AMERICAN): 5 ML/MIN/1.73 M^2
GLUCOSE SERPL-MCNC: 114 MG/DL (ref 70–110)
HCT VFR BLD AUTO: 35.6 % (ref 37–48.5)
HGB BLD-MCNC: 11.1 G/DL (ref 12–16)
INFLUENZA A, MOLECULAR: NEGATIVE
INFLUENZA B, MOLECULAR: NEGATIVE
INR PPP: 0.9 (ref 0.8–1.2)
LACTATE SERPL-SCNC: 1.3 MMOL/L (ref 0.5–2.2)
LYMPHOCYTES # BLD AUTO: 1.2 K/UL (ref 1–4.8)
LYMPHOCYTES NFR BLD: 13 % (ref 18–48)
MAGNESIUM SERPL-MCNC: 2.2 MG/DL (ref 1.6–2.6)
MCH RBC QN AUTO: 27.2 PG (ref 27–31)
MCHC RBC AUTO-ENTMCNC: 31.2 G/DL (ref 32–36)
MCV RBC AUTO: 87 FL (ref 82–98)
MONOCYTES # BLD AUTO: 0.7 K/UL (ref 0.3–1)
MONOCYTES NFR BLD: 7.4 % (ref 4–15)
NEUTROPHILS # BLD AUTO: 6.9 K/UL (ref 1.8–7.7)
NEUTROPHILS NFR BLD: 77.3 % (ref 38–73)
PHOSPHATE SERPL-MCNC: 5.5 MG/DL (ref 2.7–4.5)
PLATELET # BLD AUTO: 251 K/UL (ref 150–350)
PMV BLD AUTO: 8.9 FL (ref 9.2–12.9)
POTASSIUM SERPL-SCNC: 3.9 MMOL/L (ref 3.5–5.1)
PROCALCITONIN SERPL IA-MCNC: 0.33 NG/ML
PROT SERPL-MCNC: 7.2 G/DL (ref 6–8.4)
PROTHROMBIN TIME: 10.1 SEC (ref 9–12.5)
RBC # BLD AUTO: 4.08 M/UL (ref 4–5.4)
SODIUM SERPL-SCNC: 141 MMOL/L (ref 136–145)
SPECIMEN SOURCE: NORMAL
TROPONIN I SERPL DL<=0.01 NG/ML-MCNC: 0.02 NG/ML (ref 0–0.03)
WBC # BLD AUTO: 8.89 K/UL (ref 3.9–12.7)

## 2019-04-25 PROCEDURE — 93010 ELECTROCARDIOGRAM REPORT: CPT | Mod: ,,, | Performed by: INTERNAL MEDICINE

## 2019-04-25 PROCEDURE — 85025 COMPLETE CBC W/AUTO DIFF WBC: CPT

## 2019-04-25 PROCEDURE — 83735 ASSAY OF MAGNESIUM: CPT

## 2019-04-25 PROCEDURE — 25000242 PHARM REV CODE 250 ALT 637 W/ HCPCS: Performed by: EMERGENCY MEDICINE

## 2019-04-25 PROCEDURE — 80053 COMPREHEN METABOLIC PANEL: CPT

## 2019-04-25 PROCEDURE — 87502 INFLUENZA DNA AMP PROBE: CPT

## 2019-04-25 PROCEDURE — 87040 BLOOD CULTURE FOR BACTERIA: CPT | Mod: 59

## 2019-04-25 PROCEDURE — 85610 PROTHROMBIN TIME: CPT

## 2019-04-25 PROCEDURE — 85730 THROMBOPLASTIN TIME PARTIAL: CPT

## 2019-04-25 PROCEDURE — 83880 ASSAY OF NATRIURETIC PEPTIDE: CPT

## 2019-04-25 PROCEDURE — 84100 ASSAY OF PHOSPHORUS: CPT

## 2019-04-25 PROCEDURE — 94640 AIRWAY INHALATION TREATMENT: CPT

## 2019-04-25 PROCEDURE — 93010 EKG 12-LEAD: ICD-10-PCS | Mod: ,,, | Performed by: INTERNAL MEDICINE

## 2019-04-25 PROCEDURE — 83605 ASSAY OF LACTIC ACID: CPT

## 2019-04-25 PROCEDURE — 99285 EMERGENCY DEPT VISIT HI MDM: CPT

## 2019-04-25 PROCEDURE — 93005 ELECTROCARDIOGRAM TRACING: CPT

## 2019-04-25 PROCEDURE — 84145 PROCALCITONIN (PCT): CPT

## 2019-04-25 PROCEDURE — 84484 ASSAY OF TROPONIN QUANT: CPT

## 2019-04-25 RX ORDER — AZITHROMYCIN 250 MG/1
TABLET, FILM COATED ORAL
Qty: 6 TABLET | Refills: 0 | Status: ON HOLD | OUTPATIENT
Start: 2019-04-25 | End: 2020-06-27 | Stop reason: HOSPADM

## 2019-04-25 RX ORDER — IPRATROPIUM BROMIDE AND ALBUTEROL SULFATE 2.5; .5 MG/3ML; MG/3ML
3 SOLUTION RESPIRATORY (INHALATION)
Status: COMPLETED | OUTPATIENT
Start: 2019-04-25 | End: 2019-04-25

## 2019-04-25 RX ORDER — SUCROFERRIC OXYHYDROXIDE 500 MG/1
TABLET, CHEWABLE ORAL
Refills: 11 | COMMUNITY
Start: 2019-02-08

## 2019-04-25 RX ADMIN — IPRATROPIUM BROMIDE AND ALBUTEROL SULFATE 3 ML: .5; 3 SOLUTION RESPIRATORY (INHALATION) at 09:04

## 2019-04-25 RX ADMIN — IPRATROPIUM BROMIDE AND ALBUTEROL SULFATE 3 ML: .5; 3 SOLUTION RESPIRATORY (INHALATION) at 08:04

## 2019-04-26 NOTE — ED PROVIDER NOTES
"SCRIBE #1 NOTE: I, Zoila Wood, am scribing for, and in the presence of, Mónica Laird MD. I have scribed the entire note.      History      Chief Complaint   Patient presents with    Shortness of Breath     Pt states, "I have a cough and I feel short of breath."       Review of patient's allergies indicates:   Allergen Reactions    Sulfa (sulfonamide antibiotics) Rash        HPI   HPI    4/25/2019, 8:13 PM   History obtained from the patient      History of Present Illness: Cordell Angulo is a 54 y.o. female patient with a PMHx of CHF, CKD, DM, HTN, Afib who presents to the Emergency Department for SOB which onset gradually today. Symptoms are constant and moderate in severity. SOB worse with exertion, no mitigating factors. Associated sxs include nasal congestion and productive cough with yellow sputum x4-5 days. Pt also reports palpitations with exertion and post-tussive emesis. Patient denies any fever, chills, nausea, CP, BLE edema/pain, extremity weakness/numbness, recent travel/long car rides, and all other sxs at this time. Pt last dialyzed yesterday. She dialyzes MWF. No further complaints or concerns at this time.       Arrival mode: Personal vehicle     PCP: Chuck Grider MD       Past Medical History:  Past Medical History:   Diagnosis Date    Asthma     CHF (congestive heart failure)     CKD (chronic kidney disease) stage 5, GFR less than 15 ml/min     Depression     Diabetes mellitus     Dialysis patient     Hypercholesterolemia     Hypertension     PAF (paroxysmal atrial fibrillation)        Past Surgical History:  Past Surgical History:   Procedure Laterality Date    AV FISTULA PLACEMENT      CARDIAC ELECTROPHYSIOLOGY MAPPING AND ABLATION      CATHETERIZATION, HEART, BOTH LEFT AND RIGHT N/A 12/28/2018    Performed by Enrique Jj MD at [facility] C[facility] LAB    cesarian section      TUMOR REMOVAL      L lung    VASCULAR CATH INSERTION N/A 10/19/2015    Performed by Oren" MD Randy at [facility] C[facility] LAB         Family History:  Family History   Problem Relation Age of Onset    Heart disease Mother     Diabetes Father        Social History:  Social History     Tobacco Use    Smoking status: Never Smoker    Smokeless tobacco: Never Used   Substance and Sexual Activity    Alcohol use: No    Drug use: No    Sexual activity: unknown       ROS   Review of Systems   Constitutional: Negative for chills, diaphoresis and fever.   HENT: Positive for congestion. Negative for sore throat.    Eyes: Negative for visual disturbance.   Respiratory: Positive for cough and shortness of breath.    Cardiovascular: Positive for palpitations. Negative for chest pain and leg swelling.   Gastrointestinal: Positive for vomiting. Negative for nausea.   Genitourinary: Negative for dysuria.   Musculoskeletal: Negative for back pain and myalgias.   Skin: Negative for rash.   Neurological: Negative for dizziness, weakness and numbness.   Hematological: Does not bruise/bleed easily.   All other systems reviewed and are negative.    Physical Exam      Initial Vitals [04/25/19 2001]   BP Pulse Resp Temp SpO2   (!) 190/85 108 20 100.1 °F (37.8 °C) 99 %      MAP       --          Physical Exam  Nursing Notes and Vital Signs Reviewed.  Constitutional: Patient is in no acute distress. Well-developed and well-nourished.  Head: Atraumatic. Normocephalic.  Eyes: PERRL. EOM intact. Conjunctivae are not pale. No scleral icterus.  Ears: Right TM normal. Left TM normal. No erythema. No bulging. No effusion or air-fluid levels. No perforation.   Nose: Patent nares. Turbinates are normal. Nasal congestion  Throat: Moist mucous membranes. Posterior oropharynx is symmetric without erythema. Tonsillar exudate is not present. No trismus. Normal handling of secretions. No stridor.    Neck: Supple. Full ROM. No lymphadenopathy.  Cardiovascular: Regular rate. Regular rhythm. No murmurs, rubs, or gallops. Distal pulses are 2+ and  "symmetric.  Pulmonary/Chest: No respiratory distress. Diffuse wheezing bilaterally.  Abdominal: Soft and non-distended.  There is no tenderness.  No rebound, guarding, or rigidity. Good bowel sounds.  Genitourinary: No CVA tenderness  Musculoskeletal: Moves all extremities. No obvious deformities. Trace BLE edema. No calf tenderness  Skin: Warm and dry.  Neurological:  Alert, awake, and appropriate.  Normal speech.  No acute focal neurological deficits are appreciated.  Psychiatric: Normal affect. Good eye contact. Appropriate in content.    ED Course    Procedures  ED Vital Signs:  Vitals:    04/25/19 2001 04/25/19 2048 04/25/19 2055 04/25/19 2057   BP: (!) 190/85      Pulse: 108 97 105 105   Resp: 20  16 16   Temp: 100.1 °F (37.8 °C)      TempSrc: Oral      SpO2: 99%  100% 100%   Weight: 114.1 kg (251 lb 8.7 oz)      Height: 5' 7" (1.702 m)       04/25/19 2118 04/25/19 2201 04/25/19 2302 04/25/19 2336   BP: (!) 178/81 (!) 164/76 (!) 162/72 (!) 155/77   Pulse: (!) 114 (!) 115 (!) 113 (!) 114   Resp: (!) 29 (!) 27 (!) 26    Temp:       TempSrc:       SpO2: 97% (!) 94% 96% 99%   Weight:       Height:           Abnormal Lab Results:  Labs Reviewed   CBC W/ AUTO DIFFERENTIAL - Abnormal; Notable for the following components:       Result Value    Hemoglobin 11.1 (*)     Hematocrit 35.6 (*)     MCHC 31.2 (*)     RDW 15.2 (*)     MPV 8.9 (*)     Gran% 77.3 (*)     Lymph% 13.0 (*)     All other components within normal limits   COMPREHENSIVE METABOLIC PANEL - Abnormal; Notable for the following components:    Glucose 114 (*)     BUN, Bld 44 (*)     Creatinine 7.8 (*)     Albumin 3.3 (*)     eGFR if  6 (*)     eGFR if non  5 (*)     All other components within normal limits   B-TYPE NATRIURETIC PEPTIDE - Abnormal; Notable for the following components:     (*)     All other components within normal limits   PHOSPHORUS - Abnormal; Notable for the following components:    Phosphorus 5.5 " (*)     All other components within normal limits   PROCALCITONIN - Abnormal; Notable for the following components:    Procalcitonin 0.33 (*)     All other components within normal limits   INFLUENZA A & B BY MOLECULAR   CULTURE, BLOOD   CULTURE, BLOOD   PROTIME-INR   APTT   LACTIC ACID, PLASMA   MAGNESIUM   TROPONIN I        All Lab Results:  Results for orders placed or performed during the hospital encounter of 04/25/19   Influenza A & B by Molecular   Result Value Ref Range    Influenza A, Molecular Negative Negative    Influenza B, Molecular Negative Negative    Flu A & B Source Nasal swab    CBC auto differential   Result Value Ref Range    WBC 8.89 3.90 - 12.70 K/uL    RBC 4.08 4.00 - 5.40 M/uL    Hemoglobin 11.1 (L) 12.0 - 16.0 g/dL    Hematocrit 35.6 (L) 37.0 - 48.5 %    MCV 87 82 - 98 fL    MCH 27.2 27.0 - 31.0 pg    MCHC 31.2 (L) 32.0 - 36.0 g/dL    RDW 15.2 (H) 11.5 - 14.5 %    Platelets 251 150 - 350 K/uL    MPV 8.9 (L) 9.2 - 12.9 fL    Gran # (ANC) 6.9 1.8 - 7.7 K/uL    Lymph # 1.2 1.0 - 4.8 K/uL    Mono # 0.7 0.3 - 1.0 K/uL    Eos # 0.2 0.0 - 0.5 K/uL    Baso # 0.02 0.00 - 0.20 K/uL    Gran% 77.3 (H) 38.0 - 73.0 %    Lymph% 13.0 (L) 18.0 - 48.0 %    Mono% 7.4 4.0 - 15.0 %    Eosinophil% 2.1 0.0 - 8.0 %    Basophil% 0.2 0.0 - 1.9 %    Differential Method Automated    Comprehensive metabolic panel   Result Value Ref Range    Sodium 141 136 - 145 mmol/L    Potassium 3.9 3.5 - 5.1 mmol/L    Chloride 100 95 - 110 mmol/L    CO2 26 23 - 29 mmol/L    Glucose 114 (H) 70 - 110 mg/dL    BUN, Bld 44 (H) 6 - 20 mg/dL    Creatinine 7.8 (H) 0.5 - 1.4 mg/dL    Calcium 9.8 8.7 - 10.5 mg/dL    Total Protein 7.2 6.0 - 8.4 g/dL    Albumin 3.3 (L) 3.5 - 5.2 g/dL    Total Bilirubin 0.4 0.1 - 1.0 mg/dL    Alkaline Phosphatase 64 55 - 135 U/L    AST 16 10 - 40 U/L    ALT 14 10 - 44 U/L    Anion Gap 15 8 - 16 mmol/L    eGFR if African American 6 (A) >60 mL/min/1.73 m^2    eGFR if non African American 5 (A) >60 mL/min/1.73 m^2    Protime-INR   Result Value Ref Range    Prothrombin Time 10.1 9.0 - 12.5 sec    INR 0.9 0.8 - 1.2   APTT   Result Value Ref Range    aPTT 25.4 21.0 - 32.0 sec   Brain natriuretic peptide   Result Value Ref Range     (H) 0 - 99 pg/mL   Phosphorus   Result Value Ref Range    Phosphorus 5.5 (H) 2.7 - 4.5 mg/dL   Lactic acid, plasma   Result Value Ref Range    Lactate (Lactic Acid) 1.3 0.5 - 2.2 mmol/L   Magnesium   Result Value Ref Range    Magnesium 2.2 1.6 - 2.6 mg/dL   Troponin I   Result Value Ref Range    Troponin I 0.022 0.000 - 0.026 ng/mL   Procalcitonin   Result Value Ref Range    Procalcitonin 0.33 (H) <0.25 ng/mL     Imaging Results:  Imaging Results          X-Ray Chest 1 View (Final result)  Result time 04/25/19 21:58:59    Final result by Alexis Garcia MD (04/25/19 21:58:59)                 Impression:      There is moderate cardiomegaly with congestion of pulmonary vascularity.  The findings suggest changes of developing congestive heart failure.      Electronically signed by: Marty Garcia MD  Date:    04/25/2019  Time:    21:58             Narrative:    EXAMINATION:  XR CHEST 1 VIEW    CLINICAL HISTORY:  Shortness of breath    TECHNIQUE:  Single frontal view of the chest was performed.    COMPARISON:  12/27/2018    FINDINGS:  There is moderate chronic cardiomegaly.  There is congestion of the pulmonary vascularity which is moderate in severity.  The lungs appear free of any focal area of consolidation, effusion, or pneumothorax.                               The EKG was ordered, reviewed, and independently interpreted by the ED provider.  Interpretation time: 2006  Rate: 107 BPM  Rhythm: sinus tachycardia  Interpretation: Possible LAE. LVH with repolarization abnormality. No STEMI.         The Emergency Provider reviewed the vital signs and test results, which are outlined above.    ED Discussion     11:06 PM: Reassessed pt at this time.  Pt states her condition has improved at this  time. Advised pt to f/u with dialysis tomorrow as scheduled at 0550. Pt reports her dry weight is 112kg. Discussed with pt all pertinent ED information and results. Discussed pt dx and plan of tx. Gave pt all f/u and return to the ED instructions. All questions and concerns were addressed at this time. Pt expresses understanding of information and instructions, and is comfortable with plan to discharge. Pt is stable for discharge.    I discussed with patient and/or family/caretaker that evaluation in the ED does not suggest any emergent or life threatening medical conditions requiring immediate intervention beyond what was provided in the ED, and I believe patient is safe for discharge.  Regardless, an unremarkable evaluation in the ED does not preclude the development or presence of a serious of life threatening condition. As such, patient was instructed to return immediately for any worsening or change in current symptoms.    ED Medication(s):  Medications   albuterol-ipratropium 2.5 mg-0.5 mg/3 mL nebulizer solution 3 mL (3 mLs Nebulization Given 4/25/19 2108)     Discharge Medication List as of 4/25/2019 11:13 PM        Follow-up Information     Chuck Grider MD In 1 day.    Specialty:  Internal Medicine  Contact information:  [address] [facility] [address]  [phone]             [facility] .    Specialty:  Emergency Medicine  Why:  As needed, If symptoms worsen  Contact information:  [address] [facility] [address] [phone]                   Medical Decision Making    Medical Decision Making:   Clinical Tests:   Lab Tests: Ordered and Reviewed  Radiological Study: Ordered and Reviewed  Medical Tests: Ordered and Reviewed           Scribe Attestation:   Scribe #1: I performed the above scribed service and the documentation accurately describes the services I performed. I attest to the accuracy of the note.    Attending:   Physician Attestation  Statement for Scribe #1: I, Mónica Laird MD, personally performed the services described in this documentation, as scribed by Zoila Wood, in my presence, and it is both accurate and complete.          Clinical Impression       ICD-10-CM ICD-9-CM   1. ESRD (end stage renal disease) on dialysis N18.6 585.6    Z99.2 V45.11   2. Shortness of breath R06.02 786.05   3. SOB (shortness of breath) R06.02 786.05   4. Pulmonary vascular congestion R09.89 514   5. Bronchitis, acute, with bronchospasm J20.9 466.0       Disposition:   Disposition: Discharged  Condition: Stable         Mónica Laird MD  04/26/19 0563

## 2019-04-26 NOTE — ED NOTES
Pt seen resting comfortably in bed, AAOx3. Needs denied at this time. Bed low, side rails up, and call light within reach. Will continue to monitor.

## 2019-05-01 LAB
BACTERIA BLD CULT: NORMAL
BACTERIA BLD CULT: NORMAL

## 2019-05-06 RX ORDER — ALBUTEROL SULFATE 90 UG/1
1-2 AEROSOL, METERED RESPIRATORY (INHALATION) EVERY 6 HOURS PRN
Qty: 1 INHALER | Refills: 0 | OUTPATIENT
Start: 2019-05-06 | End: 2019-11-02 | Stop reason: SDUPTHER

## 2019-05-06 RX ORDER — ALBUTEROL SULFATE 90 UG/1
AEROSOL, METERED RESPIRATORY (INHALATION)
Qty: 54 G | Refills: 0 | Status: ON HOLD | OUTPATIENT
Start: 2019-05-06 | End: 2020-06-27 | Stop reason: HOSPADM

## 2019-05-06 RX ORDER — ALBUTEROL SULFATE 90 UG/1
2 AEROSOL, METERED RESPIRATORY (INHALATION) EVERY 6 HOURS PRN
Qty: 18 G | Refills: 0 | Status: SHIPPED | OUTPATIENT
Start: 2019-05-06 | End: 2019-05-06 | Stop reason: SDUPTHER

## 2019-06-05 DIAGNOSIS — F32.9 REACTIVE DEPRESSION: Primary | ICD-10-CM

## 2019-06-05 DIAGNOSIS — F41.9 ANXIETY: ICD-10-CM

## 2019-06-05 RX ORDER — DIAZEPAM 5 MG/1
10 TABLET ORAL NIGHTLY PRN
Qty: 30 TABLET | Refills: 1 | Status: SHIPPED | OUTPATIENT
Start: 2019-06-05 | End: 2020-07-13

## 2019-06-05 RX ORDER — HYDROXYZINE PAMOATE 25 MG/1
25 CAPSULE ORAL EVERY 8 HOURS PRN
Qty: 60 CAPSULE | Refills: 2 | Status: ON HOLD | OUTPATIENT
Start: 2019-06-05 | End: 2020-06-27 | Stop reason: HOSPADM

## 2019-09-13 ENCOUNTER — HOSPITAL ENCOUNTER (EMERGENCY)
Facility: HOSPITAL | Age: 54
Discharge: HOME | End: 2019-09-13
Attending: EMERGENCY MEDICINE
Payer: MEDICARE

## 2019-09-13 VITALS
WEIGHT: 247.56 LBS | BODY MASS INDEX: 38.78 KG/M2 | OXYGEN SATURATION: 88 % | SYSTOLIC BLOOD PRESSURE: 155 MMHG | RESPIRATION RATE: 23 BRPM | HEART RATE: 86 BPM | DIASTOLIC BLOOD PRESSURE: 67 MMHG | TEMPERATURE: 99 F

## 2019-09-13 DIAGNOSIS — M43.6 TORTICOLLIS, ACUTE: Primary | ICD-10-CM

## 2019-09-13 DIAGNOSIS — R07.9 CHEST PAIN: ICD-10-CM

## 2019-09-13 LAB
ALBUMIN SERPL BCP-MCNC: 3.3 G/DL (ref 3.5–5.2)
ALP SERPL-CCNC: 72 U/L (ref 55–135)
ALT SERPL W/O P-5'-P-CCNC: 15 U/L (ref 10–44)
ANION GAP SERPL CALC-SCNC: 13 MMOL/L (ref 8–16)
AST SERPL-CCNC: 17 U/L (ref 10–40)
BASOPHILS # BLD AUTO: 0.02 K/UL (ref 0–0.2)
BASOPHILS NFR BLD: 0.2 % (ref 0–1.9)
BILIRUB SERPL-MCNC: 0.5 MG/DL (ref 0.1–1)
BNP SERPL-MCNC: 270 PG/ML (ref 0–99)
BUN SERPL-MCNC: 19 MG/DL (ref 6–20)
CALCIUM SERPL-MCNC: 9.5 MG/DL (ref 8.7–10.5)
CHLORIDE SERPL-SCNC: 97 MMOL/L (ref 95–110)
CO2 SERPL-SCNC: 30 MMOL/L (ref 23–29)
CREAT SERPL-MCNC: 4.9 MG/DL (ref 0.5–1.4)
DIFFERENTIAL METHOD: ABNORMAL
EOSINOPHIL # BLD AUTO: 0.2 K/UL (ref 0–0.5)
EOSINOPHIL NFR BLD: 2 % (ref 0–8)
ERYTHROCYTE [DISTWIDTH] IN BLOOD BY AUTOMATED COUNT: 13.8 % (ref 11.5–14.5)
EST. GFR  (AFRICAN AMERICAN): 11 ML/MIN/1.73 M^2
EST. GFR  (NON AFRICAN AMERICAN): 9 ML/MIN/1.73 M^2
GLUCOSE SERPL-MCNC: 124 MG/DL (ref 70–110)
HCT VFR BLD AUTO: 35.1 % (ref 37–48.5)
HGB BLD-MCNC: 10.8 G/DL (ref 12–16)
LYMPHOCYTES # BLD AUTO: 1.9 K/UL (ref 1–4.8)
LYMPHOCYTES NFR BLD: 19.2 % (ref 18–48)
MCH RBC QN AUTO: 27.1 PG (ref 27–31)
MCHC RBC AUTO-ENTMCNC: 30.8 G/DL (ref 32–36)
MCV RBC AUTO: 88 FL (ref 82–98)
MONOCYTES # BLD AUTO: 0.6 K/UL (ref 0.3–1)
MONOCYTES NFR BLD: 5.7 % (ref 4–15)
NEUTROPHILS # BLD AUTO: 7.4 K/UL (ref 1.8–7.7)
NEUTROPHILS NFR BLD: 73.2 % (ref 38–73)
PLATELET # BLD AUTO: 273 K/UL (ref 150–350)
PMV BLD AUTO: 8.6 FL (ref 9.2–12.9)
POTASSIUM SERPL-SCNC: 3.5 MMOL/L (ref 3.5–5.1)
PROT SERPL-MCNC: 7.7 G/DL (ref 6–8.4)
RBC # BLD AUTO: 3.99 M/UL (ref 4–5.4)
SODIUM SERPL-SCNC: 140 MMOL/L (ref 136–145)
TROPONIN I SERPL DL<=0.01 NG/ML-MCNC: 0.02 NG/ML (ref 0–0.03)
WBC # BLD AUTO: 10.08 K/UL (ref 3.9–12.7)

## 2019-09-13 PROCEDURE — 84484 ASSAY OF TROPONIN QUANT: CPT

## 2019-09-13 PROCEDURE — 85025 COMPLETE CBC W/AUTO DIFF WBC: CPT

## 2019-09-13 PROCEDURE — 93005 ELECTROCARDIOGRAM TRACING: CPT

## 2019-09-13 PROCEDURE — 96374 THER/PROPH/DIAG INJ IV PUSH: CPT

## 2019-09-13 PROCEDURE — 96375 TX/PRO/DX INJ NEW DRUG ADDON: CPT

## 2019-09-13 PROCEDURE — 93010 ELECTROCARDIOGRAM REPORT: CPT | Mod: ,,, | Performed by: INTERNAL MEDICINE

## 2019-09-13 PROCEDURE — 93010 EKG 12-LEAD: ICD-10-PCS | Mod: ,,, | Performed by: INTERNAL MEDICINE

## 2019-09-13 PROCEDURE — 83880 ASSAY OF NATRIURETIC PEPTIDE: CPT

## 2019-09-13 PROCEDURE — 25000003 PHARM REV CODE 250: Performed by: EMERGENCY MEDICINE

## 2019-09-13 PROCEDURE — 63600175 PHARM REV CODE 636 W HCPCS: Performed by: EMERGENCY MEDICINE

## 2019-09-13 PROCEDURE — 80053 COMPREHEN METABOLIC PANEL: CPT

## 2019-09-13 PROCEDURE — 99285 EMERGENCY DEPT VISIT HI MDM: CPT | Mod: 25

## 2019-09-13 RX ORDER — ONDANSETRON 2 MG/ML
4 INJECTION INTRAMUSCULAR; INTRAVENOUS
Status: COMPLETED | OUTPATIENT
Start: 2019-09-13 | End: 2019-09-13

## 2019-09-13 RX ORDER — ASPIRIN 325 MG
325 TABLET ORAL
Status: COMPLETED | OUTPATIENT
Start: 2019-09-13 | End: 2019-09-13

## 2019-09-13 RX ORDER — CYCLOBENZAPRINE HCL 10 MG
10 TABLET ORAL
Status: COMPLETED | OUTPATIENT
Start: 2019-09-13 | End: 2019-09-13

## 2019-09-13 RX ORDER — DIPHENHYDRAMINE HYDROCHLORIDE 50 MG/ML
25 INJECTION INTRAMUSCULAR; INTRAVENOUS
Status: DISCONTINUED | OUTPATIENT
Start: 2019-09-13 | End: 2019-09-13 | Stop reason: HOSPADM

## 2019-09-13 RX ORDER — MORPHINE SULFATE 4 MG/ML
4 INJECTION, SOLUTION INTRAMUSCULAR; INTRAVENOUS
Status: COMPLETED | OUTPATIENT
Start: 2019-09-13 | End: 2019-09-13

## 2019-09-13 RX ORDER — TRAMADOL HYDROCHLORIDE 50 MG/1
50 TABLET ORAL EVERY 6 HOURS PRN
Qty: 12 TABLET | Refills: 0 | Status: SHIPPED | OUTPATIENT
Start: 2019-09-13 | End: 2019-09-23

## 2019-09-13 RX ORDER — CYCLOBENZAPRINE HCL 10 MG
5 TABLET ORAL 3 TIMES DAILY PRN
Qty: 15 TABLET | Refills: 0 | Status: SHIPPED | OUTPATIENT
Start: 2019-09-13 | End: 2020-07-13

## 2019-09-13 RX ADMIN — MORPHINE SULFATE 4 MG: 4 INJECTION, SOLUTION INTRAMUSCULAR; INTRAVENOUS at 12:09

## 2019-09-13 RX ADMIN — ONDANSETRON 4 MG: 2 INJECTION INTRAMUSCULAR; INTRAVENOUS at 12:09

## 2019-09-13 RX ADMIN — ASPIRIN 325 MG ORAL TABLET 325 MG: 325 PILL ORAL at 12:09

## 2019-09-13 RX ADMIN — CYCLOBENZAPRINE HYDROCHLORIDE 10 MG: 10 TABLET, FILM COATED ORAL at 12:09

## 2019-09-13 NOTE — ED PROVIDER NOTES
Per London Galindo, NP: 54 year old female with shoulder pain, chest pain, and neck pain. Reports has dialysis this am and was unable to complete     SCRIBE #1 NOTE: I, Emiliano Hopkins, am scribing for, and in the presence of, Tony Magdaleno Jr., MD. I have scribed the entire note.      History      Chief Complaint   Patient presents with    Chest Pain     vomiting began yesterday; pain to left shoulder and neck and chest present on waking, dizziness began at dialsys; t/c to Dr. Pina who told her to come here; did not complete dialysis        Review of patient's allergies indicates:   Allergen Reactions    Sulfa (sulfonamide antibiotics) Rash        HPI   HPI    9/13/2019, 11:51 AM   History obtained from the patient      History of Present Illness: Cordell Angulo is a 54 y.o. female patient with a PMHx of CHF, CKD, DM, PAF, and HTN who presents to the Emergency Department for chest pain, onset 1 day PTA.  Pain is been going on since yesterday in the mid afternoon.  This is worse with certain movements.  She denies any cough or shortness of breath. There is no trauma.  Pt dialyzes every M/W/F, and states that she completed her dialysis this morning. Symptoms are constant and moderate in severity. No mitigating or exacerbating factors reported. Associated sxs include upper back pain, L neck pain, SOB, and n/v. Patient denies any fever, chills, weakness, numbness, dizziness, headache, and all other sxs at this time. No prior Tx reported. No further complaints or concerns at this time.       Arrival mode: Personal vehicle    PCP: Chuck Grider MD       Past Medical History:  Past Medical History:   Diagnosis Date    Asthma     CHF (congestive heart failure)     CKD (chronic kidney disease) stage 5, GFR less than 15 ml/min     Depression     Diabetes mellitus     Dialysis patient     Hypercholesterolemia     Hypertension     PAF (paroxysmal atrial fibrillation)        Past Surgical History:  Past  Surgical History:   Procedure Laterality Date    AV FISTULA PLACEMENT      CARDIAC ELECTROPHYSIOLOGY MAPPING AND ABLATION      CATHETERIZATION, HEART, BOTH LEFT AND RIGHT N/A 12/28/2018    Performed by Enrique Jj [facility] at [facility] C[facility]    cesarian section      TUMOR REMOVAL      L lung    VASCULAR CATH INSERTION N/A 10/19/2015    Performed by Oren Padilla MD at [facility] CATH LAB         Family History:  Family History   Problem Relation Age of Onset    Heart disease Mother     Diabetes Father        Social History:  Social History     Tobacco Use    Smoking status: Never Smoker    Smokeless tobacco: Never Used   Substance and Sexual Activity    Alcohol use: No    Drug use: No    Sexual activity: Unknown       ROS   Review of Systems   Constitutional: Negative for chills, diaphoresis, fatigue and fever.   HENT: Negative for sore throat.    Respiratory: Positive for shortness of breath. Negative for cough.    Cardiovascular: Positive for chest pain. Negative for leg swelling.   Gastrointestinal: Positive for nausea and vomiting.   Genitourinary: Negative for dysuria.   Musculoskeletal: Positive for back pain (upper) and neck pain (L).   Skin: Negative for rash and wound.   Neurological: Negative for dizziness, weakness, light-headedness, numbness and headaches.   Hematological: Does not bruise/bleed easily.   All other systems reviewed and are negative.    Physical Exam      Initial Vitals [09/13/19 1109]   BP Pulse Resp Temp SpO2   (!) 156/78 78 16 98.7 °F (37.1 °C) 97 %      MAP       --          Physical Exam  Nursing Notes and Vital Signs Reviewed.  Constitutional: Patient is in mild distress. Well-developed and well-nourished.  Head: Atraumatic. Normocephalic.  Eyes: PERRL. EOM intact. Conjunctivae are not pale. No scleral icterus.  ENT: Mucous membranes are moist. Oropharynx is clear and symmetric.    Neck: Supple. Full ROM. No lymphadenopathy.  Cardiovascular: Regular rate. Regular rhythm. No  murmurs, rubs, or gallops. Distal pulses are 2+ and symmetric.  Pulmonary/Chest: No respiratory distress. Clear to auscultation bilaterally. No wheezing or rales.  Abdominal: Soft and non-distended.  There is no tenderness.  No rebound, guarding, or rigidity. Good bowel sounds.  Musculoskeletal: Moves all extremities. No obvious deformities. No edema. Upper back TTP. L shoulder TTP. L trapezius muscle spasms.  Spasm is from the mastoid to the point of the shoulder and to the superior aspect of the skin scapula.  There is no anterior chest wall pain.  Pain is worse with certain movements of the left upper arm.  Skin: Warm and dry. No rashes cellulitis  Neurological:  Alert, awake, and appropriate.  Normal speech.  No acute focal neurological deficits are appreciated.  Psychiatric: Normal affect. Good eye contact. Appropriate in content.    ED Course    Procedures  ED Vital Signs:  Vitals:    09/13/19 1109 09/13/19 1233 09/13/19 1235   BP: (!) 156/78  (!) 166/81   Pulse: 78 77 83   Resp: 16  16   Temp: 98.7 °F (37.1 °C)     TempSrc: Oral     SpO2: 97%  99%   Weight: 112.3 kg (247 lb 9.2 oz)         Abnormal Lab Results:  Labs Reviewed   CBC W/ AUTO DIFFERENTIAL - Abnormal; Notable for the following components:       Result Value    RBC 3.99 (*)     Hemoglobin 10.8 (*)     Hematocrit 35.1 (*)     Mean Corpuscular Hemoglobin Conc 30.8 (*)     MPV 8.6 (*)     Gran% 73.2 (*)     All other components within normal limits   COMPREHENSIVE METABOLIC PANEL - Abnormal; Notable for the following components:    CO2 30 (*)     Glucose 124 (*)     Creatinine 4.9 (*)     Albumin 3.3 (*)     eGFR if  11 (*)     eGFR if non  9 (*)     All other components within normal limits   B-TYPE NATRIURETIC PEPTIDE - Abnormal; Notable for the following components:     (*)     All other components within normal limits   TROPONIN I        All Lab Results:  Results for orders placed or performed during the  hospital encounter of 09/13/19   CBC auto differential   Result Value Ref Range    WBC 10.08 3.90 - 12.70 K/uL    RBC 3.99 (L) 4.00 - 5.40 M/uL    Hemoglobin 10.8 (L) 12.0 - 16.0 g/dL    Hematocrit 35.1 (L) 37.0 - 48.5 %    Mean Corpuscular Volume 88 82 - 98 fL    Mean Corpuscular Hemoglobin 27.1 27.0 - 31.0 pg    Mean Corpuscular Hemoglobin Conc 30.8 (L) 32.0 - 36.0 g/dL    RDW 13.8 11.5 - 14.5 %    Platelets 273 150 - 350 K/uL    MPV 8.6 (L) 9.2 - 12.9 fL    Gran # (ANC) 7.4 1.8 - 7.7 K/uL    Lymph # 1.9 1.0 - 4.8 K/uL    Mono # 0.6 0.3 - 1.0 K/uL    Eos # 0.2 0.0 - 0.5 K/uL    Baso # 0.02 0.00 - 0.20 K/uL    Gran% 73.2 (H) 38.0 - 73.0 %    Lymph% 19.2 18.0 - 48.0 %    Mono% 5.7 4.0 - 15.0 %    Eosinophil% 2.0 0.0 - 8.0 %    Basophil% 0.2 0.0 - 1.9 %    Differential Method Automated    Comprehensive metabolic panel   Result Value Ref Range    Sodium 140 136 - 145 mmol/L    Potassium 3.5 3.5 - 5.1 mmol/L    Chloride 97 95 - 110 mmol/L    CO2 30 (H) 23 - 29 mmol/L    Glucose 124 (H) 70 - 110 mg/dL    BUN, Bld 19 6 - 20 mg/dL    Creatinine 4.9 (H) 0.5 - 1.4 mg/dL    Calcium 9.5 8.7 - 10.5 mg/dL    Total Protein 7.7 6.0 - 8.4 g/dL    Albumin 3.3 (L) 3.5 - 5.2 g/dL    Total Bilirubin 0.5 0.1 - 1.0 mg/dL    Alkaline Phosphatase 72 55 - 135 U/L    AST 17 10 - 40 U/L    ALT 15 10 - 44 U/L    Anion Gap 13 8 - 16 mmol/L    eGFR if African American 11 (A) >60 mL/min/1.73 m^2    eGFR if non African American 9 (A) >60 mL/min/1.73 m^2   Troponin I #1   Result Value Ref Range    Troponin I 0.016 0.000 - 0.026 ng/mL   B-Type natriuretic peptide (BNP)   Result Value Ref Range     (H) 0 - 99 pg/mL     Imaging Results:  Imaging Results          X-Ray Chest PA And Lateral (Final result)  Result time 09/13/19 13:15:46    Final result by MYA Chatman Sr., MD (09/13/19 13:15:46)                 Impression:      1. The lungs are clear.  2. There are mild degenerative changes in the thoracic spine.  .      Electronically signed  by: Kavin Chatman MD  Date:    09/13/2019  Time:    13:15             Narrative:    EXAMINATION:  XR CHEST PA AND LATERAL    CLINICAL HISTORY:  Chest Pain;    COMPARISON:  None    FINDINGS:  The size and contour of the heart are normal. The lungs are clear. There is no pneumothorax or pleural effusion.  There are mild degenerative changes in the thoracic spine.                               The EKG was ordered, reviewed, and independently interpreted by the ED provider.  Interpretation time: 11:10  Rate: 81 BPM  Rhythm: normal sinus rhythm  Interpretation: LVH. Nonspecific T wave abnormality. No STEMI.           The Emergency Provider reviewed the vital signs and test results, which are outlined above.    ED Discussion     1:43 PM: Reassessed pt at this time. Discussed with pt all pertinent ED information and results. Discussed pt dx and plan of tx. Gave pt all f/u and return to the ED instructions. All questions and concerns were addressed at this time. Pt expresses understanding of information and instructions, and is comfortable with plan to discharge. Pt is stable for discharge.    I have discussed with patient and/or family/caretaker chest pain precautions, specifically to return for worsening chest pain, shortness of breath, fever, or any concern.  I have low suspicion for cardiopulmonary, vascular, infectious, respiratory, or other emergent medical condition based on my evaluation in the ED.    I discussed with patient and/or family/caretaker that evaluation in the ED does not suggest any emergent or life threatening medical conditions requiring immediate intervention beyond what was provided in the ED, and I believe patient is safe for discharge.  Regardless, an unremarkable evaluation in the ED does not preclude the development or presence of a serious of life threatening condition. As such, patient was instructed to return immediately for any worsening or change in current symptoms.    1:46 PM  Patient  has had constant symptoms of back pain radiating into her chest for almost 24 hr now.  There is no nausea vomiting diaphoresis with this.  Pain is worse with movement.  Cardiac workup is negative however.  EKG is unchanged her troponin is negative in spite of her renal failure and prolonged symptoms.  Clinically, the patient has toward [place].  She has spasm left trapezius muscle with tenderness along the trapezius 1 trapezius 2.  This is worse with certain movements.  Will treat symptomatically with close follow-up with primary care doctor.  I have discussed all findings with the patient verbalized understanding agreement with all.  She seems reliable.    ED Medication(s):  Medications   diphenhydrAMINE injection 25 mg (has no administration in time range)   morphine injection 4 mg (4 mg Intravenous Given 9/13/19 1237)   ondansetron injection 4 mg (4 mg Intravenous Given 9/13/19 1234)   aspirin tablet 325 mg (325 mg Oral Given 9/13/19 1225)   cyclobenzaprine tablet 10 mg (10 mg Oral Given 9/13/19 1225)     New Prescriptions    No medications on file           Medical Decision Making    Medical Decision Making:   Clinical Tests:   Lab Tests: Ordered and Reviewed  Radiological Study: Ordered and Reviewed  Medical Tests: Ordered and Reviewed           Scribe Attestation:   Scribe #1: I performed the above scribed service and the documentation accurately describes the services I performed. I attest to the accuracy of the note.    Attending:   Physician Attestation Statement for Scribe #1: I, Tony Magdaleno Jr., MD, personally performed the services described in this documentation, as scribed by Emiliano Hopkins, in my presence, and it is both accurate and complete.          Clinical Impression       ICD-10-CM ICD-9-CM   1. Chest pain R07.9 786.50       Disposition:   Disposition: Discharged  Condition: Stable         Tony Magdaleno Jr., MD  09/13/19 6795

## 2019-09-13 NOTE — DISCHARGE INSTRUCTIONS
Ibuprofen 4 mg orally 3 times daily for pain.  Flexeril as a muscle relaxer.  Ultram for breakthrough pain.  Her pain today does not appear to be cardiac in nature.  Follow up with her doctor tomorrow for re-evaluation.  Return as needed for any worsening symptoms, problems, questions or concerns.

## 2019-10-08 ENCOUNTER — HOSPITAL ENCOUNTER (EMERGENCY)
Facility: HOSPITAL | Age: 54
Discharge: HOME | End: 2019-10-08
Attending: EMERGENCY MEDICINE
Payer: MEDICARE

## 2019-10-08 VITALS
SYSTOLIC BLOOD PRESSURE: 169 MMHG | OXYGEN SATURATION: 96 % | HEART RATE: 77 BPM | DIASTOLIC BLOOD PRESSURE: 84 MMHG | HEIGHT: 67 IN | TEMPERATURE: 99 F | RESPIRATION RATE: 18 BRPM | WEIGHT: 247 LBS | BODY MASS INDEX: 38.77 KG/M2

## 2019-10-08 DIAGNOSIS — M25.569 KNEE PAIN: ICD-10-CM

## 2019-10-08 DIAGNOSIS — M25.561 ACUTE PAIN OF RIGHT KNEE: Primary | ICD-10-CM

## 2019-10-08 DIAGNOSIS — M53.3 TAIL BONE PAIN: ICD-10-CM
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
## 2019-10-08 DIAGNOSIS — S39.92XA INJURY OF COCCYX, INITIAL ENCOUNTER: ICD-10-CM

## 2019-10-08 PROCEDURE — 99284 EMERGENCY DEPT VISIT MOD MDM: CPT | Mod: 25

## 2019-10-08 PROCEDURE — 25000003 PHARM REV CODE 250: Performed by: NURSE PRACTITIONER

## 2019-10-08 PROCEDURE — 29505 APPLICATION LONG LEG SPLINT: CPT | Mod: RT

## 2019-10-08 RX ORDER — HYDROCODONE BITARTRATE AND ACETAMINOPHEN 5; 325 MG/1; MG/1
1 TABLET ORAL
Status: COMPLETED | OUTPATIENT
Start: 2019-10-08 | End: 2019-10-08

## 2019-10-08 RX ORDER — HYDROCODONE BITARTRATE AND ACETAMINOPHEN 5; 325 MG/1; MG/1
1 TABLET ORAL EVERY 8 HOURS PRN
Qty: 12 TABLET | Refills: 0 | Status: SHIPPED | OUTPATIENT
Start: 2019-10-08 | End: 2019-10-12

## 2019-10-08 RX ADMIN — HYDROCODONE BITARTRATE AND ACETAMINOPHEN 1 TABLET: 5; 325 TABLET ORAL at 07:10

## 2019-10-09 NOTE — ED NOTES
Patient identifiers verified and correct for Cordell Angulo.    LOC: The patient is awake, alert and aware of environment with an appropriate affect, the patient is oriented x 3 and speaking appropriately.  APPEARANCE: Patient resting comfortably and in no acute distress, patient is clean and well groomed, patient's clothing is properly fastened.  SKIN: The skin is warm and dry, color consistent with ethnicity, patient has normal skin turgor and moist mucus membranes, skin intact, no breakdown or bruising noted.  MUSCULOSKELETAL: Patient moving all extremities spontaneously, no obvious swelling or deformities noted. Pt reports R knee and tailbone pain  RESPIRATORY: Airway is open and patent, respirations are spontaneous, patient has a normal effort and rate, no accessory muscle use noted, bilateral breath sounds clear.  CARDIAC: Patient has a normal rate and regular rhythm, no periphreal edema noted, capillary refill < 3 seconds.  ABDOMEN: Soft and non tender to palpation, no distention noted, normoactive bowel sounds present in all four quadrants.  NEUROLOGIC: PERRL, **mm bilaterally, eyes open spontaneously, behavior appropriate to situation, follows commands, facial expression symmetrical, bilateral hand grasp equal and even, purposeful motor response noted, normal sensation in all extremities when touched with a finger.

## 2019-10-09 NOTE — ED PROVIDER NOTES
"SCRIBE #1 NOTE: I, Andreia Glover, am scribing for, and in the presence of, London Galindo NP. I have scribed the entire note.       History     Chief Complaint   Patient presents with    Knee Pain     right knee pain s/p fall on Friday     Review of patient's allergies indicates:   Allergen Reactions    Morphine     Sulfa (sulfonamide antibiotics) Rash         History of Present Illness     HPI    10/8/2019, 7:04 PM  History obtained from the patient      History of Present Illness: Cordell Angulo is a 54 y.o. female patient with a PMHx of asthma, CHF, CKD, depression, DM, HTN who presents to the Emergency Department for evaluation of R knee and tailbone pain s/p ground-level fall which onset gradually 4 days ago. Pt reports she "just slipped and fell." Symptoms are constant and moderate in severity.  No mitigating or exacerbating factors reported. No associated sxs reported. Patient denies any CP, abd pain, lightheadedness, HA, neck pain, back pain, hip pain, extremity numbness/weakness, dizziness, SOB, palpitations, and all other sxs at this time. Prior Tx includes Tramadol with no relief. No further complaints or concerns at this time.         Arrival mode: Personal vehicle    PCP: Chuck Grider MD        Past Medical History:  Past Medical History:   Diagnosis Date    Asthma     CHF (congestive heart failure)     CKD (chronic kidney disease) stage 5, GFR less than 15 ml/min     Depression     Diabetes mellitus     Dialysis patient     Hypercholesterolemia     Hypertension     PAF (paroxysmal atrial fibrillation)        Past Surgical History:  Past Surgical History:   Procedure Laterality Date    AV FISTULA PLACEMENT      CARDIAC ELECTROPHYSIOLOGY MAPPING AND ABLATION      CATHETERIZATION OF BOTH LEFT AND RIGHT HEART N/A 12/28/2018    Procedure: CATHETERIZATION, HEART, BOTH LEFT AND RIGHT;  Surgeon: Enrique Jj MD;  Location: [facility] CATH [facility];  Service: Cardiology;  Laterality: N/A; "  To follow his cardioversion case in [facility]    cesarian section      TUMOR REMOVAL      L lung         Family History:  Family History   Problem Relation Age of Onset    Heart disease Mother     Diabetes Father        Social History:  Social History     Tobacco Use    Smoking status: Never Smoker    Smokeless tobacco: Never Used   Substance and Sexual Activity    Alcohol use: No    Drug use: No    Sexual activity: Unknown        Review of Systems     Review of Systems   Constitutional: Negative for fever.   HENT: Negative for sore throat.    Respiratory: Negative for shortness of breath.    Cardiovascular: Negative for chest pain and palpitations.   Gastrointestinal: Negative for abdominal pain and nausea.   Genitourinary: Negative for dysuria.   Musculoskeletal: Positive for arthralgias (R knee and tailbone). Negative for back pain and neck pain.        - hip pain   Skin: Negative for rash.   Neurological: Negative for dizziness, weakness, light-headedness, numbness and headaches.   Hematological: Does not bruise/bleed easily.   All other systems reviewed and are negative.       Physical Exam     Initial Vitals [10/08/19 1856]   BP Pulse Resp Temp SpO2   (!) 173/72 80 18 98.6 °F (37 °C) 97 %      MAP       --          Physical Exam  Nursing Notes and Vital Signs Reviewed.  Constitutional: Patient is in no acute distress. Well-developed and well-nourished.  Head: Atraumatic. Normocephalic.  Eyes: PERRL. EOM intact. Conjunctivae are not pale. No scleral icterus.  ENT: Mucous membranes are moist. Oropharynx is clear and symmetric.    Neck: Supple. Full ROM. No lymphadenopathy.  Cardiovascular: Regular rate. Regular rhythm. No murmurs, rubs, or gallops. Distal pulses are 2+ and symmetric.  Pulmonary/Chest: No respiratory distress. Clear to auscultation bilaterally. No wheezing or rales.  Abdominal: Soft and non-distended.  There is no tenderness.  No rebound, guarding, or rigidity. Good bowel  "sounds.  Genitourinary: No CVA tenderness  Musculoskeletal: Moves all extremities. No obvious deformities. No edema. No calf tenderness.  Right Knee:  No obvious deformity. Pt able to bear weight. Able to ambulate. There is no tenderness.  No increased warmth, erythema, induration or fluctuance.  No ligament laxity. DP and PT pulses are 2+.  Normal capillary refill.  Distal sensation is intact.  Skin: Warm and dry.  Neurological:  Alert, awake, and appropriate.  Normal speech.  No acute focal neurological deficits are appreciated.  Psychiatric: Normal affect. Good eye contact. Appropriate in content.     ED Course   Splint Application  Date/Time: 10/8/2019 9:00 PM  Performed by: London Galindo NP  Authorized by: London Galindo NP   Consent Done: Yes  Consent: Verbal consent obtained.  Risks and benefits: risks, benefits and alternatives were discussed  Consent given by: patient  Patient understanding: patient states understanding of the procedure being performed  Patient consent: the patient's understanding of the procedure matches consent given  Imaging studies: imaging studies available  Patient identity confirmed: , verbally with patient and name  Location details: right knee  Splint type: knee immobilizer.  Post-procedure: The splinted body part was neurovascularly unchanged following the procedure.  Patient tolerance: Patient tolerated the procedure well with no immediate complications        ED Vital Signs:  Vitals:    10/08/19 1856 10/08/19 2059   BP: (!) 173/72 (!) 169/84   Pulse: 80 77   Resp: 18    Temp: 98.6 °F (37 °C)    TempSrc: Oral    SpO2: 97% 96%   Weight: 112 kg (247 lb)    Height: 5' 7" (1.702 m)        Abnormal Lab Results:  Labs Reviewed - No data to display     All Lab Results:  none    Imaging Results:  Imaging Results          X-Ray Sacrum And Coccyx (Final result)  Result time 10/08/19 20:37:48    Final result by MYA Chatman Sr., MD (10/08/19 20:37:48)                 " Impression:      1. There is grade 1 retrolisthesis of C1 on C2 of the coccyx.  2. There is a 98 mm oval shaped calcification in the pelvis.  This is characteristic of a large calcified uterine fibroid.      Electronically signed by: Kavin Chatman MD  Date:    10/08/2019  Time:    20:37             Narrative:    EXAMINATION:  XR SACRUM AND COCCYX    CLINICAL HISTORY:  Sacrococcygeal disorders, not elsewhere classified    COMPARISON:  None    FINDINGS:  The sacrum is normal in appearance.  There is grade 1 retrolisthesis of C1 on C2 of the coccyx.  There is a 98 mm oval shaped calcification in the pelvis.                               X-Ray Knee 3 View Right (Final result)  Result time 10/08/19 20:26:20    Final result by MYA Chatman Sr., MD (10/08/19 20:26:20)                 Impression:      1. There has been interval development of a moderate sized joint effusion in the right knee.  2. There is mild narrowing of the joint space of the medial compartment of the right knee.      Electronically signed by: Kavin Chatman MD  Date:    10/08/2019  Time:    20:26             Narrative:    EXAMINATION:  XR KNEE 3 VIEW RIGHT    CLINICAL HISTORY:  Pain in unspecified knee    COMPARISON:  10/10/2016    FINDINGS:  There is no fracture. There is no dislocation.  There is mild narrowing of the joint space of the medial compartment of the right knee.  There has been interval development of a moderate sized joint effusion in the right knee.                                        The Emergency Provider reviewed the vital signs and test results, which are outlined above.     ED Discussion     9:15 PM: Reassessed pt at this time.  Pt states her condition has improved at this time. Discussed with pt all pertinent ED information and results. Discussed pt dx and plan of tx. Gave pt all f/u and return to the ED instructions. All questions and concerns were addressed at this time. Pt expresses understanding of information and  instructions, and is comfortable with plan to discharge. Pt is stable for discharge.    I discussed with patient and/or family/caretaker that evaluation in the ED does not suggest any emergent or life threatening medical conditions requiring immediate intervention beyond what was provided in the ED, and I believe patient is safe for discharge.  Regardless, an unremarkable evaluation in the ED does not preclude the development or presence of a serious of life threatening condition. As such, patient was instructed to return immediately for any worsening or change in current symptoms.       Medical Decision Making:   Clinical Tests:   Radiological Study: Ordered and Reviewed           ED Medication(s):  Medications   HYDROcodone-acetaminophen 5-325 mg per tablet 1 tablet (1 tablet Oral Given 10/8/19 1939)       New Prescriptions    No medications on file               Scribe Attestation:   Scribe #1: I performed the above scribed service and the documentation accurately describes the services I performed. I attest to the accuracy of the note.     Attending:   Physician Attestation Statement for Scribe #1: I, London Galindo NP, personally performed the services described in this documentation, as scribed by Andreia Glover, in my presence, and it is both accurate and complete.           Clinical Impression       ICD-10-CM ICD-9-CM   1. Knee pain M25.569 719.46   2. Tail bone pain M53.3 724.79       Disposition:   Disposition: Discharged  Condition: Stable         London Galindo NP  10/08/19 2111

## 2019-10-18 ENCOUNTER — TELEPHONE (OUTPATIENT)
Dept: NEPHROLOGY | Facility: CLINIC | Age: 54
End: 2019-10-18

## 2019-10-18 DIAGNOSIS — M25.361 RIGHT KNEE GIVES WAY: Primary | ICD-10-CM

## 2019-10-18 NOTE — TELEPHONE ENCOUNTER
----- Message from Lola Pina MD sent at 10/18/2019 12:14 PM CDT -----  She is a dialysis patient for Monday Wednesday Friday morning shift.  Please schedule with physiatry for right knee pain and give away    Lola Pina MD

## 2019-10-24 ENCOUNTER — HOSPITAL ENCOUNTER (EMERGENCY)
Facility: HOSPITAL | Age: 54
Discharge: HOME | End: 2019-10-24
Attending: FAMILY MEDICINE
Payer: MEDICARE

## 2019-10-24 VITALS
WEIGHT: 261.94 LBS | OXYGEN SATURATION: 97 % | HEART RATE: 87 BPM | RESPIRATION RATE: 20 BRPM | TEMPERATURE: 98 F | SYSTOLIC BLOOD PRESSURE: 148 MMHG | BODY MASS INDEX: 41.11 KG/M2 | DIASTOLIC BLOOD PRESSURE: 78 MMHG | HEIGHT: 67 IN

## 2019-10-24 DIAGNOSIS — M79.606 LEG PAIN: Primary | ICD-10-CM

## 2019-10-24 PROCEDURE — 96372 THER/PROPH/DIAG INJ SC/IM: CPT

## 2019-10-24 PROCEDURE — 99284 EMERGENCY DEPT VISIT MOD MDM: CPT | Mod: 25

## 2019-10-24 PROCEDURE — 63600175 PHARM REV CODE 636 W HCPCS: Performed by: FAMILY MEDICINE

## 2019-10-24 RX ORDER — SEVELAMER CARBONATE 800 MG/1
TABLET, FILM COATED ORAL
COMMUNITY
Start: 2019-08-25

## 2019-10-24 RX ORDER — TRAMADOL HYDROCHLORIDE 50 MG/1
TABLET ORAL
COMMUNITY
Start: 2019-09-14 | End: 2020-07-13

## 2019-10-24 RX ORDER — KETOROLAC TROMETHAMINE 30 MG/ML
60 INJECTION, SOLUTION INTRAMUSCULAR; INTRAVENOUS
Status: COMPLETED | OUTPATIENT
Start: 2019-10-24 | End: 2019-10-24

## 2019-10-24 RX ADMIN — KETOROLAC TROMETHAMINE 60 MG: 30 INJECTION, SOLUTION INTRAMUSCULAR at 09:10

## 2019-10-25 NOTE — ED PROVIDER NOTES
"SCRIBE #1 NOTE: I, Emerita Harris, am scribing for, and in the presence of, Delmy Noe MD. I have scribed the entire note.      History      Chief Complaint   Patient presents with    Leg Pain     pt reports L sided leg pain that radiates from knee down to ankle; want to r/o blood clot       Review of patient's allergies indicates:   Allergen Reactions    Morphine Itching    Sulfa (sulfonamide antibiotics) Rash        HPI   HPI    10/24/2019, 9:28 PM   History obtained from the patient      History of Present Illness: Cordell Angulo is a 54 y.o. female patient who presents to the Emergency Department for evaluation of L leg pain, radiating from thigh to calf. Pt states the pain is more focused in her knee. Symptoms are described as "stabbing" and moderate in severity. No mitigating or exacerbating factors reported. No associated sxs. Patient denies any fever, N/V, extremity numbness/weakness, trauma, CP, SOB, and all other sxs at this time. Pt reports she has taken tramadol and norco, with no relief. No further complaints or concerns at this time.         Arrival mode: Personal vehicle     PCP: Chuck Grider MD       Past Medical History:  Past Medical History:   Diagnosis Date    Asthma     CHF (congestive heart failure)     CKD (chronic kidney disease) stage 5, GFR less than 15 ml/min     Depression     Diabetes mellitus     Dialysis patient     Hypercholesterolemia     Hypertension     PAF (paroxysmal atrial fibrillation)        Past Surgical History:  Past Surgical History:   Procedure Laterality Date    AV FISTULA PLACEMENT      CARDIAC ELECTROPHYSIOLOGY MAPPING AND ABLATION      CATHETERIZATION OF BOTH LEFT AND RIGHT HEART N/A 12/28/2018    Procedure: CATHETERIZATION, HEART, BOTH LEFT AND RIGHT;  Surgeon: Enrique Jj MD;  Location: [facility] CATH LAB;  Service: Cardiology;  Laterality: N/A;  To follow his cardioversion case in [facility]    cesarian section      TUMOR REMOVAL      L lung "         Family History:  Family History   Problem Relation Age of Onset    Heart disease Mother     Diabetes Father        Social History:  Social History     Tobacco Use    Smoking status: Never Smoker    Smokeless tobacco: Never Used   Substance and Sexual Activity    Alcohol use: No    Drug use: No    Sexual activity: Not on file       ROS   Review of Systems   Constitutional: Negative for fever.   HENT: Negative for sore throat.    Respiratory: Negative for shortness of breath.    Cardiovascular: Negative for chest pain.   Gastrointestinal: Negative for nausea and vomiting.   Genitourinary: Negative for dysuria.   Musculoskeletal: Negative for back pain.        (+) LLE pain   Skin: Negative for rash.   Neurological: Negative for weakness and numbness.   Hematological: Does not bruise/bleed easily.   All other systems reviewed and are negative.      Physical Exam      Initial Vitals [10/24/19 2040]   BP Pulse Resp Temp SpO2   (!) 145/69 85 20 98.6 °F (37 °C) 98 %      MAP       --          Physical Exam  Nursing Notes and Vital Signs Reviewed.  Constitutional: Patient is in no acute distress. Well-developed and well-nourished.  Head: Atraumatic. Normocephalic.  Eyes: PERRL. EOM intact. Conjunctivae are not pale. No scleral icterus.  ENT: Mucous membranes are moist.    Neck: Supple. Full ROM. No lymphadenopathy.  Cardiovascular: Regular rate. Regular rhythm. No murmurs, rubs, or gallops. Distal pulses are 2+ and symmetric.  Pulmonary/Chest: No respiratory distress. Clear to auscultation bilaterally. No wheezing or rales.  Abdominal: Soft and non-distended.  There is no tenderness.  LLE: No evident deformity. Minimal swelling. There is tenderness to the distal thigh and calf. Calf is soft to touch. ROM is normal. Cap refill distally is <2 seconds. DP and PT pulses are equal and 2+ bilaterally. No motor deficit. No distal sensory deficit.  Musculoskeletal: Moves all extremities. No obvious deformities.  "  Skin: Warm and dry.  Neurological:  Alert, awake, and appropriate.  Normal speech.  No acute focal neurological deficits are appreciated.  Psychiatric: Normal affect. Good eye contact. Appropriate in content.    ED Course    Procedures  ED Vital Signs:  Vitals:    10/24/19 2040 10/24/19 2051 10/24/19 2241   BP: (!) 145/69  (!) 148/78   Pulse: 85  87   Resp: 20  20   Temp: 98.6 °F (37 °C)  98.3 °F (36.8 °C)   TempSrc: Oral     SpO2: 98%  97%   Weight:  118.8 kg (261 lb 14.5 oz)    Height: 5' 7" (1.702 m)         Abnormal Lab Results:  Labs Reviewed - No data to display     All Lab Results:  None    Imaging Results:  Imaging Results          US Lower Extremity Veins Left (Final result)  Result time 10/24/19 22:19:54    Final result by Say Coy Jr., MD (10/24/19 22:19:54)                 Impression:      No evidence of deep venous thrombosis within the left lower extremity.      Electronically signed by: Say Coy Jr., MD  Date:    10/24/2019  Time:    22:19             Narrative:    EXAMINATION:  US LOWER EXTREMITY VEINS LEFT    CLINICAL HISTORY:  Pain in leg, unspecified    TECHNIQUE:  Duplex and color flow Doppler evaluation and graded compression of the left lower extremity veins was performed.    COMPARISON:  None    FINDINGS:  Left thigh veins: The common femoral, femoral, popliteal, upper greater saphenous, and deep femoral veins are patent and free of thrombus. The veins are normally compressible and have normal phasic flow and augmentation response.    Left calf veins: The visualized calf veins are patent.                                        The Emergency Provider reviewed the vital signs and test results, which are outlined above.    ED Discussion     10:39 PM: Reassessed pt at this time.  Pt states her condition has improved at this time. Discussed with pt all pertinent ED information and results. Discussed pt dx and plan of tx. Gave pt all f/u and return to the ED instructions. All questions " and concerns were addressed at this time. Pt expresses understanding of information and instructions, and is comfortable with plan to discharge. Pt is stable for discharge.    I discussed with patient and/or family/caretaker that evaluation in the ED does not suggest any emergent or life threatening medical conditions requiring immediate intervention beyond what was provided in the ED, and I believe patient is safe for discharge.  Regardless, an unremarkable evaluation in the ED does not preclude the development or presence of a serious of life threatening condition. As such, patient was instructed to return immediately for any worsening or change in current symptoms.    ED Medication(s):  Medications   ketorolac injection 60 mg (60 mg Intramuscular Given 10/24/19 2143)     Discharge Medication List as of 10/24/2019 10:39 PM          Follow-up Information     Schedule an appointment as soon as possible for a visit  with Chuck Grider MD.    Specialty:  Internal Medicine  Why:  As needed  Contact information:  [address]  [facility] [address]  [phone]                     Medical Decision Making    Medical Decision Making:   Clinical Tests:   Radiological Study: Ordered and Reviewed           Scribe Attestation:   Scribe #1: I performed the above scribed service and the documentation accurately describes the services I performed. I attest to the accuracy of the note.    Attending:   Physician Attestation Statement for Scribe #1: I, Delmy Noe MD, personally performed the services described in this documentation, as scribed by Emerita Harris, in my presence, and it is both accurate and complete.          Clinical Impression       ICD-10-CM ICD-9-CM   1. Leg pain M79.606 729.5       Disposition:   Disposition: Discharged  Condition: Stable         Delmy Noe MD  10/25/19 2019

## 2019-10-27 ENCOUNTER — HOSPITAL ENCOUNTER (EMERGENCY)
Facility: HOSPITAL | Age: 54
Discharge: HOME | End: 2019-10-27
Attending: EMERGENCY MEDICINE
Payer: MEDICARE

## 2019-10-27 VITALS
RESPIRATION RATE: 20 BRPM | BODY MASS INDEX: 40.14 KG/M2 | HEART RATE: 78 BPM | HEIGHT: 67 IN | SYSTOLIC BLOOD PRESSURE: 172 MMHG | TEMPERATURE: 100 F | OXYGEN SATURATION: 100 % | DIASTOLIC BLOOD PRESSURE: 80 MMHG | WEIGHT: 255.75 LBS

## 2019-10-27 DIAGNOSIS — R05.9 COUGH: ICD-10-CM

## 2019-10-27 DIAGNOSIS — J06.9 VIRAL URI WITH COUGH: Primary | ICD-10-CM

## 2019-10-27 LAB
ALBUMIN SERPL BCP-MCNC: 3 G/DL (ref 3.5–5.2)
ALP SERPL-CCNC: 60 U/L (ref 55–135)
ALT SERPL W/O P-5'-P-CCNC: 11 U/L (ref 10–44)
ANION GAP SERPL CALC-SCNC: 13 MMOL/L (ref 8–16)
AST SERPL-CCNC: 12 U/L (ref 10–40)
BASOPHILS # BLD AUTO: 0.02 K/UL (ref 0–0.2)
BASOPHILS NFR BLD: 0.2 % (ref 0–1.9)
BILIRUB SERPL-MCNC: 0.4 MG/DL (ref 0.1–1)
BUN SERPL-MCNC: 48 MG/DL (ref 6–20)
CALCIUM SERPL-MCNC: 9.8 MG/DL (ref 8.7–10.5)
CHLORIDE SERPL-SCNC: 103 MMOL/L (ref 95–110)
CO2 SERPL-SCNC: 26 MMOL/L (ref 23–29)
CREAT SERPL-MCNC: 10.3 MG/DL (ref 0.5–1.4)
DIFFERENTIAL METHOD: ABNORMAL
EOSINOPHIL # BLD AUTO: 0.2 K/UL (ref 0–0.5)
EOSINOPHIL NFR BLD: 2.4 % (ref 0–8)
ERYTHROCYTE [DISTWIDTH] IN BLOOD BY AUTOMATED COUNT: 13.8 % (ref 11.5–14.5)
EST. GFR  (AFRICAN AMERICAN): 4 ML/MIN/1.73 M^2
EST. GFR  (NON AFRICAN AMERICAN): 4 ML/MIN/1.73 M^2
GLUCOSE SERPL-MCNC: 104 MG/DL (ref 70–110)
HCT VFR BLD AUTO: 32 % (ref 37–48.5)
HGB BLD-MCNC: 9.6 G/DL (ref 12–16)
IMM GRANULOCYTES # BLD AUTO: 0.08 K/UL (ref 0–0.04)
IMM GRANULOCYTES NFR BLD AUTO: 0.9 % (ref 0–0.5)
INFLUENZA A, MOLECULAR: NEGATIVE
INFLUENZA B, MOLECULAR: NEGATIVE
LYMPHOCYTES # BLD AUTO: 1.1 K/UL (ref 1–4.8)
LYMPHOCYTES NFR BLD: 12.1 % (ref 18–48)
MCH RBC QN AUTO: 27.1 PG (ref 27–31)
MCHC RBC AUTO-ENTMCNC: 30 G/DL (ref 32–36)
MCV RBC AUTO: 90 FL (ref 82–98)
MONOCYTES # BLD AUTO: 0.8 K/UL (ref 0.3–1)
MONOCYTES NFR BLD: 8.6 % (ref 4–15)
NEUTROPHILS # BLD AUTO: 6.8 K/UL (ref 1.8–7.7)
NEUTROPHILS NFR BLD: 75.8 % (ref 38–73)
NRBC BLD-RTO: 0 /100 WBC
PLATELET # BLD AUTO: 279 K/UL (ref 150–350)
PMV BLD AUTO: 8.8 FL (ref 9.2–12.9)
POTASSIUM SERPL-SCNC: 4 MMOL/L (ref 3.5–5.1)
PROT SERPL-MCNC: 6.8 G/DL (ref 6–8.4)
RBC # BLD AUTO: 3.54 M/UL (ref 4–5.4)
SODIUM SERPL-SCNC: 142 MMOL/L (ref 136–145)
SPECIMEN SOURCE: NORMAL
WBC # BLD AUTO: 8.92 K/UL (ref 3.9–12.7)

## 2019-10-27 PROCEDURE — 87502 INFLUENZA DNA AMP PROBE: CPT

## 2019-10-27 PROCEDURE — 99284 EMERGENCY DEPT VISIT MOD MDM: CPT | Mod: 25

## 2019-10-27 PROCEDURE — 25000003 PHARM REV CODE 250: Performed by: EMERGENCY MEDICINE

## 2019-10-27 PROCEDURE — 80053 COMPREHEN METABOLIC PANEL: CPT

## 2019-10-27 PROCEDURE — 85025 COMPLETE CBC W/AUTO DIFF WBC: CPT

## 2019-10-27 PROCEDURE — 63600175 PHARM REV CODE 636 W HCPCS: Performed by: EMERGENCY MEDICINE

## 2019-10-27 RX ORDER — PREDNISONE 20 MG/1
60 TABLET ORAL
Status: COMPLETED | OUTPATIENT
Start: 2019-10-27 | End: 2019-10-27

## 2019-10-27 RX ORDER — BENZONATATE 100 MG/1
200 CAPSULE ORAL ONCE
Status: COMPLETED | OUTPATIENT
Start: 2019-10-27 | End: 2019-10-27

## 2019-10-27 RX ORDER — BENZONATATE 100 MG/1
200 CAPSULE ORAL 3 TIMES DAILY PRN
Qty: 21 CAPSULE | Refills: 0 | Status: ON HOLD | OUTPATIENT
Start: 2019-10-27 | End: 2020-06-27 | Stop reason: HOSPADM

## 2019-10-27 RX ORDER — PREDNISONE 20 MG/1
60 TABLET ORAL DAILY
Qty: 12 TABLET | Refills: 0 | Status: SHIPPED | OUTPATIENT
Start: 2019-10-27 | End: 2019-10-31

## 2019-10-27 RX ADMIN — BENZONATATE 200 MG: 100 CAPSULE ORAL at 05:10

## 2019-10-27 RX ADMIN — PREDNISONE 60 MG: 20 TABLET ORAL at 05:10

## 2019-10-27 NOTE — DISCHARGE INSTRUCTIONS
Prednisone as prescribed.  Use her albuterol inhaler every 4 hr on a scheduled for 2 days and every 2 hr in between as needed.  Use Tessalon Perles for cough.  Robitussin DM for breakthrough cough.  Follow up with your doctor tomorrow for re-evaluation.  Return as needed for any worsening symptoms, problems, questions or concerns.  Be advised that her blood sugar will become out of whack with the prednisone.  Please be very strict with her diet and her medications.

## 2019-10-27 NOTE — ED PROVIDER NOTES
SCRIBE #1 NOTE: I, Leah Palumbo, am scribing for, and in the presence of, Tony Magdaleno Jr., MD. I have scribed the entire note.       History     Chief Complaint   Patient presents with    Cough     cough and chest congestion x 3 days     Review of patient's allergies indicates:   Allergen Reactions    Morphine Itching    Sulfa (sulfonamide antibiotics) Rash         History of Present Illness     HPI    10/27/2019, 3:58 PM  History obtained from the patient      History of Present Illness: Cordell Angulo is a 54 y.o. female patient with a PMHx of asthma, HTN, CHF, PAF, DM, CKD on dialysis who presents to the Emergency Department for evaluation of a cough which onset 2 days ago. Associated sxs include chest congestion, HA, body aches, chills, rhinorrhea, and wheezing at night. Sxs are constant in course and moderate in severity. No mitigating or exacerbating factors reported. Patient has no c/o diaphoresis, CP, SOB, abd pain, N/V/D, dizziness, lightheadedness, leg swelling, and all other sxs at this time.    Arrival mode: Personal vehicle    PCP: Chuck Grider MD        Past Medical History:  Past Medical History:   Diagnosis Date    Asthma     CHF (congestive heart failure)     CKD (chronic kidney disease) stage 5, GFR less than 15 ml/min     Depression     Diabetes mellitus     Dialysis patient     Hypercholesterolemia     Hypertension     PAF (paroxysmal atrial fibrillation)        Past Surgical History:  Past Surgical History:   Procedure Laterality Date    AV FISTULA PLACEMENT      CARDIAC ELECTROPHYSIOLOGY MAPPING AND ABLATION      CATHETERIZATION OF BOTH LEFT AND RIGHT HEART N/A 12/28/2018    Procedure: CATHETERIZATION, HEART, BOTH LEFT AND RIGHT;  Surgeon: Enrique Jj MD;  Location: [facility] CATH LAB;  Service: Cardiology;  Laterality: N/A;  To follow his [facility]version case [facility]    cesarian section      TUMOR REMOVAL      L lung         Family History:  Family History    Problem Relation Age of Onset    Heart disease Mother     Diabetes Father        Social History:  Social History     Tobacco Use    Smoking status: Never Smoker    Smokeless tobacco: Never Used   Substance and Sexual Activity    Alcohol use: No    Drug use: No    Sexual activity: Unknown        Review of Systems     Review of Systems   Constitutional: Positive for chills. Negative for fever.   HENT: Positive for congestion (chest) and rhinorrhea. Negative for ear discharge, ear pain, sore throat, trouble swallowing and voice change.    Respiratory: Positive for cough and wheezing. Negative for shortness of breath.    Cardiovascular: Negative for chest pain.   Gastrointestinal: Negative for abdominal pain, diarrhea, nausea and vomiting.   Genitourinary: Negative for dysuria.   Musculoskeletal: Positive for myalgias. Negative for back pain.   Skin: Negative for rash.   Neurological: Positive for headaches. Negative for weakness.   Hematological: Does not bruise/bleed easily.   All other systems reviewed and are negative.     Physical Exam     Initial Vitals [10/27/19 1520]   BP Pulse Resp Temp SpO2   (!) 151/62 83 18 99.8 °F (37.7 °C) 96 %      MAP       --          Physical Exam  Nursing Notes and Vital Signs Reviewed.  Constitutional: Patient is in no acute distress. Well-developed and well-nourished.  Head: Atraumatic. Normocephalic.  Eyes: PERRL. EOM intact. Conjunctivae are not pale. No scleral icterus.  ENT: Mucous membranes are moist. Oropharynx is clear and symmetric.    Neck: Supple. Full ROM. No lymphadenopathy.  Cardiovascular: Regular rate. Regular rhythm. No murmurs, rubs, or gallops. Distal pulses are 2+ and symmetric.  Pulmonary/Chest: No respiratory distress. Clear to auscultation bilaterally. No wheezing or rales.  Abdominal: Soft and non-distended.  There is no tenderness.  No rebound, guarding, or rigidity. Good bowel sounds.  Musculoskeletal: Moves all extremities. No obvious deformities.  "No edema. No calf tenderness.  Skin: Warm and dry.  Neurological:  Alert, awake, and appropriate.  Normal speech.  No acute focal neurological deficits are appreciated.  Psychiatric: Normal affect. Good eye contact. Appropriate in content.     ED Course   Procedures  ED Vital Signs:  Vitals:    10/27/19 1520 10/27/19 1551 10/27/19 1611   BP: (!) 151/62  (!) 178/81   Pulse: 83  79   Resp: 18     Temp: 99.8 °F (37.7 °C)     TempSrc: Oral     SpO2: 96%  99%   Weight: 116 kg (255 lb 11.7 oz)     Height:  5' 7" (1.702 m)        Abnormal Lab Results:  Labs Reviewed   CBC W/ AUTO DIFFERENTIAL - Abnormal; Notable for the following components:       Result Value    RBC 3.54 (*)     Hemoglobin 9.6 (*)     Hematocrit 32.0 (*)     Mean Corpuscular Hemoglobin Conc 30.0 (*)     MPV 8.8 (*)     Immature Granulocytes 0.9 (*)     Immature Grans (Abs) 0.08 (*)     Gran% 75.8 (*)     Lymph% 12.1 (*)     All other components within normal limits   COMPREHENSIVE METABOLIC PANEL - Abnormal; Notable for the following components:    BUN, Bld 48 (*)     Creatinine 10.3 (*)     Albumin 3.0 (*)     eGFR if  4 (*)     eGFR if non  4 (*)     All other components within normal limits   INFLUENZA A & B BY MOLECULAR        All Lab Results:  Results for orders placed or performed during the hospital encounter of 10/27/19   Influenza A & B by Molecular   Result Value Ref Range    Influenza A, Molecular Negative Negative    Influenza B, Molecular Negative Negative    Flu A & B Source NP    CBC auto differential   Result Value Ref Range    WBC 8.92 3.90 - 12.70 K/uL    RBC 3.54 (L) 4.00 - 5.40 M/uL    Hemoglobin 9.6 (L) 12.0 - 16.0 g/dL    Hematocrit 32.0 (L) 37.0 - 48.5 %    Mean Corpuscular Volume 90 82 - 98 fL    Mean Corpuscular Hemoglobin 27.1 27.0 - 31.0 pg    Mean Corpuscular Hemoglobin Conc 30.0 (L) 32.0 - 36.0 g/dL    RDW 13.8 11.5 - 14.5 %    Platelets 279 150 - 350 K/uL    MPV 8.8 (L) 9.2 - 12.9 fL    " Immature Granulocytes 0.9 (H) 0.0 - 0.5 %    Gran # (ANC) 6.8 1.8 - 7.7 K/uL    Immature Grans (Abs) 0.08 (H) 0.00 - 0.04 K/uL    Lymph # 1.1 1.0 - 4.8 K/uL    Mono # 0.8 0.3 - 1.0 K/uL    Eos # 0.2 0.0 - 0.5 K/uL    Baso # 0.02 0.00 - 0.20 K/uL    nRBC 0 0 /100 WBC    Gran% 75.8 (H) 38.0 - 73.0 %    Lymph% 12.1 (L) 18.0 - 48.0 %    Mono% 8.6 4.0 - 15.0 %    Eosinophil% 2.4 0.0 - 8.0 %    Basophil% 0.2 0.0 - 1.9 %    Differential Method Automated    Comprehensive metabolic panel   Result Value Ref Range    Sodium 142 136 - 145 mmol/L    Potassium 4.0 3.5 - 5.1 mmol/L    Chloride 103 95 - 110 mmol/L    CO2 26 23 - 29 mmol/L    Glucose 104 70 - 110 mg/dL    BUN, Bld 48 (H) 6 - 20 mg/dL    Creatinine 10.3 (H) 0.5 - 1.4 mg/dL    Calcium 9.8 8.7 - 10.5 mg/dL    Total Protein 6.8 6.0 - 8.4 g/dL    Albumin 3.0 (L) 3.5 - 5.2 g/dL    Total Bilirubin 0.4 0.1 - 1.0 mg/dL    Alkaline Phosphatase 60 55 - 135 U/L    AST 12 10 - 40 U/L    ALT 11 10 - 44 U/L    Anion Gap 13 8 - 16 mmol/L    eGFR if African American 4 (A) >60 mL/min/1.73 m^2    eGFR if non African American 4 (A) >60 mL/min/1.73 m^2     Imaging Results:  Imaging Results          X-Ray Chest PA And Lateral (Final result)  Result time 10/27/19 16:24:34    Final result by Mauricio Gtz MD (10/27/19 16:24:34)                 Impression:      Stable examination without acute or new pulmonary infiltrate.      Electronically signed by: Mauricio Gtz  Date:    10/27/2019  Time:    16:24             Narrative:    EXAMINATION:  XR CHEST PA AND LATERAL    CLINICAL HISTORY:  Cough    TECHNIQUE:  PA and lateral views of the chest were performed.    COMPARISON:  Chest radiograph 09/13/2019 with multiple priors    FINDINGS:  Cardiomediastinal silhouette is mildly prominent, similar to the prior.  Similar appearance of the lungs.  No acute or new infiltrate.  No significant effusion.  No pneumothorax.  Osseous structures appear intact.                                      The  Emergency Provider reviewed the vital signs and test results, which are outlined above.     ED Discussion   5:20 PM: Reassessed pt at this time. Discussed with pt all pertinent ED information and results. Discussed pt dx and plan of tx. Gave pt all f/u and return to the ED instructions. All questions and concerns were addressed at this time. Pt expresses understanding of information and instructions, and is comfortable with plan to discharge. Pt is stable for discharge.    I discussed with patient and/or family/caretaker that evaluation in the ED does not suggest any emergent or life threatening medical conditions requiring immediate intervention beyond what was provided in the ED, and I believe patient is safe for discharge.  Regardless, an unremarkable evaluation in the ED does not preclude the development or presence of a serious of life threatening condition. As such, patient was instructed to return immediately for any worsening or change in current symptoms.      5:25 PM  Patient is stable nontoxic.  Patient has a history of asthma as been coughing and wheezing recently.  She is not wheezing on arrival here sats are fine.  She is in no respiratory distress. She does have an underlying history of asthma responded well to prednisone.  She is aware of the risk of her blood sugar becoming abnormally high with this.  She has been counseled for this as well. Flu test is negative.  Patient has runny nose cough and had mild sore throat prior to this.  This clinically a viral illness and was treated as such.  She has a follow-up with her doctor tomorrow for re-evaluation.  She will return for symptoms worsen in any way.  She is very reliable and verbalized understanding agreement with all.    Medical Decision Making:   Clinical Tests:   Lab Tests: Reviewed and Ordered  Radiological Study: Ordered and Reviewed           ED Medication(s):  Medications   predniSONE tablet 60 mg (has no administration in time range)    benzonatate capsule 200 mg (has no administration in time range)       New Prescriptions    BENZONATATE (TESSALON) 100 MG CAPSULE    Take 2 capsules (200 mg total) by mouth 3 (three) times daily as needed for Cough.    PREDNISONE (DELTASONE) 20 MG TABLET    Take 3 tablets (60 mg total) by mouth once daily. for 4 days       Follow-up Information     Chuck Grider MD In 2 days.    Specialty:  Internal Medicine  Contact information:  [address] [facility] [address] [phone]                       Scribe Attestation:   Scribe #1: I performed the above scribed service and the documentation accurately describes the services I performed. I attest to the accuracy of the note.     Attending:   Physician Attestation Statement for Scribe #1: I, Toyn Magdaleno Jr., MD, personally performed the services described in this documentation, as scribed by Leah Palumbo, in my presence, and it is both accurate and complete.           Clinical Impression       ICD-10-CM ICD-9-CM   1. Viral URI with cough J06.9 465.9    B97.89    2. Cough R05 786.2       Disposition:   Disposition: Discharged  Condition: Stable         Tony Magdaleno Jr., MD  10/27/19 2764

## 2019-10-28 ENCOUNTER — INITIAL CONSULT (OUTPATIENT)
Dept: PHYSICAL MEDICINE AND REHAB | Facility: CLINIC | Age: 54
End: 2019-10-28
Payer: MEDICARE

## 2019-10-28 VITALS
WEIGHT: 255 LBS | HEART RATE: 91 BPM | DIASTOLIC BLOOD PRESSURE: 77 MMHG | SYSTOLIC BLOOD PRESSURE: 160 MMHG | RESPIRATION RATE: 14 BRPM | BODY MASS INDEX: 40.02 KG/M2 | HEIGHT: 67 IN

## 2019-10-28 DIAGNOSIS — M17.11 PRIMARY OSTEOARTHRITIS OF RIGHT KNEE: Primary | ICD-10-CM

## 2019-10-28 PROCEDURE — 99204 PR OFFICE/OUTPT VISIT, NEW, LEVL IV, 45-59 MIN: ICD-10-PCS | Mod: S$PBB,,, | Performed by: PHYSICAL MEDICINE & REHABILITATION

## 2019-10-28 PROCEDURE — 99999 PR PBB SHADOW E&M-EST. PATIENT-LVL IV: ICD-10-PCS | Mod: PBBFAC,,, | Performed by: PHYSICAL MEDICINE & REHABILITATION

## 2019-10-28 PROCEDURE — 99999 PR PBB SHADOW E&M-EST. PATIENT-LVL IV: CPT | Mod: PBBFAC,,, | Performed by: PHYSICAL MEDICINE & REHABILITATION

## 2019-10-28 PROCEDURE — 99204 OFFICE O/P NEW MOD 45 MIN: CPT | Mod: S$PBB,,, | Performed by: PHYSICAL MEDICINE & REHABILITATION

## 2019-10-28 PROCEDURE — 99214 OFFICE O/P EST MOD 30 MIN: CPT | Mod: PBBFAC | Performed by: PHYSICAL MEDICINE & REHABILITATION

## 2019-10-28 NOTE — PROGRESS NOTES
PM&R NEW PATIENT HISTORY & PHYSICAL :    Referring Physician:CHELY Pina    Chief Complaint   Patient presents with    Knee Pain     right        HPI: This is a 54 y.o.  female being seen in clinic today for evaluation of chronic right knee pain that has bothered her over the past few years.  She complains of achy pain, weakness, and locking at times. She has tried an injection years ago that helped, but she hasn't tried PT.  She denies numbness/tingling.     History obtained from patient    Functional History:  Walking: Not limited  Transfers: Independent  Assistive devices: No  Power mobility: No  Falls: None   Directional preference:  Employment status:    Needs help with:  Nothing - all ADLS normal    Cooking   Cleaning  Bathing   Dressing   Toileting     Past family, medical, social, and surgical history reviewed in chart    Review of Systems:     General- denies lethargy, weight change, fever, chills  Head/neck- denies swallowing difficulties  ENT- denies hearing changes  Cardiovascular-denies chest pain  Pulmonary- denies shortness of breath  GI- denies constipation or bowel incontinence  - +CKD-on HD  Skin- denies wounds or rashes  Musculoskeletal- +weakness, +pain  Neurologic- denies numbness and tingling  Psychiatric- denies depressive or psychotic features, denies anxiety  Lymphatic-denies swelling  Endocrine- denies hypoglycemic symptoms/DM history  All other pertinent systems negative     Physical Examination:  General: Well developed, well nourished female, NAD  HEENT:NCAT EOMI bilaterally   Pulmonary:Normal respirations    Spinal Examination: CERVICAL  Active ROM is within normal limits.  Inspection: No deformity of spinal alignment.  Palpation: No vertebral tenderness to percussion.    Spurling test:     Spinal Examination: LUMBAR or THORACIC  Active ROM is within normal limits.  Inspection: No deformity of spinal alignment.      Bilateral Upper and Lower Extremities:  Pulses are 2+ at radial, PT  bilaterally.  Shoulder/Elbow/Wrist/Hand ROM   Hip/Knee/Ankle ROM wnl except limited full ext on right, ttp at med and lateral jt line of knee, +crepitus, no edema noted  Bilateral Extremities show normal capillary refill.  No signs of cyanosis, rubor, edema, skin changes, or dysvascular changes of appendages.  Nails appear intact.    Neurological Exam:  Cranial Nerves:  II-XII grossly intact    Manual Muscle Testing: (Motor 5=normal)  RIGHT Lower extremity: Hip flexion 4/5, Hip Abduction 4/5, Hip Adduction 3+/5, Knee extension 4/5, Knee flexion 4/5, Ankle dorsiflexion 5/5, Extensor hallucis longus 5/5, Ankle plantarflexion 5/5  LEFT Lower extremity:  Hip flexion 4/5, Hip Abduction 4/5,Hip Adduction 3+/5, Knee extension4/5, Knee flexion 5/5, Ankle dorsiflexion 5/5, Extensor hallucis longus 5/5, Ankle plantarflexion 5/5    No focal atrophy is noted of either lower extremity.    Bilateral Reflexes: hypo a patellar  No clonus at knee or ankle.    Sensation: tested to light touch  - intact in legs  Gait: limited knee ROM on right limited hip flex bilaterally, antalgic favoring right       IMPRESSION/PLAN: This is a 54 y.o.  female with knee DJD    1. Rx for PT=Lori-Lower ext and core strengthening, ROM, stretch, modalities, gait/balance  2. Tylenol arthritis BID Prn  3. Handouts on knee and leg strengthening   4. Fu prn, if not improving, will refer to IPM for possible injection    Mariana Askew M.D.  Physical Medicine and Rehab

## 2019-10-28 NOTE — PATIENT INSTRUCTIONS
Understanding Osteoarthritis of the Knee    A joint is a place where two bones meet. The knee is called a hinge joint. This joint is formed where the thighbone (femur) meets the shinbone (tibia). A healthy knee joint bends freely. Knee osteoarthritis is a condition where parts of the knee joint wear out. This can lead to pain, stiffness, and limited movement.   What is osteoarthritis?  Every joint contains a smooth tissue called cartilage. Cartilage cushions the ends of bones and helps bones in a joint glide smoothly against each other. Knee osteoarthritis occurs when cartilage in the knee joint begins to break down and wear away. Bones may become exposed and rub together. The cartilage may become irritated and rough. This prevents smooth movement of the joint and can lead to pain.  Causes of osteoarthritis of the knee  Causes can include:  · Wear and tear from normal use over time  · Overuse of the knee during sports or work activities  · Being overweight. This increases stress on the knee joint.  · Misalignment of the knee joint  · Injury to the knee  Symptoms of osteoarthritis of the knee  Common symptoms include:  · Pain and swelling around the joint. The pain and swelling get worse with activity and better with rest.  · Grinding sound when moving the knee  · Reduced knee movement  · Knee stiffness. This is often worse first thing in the morning.  Treating osteoarthritis of the knee  Osteoarthritis is a long-term condition. Treatment usually focuses on managing symptoms. Treatment may include:  · Over-the-counter or prescription medicines taken by mouth to help relieve pain and swelling  · Injections of medicine into the joint to help relieve symptoms for a time  · A weight-loss plan for people who are overweight  · A plan of physical therapy and exercises to improve the strength and flexibility of the muscles around the knee  · Heat or cold therapy to help relieve pain and stiffness  · Assistive devices that  help with movement, such as a cane or a walker  · Assistive devices that make activities of daily life easier, such as raised toilet seats or shower bars  If other treatments dont do enough to relieve symptoms, you may need surgery to replace the joint. During this surgery, the damaged joint is removed. An artificial knee joint is then put into place. This can help relieve pain and stiffness and restore movement of the knee.     When to call your healthcare provider  Call your healthcare provider right away if you have any of these:  · Fever of 100.4°F (38°C) or higher, or as directed  · Symptoms that dont get better with prescribed medicines or get worse  · New symptoms   Date Last Reviewed: 3/10/2016  © 2025-3298 [employer] [address] [place] [address]. All rights reserved. This information is not intended as a substitute for professional medical care. Always follow your healthcare professional's instructions.        Bent-Knee Calf Stretch    This exercise is designed to stretch and strengthen your feet and ankles. Before beginning the exercise, read through all the instructions. While exercising, breathe normally and dont bounce. If you feel any pain, stop the exercise. If pain persists, inform your healthcare provider:  · Stand an arms length away from a wall. Place the palms of your hands on the wall. Step forward about 12 inches with your ______ foot.  · Keeping toes pointed forward and both heels on the floor, bend both knees and lean forward. Hold for ______ seconds. Relax.  · Repeat ______ times. Do ______ sets a day.  Date Last Reviewed: 8/16/2015  © 3314-4075 [employer] [address] [place] [address]. All rights reserved. This information is not intended as a substitute for professional medical care. Always follow your healthcare professional's instructions.        Quad Set for Leg and Knee    This exercise is designed to stretch and strengthen  your knee. Before beginning, read through all the instructions. While exercising, breathe normally and use smooth movements. If you feel any pain, stop the exercise. If pain persists, call your healthcare provider.  1.  Sit on the floor with one leg straight, the other bent.  2.  Flex the foot of your straight leg by pointing your toes toward you. Press the back of your knee into the floor while tightening the muscle on the top of your thigh. Hold for ______ seconds. Then relax.  3.  Repeat ______ times. Do ______ sets a day.  Caution  · Dont arch your back.  · Dont hunch your shoulders.  Date Last Reviewed: 9/20/2015  © 9879-5473 [employer]. [address] [place] [address]. All rights reserved. This information is not intended as a substitute for professional medical care. Always follow your healthcare professional's instructions.        Leg and Knee Exercises: Heel Raise    This exercise is designed to strengthen your knee and calf. Before beginning, read through all the instructions. While exercising, breathe normally and use smooth movements. If you feel any pain, stop the exercise. If pain persists, inform your healthcare provider.  Caution  · Dont lock your knees.  · Dont arch your back.   · Stand with both feet flat on the floor, shoulder-width apart.  · If you need support, steady yourself with your hand on a ledge, wall, or table.  · Raise both heels so youre standing on your toes.  · Hold for ______ seconds. Slowly lower your heels to the floor.  · Repeat ______ times. Do ______ sets a day.  Note: As you become stronger, stand on one foot at a time, and raise that heel off the floor.  Date Last Reviewed: 8/16/2015  © 0214-5163 [employer]. [address] [place] [address]. All rights reserved. This information is not intended as a substitute for professional medical care. Always follow your healthcare professional's instructions.        Leg and Knee  Exercises: Leg Raise    This exercise is designed to stretch and strengthen your knee. Before beginning, read through all the instructions. While exercising, breathe normally and use smooth movements. If you feel any pain, stop the exercise. If pain persists, call your healthcare provider.  Caution  · Dont arch your back.  · Dont hunch your shoulders.   · Sit on the floor with your_________ leg straight, the other bent.  · Tighten the thigh muscles on the top of your straight leg. You should feel the muscles contract. Raise that leg 6-8 inches. Then lower it slowly and steadily to the floor. Relax.  · Repeat ______ times.  Do ______ sets a day.  Date Last Reviewed: 8/16/2015  © 0476-3342 [employer] [address] [place] [address]. All rights reserved. This information is not intended as a substitute for professional medical care. Always follow your healthcare professional's instructions.        Leg and Knee Exercises: Step-Ups    This exercise is designed to stretch and strengthen your knee. Before beginning, read through all the instructions. While exercising, breathe normally and use smooth movements. If you feel any pain, stop the exercise. If pain persists, inform your healthcare provider.  1. After a brief warm-up, such as brisk walking for a few minutes, stand with your _________ foot on a 3-inch to 5-inch support (such as a block of wood) and the other foot flat on the floor.  2. Shift your weight onto the foot on the block, straightening that knee and raising your other foot off the floor. Then slowly lower the foot until only the heel touches the floor.  3. Return to starting position.  4. Repeat ______ times. Do ______ sets a day.  Caution  · Dont lock your knees.  · Keep your weight on the foot thats on the block-- dont push off from the floor.   Date Last Reviewed: 9/3/2015  © 2935-4424 [employer] [address] [place] [address]. All rights  reserved. This information is not intended as a substitute for professional medical care. Always follow your healthcare professional's instructions.        Gastrocnemius Stretch (Flexibility)    5. Stand facing a wall from 3 feet away. Take one step toward the wall with your right foot.  6. Place both palms on the wall. Bend your right knee.  7. Lean forward, keeping the left leg straight and the left heel on the floor.  8. Hold for 30 to 60 seconds. Repeat 2 times, or as instructed.  9. Switch legs and repeat.  Date Last Reviewed: 3/10/2016  © 3764-0197 [employer] [address] [place] [address]. All rights reserved. This information is not intended as a substitute for professional medical care. Always follow your healthcare professional's instructions.        Hamstring Curl (Strength)    This exercise is for an injured right knee. Switch sides if the injury is to your left knee.  10. Tie the ends of an elastic exercise band or tubing into a large, strong knot. Close the door on the elastic band, so the knot is on one side and the loop of the band is on the other. The elastic band should be close to the floor. You should be on the side of the door with the loop.  11. Sit in a chair facing the closed door. Slip the loop of the elastic exercise band around the heel of your right leg.  12. Slowly pull the elastic band backward along the floor with your heel. Stop when you cant pull it any farther. Hold in place for 10 seconds. Slowly return your leg to the starting position.  13. Repeat 10 times, or as instructed.     Safety tip: Take it slowly. If you do too much too soon, you can create new knee problems, or reinjure your knee.   Date Last Reviewed: 3/10/2016  © 6648-8093 [employer] [address] [place] [address]. All rights reserved. This information is not intended as a substitute for professional medical care. Always follow your healthcare professional's  instructions.        Hamstring Stretch (Flexibility)    14. Sit on the floor with your right leg straight in front of you. Bend your left leg so the sole of your foot rests against the inside of your right thigh.  15. Reach toward your ankle. Keep your knee, neck, and back straight. Feel the stretch in the back of your thigh.  16. Hold for 30 to 60 seconds. Repeat 2 times, or as instructed.  17. Switch legs and repeat.  18. Repeat this exercise 3 times per day, or as instructed.     Tip: Dont bounce while youre stretching.   Date Last Reviewed: 3/10/2016  © 4271-0437 [employer]. [address] [place] [address]. All rights reserved. This information is not intended as a substitute for professional medical care. Always follow your healthcare professional's instructions.        Heel Slides    This exercise is for an injured right knee. Switch sides if the injury is to your left knee.  19. Sit on the floor with your legs straight in front of you.  20. Slide your right heel along the floor toward you, bending your knee and keeping your foot flexed.  21. Move it as close to you as you comfortably can. Hold for 5 to 10 seconds. Then slide your heel back.  22. Repeat 5 times, or as instructed.     Tip: If youre sitting on carpet, put a plastic bag under your heel to make it slide more easily. If youre sitting on a hard floor, put a small towel under your heel.   Date Last Reviewed: 3/10/2016  © 5972-3961 [employer]. [address] [place] [address]. All rights reserved. This information is not intended as a substitute for professional medical care. Always follow your healthcare professional's instructions.        Hip Adduction (Strength)    These instructions are for your right foot. Switch sides for your left foot.  23. Lie on your right side on the floor. Keep your right leg straight. Bend your left leg and put your left foot flat on the floor behind your right  knee.  24. Raise your right leg as high as you comfortably can. Hold for 5 seconds, then lower it back down.  25. Repeat 10 times, or as instructed.  26. Switch legs and repeat.  Date Last Reviewed: 3/10/2016  © 1897-9042 [employer]. [address] [place] [address]. All rights reserved. This information is not intended as a substitute for professional medical care. Always follow your healthcare professional's instructions.        Quadriceps, Short Arcs (Strength)    27. Sit on the floor with your right leg straight in front of you. Bend your left knee up and put your left foot flat on the floor.  28. Place a rolled-up towel under your right thigh, just above your knee. Relax your leg.  29. Straighten your right leg, lifting your foot off the floor. Hold for 5 seconds. Then relax.  30. Repeat 10 times, or as instructed.  31. Switch legs and then repeat.     Challenge yourself  Use ankle weights for a tougher workout. Your healthcare provider will tell you what size ankle weights to use.   Date Last Reviewed: 3/10/2016  © 2631-1491 [employer]. [address] [place] [address]. All rights reserved. This information is not intended as a substitute for professional medical care. Always follow your healthcare professional's instructions.        Quadriceps, Isometric (Strength)    This exercise is for an injured right knee. Switch sides if the injury is to your left knee.  32. Sit on the floor with your right leg straight in front of you. Bend your left knee up and put your left foot flat on the floor.  33. Flex your right foot and tighten the thigh muscles of your right leg. Press the back of your right knee toward the floor. Dont arch your back or hunch your shoulders.  34. Hold for 5 to 10 seconds. Then relax.  35. Repeat 10 times, or as instructed.  36. Do this exercise 3 times a day, or as instructed.  Date Last Reviewed: 3/10/2016  © 0813-3078 [employer] [address] [place] [address]. All rights reserved. This information is not intended as a substitute for professional medical care. Always follow your healthcare professional's instructions.

## 2019-10-28 NOTE — LETTER
October 28, 2019      Lola Pina MD  [address] [facility]  [place] [address]           [facility]  [address] [facility] [place] [place] [address]  Phone: [phone]  Fax: [phone]          Patient: Cordell Angulo   MR Number: [id number]   YOB: 1965   Date of Visit: 10/28/2019       Dear Dr. Lola Pina:    Thank you for referring Cordell Angulo to me for evaluation. Attached you will find relevant portions of my assessment and plan of care.    If you have questions, please do not hesitate to call me. I look forward to following Cordell Angulo along with you.    Sincerely,    Mariana Askew MD    Enclosure  CC:  No Recipients    If you would like to receive this communication electronically, please contact [email or web address] or [phone] to request more information on [facility] Link access.    For providers and/or their staff who would like to refer a patient to [facility], please contact us through our one-stop-shop provider referral line, [facility], at [phone].    If you feel you have received this communication in error or would no longer like to receive these types of communications, please e-mail [email or web address]

## 2019-11-02 ENCOUNTER — HOSPITAL ENCOUNTER (EMERGENCY)
Facility: HOSPITAL | Age: 54
Discharge: HOME | End: 2019-11-02
Attending: EMERGENCY MEDICINE
Payer: MEDICARE

## 2019-11-02 VITALS
HEART RATE: 88 BPM | BODY MASS INDEX: 39.44 KG/M2 | HEIGHT: 67 IN | WEIGHT: 251.31 LBS | TEMPERATURE: 98 F | DIASTOLIC BLOOD PRESSURE: 78 MMHG | RESPIRATION RATE: 20 BRPM | OXYGEN SATURATION: 97 % | SYSTOLIC BLOOD PRESSURE: 181 MMHG

## 2019-11-02 DIAGNOSIS — R05.9 COUGH: ICD-10-CM

## 2019-11-02 DIAGNOSIS — J18.9 PNEUMONIA DUE TO INFECTIOUS ORGANISM, UNSPECIFIED LATERALITY, UNSPECIFIED PART OF LUNG: Primary | ICD-10-CM

## 2019-11-02 PROCEDURE — 99284 EMERGENCY DEPT VISIT MOD MDM: CPT | Mod: 25

## 2019-11-02 RX ORDER — PROMETHAZINE HYDROCHLORIDE AND DEXTROMETHORPHAN HYDROBROMIDE 6.25; 15 MG/5ML; MG/5ML
5 SYRUP ORAL 3 TIMES DAILY PRN
Qty: 180 ML | Refills: 0 | Status: SHIPPED | OUTPATIENT
Start: 2019-11-02 | End: 2019-11-12

## 2019-11-02 RX ORDER — ALBUTEROL SULFATE 90 UG/1
1-2 AEROSOL, METERED RESPIRATORY (INHALATION) EVERY 6 HOURS PRN
Qty: 1 INHALER | Refills: 0 | Status: SHIPPED | OUTPATIENT
Start: 2019-11-02 | End: 2020-12-07

## 2019-11-02 RX ORDER — LEVOFLOXACIN 750 MG/1
750 TABLET ORAL DAILY
Qty: 1 TABLET | Refills: 0 | Status: ON HOLD | OUTPATIENT
Start: 2019-11-02 | End: 2020-06-27 | Stop reason: HOSPADM

## 2019-11-02 RX ORDER — LEVOFLOXACIN 500 MG/1
500 TABLET, FILM COATED ORAL EVERY OTHER DAY
Qty: 9 TABLET | Refills: 0 | Status: SHIPPED | OUTPATIENT
Start: 2019-11-02 | End: 2019-11-16

## 2019-11-02 NOTE — ED PROVIDER NOTES
SCRIBE #1 NOTE: I, Shari Ace, am scribing for, and in the presence of, Ernie Thomas NP. I have scribed the entire note.      History      Chief Complaint   Patient presents with    Cough     cough, fever of 101 at home, and wheezing x1 week       Review of patient's allergies indicates:   Allergen Reactions    Morphine Itching    Sulfa (sulfonamide antibiotics) Rash        HPI   HPI    11/2/2019, 6:45 PM   History obtained from the patient      History of Present Illness: Cordell Angulo is a 54 y.o. female patient who presents to the Emergency Department for cough, onset 1 week PTA. Symptoms are constant and moderate in severity. No mitigating or exacerbating factors reported. Associated sxs include fever and wheezing. Tmax was 101 at home. Patient denies any nausea, vomiting, diarrhea, numbness, weakness, abd pain, and all other sxs at this time. Prior Tx includes Prednisone and Benzonatate prescribed in ED 1 week ago by . No further complaints or concerns at this time.         Arrival mode: Personal vehicle      PCP: Chuck Grider MD       Past Medical History:  Past Medical History:   Diagnosis Date    Asthma     CHF (congestive heart failure)     CKD (chronic kidney disease) stage 5, GFR less than 15 ml/min     Depression     Diabetes mellitus     Dialysis patient     Hypercholesterolemia     Hypertension     PAF (paroxysmal atrial fibrillation)        Past Surgical History:  Past Surgical History:   Procedure Laterality Date    AV FISTULA PLACEMENT      CARDIAC ELECTROPHYSIOLOGY MAPPING AND ABLATION      CATHETERIZATION OF BOTH LEFT AND RIGHT HEART N/A 12/28/2018    Procedure: CATHETERIZATION, HEART, BOTH LEFT AND RIGHT;  Surgeon: Enrique Jj MD;  Location[facility] CATH LAB;  Service: Cardiology;  Laterality: N/A;  To follow [facility] cardioversion [facility]    cesarian section      TUMOR REMOVAL      L lung         Family History:  Family History   Problem Relation Age  of Onset    Heart disease Mother     Diabetes Father        Social History:  Social History     Tobacco Use    Smoking status: Never Smoker    Smokeless tobacco: Never Used   Substance and Sexual Activity    Alcohol use: No    Drug use: No    Sexual activity: unknown       ROS   Review of Systems   Constitutional: Positive for fever (tmax 101). Negative for chills.   HENT: Negative for sore throat.    Respiratory: Positive for cough and wheezing. Negative for shortness of breath.    Cardiovascular: Negative for chest pain.   Gastrointestinal: Negative for nausea.   Genitourinary: Negative for dysuria.   Musculoskeletal: Negative for back pain.   Skin: Negative for rash.   Neurological: Negative for dizziness, weakness, numbness and headaches.   Hematological: Does not bruise/bleed easily.   All other systems reviewed and are negative.      Physical Exam      Initial Vitals [11/02/19 1835]   BP Pulse Resp Temp SpO2   (!) 181/78 88 20 98.4 °F (36.9 °C) 97 %      MAP       --          Physical Exam  Nursing Notes and Vital Signs Reviewed.  Constitutional: Patient is in mild distress. Well-developed and well-nourished.  Head: Atraumatic. Normocephalic.  Eyes: PERRL. EOM intact. Conjunctivae are not pale. No scleral icterus.  ENT: Mucous membranes are moist. Oropharynx is clear and symmetric.    Neck: Supple. Full ROM. No lymphadenopathy.  Cardiovascular: Regular rate. Regular rhythm. No murmurs, rubs, or gallops. Distal pulses are 2+ and symmetric.  Pulmonary/Chest: No respiratory distress. Clear to auscultation bilaterally. No wheezing or rales.  Abdominal: Soft and non-distended.  There is no tenderness.  No rebound, guarding, or rigidity. Good bowel sounds.  Musculoskeletal: Moves all extremities. No obvious deformities. No edema. No calf tenderness.  Skin: Warm and dry.  Neurological:  Alert, awake, and appropriate.  Normal speech.  No acute focal neurological deficits are appreciated.  Psychiatric: Normal  "affect. Good eye contact. Appropriate in content.    ED Course    Procedures  ED Vital Signs:  Vitals:    11/02/19 1835   BP: (!) 181/78   Pulse: 88   Resp: 20   Temp: 98.4 °F (36.9 °C)   TempSrc: Oral   SpO2: 97%   Weight: 114 kg (251 lb 5.2 oz)   Height: 5' 7" (1.702 m)       Abnormal Lab Results:  Labs Reviewed - No data to display     All Lab Results:  None    Imaging Results:  Imaging Results          X-Ray Chest PA And Lateral (Final result)  Result time 11/02/19 19:23:52    Final result by Elbert Lombardi MD (11/02/19 19:23:52)                 Impression:      Worsening interstitial infiltrate throughout the lungs in compared to the prior exam.      Electronically signed by: Elbert Lombardi MD  Date:    11/02/2019  Time:    19:23             Narrative:    EXAMINATION:  XR CHEST PA AND LATERAL    CLINICAL HISTORY:  Cough    COMPARISON:  Chest x-ray, 10/27/2019.    FINDINGS:  There is interstitial opacity throughout the lungs which appears slightly worse compared to the prior exam.  The heart size is top-normal.  No pleural effusion or pneumothorax. The bones are intact.                                        The Emergency Provider reviewed the vital signs and test results, which are outlined above.    ED Discussion   7:54 PM: Discussed pt's case with the pharmacy who recommends the pt take Levaquin 750 mg on day 1 and 500 mg every other day for 9 doses.    7:55 PM: Reassessed pt at this time.  Discussed with pt all pertinent ED information and results. Discussed pt dx  and plan of tx. Told pt to talk to her renal doctor and PCP on Monday to confirm prescription doses are appropriate. Gave pt all f/u and return to the ED instructions. All questions and concerns were addressed at this time. Pt expresses understanding of information and instructions, and is comfortable with plan to discharge. Pt is stable for discharge.     I discussed with patient and/or family/caretaker that evaluation in the ED does not suggest " any emergent or life threatening medical conditions requiring immediate intervention beyond what was provided in the ED, and I believe patient is safe for discharge.  Regardless, an unremarkable evaluation in the ED does not preclude the development or presence of a serious of life threatening condition. As such, patient was instructed to return immediately for any worsening or change in current symptoms.      ED Medication(s):  Medications - No data to display    Follow-up Information     Chuck Grider MD. Schedule an appointment as soon as possible for a visit in 1 day.    Specialty:  Internal Medicine  Contact information:  [address] [facility] [address]  [phone]             [facility] - .    Specialty:  Emergency Medicine  Why:  As needed, If symptoms worsen  Contact information:  [address]  [facility] [address] [phone]                 Discharge Medication List as of 11/2/2019  7:57 PM      START taking these medications    Details   !! levoFLOXacin (LEVAQUIN) 500 MG tablet Take 1 tablet (500 mg total) by mouth every other day. for 14 days, Starting Sat 11/2/2019, Until Sat 11/16/2019, Print      !! levoFLOXacin (LEVAQUIN) 750 MG tablet Take 1 tablet (750 mg total) by mouth once daily., Starting Sat 11/2/2019, Print      promethazine-dextromethorphan (PROMETHAZINE-DM) 6.25-15 mg/5 mL Syrp Take 5 mLs by mouth 3 (three) times daily as needed., Starting Sat 11/2/2019, Until Tue 11/12/2019, Print       !! - Potential duplicate medications found. Please discuss with provider.          Medical Decision Making    Medical Decision Making:   Clinical Tests:   Radiological Study: Ordered and Reviewed           Scribe Attestation:   Scribe #1: I performed the above scribed service and the documentation accurately describes the services I performed. I attest to the accuracy of the note.    Attending:   Physician Attestation Statement for Scribe #1: Ernie DE LOS SANTOS  TARAH Thomas , personally performed the services described in this documentation, as scribed by Shari Ace, in my presence, and it is both accurate and complete.          Clinical Impression       ICD-10-CM ICD-9-CM   1. Pneumonia due to infectious organism, unspecified laterality, unspecified part of lung J18.9 136.9     484.8   2. Cough R05 786.2       Disposition:   Disposition: Discharged  Condition: Stable         Ernie Thomas NP  11/03/19 4054

## 2019-11-15 ENCOUNTER — TELEPHONE (OUTPATIENT)
Dept: NEPHROLOGY | Facility: CLINIC | Age: 54
End: 2019-11-15

## 2019-11-15 DIAGNOSIS — G47.33 OSA (OBSTRUCTIVE SLEEP APNEA): Primary | ICD-10-CM

## 2019-11-15 NOTE — TELEPHONE ENCOUNTER
I tried to contact Sleep staff to see the process. No response. Can you assist when you get a chance please.

## 2019-11-15 NOTE — TELEPHONE ENCOUNTER
----- Message from Lola Pina MD sent at 11/15/2019 10:58 AM CST -----  Please order / communicate with sleep to order home sleep study     Thanks    NK

## 2019-11-18 NOTE — TELEPHONE ENCOUNTER
Called sleep study dept @ [facility]. Left detail voice message about referral and scheduling. Left contact information.

## 2019-11-19 ENCOUNTER — TELEPHONE (OUTPATIENT)
Dept: NEPHROLOGY | Facility: HOSPITAL | Age: 54
End: 2019-11-19

## 2019-11-20 NOTE — PLAN OF CARE
NEHAL:     Patient seen in dialysis 11/15/2019     She has documented Loud or frequent snoring.also family reports Silent pauses in breathing alongwith   Choking or gasping sounds.  +Daytime sleepiness or fatigue.  +Unrefreshing sleep.  I++ nsomnia.  ++Morning headaches.    High likelihood of NEHAL     Proceed with Home sleep study     Lola Pina MD

## 2019-11-20 NOTE — TELEPHONE ENCOUNTER
----- Message from Tomy Cleary MD sent at 11/19/2019  7:07 AM CST -----  Dr Pina,  Patient needs F2F documentation for medical necessity for sleep testing, especially medicare.  Please do that and let us know    Thanks    Tomy Cleary MD    ----- Message -----  From: King Wagner  Sent: 11/18/2019   9:39 AM CST  To: Tomy Cleary MD    Review Chart, HSAT

## 2019-12-09 ENCOUNTER — TELEPHONE (OUTPATIENT)
Dept: NEPHROLOGY | Facility: CLINIC | Age: 54
End: 2019-12-09

## 2019-12-09 DIAGNOSIS — G47.33 OSA (OBSTRUCTIVE SLEEP APNEA): ICD-10-CM

## 2019-12-09 DIAGNOSIS — M25.512 ACUTE PAIN OF LEFT SHOULDER: Primary | ICD-10-CM

## 2019-12-09 NOTE — TELEPHONE ENCOUNTER
----- Message from Lola Pina MD sent at 12/9/2019 12:04 PM CST -----  Placed orders for consultation for sleep disorder as well as consultation for physiatry please schedule dose to appointments and let the patient know.  If the patient does not answer the phone let me know and do not leave a voicemail for the patient

## 2019-12-09 NOTE — TELEPHONE ENCOUNTER
Both appointments scheduled. Called patient on all contact numbers, unable to reach. Left message on voicemail. Appointments mailed to patient.

## 2020-01-24 ENCOUNTER — TELEPHONE (OUTPATIENT)
Dept: GASTROENTEROLOGY | Facility: CLINIC | Age: 55
End: 2020-01-24

## 2020-01-24 DIAGNOSIS — R11.0 NAUSEA: Primary | ICD-10-CM

## 2020-01-29 ENCOUNTER — TELEPHONE (OUTPATIENT)
Dept: NEPHROLOGY | Facility: CLINIC | Age: 55
End: 2020-01-29

## 2020-01-29 RX ORDER — LEVOCETIRIZINE DIHYDROCHLORIDE 2.5 MG/5ML
2.5 SOLUTION ORAL NIGHTLY
Qty: 118 ML | Refills: 0 | Status: SHIPPED | OUTPATIENT
Start: 2020-01-29 | End: 2020-12-03 | Stop reason: CLARIF

## 2020-01-29 RX ORDER — AMOXICILLIN AND CLAVULANATE POTASSIUM 875; 125 MG/1; MG/1
1 TABLET, FILM COATED ORAL EVERY 12 HOURS
Qty: 14 TABLET | Refills: 0 | Status: SHIPPED | OUTPATIENT
Start: 2020-01-29 | End: 2020-02-05

## 2020-01-29 NOTE — TELEPHONE ENCOUNTER
----- Message from Lola Pina MD sent at 1/29/2020 11:45 AM CST -----  Contact: [facility]   I tried to call them and was on hold for ever     If you talk to them put them on hold and use my cell to clarify     NK   ----- Message -----  From: Rina Lott  Sent: 1/29/2020  11:31 AM CST  To: [facility]    Would like to consult with nurse regarding script sent over for pt. Please give a call back at [phone].            Thanks,  Rina COCHRAN

## 2020-02-10 ENCOUNTER — HOSPITAL ENCOUNTER (EMERGENCY)
Facility: HOSPITAL | Age: 55
Discharge: HOME | End: 2020-02-10
Attending: EMERGENCY MEDICINE
Payer: MEDICARE

## 2020-02-10 VITALS
TEMPERATURE: 99 F | SYSTOLIC BLOOD PRESSURE: 148 MMHG | WEIGHT: 240.19 LBS | HEART RATE: 69 BPM | RESPIRATION RATE: 15 BRPM | BODY MASS INDEX: 37.62 KG/M2 | OXYGEN SATURATION: 98 % | DIASTOLIC BLOOD PRESSURE: 55 MMHG

## 2020-02-10 DIAGNOSIS — R42 VERTIGO: ICD-10-CM

## 2020-02-10 DIAGNOSIS — R55 SYNCOPE: ICD-10-CM

## 2020-02-10 DIAGNOSIS — H65.01 NON-RECURRENT ACUTE SEROUS OTITIS MEDIA OF RIGHT EAR: Primary | ICD-10-CM

## 2020-02-10 LAB
ALBUMIN SERPL BCP-MCNC: 3.4 G/DL (ref 3.5–5.2)
ALP SERPL-CCNC: 75 U/L (ref 55–135)
ALT SERPL W/O P-5'-P-CCNC: 16 U/L (ref 10–44)
ANION GAP SERPL CALC-SCNC: 14 MMOL/L (ref 8–16)
AST SERPL-CCNC: 14 U/L (ref 10–40)
BASOPHILS # BLD AUTO: 0.03 K/UL (ref 0–0.2)
BASOPHILS NFR BLD: 0.3 % (ref 0–1.9)
BILIRUB SERPL-MCNC: 0.3 MG/DL (ref 0.1–1)
BUN SERPL-MCNC: 24 MG/DL (ref 6–20)
CALCIUM SERPL-MCNC: 9.9 MG/DL (ref 8.7–10.5)
CHLORIDE SERPL-SCNC: 97 MMOL/L (ref 95–110)
CO2 SERPL-SCNC: 29 MMOL/L (ref 23–29)
CREAT SERPL-MCNC: 6.7 MG/DL (ref 0.5–1.4)
DIFFERENTIAL METHOD: ABNORMAL
EOSINOPHIL # BLD AUTO: 0.2 K/UL (ref 0–0.5)
EOSINOPHIL NFR BLD: 2.1 % (ref 0–8)
ERYTHROCYTE [DISTWIDTH] IN BLOOD BY AUTOMATED COUNT: 14.8 % (ref 11.5–14.5)
EST. GFR  (AFRICAN AMERICAN): 7 ML/MIN/1.73 M^2
EST. GFR  (NON AFRICAN AMERICAN): 6 ML/MIN/1.73 M^2
GLUCOSE SERPL-MCNC: 118 MG/DL (ref 70–110)
HCT VFR BLD AUTO: 37.1 % (ref 37–48.5)
HGB BLD-MCNC: 11.5 G/DL (ref 12–16)
IMM GRANULOCYTES # BLD AUTO: 0.04 K/UL (ref 0–0.04)
IMM GRANULOCYTES NFR BLD AUTO: 0.4 % (ref 0–0.5)
LYMPHOCYTES # BLD AUTO: 1.6 K/UL (ref 1–4.8)
LYMPHOCYTES NFR BLD: 16.1 % (ref 18–48)
MCH RBC QN AUTO: 26.3 PG (ref 27–31)
MCHC RBC AUTO-ENTMCNC: 31 G/DL (ref 32–36)
MCV RBC AUTO: 85 FL (ref 82–98)
MONOCYTES # BLD AUTO: 0.6 K/UL (ref 0.3–1)
MONOCYTES NFR BLD: 6.4 % (ref 4–15)
NEUTROPHILS # BLD AUTO: 7.5 K/UL (ref 1.8–7.7)
NEUTROPHILS NFR BLD: 74.7 % (ref 38–73)
NRBC BLD-RTO: 0 /100 WBC
PLATELET # BLD AUTO: 278 K/UL (ref 150–350)
PMV BLD AUTO: 8.8 FL (ref 9.2–12.9)
POTASSIUM SERPL-SCNC: 3.6 MMOL/L (ref 3.5–5.1)
PROT SERPL-MCNC: 7.9 G/DL (ref 6–8.4)
RBC # BLD AUTO: 4.38 M/UL (ref 4–5.4)
SODIUM SERPL-SCNC: 140 MMOL/L (ref 136–145)
TROPONIN I SERPL DL<=0.01 NG/ML-MCNC: 0.01 NG/ML (ref 0–0.03)
WBC # BLD AUTO: 9.98 K/UL (ref 3.9–12.7)

## 2020-02-10 PROCEDURE — 80053 COMPREHEN METABOLIC PANEL: CPT

## 2020-02-10 PROCEDURE — 84484 ASSAY OF TROPONIN QUANT: CPT

## 2020-02-10 PROCEDURE — 85025 COMPLETE CBC W/AUTO DIFF WBC: CPT

## 2020-02-10 PROCEDURE — 93010 EKG 12-LEAD: ICD-10-PCS | Mod: ,,, | Performed by: INTERNAL MEDICINE

## 2020-02-10 PROCEDURE — 93005 ELECTROCARDIOGRAM TRACING: CPT

## 2020-02-10 PROCEDURE — 25000003 PHARM REV CODE 250: Performed by: EMERGENCY MEDICINE

## 2020-02-10 PROCEDURE — 99285 EMERGENCY DEPT VISIT HI MDM: CPT | Mod: 25

## 2020-02-10 PROCEDURE — 93010 ELECTROCARDIOGRAM REPORT: CPT | Mod: ,,, | Performed by: INTERNAL MEDICINE

## 2020-02-10 RX ORDER — MECLIZINE HYDROCHLORIDE 25 MG/1
50 TABLET ORAL
Status: COMPLETED | OUTPATIENT
Start: 2020-02-10 | End: 2020-02-10

## 2020-02-10 RX ORDER — MECLIZINE HYDROCHLORIDE 25 MG/1
25 TABLET ORAL 3 TIMES DAILY PRN
Qty: 20 TABLET | Refills: 0 | Status: SHIPPED | OUTPATIENT
Start: 2020-02-10 | End: 2020-12-03 | Stop reason: CLARIF

## 2020-02-10 RX ADMIN — MECLIZINE HYDROCHLORIDE 50 MG: 25 TABLET ORAL at 01:02

## 2020-02-10 NOTE — DISCHARGE INSTRUCTIONS
You have some fluid behind her right ear which is causing you to have vertigo or dizziness.  This is a disorder of the inner ear causing you to become dizzy.  Do not drive or operate machinery until cleared by your doctor.  Use Antivert for symptoms.  Use Coricidin HBP every 6 hr for congestion.  Follow up with her doctor tomorrow for re-evaluation.  Return as needed for any worsening symptoms, problems, questions or concerns.

## 2020-02-10 NOTE — ED PROVIDER NOTES
SCRIBE #1 NOTE: I, Ashlie Martinez, am scribing for, and in the presence of, Tony Magdaleno Jr., MD. I have scribed the entire note.       History     Chief Complaint   Patient presents with    Dizziness     had dialysis and then seen at ENT today, felt dizzy and was told to come here by ENT      Review of patient's allergies indicates:   Allergen Reactions    Morphine Itching    Sulfa (sulfonamide antibiotics) Rash         History of Present Illness     HPI    2/10/2020, 1:32 PM  History obtained from the patient      History of Present Illness: Cordell Angulo is a 55 y.o. female patient with a h/o asthma, CHF, CKD, depression, dialysis patient, HTN, PAF, who presents to the Emergency Department for evaluation of dizziness which onset earlier today. Pt was receiving dialysis treatment when her blood pressure lowered and became dizzy with having to be taken off. Pt then went to ENT and fell back while trying to stand up so was referred to the ED for evaluation. Pt recently finished her antibiotics to treat fluid in her ears.  Patient does have a history of vertigo.  Symptoms are constant and moderate in severity. She reports sxs are worse when sitting up. Associated sxs include emesis and HA. Patient denies any diarrhea, fever, abd pain, CP, and all other sxs at this time. No prior Tx reported. No further complaints or concerns at this time.       Arrival mode: Personal transportation    PCP: Chuck Grider MD      Past Medical History:  Past Medical History:   Diagnosis Date    Asthma     CHF (congestive heart failure)     CKD (chronic kidney disease) stage 5, GFR less than 15 ml/min     Depression     Diabetes mellitus     Dialysis patient     Hypercholesterolemia     Hypertension     PAF (paroxysmal atrial fibrillation)        Past Surgical History:  Past Surgical History:   Procedure Laterality Date    AV FISTULA PLACEMENT      CARDIAC ELECTROPHYSIOLOGY MAPPING AND ABLATION       CATHETERIZATION OF BOTH LEFT AND RIGHT HEART N/A 12/28/2018    Procedure: CATHETERIZATION, HEART, BOTH LEFT AND RIGHT;  Surgeon: Enrique Jj MD;  Location[facility] C[facility];  Service: Cardiology;  Laterality: N/A;  To follow his cardioversion case in RU    cesarian section      TUMOR REMOVAL      L lung         Family History:  Family History   Problem Relation Age of Onset    Heart disease Mother     Diabetes Father        Social History:   Social History     Tobacco Use    Smoking status: Never Smoker    Smokeless tobacco: Never Used   Substance and Sexual Activity    Alcohol use: No    Drug use: No    Sexual activity: Unknown        Review of Systems     Review of Systems   Constitutional: Negative for fever.   Cardiovascular: Negative for chest pain.   Gastrointestinal: Positive for vomiting. Negative for abdominal pain and diarrhea.   Neurological: Positive for dizziness and headaches.   All other systems reviewed and are negative.       Physical Exam     Initial Vitals [02/10/20 1223]   BP Pulse Resp Temp SpO2   (!) 151/68 71 16 98.7 °F (37.1 °C) 100 %      MAP       --          Physical Exam  Nursing Notes and Vital Signs Reviewed.  Constitutional: Well-developed and well-nourished.   Head: Atraumatic. Normocephalic.  Eyes: EOM intact. No scleral icterus.  Multiple beats of fatiguing rightward horizontal nystagmus.  ENT: Mucous membranes are moist. Oropharynx is clear and symmetric.  nares are clear.  There is fluid behind the right TM.  There is no sign or symptom of infection.  There is no erythema.  Neck: Supple. Full ROM. No lymphadenopathy.  Cardiovascular: Regular rate. Regular rhythm. No murmurs, rubs, or gallops. Distal pulses are 2+ and symmetric.  Pulmonary/Chest: No respiratory distress. Clear to auscultation bilaterally. No wheezing or rales.  Abdominal: Soft and non-distended.  There is no tenderness.  No rebound, guarding, or rigidity. Good bowel sounds.  Genitourinary: No CVA  tenderness.  No suprapubic tenderness.  Musculoskeletal: Moves all extremities. No obvious deformities. No calf tenderness. 5 x 5 strength in all extremities.  Skin: Warm and dry.  Neurological:  Alert, awake, and appropriate.  Normal speech.  No acute focal neurological deficits are appreciated. 2 through 12 intact bilaterally. No focal lateralizing signs.  Patient does have multiple beats of fatiguing rightward nystagmus.  Psychiatric: Normal affect. Good eye contact. Appropriate in content.     ED Course   Procedures  ED Vital Signs:  Vitals:    02/10/20 1223   BP: (!) 151/68   Pulse: 71   Resp: 16   Temp: 98.7 °F (37.1 °C)   TempSrc: Oral   SpO2: 100%   Weight: 109 kg (240 lb 3.1 oz)       Abnormal Lab Results:  Labs Reviewed   CBC W/ AUTO DIFFERENTIAL - Abnormal; Notable for the following components:       Result Value    Hemoglobin 11.5 (*)     Mean Corpuscular Hemoglobin 26.3 (*)     Mean Corpuscular Hemoglobin Conc 31.0 (*)     RDW 14.8 (*)     MPV 8.8 (*)     Gran% 74.7 (*)     Lymph% 16.1 (*)     All other components within normal limits   COMPREHENSIVE METABOLIC PANEL - Abnormal; Notable for the following components:    Glucose 118 (*)     BUN, Bld 24 (*)     Creatinine 6.7 (*)     Albumin 3.4 (*)     eGFR if  7 (*)     eGFR if non  6 (*)     All other components within normal limits   TROPONIN I        All Lab Results:  Results for orders placed or performed during the hospital encounter of 02/10/20   CBC auto differential   Result Value Ref Range    WBC 9.98 3.90 - 12.70 K/uL    RBC 4.38 4.00 - 5.40 M/uL    Hemoglobin 11.5 (L) 12.0 - 16.0 g/dL    Hematocrit 37.1 37.0 - 48.5 %    Mean Corpuscular Volume 85 82 - 98 fL    Mean Corpuscular Hemoglobin 26.3 (L) 27.0 - 31.0 pg    Mean Corpuscular Hemoglobin Conc 31.0 (L) 32.0 - 36.0 g/dL    RDW 14.8 (H) 11.5 - 14.5 %    Platelets 278 150 - 350 K/uL    MPV 8.8 (L) 9.2 - 12.9 fL    Immature Granulocytes 0.4 0.0 - 0.5 %    Gran #  (ANC) 7.5 1.8 - 7.7 K/uL    Immature Grans (Abs) 0.04 0.00 - 0.04 K/uL    Lymph # 1.6 1.0 - 4.8 K/uL    Mono # 0.6 0.3 - 1.0 K/uL    Eos # 0.2 0.0 - 0.5 K/uL    Baso # 0.03 0.00 - 0.20 K/uL    nRBC 0 0 /100 WBC    Gran% 74.7 (H) 38.0 - 73.0 %    Lymph% 16.1 (L) 18.0 - 48.0 %    Mono% 6.4 4.0 - 15.0 %    Eosinophil% 2.1 0.0 - 8.0 %    Basophil% 0.3 0.0 - 1.9 %    Differential Method Automated    Comprehensive metabolic panel   Result Value Ref Range    Sodium 140 136 - 145 mmol/L    Potassium 3.6 3.5 - 5.1 mmol/L    Chloride 97 95 - 110 mmol/L    CO2 29 23 - 29 mmol/L    Glucose 118 (H) 70 - 110 mg/dL    BUN, Bld 24 (H) 6 - 20 mg/dL    Creatinine 6.7 (H) 0.5 - 1.4 mg/dL    Calcium 9.9 8.7 - 10.5 mg/dL    Total Protein 7.9 6.0 - 8.4 g/dL    Albumin 3.4 (L) 3.5 - 5.2 g/dL    Total Bilirubin 0.3 0.1 - 1.0 mg/dL    Alkaline Phosphatase 75 55 - 135 U/L    AST 14 10 - 40 U/L    ALT 16 10 - 44 U/L    Anion Gap 14 8 - 16 mmol/L    eGFR if African American 7 (A) >60 mL/min/1.73 m^2    eGFR if non African American 6 (A) >60 mL/min/1.73 m^2   Troponin I   Result Value Ref Range    Troponin I 0.009 0.000 - 0.026 ng/mL         Imaging Results          X-Ray Chest PA And Lateral (Final result)  Result time 02/10/20 14:19:46    Final result by Mauricio Gtz MD (02/10/20 14:19:46)                 Impression:      No acute radiographic process in the chest.      Electronically signed by: Mauricio Gtz  Date:    02/10/2020  Time:    14:19             Narrative:    EXAMINATION:  XR CHEST PA AND LATERAL    CLINICAL HISTORY:  Syncope and collapse    TECHNIQUE:  PA and lateral views of the chest were performed.    COMPARISON:  Chest radiograph 11/02/2019 with multiple priors    FINDINGS:  Metallic clothing clips appreciated overlying the right chest.  Cardiomediastinal silhouette is unchanged.  No acute or new airspace consolidative opacity or effusion.  No pneumothorax.  Osseous structures appear intact.  Minor degenerative changes  of the thoracic spine.                               CT Head Without Contrast (Final result)  Result time 02/10/20 14:17:07    Final result by Mauricio Gtz MD (02/10/20 14:17:07)                 Impression:      No CT evidence of acute intracranial abnormality.    Mild chronic microvascular ischemic changes.    All CT scans at this facility use dose modulation, iterative reconstruction, and/or weight base dosing when appropriate to reduce radiation dose to as low as reasonably achievable.      Electronically signed by: Mauricio Gtz  Date:    02/10/2020  Time:    14:17             Narrative:    EXAMINATION:  CT HEAD WITHOUT CONTRAST    CLINICAL HISTORY:  Syncope/fainting;    TECHNIQUE:  Contiguous axial images were obtained from the skull base through the vertex without intravenous contrast.    COMPARISON:  Head CT 06/18/2018    FINDINGS:  No intracranial hemorrhage. No mass effect or midline shift. Few small areas of periventricular parenchymal hypoattenuation, nonspecific but most suggestive of chronic microvascular ischemic change.  Normal parenchymal hypoattenuation to suggest acute or recent major vascular territory cerebral infarct.  The ventricles and sulci are normal in size and configuration. The paranasal sinuses and mastoid air cells are clear.  No concerning osseous findings.                                 The EKG was ordered, reviewed, and independently interpreted by the ED provider.  Interpretation time: 12:55:39  Rate: 77 BPM  Rhythm: Normal sinus rhythm  Interpretation: Possible left atrial enlargement. Left axis deviation. Left ventricular hypertrophy. Nonspecific ST and T wave abnormality. No STEMI.      The Emergency Provider reviewed the vital signs and test results, which are outlined above.     ED Discussion       2:29 PM: Reassessed pt at this time.  Pt states her condition has improved at this time. Discussed with pt all pertinent ED information and results. Discussed pt dx and plan  of tx. Gave pt all f/u and return to the ED instructions. All questions and concerns were addressed at this time. Pt expresses understanding of information and instructions, and is comfortable with plan to discharge. Pt is stable for discharge.    I discussed with patient and/or family/caretaker that evaluation in the ED does not suggest any emergent or life threatening medical conditions requiring immediate intervention beyond what was provided in the ED, and I believe patient is safe for discharge.  Regardless, an unremarkable evaluation in the ED does not preclude the development or presence of a serious of life threatening condition. As such, patient was instructed to return immediately for any worsening or change in current symptoms.    2:30 PM  Patient is stable nontoxic.  Symptoms have resolved with Antivert.  She does have serous effusion behind the right ear with a recent otitis media for which he had taken antibiotics.  The infection appears to be early but there still appears to be some fluid.  Patient does have a history of vertigo as well in the past.  Will treat the patient symptomatically with Antivert.  I have advised her not to do any activities requiring full concentration due to fatigue with the medications.  I have advised Coricidin HBP a for 6 hr and close follow-up tomorrow with her doctor.  She verbalized understanding agreement with all seems reliable.  She is safe for discharge in my opinion with a negative workup.     MDM        Medical Decision Making:   Clinical Tests:   Lab Tests: Ordered and Reviewed  Radiological Study: Ordered and Reviewed  Medical Tests: Ordered and Reviewed           ED Medication(s):  Medications   meclizine tablet 50 mg (50 mg Oral Given 2/10/20 5776)       New Prescriptions    MECLIZINE (ANTIVERT) 25 MG TABLET    Take 1 tablet (25 mg total) by mouth 3 (three) times daily as needed.       Follow-up Information     Chuck Grider MD In 1 day.    Specialty:   Internal Medicine  Contact information:  [address] [place] [address]  [phone]                       Scribe Attestation:   Scribe #1: I performed the above scribed service and the documentation accurately describes the services I performed. I attest to the accuracy of the note.     Attending:   Physician Attestation Statement for Scribe #1: I, Tony Magdaleno Jr., MD, personally performed the services described in this documentation, as scribed by Ashlie Martinez, in my presence, and it is both accurate and complete.           Clinical Impression       ICD-10-CM ICD-9-CM   1. Non-recurrent acute serous otitis media of right ear H65.01 381.01   2. Syncope R55 780.2   3. Vertigo R42 780.4       Disposition:   Disposition: Discharged  Condition: Stable         Tony Magdaleno Jr., MD  02/10/20 1431

## 2020-02-10 NOTE — ED NOTES
Patient identifiers verified and correct for Cordell Angulo. Patient complains of dizziness when changing positions along with moments of nausea. Patient denies any pain at this time.      LOC: The patient is awake, alert and aware of environment with an appropriate affect, the patient is oriented x 3 and speaking appropriately.  APPEARANCE: Patient resting comfortably and in no acute distress, patient is clean and well groomed, patient's clothing is properly fastened.  SKIN: The skin is warm and dry, color consistent with ethnicity, patient has normal skin turgor and moist mucus membranes, skin intact, no breakdown or bruising noted.  MUSCULOSKELETAL: Patient moving all extremities spontaneously.  RESPIRATORY: Airway is open and patent, respirations are spontaneous.  CARDIAC: Patient has a normal rate, no periphreal edema noted, capillary refill < 3 seconds.  ABDOMEN: Soft and non tender to palpation.

## 2020-02-14 ENCOUNTER — TELEPHONE (OUTPATIENT)
Dept: PULMONOLOGY | Facility: CLINIC | Age: 55
End: 2020-02-14

## 2020-02-14 DIAGNOSIS — J45.909 ASTHMA, UNSPECIFIED ASTHMA SEVERITY, UNSPECIFIED WHETHER COMPLICATED, UNSPECIFIED WHETHER PERSISTENT: Primary | ICD-10-CM

## 2020-03-31 ENCOUNTER — NURSE TRIAGE (OUTPATIENT)
Dept: ADMINISTRATIVE | Facility: CLINIC | Age: 55
End: 2020-03-31

## 2020-03-31 NOTE — TELEPHONE ENCOUNTER
Spoke with patient she states that she had COVID-19 symptoms in December 2019.  Patient states she wants to know if she may have had coronavirus at that time.  Patient states that she was seen by doctor during that time in December and she was told she had pneumonia and was given a Z-aurea.  Reports that she has gotten better since. Patient denies having any symptoms at this time and she states that she is isolating herself at home in her bedroom.  Advised patient to call back if she has further questions or concerns.  Patient verbalized understanding.     Reason for Disposition   General information question, no triage required and triager able to answer question    Protocols used: INFORMATION ONLY CALL-A-AH

## 2020-05-01 ENCOUNTER — TELEPHONE (OUTPATIENT)
Dept: NEPHROLOGY | Facility: CLINIC | Age: 55
End: 2020-05-01

## 2020-05-01 DIAGNOSIS — H81.10 BENIGN PAROXYSMAL POSITIONAL VERTIGO, UNSPECIFIED LATERALITY: Primary | ICD-10-CM

## 2020-05-01 NOTE — TELEPHONE ENCOUNTER
----- Message from Lola Pina MD sent at 5/1/2020 10:29 AM CDT -----  Please schedule her for audiology ---she feel and has BPV

## 2020-05-27 ENCOUNTER — CLINICAL SUPPORT (OUTPATIENT)
Dept: AUDIOLOGY | Facility: CLINIC | Age: 55
End: 2020-05-27
Payer: COMMERCIAL

## 2020-05-27 DIAGNOSIS — R42 DIZZINESS: ICD-10-CM

## 2020-05-27 NOTE — PROGRESS NOTES
"Referring Provider:Dr. Yovani JOINER Kev was seen 05/27/2020 for an evaluation of dizziness. She reports falling frequently, as recently as this morning when she got out of bed. She reports this has been happening for the last few months and tends to coincide with her dialysis appointments. She has dialysis Monday's, Wednesday's and Friday's for 4 hours a day. She had dialysis this morning and is feeling weak and slightly nauseous. She ate a piece of candy prior to seeing me today to settle her stomach. She has a history of hypotension (after dialysis appointments), chronic systolic congestive heart failure, DM Type 2, hypertension, and ESRD. She reports her episodes of dizziness feel like her forehead "sizzles" and has some spinning occurs. This lasts seconds.  Nothing alleviates symptoms. They dissipate on their own. Otoscopy was performed today and was unremarkable bilaterally.    Waimea Hallpike Right: Negative for BPPV. Lightheadedness reported when going from a supine position to a seated position.  Isaiah Hallpike Left: Negative for BPPV.Lightheadedness reported when going from a supine position to a seated position.    Patient was counseled on the above findings.    Recommendations:  1. Diagnostic audiogram and VNG to rule out vestibulopathy. Scheduled on a day she does not have dialysis. I counseled her regarding test procedures and she verbalized understanding.   2. If VNG is normal, follow-up with PCP and Nephrology regarding other possible causes for symptoms.               "

## 2020-06-25 ENCOUNTER — HOSPITAL ENCOUNTER (OUTPATIENT)
Facility: HOSPITAL | Age: 55
Discharge: HOME HEALTH | End: 2020-06-27
Attending: EMERGENCY MEDICINE | Admitting: INTERNAL MEDICINE
Payer: MEDICARE

## 2020-06-25 DIAGNOSIS — R47.1 DYSARTHRIA: ICD-10-CM

## 2020-06-25 DIAGNOSIS — R53.1 WEAKNESS: Primary | ICD-10-CM

## 2020-06-25 DIAGNOSIS — I63.9 STROKE: ICD-10-CM

## 2020-06-25 DIAGNOSIS — R47.01 APHASIA: ICD-10-CM

## 2020-06-25 DIAGNOSIS — I63.9 CVA (CEREBRAL VASCULAR ACCIDENT): ICD-10-CM

## 2020-06-25 PROBLEM — N18.6 ESRD (END STAGE RENAL DISEASE): Chronic | Status: ACTIVE | Noted: 2018-12-26

## 2020-06-25 PROBLEM — I48.0 PAF (PAROXYSMAL ATRIAL FIBRILLATION): Chronic | Status: ACTIVE | Noted: 2018-11-28

## 2020-06-25 LAB
ALBUMIN SERPL BCP-MCNC: 3.2 G/DL (ref 3.5–5.2)
ALP SERPL-CCNC: 77 U/L (ref 55–135)
ALT SERPL W/O P-5'-P-CCNC: 15 U/L (ref 10–44)
ANION GAP SERPL CALC-SCNC: 14 MMOL/L (ref 8–16)
AST SERPL-CCNC: 13 U/L (ref 10–40)
BASOPHILS # BLD AUTO: 0.03 K/UL (ref 0–0.2)
BASOPHILS NFR BLD: 0.4 % (ref 0–1.9)
BILIRUB SERPL-MCNC: 0.3 MG/DL (ref 0.1–1)
BUN SERPL-MCNC: 41 MG/DL (ref 6–20)
CALCIUM SERPL-MCNC: 9.8 MG/DL (ref 8.7–10.5)
CHLORIDE SERPL-SCNC: 100 MMOL/L (ref 95–110)
CHOLEST SERPL-MCNC: 159 MG/DL (ref 120–199)
CHOLEST/HDLC SERPL: 4.8 {RATIO} (ref 2–5)
CO2 SERPL-SCNC: 24 MMOL/L (ref 23–29)
CREAT SERPL-MCNC: 9.1 MG/DL (ref 0.5–1.4)
DIFFERENTIAL METHOD: ABNORMAL
EOSINOPHIL # BLD AUTO: 0.2 K/UL (ref 0–0.5)
EOSINOPHIL NFR BLD: 2.4 % (ref 0–8)
ERYTHROCYTE [DISTWIDTH] IN BLOOD BY AUTOMATED COUNT: 14.9 % (ref 11.5–14.5)
EST. GFR  (AFRICAN AMERICAN): 5 ML/MIN/1.73 M^2
EST. GFR  (NON AFRICAN AMERICAN): 4 ML/MIN/1.73 M^2
GLUCOSE SERPL-MCNC: 120 MG/DL (ref 70–110)
HCT VFR BLD AUTO: 44.4 % (ref 37–48.5)
HDLC SERPL-MCNC: 33 MG/DL (ref 40–75)
HDLC SERPL: 20.8 % (ref 20–50)
HGB BLD-MCNC: 13.2 G/DL (ref 12–16)
IMM GRANULOCYTES # BLD AUTO: 0.03 K/UL (ref 0–0.04)
IMM GRANULOCYTES NFR BLD AUTO: 0.4 % (ref 0–0.5)
INR PPP: 1 (ref 0.8–1.2)
LDLC SERPL CALC-MCNC: 80.2 MG/DL (ref 63–159)
LYMPHOCYTES # BLD AUTO: 1.9 K/UL (ref 1–4.8)
LYMPHOCYTES NFR BLD: 22.9 % (ref 18–48)
MCH RBC QN AUTO: 25.5 PG (ref 27–31)
MCHC RBC AUTO-ENTMCNC: 29.7 G/DL (ref 32–36)
MCV RBC AUTO: 86 FL (ref 82–98)
MONOCYTES # BLD AUTO: 0.7 K/UL (ref 0.3–1)
MONOCYTES NFR BLD: 9.2 % (ref 4–15)
NEUTROPHILS # BLD AUTO: 5.2 K/UL (ref 1.8–7.7)
NEUTROPHILS NFR BLD: 64.7 % (ref 38–73)
NONHDLC SERPL-MCNC: 126 MG/DL
NRBC BLD-RTO: 0 /100 WBC
PLATELET # BLD AUTO: 243 K/UL (ref 150–350)
PMV BLD AUTO: 8.9 FL (ref 9.2–12.9)
POCT GLUCOSE: 109 MG/DL (ref 70–110)
POCT GLUCOSE: 260 MG/DL (ref 70–110)
POTASSIUM SERPL-SCNC: 4.3 MMOL/L (ref 3.5–5.1)
PROT SERPL-MCNC: 7.6 G/DL (ref 6–8.4)
PROTHROMBIN TIME: 10.3 SEC (ref 9–12.5)
RBC # BLD AUTO: 5.18 M/UL (ref 4–5.4)
SARS-COV-2 RDRP RESP QL NAA+PROBE: NEGATIVE
SODIUM SERPL-SCNC: 138 MMOL/L (ref 136–145)
TRIGL SERPL-MCNC: 229 MG/DL (ref 30–150)
TSH SERPL DL<=0.005 MIU/L-ACNC: 1.28 UIU/ML (ref 0.4–4)
WBC # BLD AUTO: 8.08 K/UL (ref 3.9–12.7)

## 2020-06-25 PROCEDURE — 93005 ELECTROCARDIOGRAM TRACING: CPT

## 2020-06-25 PROCEDURE — 25000003 PHARM REV CODE 250: Performed by: EMERGENCY MEDICINE

## 2020-06-25 PROCEDURE — 63600175 PHARM REV CODE 636 W HCPCS: Performed by: NURSE PRACTITIONER

## 2020-06-25 PROCEDURE — C9399 UNCLASSIFIED DRUGS OR BIOLOG: HCPCS | Performed by: NURSE PRACTITIONER

## 2020-06-25 PROCEDURE — 25000003 PHARM REV CODE 250: Performed by: NURSE PRACTITIONER

## 2020-06-25 PROCEDURE — G0378 HOSPITAL OBSERVATION PER HR: HCPCS

## 2020-06-25 PROCEDURE — 93010 ELECTROCARDIOGRAM REPORT: CPT | Mod: ,,, | Performed by: INTERNAL MEDICINE

## 2020-06-25 PROCEDURE — 85610 PROTHROMBIN TIME: CPT

## 2020-06-25 PROCEDURE — 80053 COMPREHEN METABOLIC PANEL: CPT

## 2020-06-25 PROCEDURE — U0002 COVID-19 LAB TEST NON-CDC: HCPCS

## 2020-06-25 PROCEDURE — 82962 GLUCOSE BLOOD TEST: CPT

## 2020-06-25 PROCEDURE — 96372 THER/PROPH/DIAG INJ SC/IM: CPT | Mod: 59 | Performed by: EMERGENCY MEDICINE

## 2020-06-25 PROCEDURE — 80061 LIPID PANEL: CPT

## 2020-06-25 PROCEDURE — 36415 COLL VENOUS BLD VENIPUNCTURE: CPT

## 2020-06-25 PROCEDURE — 84443 ASSAY THYROID STIM HORMONE: CPT

## 2020-06-25 PROCEDURE — 93010 EKG 12-LEAD: ICD-10-PCS | Mod: ,,, | Performed by: INTERNAL MEDICINE

## 2020-06-25 PROCEDURE — 99291 CRITICAL CARE FIRST HOUR: CPT

## 2020-06-25 PROCEDURE — 85025 COMPLETE CBC W/AUTO DIFF WBC: CPT

## 2020-06-25 RX ORDER — SODIUM CHLORIDE 0.9 % (FLUSH) 0.9 %
10 SYRINGE (ML) INJECTION
Status: DISCONTINUED | OUTPATIENT
Start: 2020-06-25 | End: 2020-06-27 | Stop reason: HOSPADM

## 2020-06-25 RX ORDER — LABETALOL HYDROCHLORIDE 5 MG/ML
10 INJECTION, SOLUTION INTRAVENOUS EVERY 4 HOURS PRN
Status: DISCONTINUED | OUTPATIENT
Start: 2020-06-25 | End: 2020-06-25

## 2020-06-25 RX ORDER — ACETAMINOPHEN 500 MG
1000 TABLET ORAL
Status: COMPLETED | OUTPATIENT
Start: 2020-06-25 | End: 2020-06-25

## 2020-06-25 RX ORDER — SEVELAMER CARBONATE 800 MG/1
1600 TABLET, FILM COATED ORAL
Status: DISCONTINUED | OUTPATIENT
Start: 2020-06-26 | End: 2020-06-27 | Stop reason: HOSPADM

## 2020-06-25 RX ORDER — GLUCAGON 1 MG
1 KIT INJECTION
Status: DISCONTINUED | OUTPATIENT
Start: 2020-06-25 | End: 2020-06-27 | Stop reason: HOSPADM

## 2020-06-25 RX ORDER — HEPARIN SODIUM 5000 [USP'U]/ML
5000 INJECTION, SOLUTION INTRAVENOUS; SUBCUTANEOUS EVERY 8 HOURS
Status: DISCONTINUED | OUTPATIENT
Start: 2020-06-25 | End: 2020-06-27 | Stop reason: HOSPADM

## 2020-06-25 RX ORDER — IBUPROFEN 200 MG
24 TABLET ORAL
Status: DISCONTINUED | OUTPATIENT
Start: 2020-06-25 | End: 2020-06-27 | Stop reason: HOSPADM

## 2020-06-25 RX ORDER — INSULIN ASPART 100 [IU]/ML
0-5 INJECTION, SOLUTION INTRAVENOUS; SUBCUTANEOUS
Status: DISCONTINUED | OUTPATIENT
Start: 2020-06-25 | End: 2020-06-27 | Stop reason: HOSPADM

## 2020-06-25 RX ORDER — HYDRALAZINE HYDROCHLORIDE 20 MG/ML
10 INJECTION INTRAMUSCULAR; INTRAVENOUS EVERY 8 HOURS PRN
Status: DISCONTINUED | OUTPATIENT
Start: 2020-06-25 | End: 2020-06-27 | Stop reason: HOSPADM

## 2020-06-25 RX ORDER — ONDANSETRON 2 MG/ML
4 INJECTION INTRAMUSCULAR; INTRAVENOUS EVERY 12 HOURS PRN
Status: DISCONTINUED | OUTPATIENT
Start: 2020-06-25 | End: 2020-06-27 | Stop reason: HOSPADM

## 2020-06-25 RX ORDER — DIAZEPAM 5 MG/1
5 TABLET ORAL EVERY 12 HOURS PRN
Status: DISCONTINUED | OUTPATIENT
Start: 2020-06-25 | End: 2020-06-27 | Stop reason: HOSPADM

## 2020-06-25 RX ORDER — IBUPROFEN 200 MG
16 TABLET ORAL
Status: DISCONTINUED | OUTPATIENT
Start: 2020-06-25 | End: 2020-06-27 | Stop reason: HOSPADM

## 2020-06-25 RX ORDER — ASPIRIN 81 MG/1
81 TABLET ORAL DAILY
Status: DISCONTINUED | OUTPATIENT
Start: 2020-06-26 | End: 2020-06-27 | Stop reason: HOSPADM

## 2020-06-25 RX ORDER — PANTOPRAZOLE SODIUM 40 MG/1
40 TABLET, DELAYED RELEASE ORAL DAILY
Status: DISCONTINUED | OUTPATIENT
Start: 2020-06-26 | End: 2020-06-27 | Stop reason: HOSPADM

## 2020-06-25 RX ORDER — PRAVASTATIN SODIUM 20 MG/1
40 TABLET ORAL DAILY
Status: DISCONTINUED | OUTPATIENT
Start: 2020-06-26 | End: 2020-06-27 | Stop reason: HOSPADM

## 2020-06-25 RX ADMIN — ACETAMINOPHEN 1000 MG: 500 TABLET ORAL at 03:06

## 2020-06-25 RX ADMIN — INSULIN DETEMIR 10 UNITS: 100 INJECTION, SOLUTION SUBCUTANEOUS at 09:06

## 2020-06-25 RX ADMIN — INSULIN ASPART 1 UNITS: 100 INJECTION, SOLUTION INTRAVENOUS; SUBCUTANEOUS at 09:06

## 2020-06-25 RX ADMIN — HEPARIN SODIUM 5000 UNITS: 5000 INJECTION, SOLUTION INTRAVENOUS; SUBCUTANEOUS at 09:06

## 2020-06-25 RX ADMIN — DIAZEPAM 5 MG: 5 TABLET ORAL at 09:06

## 2020-06-25 NOTE — ED NOTES
Patient identifiers verified and correct for Cordell Angulo.    Patient presented to the ED with c/o slurred speech since this morning. Patient reports she went to sleep last night and woke up this morning confused. Patient denies all other complaints at this time.     LOC: The patient is awake, alert and aware of environment with an appropriate affect, the patient is oriented x 3 and speaking appropriately.  APPEARANCE: Patient resting comfortably and in no acute distress, patient is clean and well groomed, patient's clothing is properly fastened.  HEENT: + slurred speech   SKIN: The skin is warm and dry, color consistent with ethnicity, patient has normal skin turgor and moist mucus membranes, skin intact, no breakdown or bruising noted. +shunt noted to the left upper arm   MUSCULOSKELETAL: Patient moving all extremities spontaneously.   RESPIRATORY: Airway is open and patent, respirations are spontaneous, and unlabored. Breath sounds are clear.   CARDIAC: Patient has a normal rate, no periphreal edema noted, capillary refill < 3 seconds.   ABDOMEN: Soft and non tender to palpation. No distention noted.   : Normal urinary patterns. Urine is yellow without foul odor.

## 2020-06-25 NOTE — ED PROVIDER NOTES
SCRIBE #1 NOTE: I, Trista Javed/Anuj Lester, am scribing for, and in the presence of, London Patterson MD. I have scribed the entire note.       History     Chief Complaint   Patient presents with    Altered Mental Status     pt stuttering since yesterday after dialysis     Review of patient's allergies indicates:   Allergen Reactions    Morphine Itching    Sulfa (sulfonamide antibiotics) Rash         History of Present Illness     HPI    6/25/2020, 2:06 PM  History obtained from the patient      History of Present Illness: Cordell Angulo is a 55 y.o. female patient with a PMHx of asthma, CHF, CKD, HTN, DM who presents to the Emergency Department for evaluation of stuttering speech which onset suddenly yesterday afternoon after dialysis. Symptoms are constant and moderate in severity. No mitigating or exacerbating factors reported. Associated sxs include L-sided headache. Patient denies any syncope, confusion, extremity weakness/numbness, dizziness, facial droop, and all other sxs at this time. No prior Tx included. No further complaints or concerns at this time. Patient states she continued to notice stuttering this AM upon awakening from sleep.       Arrival mode: EMS    PCP: Chuck Grider MD        Past Medical History:  Past Medical History:   Diagnosis Date    Asthma     CHF (congestive heart failure)     CKD (chronic kidney disease) stage 5, GFR less than 15 ml/min     Depression     Diabetes mellitus     Dialysis patient     Hypercholesterolemia     Hypertension     PAF (paroxysmal atrial fibrillation)        Past Surgical History:  Past Surgical History:   Procedure Laterality Date    AV FISTULA PLACEMENT      CARDIAC ELECTROPHYSIOLOGY MAPPING AND ABLATION      CATHETERIZATION OF BOTH LEFT AND RIGHT HEART N/A 12/28/2018    Procedure: CATHETERIZATION, HEART, BOTH LEFT AND RIGHT;  Surgeon: Enrique Jj MD;  Location: [facility] CATH LAB;  Service: Cardiology;  Laterality: N/A;  To follow his  cardioversion case in [facility]    cesarian section      TUMOR REMOVAL      L lung         Family History:  Family History   Problem Relation Age of Onset    Heart disease Mother     Diabetes Father        Social History:  Social History     Tobacco Use    Smoking status: Never Smoker    Smokeless tobacco: Never Used   Substance and Sexual Activity    Alcohol use: No    Drug use: No    Sexual activity: Unknown        Review of Systems     Review of Systems   Constitutional: Negative for fever.   HENT: Negative for sore throat.    Respiratory: Negative for shortness of breath.    Cardiovascular: Negative for chest pain.   Gastrointestinal: Negative for nausea.   Genitourinary: Negative for dysuria.   Musculoskeletal: Negative for back pain.   Skin: Negative for rash.   Neurological: Positive for speech difficulty and headaches. Negative for dizziness, syncope, facial asymmetry, weakness and numbness.   Hematological: Does not bruise/bleed easily.   Psychiatric/Behavioral: Negative for confusion.   All other systems reviewed and are negative.     Physical Exam     Initial Vitals [06/25/20 1253]   BP Pulse Resp Temp SpO2   120/70 68 18 98.5 °F (36.9 °C) 100 %      MAP       --          Physical Exam  Nursing Notes and Vital Signs Reviewed.  Constitutional: Patient is in NAD. Well-developed and well-nourished.  Head: Atraumatic. Normocephalic.  Eyes: PERRL. EOM intact. Conjunctivae are not pale. No scleral icterus.  ENT: Mucous membranes are moist. Oropharynx is clear and symmetric.    Neck: Supple. Full ROM. No lymphadenopathy.  Cardiovascular: Regular rate. Regular rhythm. No murmurs, rubs, or gallops. Distal pulses are 2+ and symmetric. Thrill and bruit to shunt in LUE.  Pulmonary/Chest: No respiratory distress. Clear to auscultation bilaterally. No wheezing or rales.  Abdominal: Soft and non-distended.  There is no tenderness.  No rebound, guarding, or rigidity. Good bowel sounds.  Genitourinary: No CVA  tenderness  Musculoskeletal: Moves all extremities. No obvious deformities. No edema. No calf tenderness.  Skin: Warm and dry.  Neurological: No disarthia. Aphasia. LUE and RLE drift. No facial droop. Patient is alert and oriented to person, place and time. Pupils ERRL and EOM normal. Light touch sense is intact.   Psychiatric: Normal affect. Good eye contact. Appropriate in content.     ED Course   Critical Care    Date/Time: 6/25/2020 6:10 PM  Performed by: London Patterson MD  Authorized by: Bethany Hermosillo MD   Direct patient critical care time: 15 minutes  Additional history critical care time: 10 minutes  Ordering / reviewing critical care time: 9 minutes  Documentation critical care time: 9 minutes  Total critical care time (exclusive of procedural time) : 43 minutes  Critical care time was exclusive of separately billable procedures and treating other patients and teaching time.  Critical care was necessary to treat or prevent imminent or life-threatening deterioration of the following conditions: CNS failure or compromise.  Critical care was time spent personally by me on the following activities: blood draw for specimens, development of treatment plan with patient or surrogate, discussions with consultants, interpretation of cardiac output measurements, evaluation of patient's response to treatment, examination of patient, obtaining history from patient or surrogate, ordering and performing treatments and interventions, ordering and review of laboratory studies, ordering and review of radiographic studies, pulse oximetry, review of old charts and re-evaluation of patient's condition.        ED Vital Signs:  Vitals:    06/25/20 1253 06/25/20 1450 06/25/20 1548 06/25/20 1803   BP: 120/70 132/73 (!) 159/79 (!) 172/79   Pulse: 68 62 64 60   Resp: 18 18     Temp: 98.5 °F (36.9 °C)      TempSrc: Oral      SpO2: 100% 99% 95% 100%   Weight:       Height:        06/25/20 1856 06/25/20 1901 06/25/20 1930 06/25/20 2000  "  BP: (!) 170/80 (!) 162/78     Pulse: 62 60  60   Resp: 17 17  18   Temp: 98.1 °F (36.7 °C) 98.1 °F (36.7 °C)     TempSrc: Oral Oral     SpO2: 100% 100%  100%   Weight:   105.1 kg (231 lb 11.3 oz)    Height:   5' 7" (1.702 m)     06/25/20 2110   BP:    Pulse: 61   Resp:    Temp:    TempSrc:    SpO2:    Weight:    Height:        Abnormal Lab Results:  Labs Reviewed   CBC W/ AUTO DIFFERENTIAL - Abnormal; Notable for the following components:       Result Value    Mean Corpuscular Hemoglobin 25.5 (*)     Mean Corpuscular Hemoglobin Conc 29.7 (*)     RDW 14.9 (*)     MPV 8.9 (*)     All other components within normal limits   COMPREHENSIVE METABOLIC PANEL - Abnormal; Notable for the following components:    Glucose 120 (*)     BUN, Bld 41 (*)     Creatinine 9.1 (*)     Albumin 3.2 (*)     eGFR if  5 (*)     eGFR if non  4 (*)     All other components within normal limits   PROTIME-INR   TSH   SARS-COV-2 RNA AMPLIFICATION, QUAL   POCT GLUCOSE, HAND-HELD DEVICE   POCT GLUCOSE        All Lab Results:  Results for orders placed or performed during the hospital encounter of 06/25/20   CBC W/ AUTO DIFFERENTIAL   Result Value Ref Range    WBC 8.08 3.90 - 12.70 K/uL    RBC 5.18 4.00 - 5.40 M/uL    Hemoglobin 13.2 12.0 - 16.0 g/dL    Hematocrit 44.4 37.0 - 48.5 %    Mean Corpuscular Volume 86 82 - 98 fL    Mean Corpuscular Hemoglobin 25.5 (L) 27.0 - 31.0 pg    Mean Corpuscular Hemoglobin Conc 29.7 (L) 32.0 - 36.0 g/dL    RDW 14.9 (H) 11.5 - 14.5 %    Platelets 243 150 - 350 K/uL    MPV 8.9 (L) 9.2 - 12.9 fL    Immature Granulocytes 0.4 0.0 - 0.5 %    Gran # (ANC) 5.2 1.8 - 7.7 K/uL    Immature Grans (Abs) 0.03 0.00 - 0.04 K/uL    Lymph # 1.9 1.0 - 4.8 K/uL    Mono # 0.7 0.3 - 1.0 K/uL    Eos # 0.2 0.0 - 0.5 K/uL    Baso # 0.03 0.00 - 0.20 K/uL    nRBC 0 0 /100 WBC    Gran% 64.7 38.0 - 73.0 %    Lymph% 22.9 18.0 - 48.0 %    Mono% 9.2 4.0 - 15.0 %    Eosinophil% 2.4 0.0 - 8.0 %    Basophil% 0.4 0.0 " - 1.9 %    Differential Method Automated    Comprehensive metabolic panel   Result Value Ref Range    Sodium 138 136 - 145 mmol/L    Potassium 4.3 3.5 - 5.1 mmol/L    Chloride 100 95 - 110 mmol/L    CO2 24 23 - 29 mmol/L    Glucose 120 (H) 70 - 110 mg/dL    BUN, Bld 41 (H) 6 - 20 mg/dL    Creatinine 9.1 (H) 0.5 - 1.4 mg/dL    Calcium 9.8 8.7 - 10.5 mg/dL    Total Protein 7.6 6.0 - 8.4 g/dL    Albumin 3.2 (L) 3.5 - 5.2 g/dL    Total Bilirubin 0.3 0.1 - 1.0 mg/dL    Alkaline Phosphatase 77 55 - 135 U/L    AST 13 10 - 40 U/L    ALT 15 10 - 44 U/L    Anion Gap 14 8 - 16 mmol/L    eGFR if African American 5 (A) >60 mL/min/1.73 m^2    eGFR if non African American 4 (A) >60 mL/min/1.73 m^2   Protime-INR   Result Value Ref Range    Prothrombin Time 10.3 9.0 - 12.5 sec    INR 1.0 0.8 - 1.2   TSH   Result Value Ref Range    TSH 1.283 0.400 - 4.000 uIU/mL   LDL - Lipid Panel   Result Value Ref Range    Cholesterol 159 120 - 199 mg/dL    Triglycerides 229 (H) 30 - 150 mg/dL    HDL 33 (L) 40 - 75 mg/dL    LDL Cholesterol 80.2 63.0 - 159.0 mg/dL    Hdl/Cholesterol Ratio 20.8 20.0 - 50.0 %    Total Cholesterol/HDL Ratio 4.8 2.0 - 5.0    Non-HDL Cholesterol 126 mg/dL   COVID-19 Rapid Screening   Result Value Ref Range    SARS-CoV-2 RNA, Amplification, Qual Negative Negative   POCT glucose   Result Value Ref Range    POCT Glucose 109 70 - 110 mg/dL   POCT glucose   Result Value Ref Range    POCT Glucose 260 (H) 70 - 110 mg/dL         Imaging Results:  Imaging Results          CT Head Without Contrast (Final result)  Result time 06/25/20 13:58:15    Final result by Kvng Banks MD (06/25/20 13:58:15)                 Impression:      1.  Negative for acute intracranial process. Negative for hemorrhage, or skull fracture.    2.  Atrophy, intracranial atherosclerotic disease and small vessel ischemic changes again seen.    3.  Stable findings as noted above.    All CT scans at this facility are performed  using dose modulation  techniques as appropriate to performed exam including the following:  automated exposure control; adjustment of mA and/or kV according to the patients size (this includes techniques or standardized protocols for targeted exams where dose is matched to indication/reason for exam: i.e. extremities or head);  iterative reconstruction technique.      Electronically signed by: Kvng Banks MD  Date:    06/25/2020  Time:    13:58             Narrative:    EXAMINATION:  CT HEAD WITHOUT CONTRAST    CLINICAL HISTORY:  Neuro deficit, acute, stroke suspected;    TECHNIQUE:  Axial images through the brain and posterior fossa were obtained without the use of IV contrast.  Sagittal and coronal reconstructions are provided for review.    COMPARISON:  February 10, 2020    FINDINGS:  The ventricles are midline and the CSF spaces are prominent.  The gray-white matter junction is well preserved. Negative for intracranial vascular abnormalities. Negative for mass, mass effect, cerebral edema, hemorrhage or abnormal fluid collections.  Intracranial atherosclerotic disease.  Small vessel ischemic changes, focally prominent in the posterior periventricular white matter regions.  Small cavum septum pellucidum.  Basal ganglia calcifications.  Falx calcifications.  Arachnoid granulation calcifications.    The skull and scalp are intact.    The   paranasal sinuses, mastoid air cells, middle ears and ear canals are clear. The globes are intact.  Postoperative changes to the lens regions.                                 The EKG was ordered, reviewed, and independently interpreted by the ED provider.  Interpretation time: 1333  Rate: 71 BPM  Rhythm: NSR  Interpretation: Possible Left atrial enlargement, Incomplete right bundle branch block, left ventricular hypertrophy, T wave abnormality, consider inferolateral ischemia. No STEMI.             The Emergency Provider reviewed the vital signs and test results, which are outlined above.     ED  Discussion     5:39 PM: Discussed pt's case with Dr. Caro ([facility]) who recommends MRI head and admit to  for metabolic workup.    5:59 PM: Discussed case with Leatha Woods NP ([facility]). Dr. Leon agrees with current care and management of pt and accepts admission.   Admitting Service: Hospital Medicine  Admitting Physician: Dr. Leon  Admit to: Obs / med tele      ED Course as of Jun 25 2240   Thu Jun 25, 2020   1527 Creatinine(!): 9.1 [BA]      ED Course User Index  [BA] London Patterson MD     Medical Decision Making:   Clinical Tests:   Lab Tests: Ordered and Reviewed  Radiological Study: Ordered and Reviewed  Medical Tests: Ordered and Reviewed           ED Medication(s):  Medications   sodium chloride 0.9% flush 10 mL (has no administration in time range)   sodium chloride 0.9% bolus 500 mL (has no administration in time range)   heparin (porcine) injection 5,000 Units (5,000 Units Subcutaneous Given 6/25/20 2126)   ondansetron injection 4 mg (has no administration in time range)   promethazine (PHENERGAN) 6.25 mg in dextrose 5 % 50 mL IVPB (has no administration in time range)   aspirin EC tablet 81 mg (has no administration in time range)   insulin detemir U-100 pen 10 Units (10 Units Subcutaneous Given 6/25/20 2125)   pravastatin tablet 40 mg (has no administration in time range)   sevelamer carbonate tablet 1,600 mg (has no administration in time range)   sucroferric oxyhydroxide Chew 500 mg (has no administration in time range)   vitamin renal formula (B-complex-vitamin c-folic acid) 1 mg per capsule 1 capsule (has no administration in time range)   pantoprazole EC tablet 40 mg (has no administration in time range)   glucose chewable tablet 16 g (has no administration in time range)   glucose chewable tablet 24 g (has no administration in time range)   dextrose 50% injection 12.5 g (has no administration in time range)   dextrose 50% injection 25 g (has no administration in time  range)   glucagon (human recombinant) injection 1 mg (has no administration in time range)   insulin aspart U-100 pen 0-5 Units (1 Units Subcutaneous Given 6/25/20 2126)   hydrALAZINE injection 10 mg (has no administration in time range)   diazePAM tablet 5 mg (5 mg Oral Given 6/25/20 2129)   acetaminophen tablet 1,000 mg (1,000 mg Oral Given 6/25/20 1535)       Current Discharge Medication List                  Scribe Attestation:   Scribe #1: I performed the above scribed service and the documentation accurately describes the services I performed. I attest to the accuracy of the note.     Attending:   Physician Attestation Statement for Scribe #1: I, London Patterson MD, personally performed the services described in this documentation, as scribed by Trista Javed/Anuj Lester, in my presence, and it is both accurate and complete.           Clinical Impression       ICD-10-CM ICD-9-CM   1. Weakness  R53.1 780.79   2. Stroke  I63.9 434.91   3. Aphasia  R47.01 784.3   4. CVA (cerebral vascular accident)  I63.9 434.91       Disposition:   Disposition: Placed in Observation  Condition: Fair       London Patterson MD  06/25/20 8240

## 2020-06-25 NOTE — ED NOTES
Spoke with Dr. Allen- Admit patient for MRI in the morning. Placed renal diet in for patient and called dietary. Patient informed and resting comfortably daughter at bedside.

## 2020-06-26 LAB
ALBUMIN SERPL BCP-MCNC: 3 G/DL (ref 3.5–5.2)
ALP SERPL-CCNC: 68 U/L (ref 55–135)
ALT SERPL W/O P-5'-P-CCNC: 13 U/L (ref 10–44)
ANION GAP SERPL CALC-SCNC: 15 MMOL/L (ref 8–16)
AORTIC ROOT ANNULUS: 2.59 CM
APTT BLDCRRT: 27.3 SEC (ref 21–32)
AST SERPL-CCNC: 11 U/L (ref 10–40)
AV INDEX (PROSTH): 0.62
AV MEAN GRADIENT: 7 MMHG
AV PEAK GRADIENT: 12 MMHG
AV VALVE AREA: 2.76 CM2
AV VELOCITY RATIO: 0.59
BACTERIA #/AREA URNS HPF: ABNORMAL /HPF
BASOPHILS # BLD AUTO: 0.05 K/UL (ref 0–0.2)
BASOPHILS NFR BLD: 0.7 % (ref 0–1.9)
BILIRUB SERPL-MCNC: 0.3 MG/DL (ref 0.1–1)
BILIRUB UR QL STRIP: NEGATIVE
BSA FOR ECHO PROCEDURE: 2.23 M2
BUN SERPL-MCNC: 52 MG/DL (ref 6–20)
CALCIUM SERPL-MCNC: 9.2 MG/DL (ref 8.7–10.5)
CHLORIDE SERPL-SCNC: 99 MMOL/L (ref 95–110)
CK MB SERPL-MCNC: 0.5 NG/ML (ref 0.1–6.5)
CK MB SERPL-RTO: 1.5 % (ref 0–5)
CK SERPL-CCNC: 34 U/L (ref 20–180)
CLARITY UR: CLEAR
CO2 SERPL-SCNC: 24 MMOL/L (ref 23–29)
COLOR UR: YELLOW
CREAT SERPL-MCNC: 10.5 MG/DL (ref 0.5–1.4)
CV ECHO LV RWT: 0.49 CM
DIFFERENTIAL METHOD: ABNORMAL
DOP CALC AO PEAK VEL: 1.72 M/S
DOP CALC AO VTI: 33.64 CM
DOP CALC LVOT AREA: 4.5 CM2
DOP CALC LVOT DIAMETER: 2.39 CM
DOP CALC LVOT PEAK VEL: 1.02 M/S
DOP CALC LVOT STROKE VOLUME: 92.77 CM3
DOP CALCLVOT PEAK VEL VTI: 20.69 CM
E WAVE DECELERATION TIME: 140.99 MSEC
E/A RATIO: 0.83
E/E' RATIO: 9.14 M/S
ECHO LV POSTERIOR WALL: 1.41 CM (ref 0.6–1.1)
EOSINOPHIL # BLD AUTO: 0.2 K/UL (ref 0–0.5)
EOSINOPHIL NFR BLD: 2.5 % (ref 0–8)
ERYTHROCYTE [DISTWIDTH] IN BLOOD BY AUTOMATED COUNT: 14.8 % (ref 11.5–14.5)
EST. GFR  (AFRICAN AMERICAN): 4 ML/MIN/1.73 M^2
EST. GFR  (NON AFRICAN AMERICAN): 4 ML/MIN/1.73 M^2
ESTIMATED AVG GLUCOSE: 146 MG/DL (ref 68–131)
FRACTIONAL SHORTENING: 19 % (ref 28–44)
GLUCOSE SERPL-MCNC: 154 MG/DL (ref 70–110)
GLUCOSE UR QL STRIP: NEGATIVE
HBA1C MFR BLD HPLC: 6.7 % (ref 4–5.6)
HCT VFR BLD AUTO: 40.8 % (ref 37–48.5)
HGB BLD-MCNC: 12.3 G/DL (ref 12–16)
HGB UR QL STRIP: ABNORMAL
HYALINE CASTS #/AREA URNS LPF: 0 /LPF
IMM GRANULOCYTES # BLD AUTO: 0.02 K/UL (ref 0–0.04)
IMM GRANULOCYTES NFR BLD AUTO: 0.3 % (ref 0–0.5)
INR PPP: 1 (ref 0.8–1.2)
INTERVENTRICULAR SEPTUM: 1.13 CM (ref 0.6–1.1)
KETONES UR QL STRIP: NEGATIVE
LA MAJOR: 5.66 CM
LA MINOR: 4.66 CM
LA WIDTH: 4.14 CM
LEFT ATRIUM SIZE: 3.55 CM
LEFT ATRIUM VOLUME INDEX: 29.7 ML/M2
LEFT ATRIUM VOLUME: 63.86 CM3
LEFT INTERNAL DIMENSION IN SYSTOLE: 4.67 CM (ref 2.1–4)
LEFT VENTRICLE DIASTOLIC VOLUME INDEX: 77.17 ML/M2
LEFT VENTRICLE DIASTOLIC VOLUME: 166.12 ML
LEFT VENTRICLE MASS INDEX: 149 G/M2
LEFT VENTRICLE SYSTOLIC VOLUME INDEX: 46.9 ML/M2
LEFT VENTRICLE SYSTOLIC VOLUME: 100.93 ML
LEFT VENTRICULAR INTERNAL DIMENSION IN DIASTOLE: 5.79 CM (ref 3.5–6)
LEFT VENTRICULAR MASS: 320.01 G
LEUKOCYTE ESTERASE UR QL STRIP: NEGATIVE
LV LATERAL E/E' RATIO: 8 M/S
LV SEPTAL E/E' RATIO: 10.67 M/S
LYMPHOCYTES # BLD AUTO: 1.6 K/UL (ref 1–4.8)
LYMPHOCYTES NFR BLD: 20.9 % (ref 18–48)
MAGNESIUM SERPL-MCNC: 2.2 MG/DL (ref 1.6–2.6)
MCH RBC QN AUTO: 25.9 PG (ref 27–31)
MCHC RBC AUTO-ENTMCNC: 30.1 G/DL (ref 32–36)
MCV RBC AUTO: 86 FL (ref 82–98)
MICROSCOPIC COMMENT: ABNORMAL
MONOCYTES # BLD AUTO: 0.6 K/UL (ref 0.3–1)
MONOCYTES NFR BLD: 7.9 % (ref 4–15)
MV PEAK A VEL: 0.77 M/S
MV PEAK E VEL: 0.64 M/S
MV STENOSIS PRESSURE HALF TIME: 40.89 MS
MV VALVE AREA P 1/2 METHOD: 5.38 CM2
NEUTROPHILS # BLD AUTO: 5.2 K/UL (ref 1.8–7.7)
NEUTROPHILS NFR BLD: 67.7 % (ref 38–73)
NITRITE UR QL STRIP: NEGATIVE
NRBC BLD-RTO: 0 /100 WBC
PH UR STRIP: 6 [PH] (ref 5–8)
PHOSPHATE SERPL-MCNC: 7 MG/DL (ref 2.7–4.5)
PISA TR MAX VEL: 1.87 M/S
PLATELET # BLD AUTO: 249 K/UL (ref 150–350)
PMV BLD AUTO: 9 FL (ref 9.2–12.9)
POCT GLUCOSE: 122 MG/DL (ref 70–110)
POCT GLUCOSE: 139 MG/DL (ref 70–110)
POCT GLUCOSE: 147 MG/DL (ref 70–110)
POCT GLUCOSE: 164 MG/DL (ref 70–110)
POTASSIUM SERPL-SCNC: 4.4 MMOL/L (ref 3.5–5.1)
PROT SERPL-MCNC: 7.1 G/DL (ref 6–8.4)
PROT UR QL STRIP: ABNORMAL
PROTHROMBIN TIME: 10.2 SEC (ref 9–12.5)
PULM VEIN S/D RATIO: 1.23
PV PEAK D VEL: 0.44 M/S
PV PEAK S VEL: 0.54 M/S
PV PEAK VELOCITY: 0.92 CM/S
RA MAJOR: 4.27 CM
RA PRESSURE: 3 MMHG
RA WIDTH: 3.11 CM
RBC # BLD AUTO: 4.75 M/UL (ref 4–5.4)
RBC #/AREA URNS HPF: 1 /HPF (ref 0–4)
RIGHT VENTRICULAR END-DIASTOLIC DIMENSION: 2.11 CM
SINUS: 2.77 CM
SODIUM SERPL-SCNC: 138 MMOL/L (ref 136–145)
SP GR UR STRIP: 1.02 (ref 1–1.03)
SQUAMOUS #/AREA URNS HPF: 25 /HPF
TDI LATERAL: 0.08 M/S
TDI SEPTAL: 0.06 M/S
TDI: 0.07 M/S
TR MAX PG: 14 MMHG
TRICUSPID ANNULAR PLANE SYSTOLIC EXCURSION: 1.73 CM
TROPONIN I SERPL DL<=0.01 NG/ML-MCNC: 0.02 NG/ML (ref 0–0.03)
TV REST PULMONARY ARTERY PRESSURE: 17 MMHG
URN SPEC COLLECT METH UR: ABNORMAL
UROBILINOGEN UR STRIP-ACNC: NEGATIVE EU/DL
WBC # BLD AUTO: 7.69 K/UL (ref 3.9–12.7)
WBC #/AREA URNS HPF: 5 /HPF (ref 0–5)

## 2020-06-26 PROCEDURE — 84100 ASSAY OF PHOSPHORUS: CPT

## 2020-06-26 PROCEDURE — 84484 ASSAY OF TROPONIN QUANT: CPT

## 2020-06-26 PROCEDURE — 85025 COMPLETE CBC W/AUTO DIFF WBC: CPT

## 2020-06-26 PROCEDURE — 36415 COLL VENOUS BLD VENIPUNCTURE: CPT

## 2020-06-26 PROCEDURE — 97535 SELF CARE MNGMENT TRAINING: CPT

## 2020-06-26 PROCEDURE — 99204 OFFICE O/P NEW MOD 45 MIN: CPT | Mod: ,,, | Performed by: PSYCHIATRY & NEUROLOGY

## 2020-06-26 PROCEDURE — 82553 CREATINE MB FRACTION: CPT

## 2020-06-26 PROCEDURE — 80053 COMPREHEN METABOLIC PANEL: CPT

## 2020-06-26 PROCEDURE — G0378 HOSPITAL OBSERVATION PER HR: HCPCS

## 2020-06-26 PROCEDURE — G0257 UNSCHED DIALYSIS ESRD PT HOS: HCPCS

## 2020-06-26 PROCEDURE — 99215 PR OFFICE/OUTPT VISIT, EST, LEVL V, 40-54 MIN: ICD-10-PCS | Mod: ,,, | Performed by: INTERNAL MEDICINE

## 2020-06-26 PROCEDURE — 94761 N-INVAS EAR/PLS OXIMETRY MLT: CPT

## 2020-06-26 PROCEDURE — 25000003 PHARM REV CODE 250: Performed by: NURSE PRACTITIONER

## 2020-06-26 PROCEDURE — 99215 OFFICE O/P EST HI 40 MIN: CPT | Mod: ,,, | Performed by: INTERNAL MEDICINE

## 2020-06-26 PROCEDURE — 80100016 HC MAINTENANCE HEMODIALYSIS

## 2020-06-26 PROCEDURE — 82550 ASSAY OF CK (CPK): CPT

## 2020-06-26 PROCEDURE — 92610 EVALUATE SWALLOWING FUNCTION: CPT

## 2020-06-26 PROCEDURE — 63600175 PHARM REV CODE 636 W HCPCS: Performed by: NURSE PRACTITIONER

## 2020-06-26 PROCEDURE — 25000003 PHARM REV CODE 250: Performed by: INTERNAL MEDICINE

## 2020-06-26 PROCEDURE — 96374 THER/PROPH/DIAG INJ IV PUSH: CPT | Mod: 59 | Performed by: EMERGENCY MEDICINE

## 2020-06-26 PROCEDURE — 97116 GAIT TRAINING THERAPY: CPT

## 2020-06-26 PROCEDURE — 99204 PR OFFICE/OUTPT VISIT, NEW, LEVL IV, 45-59 MIN: ICD-10-PCS | Mod: ,,, | Performed by: PSYCHIATRY & NEUROLOGY

## 2020-06-26 PROCEDURE — 85730 THROMBOPLASTIN TIME PARTIAL: CPT

## 2020-06-26 PROCEDURE — 96372 THER/PROPH/DIAG INJ SC/IM: CPT | Mod: 59 | Performed by: EMERGENCY MEDICINE

## 2020-06-26 PROCEDURE — 97165 OT EVAL LOW COMPLEX 30 MIN: CPT

## 2020-06-26 PROCEDURE — 92523 SPEECH SOUND LANG COMPREHEN: CPT

## 2020-06-26 PROCEDURE — 97162 PT EVAL MOD COMPLEX 30 MIN: CPT

## 2020-06-26 PROCEDURE — 81000 URINALYSIS NONAUTO W/SCOPE: CPT

## 2020-06-26 PROCEDURE — 83036 HEMOGLOBIN GLYCOSYLATED A1C: CPT

## 2020-06-26 PROCEDURE — 85610 PROTHROMBIN TIME: CPT

## 2020-06-26 PROCEDURE — 83735 ASSAY OF MAGNESIUM: CPT

## 2020-06-26 RX ORDER — HEPARIN SODIUM 1000 [USP'U]/ML
2000 INJECTION, SOLUTION INTRAVENOUS; SUBCUTANEOUS ONCE
Status: CANCELLED | OUTPATIENT
Start: 2020-06-26

## 2020-06-26 RX ORDER — METOPROLOL SUCCINATE 50 MG/1
50 TABLET, EXTENDED RELEASE ORAL DAILY
Status: DISCONTINUED | OUTPATIENT
Start: 2020-06-27 | End: 2020-06-27 | Stop reason: HOSPADM

## 2020-06-26 RX ORDER — CYCLOBENZAPRINE HCL 10 MG
10 TABLET ORAL 3 TIMES DAILY PRN
Status: DISCONTINUED | OUTPATIENT
Start: 2020-06-26 | End: 2020-06-27 | Stop reason: HOSPADM

## 2020-06-26 RX ORDER — MUPIROCIN 20 MG/G
OINTMENT TOPICAL 2 TIMES DAILY
Status: DISCONTINUED | OUTPATIENT
Start: 2020-06-26 | End: 2020-06-27 | Stop reason: HOSPADM

## 2020-06-26 RX ORDER — METOPROLOL SUCCINATE 50 MG/1
100 TABLET, EXTENDED RELEASE ORAL DAILY
Status: ON HOLD | COMMUNITY
End: 2020-06-27 | Stop reason: HOSPADM

## 2020-06-26 RX ORDER — SODIUM CHLORIDE 9 MG/ML
INJECTION, SOLUTION INTRAVENOUS
Status: CANCELLED | OUTPATIENT
Start: 2020-06-26

## 2020-06-26 RX ORDER — SODIUM CHLORIDE 9 MG/ML
INJECTION, SOLUTION INTRAVENOUS ONCE
Status: CANCELLED | OUTPATIENT
Start: 2020-06-26 | End: 2020-06-26

## 2020-06-26 RX ORDER — TRAMADOL HYDROCHLORIDE 50 MG/1
50 TABLET ORAL EVERY 6 HOURS PRN
Status: DISCONTINUED | OUTPATIENT
Start: 2020-06-26 | End: 2020-06-27 | Stop reason: HOSPADM

## 2020-06-26 RX ORDER — HYDROCODONE BITARTRATE AND ACETAMINOPHEN 5; 325 MG/1; MG/1
1 TABLET ORAL EVERY 6 HOURS PRN
Status: DISCONTINUED | OUTPATIENT
Start: 2020-06-26 | End: 2020-06-27 | Stop reason: HOSPADM

## 2020-06-26 RX ORDER — CITALOPRAM 20 MG/1
20 TABLET, FILM COATED ORAL DAILY
Status: DISCONTINUED | OUTPATIENT
Start: 2020-06-26 | End: 2020-06-27 | Stop reason: HOSPADM

## 2020-06-26 RX ADMIN — HYDROCODONE BITARTRATE AND ACETAMINOPHEN 1 TABLET: 5; 325 TABLET ORAL at 11:06

## 2020-06-26 RX ADMIN — DIAZEPAM 5 MG: 5 TABLET ORAL at 09:06

## 2020-06-26 RX ADMIN — PANTOPRAZOLE SODIUM 40 MG: 40 TABLET, DELAYED RELEASE ORAL at 09:06

## 2020-06-26 RX ADMIN — CYCLOBENZAPRINE HYDROCHLORIDE 10 MG: 10 TABLET, FILM COATED ORAL at 11:06

## 2020-06-26 RX ADMIN — PRAVASTATIN SODIUM 40 MG: 20 TABLET ORAL at 09:06

## 2020-06-26 RX ADMIN — HEPARIN SODIUM 5000 UNITS: 5000 INJECTION, SOLUTION INTRAVENOUS; SUBCUTANEOUS at 05:06

## 2020-06-26 RX ADMIN — PROMETHAZINE HYDROCHLORIDE 6.25 MG: 25 INJECTION INTRAMUSCULAR; INTRAVENOUS at 02:06

## 2020-06-26 RX ADMIN — NEPHROCAP 1 CAPSULE: 1 CAP ORAL at 09:06

## 2020-06-26 RX ADMIN — SEVELAMER CARBONATE 1600 MG: 800 TABLET, FILM COATED ORAL at 06:06

## 2020-06-26 RX ADMIN — CITALOPRAM HYDROBROMIDE 20 MG: 20 TABLET ORAL at 09:06

## 2020-06-26 RX ADMIN — ASPIRIN 81 MG: 81 TABLET, COATED ORAL at 09:06

## 2020-06-26 RX ADMIN — MUPIROCIN: 20 OINTMENT TOPICAL at 11:06

## 2020-06-26 RX ADMIN — HYDROCODONE BITARTRATE AND ACETAMINOPHEN 1 TABLET: 5; 325 TABLET ORAL at 12:06

## 2020-06-26 RX ADMIN — TRAMADOL HYDROCHLORIDE 50 MG: 50 TABLET, FILM COATED ORAL at 04:06

## 2020-06-26 RX ADMIN — CYCLOBENZAPRINE HYDROCHLORIDE 10 MG: 10 TABLET, FILM COATED ORAL at 04:06

## 2020-06-26 RX ADMIN — HEPARIN SODIUM 5000 UNITS: 5000 INJECTION, SOLUTION INTRAVENOUS; SUBCUTANEOUS at 09:06

## 2020-06-26 RX ADMIN — SEVELAMER CARBONATE 1600 MG: 800 TABLET, FILM COATED ORAL at 11:06

## 2020-06-26 RX ADMIN — INSULIN DETEMIR 10 UNITS: 100 INJECTION, SOLUTION SUBCUTANEOUS at 09:06

## 2020-06-26 RX ADMIN — MUPIROCIN: 20 OINTMENT TOPICAL at 09:06

## 2020-06-26 NOTE — ASSESSMENT & PLAN NOTE
- Continue basal insulin 10 units nightly.  - AccuChecks with low dose SSI.  - Diabetic diet.  - Check HbA1c.

## 2020-06-26 NOTE — HOSPITAL COURSE
Cordell Angulo is a 55 year old female placed on observation for Dysarthria. Dr. Santoro (neurology) consulted. Left hemiparesis, acute onset suggestive of right MCA stroke in patient with multiple risk factors for stroke. MRI brain with no acute intracranial findings, mild chronic microvascular ischemic changes. Carotid US negative for elevated velocities or gray scale images to suggest significant stenosis or occlusion. Left side weakness has resolved. PT recommended home health and rolling walker. Pt ready for discharge. Pt will be discharged home with home health (PT/OT/SN) and rolling walker. Pt will follow up with PCP within 2-3 days after discharge for hospital follow up. She will resume her scheduled HD. This patient was seen and examined on the date of discharge and determined suitable for discharge.   
(4) rarely moist

## 2020-06-26 NOTE — PROGRESS NOTES
[facility] Hospital Medicine  Progress Note    Patient Name: Cordell Angulo  MRN: [id number]  Patient Class: OP- Observation   Admission Date: 6/25/2020  Length of Stay: 0 days  Attending Physician: Bethany Hermosillo MD  Primary Care Provider: Chuck Grider MD        Subjective:     Principal Problem:Dysarthria        HPI:  Cordell Angulo is a 55 y.o. female patient with a PMHx of asthma, HTN, HLD, chronic diastolic HFpEF, ESRD on HD, DM II, and PAF.  She presented to the ED with c/o stuttering speech which onset suddenly yesterday afternoon after dialysis. Symptoms are constant and moderate in severity. No mitigating or exacerbating factors reported. Associated sxs include L-sided headache. Patient denies any syncope, confusion, extremity weakness/numbness, dizziness, facial droop, and all other sxs at this time. No prior Tx included. No further complaints or concerns at this time. Patient states she continued to notice stuttering this AM upon awakening from sleep. Work-up in the ED was benign with CT of the head showing no acute process.  TeleStroke consult completed in the ED, who recommend admission for full CVA work-up.  No tPA due to being outside treatment window.  Hospital Medicine was contacted for admission.       Overview/Hospital Course:  Cordell Angulo is a 55 year old female placed on observation for Dysarthria. Dr. Santoro (neurology) consulted. Left hemiparesis, acute onset suggestive of right MCA stroke in patient with multiple risk factors for stroke. MRI brain with no acute findings. Carotid US negative for elevated velocities or gray scale images to suggest significant stenosis or occlusion. Will discuss MRI findings with neurologist. PT/OT consulted due to left hemiparesis. PT recommending rehab.     Interval History: Pt seen and examined. Pt informed of MRI results. Still experiencing left sided weakness. Will discuss with Dr. Park, maybe radiologist will have to take a second  look. PT recommended rehab.    Review of Systems   Constitutional: Negative for activity change, chills, diaphoresis, fatigue and fever.   HENT: Negative for congestion, postnasal drip, rhinorrhea, sinus pressure and sore throat.    Respiratory: Negative for cough, shortness of breath and wheezing.    Cardiovascular: Negative for chest pain, palpitations and leg swelling.   Gastrointestinal: Negative for abdominal pain, constipation, diarrhea, nausea and vomiting.   Genitourinary: Negative for dysuria, hematuria and urgency.   Musculoskeletal: Negative for arthralgias, back pain, myalgias and neck pain.   Skin: Negative for pallor and rash.   Neurological: Positive for weakness (left side weakness). Negative for dizziness, seizures, syncope, facial asymmetry, speech difficulty, light-headedness, numbness and headaches.   Psychiatric/Behavioral: Negative for confusion. The patient is not nervous/anxious.    All other systems reviewed and are negative.    Objective:     Vital Signs (Most Recent):  Temp: 97.1 °F (36.2 °C) (06/26/20 1320)  Pulse: 74 (06/26/20 1500)  Resp: 18 (06/26/20 1320)  BP: 136/74 (06/26/20 1500)  SpO2: 100 % (06/26/20 1200) Vital Signs (24h Range):  Temp:  [97.1 °F (36.2 °C)-98.3 °F (36.8 °C)] 97.1 °F (36.2 °C)  Pulse:  [55-85] 74  Resp:  [16-19] 18  SpO2:  [96 %-100 %] 100 %  BP: ()/(42-94) 136/74     Weight: 105.1 kg (231 lb 11.3 oz)  Body mass index is 36.29 kg/m².    Intake/Output Summary (Last 24 hours) at 6/26/2020 1550  Last data filed at 6/26/2020 0324  Gross per 24 hour   Intake 480 ml   Output --   Net 480 ml      Physical Exam  Vitals signs and nursing note reviewed.   Constitutional:       General: She is not in acute distress.     Appearance: She is well-developed. She is not diaphoretic.   HENT:      Head: Normocephalic and atraumatic.   Eyes:      Conjunctiva/sclera: Conjunctivae normal.   Neck:      Musculoskeletal: Normal range of motion and neck supple.      Vascular: No  JVD.   Cardiovascular:      Rate and Rhythm: Normal rate and regular rhythm.      Pulses: Normal pulses.      Heart sounds: S1 normal and S2 normal. No murmur.      Comments: LUE AVF (+) thrill/bruit  Pulmonary:      Effort: Pulmonary effort is normal.      Breath sounds: Normal breath sounds. No wheezing, rhonchi or rales.   Abdominal:      General: Bowel sounds are normal. There is no distension.      Palpations: Abdomen is soft.      Tenderness: There is no abdominal tenderness. There is no guarding or rebound.   Genitourinary:     Comments: Deferred  Musculoskeletal: Normal range of motion.         General: No tenderness.   Skin:     General: Skin is warm and dry.      Capillary Refill: Capillary refill takes less than 2 seconds.      Findings: No erythema or rash.   Neurological:      Mental Status: She is alert and oriented to person, place, and time.      GCS: GCS eye subscore is 4. GCS verbal subscore is 5. GCS motor subscore is 6.      Sensory: Sensation is intact.      Motor: Weakness (left side weakness) present.      Coordination: Coordination is intact.      Gait: Gait is intact.      Comments: No aphasia or slurred speech.   Psychiatric:         Mood and Affect: Mood normal.         Speech: Speech is not slurred.         Behavior: Behavior normal.         Thought Content: Thought content normal.         Cognition and Memory: Cognition and memory normal.         Significant Labs:   A1C:   Recent Labs   Lab 06/26/20  0450   HGBA1C 6.7*     CBC:   Recent Labs   Lab 06/25/20  1440 06/26/20  0450   WBC 8.08 7.69   HGB 13.2 12.3   HCT 44.4 40.8    249     CMP:   Recent Labs   Lab 06/25/20  1440 06/26/20  0450    138   K 4.3 4.4    99   CO2 24 24   * 154*   BUN 41* 52*   CREATININE 9.1* 10.5*   CALCIUM 9.8 9.2   PROT 7.6 7.1   ALBUMIN 3.2* 3.0*   BILITOT 0.3 0.3   ALKPHOS 77 68   AST 13 11   ALT 15 13   ANIONGAP 14 15   EGFRNONAA 4* 4*     Magnesium:   Recent Labs   Lab  06/26/20  0450   MG 2.2     POCT Glucose:   Recent Labs   Lab 06/25/20  2125 06/26/20  0529 06/26/20  1126   POCTGLUCOSE 260* 139* 122*       Significant Imaging:   Imaging Results          CT Head Without Contrast (Final result)  Result time 06/25/20 13:58:15    Final result by Kvng Banks MD (06/25/20 13:58:15)                 Impression:      1.  Negative for acute intracranial process. Negative for hemorrhage, or skull fracture.    2.  Atrophy, intracranial atherosclerotic disease and small vessel ischemic changes again seen.    3.  Stable findings as noted above.    All CT scans at this facility are performed  using dose modulation techniques as appropriate to performed exam including the following:  automated exposure control; adjustment of mA and/or kV according to the patients size (this includes techniques or standardized protocols for targeted exams where dose is matched to indication/reason for exam: i.e. extremities or head);  iterative reconstruction technique.      Electronically signed by: Kvng Banks MD  Date:    06/25/2020  Time:    13:58             Narrative:    EXAMINATION:  CT HEAD WITHOUT CONTRAST    CLINICAL HISTORY:  Neuro deficit, acute, stroke suspected;    TECHNIQUE:  Axial images through the brain and posterior fossa were obtained without the use of IV contrast.  Sagittal and coronal reconstructions are provided for review.    COMPARISON:  February 10, 2020    FINDINGS:  The ventricles are midline and the CSF spaces are prominent.  The gray-white matter junction is well preserved. Negative for intracranial vascular abnormalities. Negative for mass, mass effect, cerebral edema, hemorrhage or abnormal fluid collections.  Intracranial atherosclerotic disease.  Small vessel ischemic changes, focally prominent in the posterior periventricular white matter regions.  Small cavum septum pellucidum.  Basal ganglia calcifications.  Falx calcifications.  Arachnoid granulation  calcifications.    The skull and scalp are intact.    The   paranasal sinuses, mastoid air cells, middle ears and ear canals are clear. The globes are intact.  Postoperative changes to the lens regions.                                Assessment/Plan:      * Dysarthria  - CT of the head was negative for acute process.   - Will obtain MRI of the brain, carotid US, and 2D echo.  - Continue ASA and statin.  - Check FLP and HbA1c.  - Neuro checks.  - PT/OT/ST consult to eval and treat.  - Neurology consult.    06/26  - Neurology following -- left hemiparesis, acute onset suggestive of right MCA stroke in patient with multiple risk factors for stroke.   - MRI brain with no acute findings. Carotid US negative for elevated velocities or gray scale images to suggest significant stenosis or occlusion. Will discuss MRI findings with neurologist.    - 2D ECHO pending    ESRD (end stage renal disease)  - Nephrology consult for HD management during hospital stay.     PAF (paroxysmal atrial fibrillation)  - Rate controlled. Restart Metoprolol.  - Not on anticoagulation. May need to be started due to CHADS-VASc score    Age 0 - <65 years old   CHF History 1 - yes   HTN History 1 - yes   Stroke/TIA/Thromboembolism History 0 - no   Vascular Disease History 0 - no   Diabetes Mellitus in history 1 - yes   Female 1 - yes   [id number] VASc Scale Total Score 4           Chronic diastolic congestive heart failure  - Clinically compensated.  Volume management via dialysis.    Type 2 diabetes mellitus, with long-term current use of insulin  - Continue basal insulin 10 units nightly.  - AccuChecks with low dose SSI.  - Diabetic diet.  - Check HbA1c -- 6.7    Essential hypertension  - MRI brain negative. Continue home medications as prescribed.       VTE Risk Mitigation (From admission, onward)         Ordered     heparin (porcine) injection 5,000 Units  Every 8 hours      06/25/20 1908     IP VTE HIGH RISK PATIENT  Once      06/25/20 1908      Place sequential compression device  Until discontinued      06/25/20 0241                      SOUMYA Haines  Department of Hospital Medicine   [facility]

## 2020-06-26 NOTE — PT/OT/SLP EVAL
"Occupational Therapy   Evaluation    Name: Cordell Angulo  MRN: [id number]  Admitting Diagnosis:  Dysarthria      Recommendations:     Discharge Recommendations: home health OT  Discharge Equipment Recommendations:  other (see comments)(TBD)  Barriers to discharge:  None    Assessment:     Cordell Angulo is a 55 y.o. female with a medical diagnosis of Dysarthria.  She presents with SELF CARE DEBILITY. Performance deficits affecting function: weakness, impaired endurance, impaired self care skills, impaired functional mobilty, gait instability, impaired balance, decreased coordination, decreased upper extremity function, decreased lower extremity function, impaired coordination.      Rehab Prognosis: Good; patient would benefit from acute skilled OT services to address these deficits and reach maximum level of function.       Plan:     Patient to be seen 3 x/week to address the above listed problems via self-care/home management, therapeutic activities, therapeutic exercises  · Plan of Care Expires: 20  · Plan of Care Reviewed with: patient    Subjective     Chief Complaint: PATIENT TEARFUL DURING EVAL STATING HER MOM ALWAYS (A)'ED HER AT TIMES LIKE THIS BUT NOW MOM IS RECENTLY .  Patient/Family Comments/goals: NONE STATED    Occupational Profile:  Living Environment: PATIENT LIVES IN 1-STORY HOME WITH  WHO WORKS "ALL DAY."  PATIENT STATED SON OR DTR STAYS AT HOME SOMETIMES.  Previous level of function: PATIENT STATED SHE WAS (I) WITH ADL'S AND T/F'S.  Roles and Routines: PATIENT USED RW.  Equipment Used at Home:  walker, rolling  Assistance upon Discharge: FAMILY    Pain/Comfort:  · Pain Rating 1: 0/10    Patients cultural, spiritual, [employer] conflicts given the current situation:      Objective:     Communicated with: NURSE prior to session.  Patient found supine with peripheral IV upon OT entry to room.    General Precautions: Standard, fall   Orthopedic Precautions:N/A   Braces: N/A "     Occupational Performance:    Bed Mobility:    · Patient completed Rolling/Turning to Left with  stand by assistance  · Patient completed Rolling/Turning to Right with stand by assistance  · Patient completed Scooting/Bridging with stand by assistance  · Patient completed Supine to Sit with stand by assistance  · Patient completed Sit to Supine with stand by assistance    Functional Mobility/Transfers:  · Patient completed Sit <> Stand Transfer with minimum assistance  with  no assistive device   · Functional Mobility: NT    Activities of Daily Living:  · Upper Body Dressing: minimum assistance      · Lower Body Dressing: minimum assistance        Cognitive/Visual Perceptual:  APPEARS INTACT    Physical Exam:  Balance:    -       NO LOB SEATED EOB WITH LB DRESSING  Postural examination/scapula alignment:    -       No postural abnormalities identified  Dominant hand:    -       RIGHT  Upper Extremity Range of Motion:     -       Right Upper Extremity: WFL except WEAKNESS  -       Left Upper Extremity: WFL except WEAKNESS    AMPAC 6 Click ADL:  AMPAC Total Score: 18    Treatment & Education:  OT EVAL COMPLETED.  PATIENT EDUCATED RE: PURPOSE OF OT.  PATIENT PRACTICED BED MOBILITY AND PERFORMED SITTING BALANCE EOB WITH LB DRESSING.  Education:    Patient left HOB elevated with all lines intact, call button in reach and bed alarm on    GOALS:   Multidisciplinary Problems     Occupational Therapy Goals        Problem: Occupational Therapy Goal    Goal Priority Disciplines Outcome Interventions   Occupational Therapy Goal     OT, PT/OT     Description: Goals to be met by: 7/3/20     Patient will increase functional independence with ADLs by performing:    UE Dressing with Supervision.  LE Dressing with Supervision.  Toilet transfer to toilet with Stand-by Assistance.  Upper extremity exercise program x10-15 reps per handout, with supervision.                     History:     Past Medical History:   Diagnosis Date     Asthma     CHF (congestive heart failure)     CKD (chronic kidney disease) stage 5, GFR less than 15 ml/min     Depression     Diabetes mellitus     Dialysis patient     Hypercholesterolemia     Hypertension     PAF (paroxysmal atrial fibrillation)        Past Surgical History:   Procedure Laterality Date    AV FISTULA PLACEMENT      CARDIAC ELECTROPHYSIOLOGY MAPPING AND ABLATION      CATHETERIZATION OF BOTH LEFT AND RIGHT HEART N/A 12/28/2018    Procedure: CATHETERIZATION, HEART, BOTH LEFT AND RIGHT;  Surgeon: Enrique Jj MD;  Location: [facility] CATH LAB;  Service: Cardiology;  Laterality: N/A;  To follow his cardioversion case in [facility]    cesarian section      TUMOR REMOVAL      L lung       Time Tracking:     OT Date of Treatment: 06/26/20  OT Start Time: 1126  OT Stop Time: 1145  OT Total Time (min): 19 min    Billable Minutes:Evaluation 10  Self Care/Home Management 9    Aicha Rivera OT  6/26/2020

## 2020-06-26 NOTE — ASSESSMENT & PLAN NOTE
- Continue basal insulin 10 units nightly.  - AccuChecks with low dose SSI.  - Diabetic diet.  - Check HbA1c -- 6.7

## 2020-06-26 NOTE — PLAN OF CARE
"  History     Chief Complaint:  Vaginal Bleeding    HPI   Leah Do is a 32 year old  female who presents to the ED for evaluation of vaginal bleeding. The patient reports that she is currently 13 weeks pregnant. She developed some vaginal bleeding this evening, so she presented to the ED for evaluation. Here in the ED, she notes that she has had 2 miscarriages in the past. She did not pass any clots, though she adds that she does have some slight pelvic cramping. She additionally has had a usual amount of nausea and vomiting for a first trimester of pregnancy. She did have some spotting at 10 weeks, though she had a negative US at that time and has otherwise had no further complications of this pregnancy. She denies any black/bloody stools, dysuria, hematuria, frequency, bowel changes, or any further concerns here today. Of note, she has an upcoming OBGYN appointment for next Friday.    Allergies:  Erythromycin    Medications:    Prenatal vitamins    Past Medical History:    Chronic pain  Obesity  Spondylolisthesis    Past Surgical History:     section  Lumbar spinal fusion  D & C  Spine surgery  Laminectomy  [place] teeth surgery    Family History:    Thyroid disease    Social History:  Marital Status:  Single [1]  Former Smoker  Alcohol use: yes     Review of Systems   Gastrointestinal: Positive for nausea and vomiting. Negative for blood in stool, constipation and diarrhea.   Genitourinary: Positive for pelvic pain and vaginal bleeding. Negative for dysuria, frequency and hematuria.   All other systems reviewed and are negative.    Physical Exam     Patient Vitals for the past 24 hrs:   BP Temp Temp src Pulse Resp SpO2 Height Weight   18 0013 (!) 144/91 98.7  F (37.1  C) Temporal 71 20 100 % 1.626 m (5' 4\") 104.3 kg (230 lb)     Physical Exam   HENT:   Right Ear: External ear normal.   Left Ear: External ear normal.   Nose: Nose normal.   Eyes: Right eye exhibits no discharge. Left eye " PATIENT WOULD BENEFIT FROM CONT SKILLED OT INTERVENTION TO INCREASE SAFETY AND (I) WITH ALL LEVELS OF SELF CARE.   exhibits no discharge. No scleral icterus.   Cardiovascular: Intact distal pulses.    Pulmonary/Chest: Effort normal.   Speaking in full sentences   Abdominal: She exhibits no distension. There is no tenderness.   Genitourinary:   Genitourinary Comments: Exam done with female RN in the room.  External genitalia is normal, vaginal vault with tiny amount of whitish discharge no blood cervix is closed   Musculoskeletal:   No peripheral edema    Neurological:   Alert    No gross motor or sensory deficits   Skin: Skin is warm.   Psychiatric: She has a normal mood and affect. Judgment normal.   Nursing note and vitals reviewed.        Emergency Department Course     Procedures:           Emergency Department Course:  Nursing notes and vitals reviewed. (0051) I performed an exam of the patient as documented above.     (1315) I rechecked the patient and discussed the results of her workup thus far. I performed a bedside US, as noted above.    Findings and plan explained to the Patient. Patient discharged home with instructions regarding supportive care, medications, and reasons to return. The importance of close follow-up was reviewed.    I personally reviewed the laboratory results with the Patient and answered all related questions prior to discharge.     Impression & Plan      Medical Decision Making:  Leah Do is a 32 year old  female currently 13 weeks pregnant here with a small amount of vaginal bleeding earlier today. She is very clear this is not hematuria or bright red blood per rectum. She has a benign abdomen here. She is hemodynamically stable. By description, I do not think this is significant blood loss. I do not need a hemoglobin, as I am not concerned in the quantity of bleeding. Bedside US shows fetus with movement. Normal heart rate measuring appropriate for date. Exam without any ongoing vaginal bleeding. I think she can be safely discharged home and will follow up with OBGYN. She is known to  be Rh positive and there is no indication for rhogam.    Diagnosis:    ICD-10-CM    1. Vaginal bleeding in pregnancy O46.90        Disposition:  discharged to home    Scribe Disclosure:  I, Leah Calle, am serving as a scribe on 9/22/2018 at 12:23 AM to personally document services performed by Luis Carlos Guidry, * based on my observations and the provider's statements to me.     Leah Calle  9/22/2018   [facility] EMERGENCY DEPARTMENT       Luis Carlos Guidry MD  09/22/18 0250

## 2020-06-26 NOTE — SUBJECTIVE & OBJECTIVE
Interval History: Pt seen and examined. Pt informed of MRI results. Still experiencing left sided weakness. Will discuss with Dr. Park, maybe radiologist will have to take a second look. PT recommended rehab.    Review of Systems   Constitutional: Negative for activity change, chills, diaphoresis, fatigue and fever.   HENT: Negative for congestion, postnasal drip, rhinorrhea, sinus pressure and sore throat.    Respiratory: Negative for cough, shortness of breath and wheezing.    Cardiovascular: Negative for chest pain, palpitations and leg swelling.   Gastrointestinal: Negative for abdominal pain, constipation, diarrhea, nausea and vomiting.   Genitourinary: Negative for dysuria, hematuria and urgency.   Musculoskeletal: Negative for arthralgias, back pain, myalgias and neck pain.   Skin: Negative for pallor and rash.   Neurological: Positive for weakness (left side weakness). Negative for dizziness, seizures, syncope, facial asymmetry, speech difficulty, light-headedness, numbness and headaches.   Psychiatric/Behavioral: Negative for confusion. The patient is not nervous/anxious.    All other systems reviewed and are negative.    Objective:     Vital Signs (Most Recent):  Temp: 97.1 °F (36.2 °C) (06/26/20 1320)  Pulse: 74 (06/26/20 1500)  Resp: 18 (06/26/20 1320)  BP: 136/74 (06/26/20 1500)  SpO2: 100 % (06/26/20 1200) Vital Signs (24h Range):  Temp:  [97.1 °F (36.2 °C)-98.3 °F (36.8 °C)] 97.1 °F (36.2 °C)  Pulse:  [55-85] 74  Resp:  [16-19] 18  SpO2:  [96 %-100 %] 100 %  BP: ()/(42-94) 136/74     Weight: 105.1 kg (231 lb 11.3 oz)  Body mass index is 36.29 kg/m².    Intake/Output Summary (Last 24 hours) at 6/26/2020 1550  Last data filed at 6/26/2020 0324  Gross per 24 hour   Intake 480 ml   Output --   Net 480 ml      Physical Exam  Vitals signs and nursing note reviewed.   Constitutional:       General: She is not in acute distress.     Appearance: She is well-developed. She is not diaphoretic.   HENT:       Head: Normocephalic and atraumatic.   Eyes:      Conjunctiva/sclera: Conjunctivae normal.   Neck:      Musculoskeletal: Normal range of motion and neck supple.      Vascular: No JVD.   Cardiovascular:      Rate and Rhythm: Normal rate and regular rhythm.      Pulses: Normal pulses.      Heart sounds: S1 normal and S2 normal. No murmur.      Comments: LUE AVF (+) thrill/bruit  Pulmonary:      Effort: Pulmonary effort is normal.      Breath sounds: Normal breath sounds. No wheezing, rhonchi or rales.   Abdominal:      General: Bowel sounds are normal. There is no distension.      Palpations: Abdomen is soft.      Tenderness: There is no abdominal tenderness. There is no guarding or rebound.   Genitourinary:     Comments: Deferred  Musculoskeletal: Normal range of motion.         General: No tenderness.   Skin:     General: Skin is warm and dry.      Capillary Refill: Capillary refill takes less than 2 seconds.      Findings: No erythema or rash.   Neurological:      Mental Status: She is alert and oriented to person, place, and time.      GCS: GCS eye subscore is 4. GCS verbal subscore is 5. GCS motor subscore is 6.      Sensory: Sensation is intact.      Motor: Weakness (left side weakness) present.      Coordination: Coordination is intact.      Gait: Gait is intact.      Comments: No aphasia or slurred speech.   Psychiatric:         Mood and Affect: Mood normal.         Speech: Speech is not slurred.         Behavior: Behavior normal.         Thought Content: Thought content normal.         Cognition and Memory: Cognition and memory normal.         Significant Labs:   A1C:   Recent Labs   Lab 06/26/20  0450   HGBA1C 6.7*     CBC:   Recent Labs   Lab 06/25/20  1440 06/26/20  0450   WBC 8.08 7.69   HGB 13.2 12.3   HCT 44.4 40.8    249     CMP:   Recent Labs   Lab 06/25/20  1440 06/26/20  0450    138   K 4.3 4.4    99   CO2 24 24   * 154*   BUN 41* 52*   CREATININE 9.1* 10.5*   CALCIUM 9.8  9.2   PROT 7.6 7.1   ALBUMIN 3.2* 3.0*   BILITOT 0.3 0.3   ALKPHOS 77 68   AST 13 11   ALT 15 13   ANIONGAP 14 15   EGFRNONAA 4* 4*     Magnesium:   Recent Labs   Lab 06/26/20  0450   MG 2.2     POCT Glucose:   Recent Labs   Lab 06/25/20  2125 06/26/20  0529 06/26/20  1126   POCTGLUCOSE 260* 139* 122*       Significant Imaging:   Imaging Results          CT Head Without Contrast (Final result)  Result time 06/25/20 13:58:15    Final result by Kvng Banks MD (06/25/20 13:58:15)                 Impression:      1.  Negative for acute intracranial process. Negative for hemorrhage, or skull fracture.    2.  Atrophy, intracranial atherosclerotic disease and small vessel ischemic changes again seen.    3.  Stable findings as noted above.    All CT scans at this facility are performed  using dose modulation techniques as appropriate to performed exam including the following:  automated exposure control; adjustment of mA and/or kV according to the patients size (this includes techniques or standardized protocols for targeted exams where dose is matched to indication/reason for exam: i.e. extremities or head);  iterative reconstruction technique.      Electronically signed by: Kvng Banks MD  Date:    06/25/2020  Time:    13:58             Narrative:    EXAMINATION:  CT HEAD WITHOUT CONTRAST    CLINICAL HISTORY:  Neuro deficit, acute, stroke suspected;    TECHNIQUE:  Axial images through the brain and posterior fossa were obtained without the use of IV contrast.  Sagittal and coronal reconstructions are provided for review.    COMPARISON:  February 10, 2020    FINDINGS:  The ventricles are midline and the CSF spaces are prominent.  The gray-white matter junction is well preserved. Negative for intracranial vascular abnormalities. Negative for mass, mass effect, cerebral edema, hemorrhage or abnormal fluid collections.  Intracranial atherosclerotic disease.  Small vessel ischemic changes, focally prominent in the  posterior periventricular white matter regions.  Small cavum septum pellucidum.  Basal ganglia calcifications.  Falx calcifications.  Arachnoid granulation calcifications.    The skull and scalp are intact.    The   paranasal sinuses, mastoid air cells, middle ears and ear canals are clear. The globes are intact.  Postoperative changes to the lens regions.

## 2020-06-26 NOTE — CONSULTS
[facility] -   Neurology  Consult Note    Patient Name: Cordell Angulo  MRN: [id number]  Admission Date: 6/25/2020  Hospital Length of Stay: 0 days  Code Status: Full Code   Attending Provider: Bethany Hermosillo MD   Consulting Provider: Rick Park MD  Primary Care Physician: Chuck Grider MD  Principal Problem:Dysarthria    Consults  Subjective:     Chief Complaint:  Change in speech and falling to the left     HPI: The patient states that she had been aware of tendency to lean and fall to the left for unknown period of time, but on the day of admission, had the abrupt onset of speech difficulty, characterized as stuttering.  She was not aware of any numbness, tingling, or vision changes, but was aware of the speech deficit, and perhaps, a slight change in strength on her left side.    She has past history of numerous risk factors for stroke:  DM, HTN, CAD, PAF, increased lipids, obesity.  However, she denies any prior transient neurological deficits.    On admission to the hospital CT scan brain demonstrated only small vessel disease without evidence of a territorial infarct.    Past Medical History:   Diagnosis Date    Asthma     CHF (congestive heart failure)     CKD (chronic kidney disease) stage 5, GFR less than 15 ml/min     Depression     Diabetes mellitus     Dialysis patient     Hypercholesterolemia     Hypertension     PAF (paroxysmal atrial fibrillation)        Past Surgical History:   Procedure Laterality Date    AV FISTULA PLACEMENT      CARDIAC ELECTROPHYSIOLOGY MAPPING AND ABLATION      CATHETERIZATION OF BOTH LEFT AND RIGHT HEART N/A 12/28/2018    Procedure: CATHETERIZATION, HEART, BOTH LEFT AND RIGHT;  Surgeon: Enrique Jj MD;  Location[facility] CATH LAB;  Service: Cardiology;  Laterality: N/A;  To follow his cardioversion case in [facility]    cesarian section      TUMOR REMOVAL      L lung       Review of patient's allergies indicates:   Allergen Reactions     Morphine Itching    Sulfa (sulfonamide antibiotics) Rash       Current Neurological Medications: See below    No current facility-administered medications on file prior to encounter.      Current Outpatient Medications on File Prior to Encounter   Medication Sig    albuterol (PROVENTIL/VENTOLIN HFA) 90 mcg/actuation inhaler INHALE 2 PUFFS INTO THE LUNGS EVERY 6 HOURS AS NEEDED FOR WHEEZING(RESCUE)    albuterol (PROVENTIL/VENTOLIN HFA) 90 mcg/actuation inhaler Inhale 1-2 puffs into the lungs every 6 (six) hours as needed for Wheezing.    aspirin (ECOTRIN) 81 MG EC tablet Take 1 tablet (81 mg total) by mouth once daily.    azithromycin (Z-SHYANN) 250 MG tablet Take 1 tablet (250 mg total) by mouth once daily. (Patient not taking: Reported on 2/10/2020)    azithromycin (Z-SHYANN) 250 MG tablet Take 2 tablets by mouth on day 1; Take 1 tablet by mouth on days 2-5 (Patient not taking: Reported on 2/10/2020)    benzocaine 10 % Oint Apply 1 application topically 3 (three) times daily as needed. Apply to left ear for pain    benzonatate (TESSALON) 100 MG capsule Take 2 capsules (200 mg total) by mouth 3 (three) times daily as needed for Cough.    citalopram (CELEXA) 20 MG tablet Take 1 tablet (20 mg total) by mouth once daily.    cyclobenzaprine (FLEXERIL) 10 MG tablet Take 0.5 tablets (5 mg total) by mouth 3 (three) times daily as needed for Muscle spasms.    diazePAM (VALIUM) 5 MG tablet Take 2 tablets (10 mg total) by mouth nightly as needed for Anxiety or Insomnia. One to 2 tablets as needed for sleep and anxiety    fluticasone-salmeterol 250-50 mcg/dose (ADVAIR) 250-50 mcg/dose diskus inhaler Inhale 1 puff into the lungs 2 (two) times daily. Controller    hydrOXYzine pamoate (VISTARIL) 25 MG Cap Take 1 capsule (25 mg total) by mouth every 8 (eight) hours as needed (itching dont drive). (Patient not taking: Reported on 2/10/2020)    insulin glargine (LANTUS) 100 unit/mL injection Inject 10 Units into the skin  every evening.     levocetirizine (XYZAL) 2.5 mg/5 mL solution Take 5 mLs (2.5 mg total) by mouth every evening.    levoFLOXacin (LEVAQUIN) 750 MG tablet Take 1 tablet (750 mg total) by mouth once daily. (Patient not taking: Reported on 2/10/2020)    lisinopril (PRINIVIL,ZESTRIL) 40 MG tablet Take 40 mg by mouth once daily.    meclizine (ANTIVERT) 25 mg tablet Take 1 tablet (25 mg total) by mouth 3 (three) times daily as needed.    medroxyPROGESTERone (PROVERA) 10 MG tablet Take 10 mg by mouth once daily.    metoprolol succinate (TOPROL-XL) 200 MG 24 hr tablet Take 1 tablet (200 mg total) by mouth every evening.    pravastatin (PRAVACHOL) 40 MG tablet Take 40 mg by mouth once daily.    sevelamer carbonate (RENVELA) 800 mg Tab TK 2 TS PO TID WITH MEALS    traMADol (ULTRAM) 50 mg tablet TK 1 T PO Q 6 H PRN P    VELPHORO 500 mg Chew CHEW & SWALLOW 1 TABLET BY MOUTH THREE TIMES A DAY WITH MEALS AND 1 TABLET TWO TIMES A DAY WITH SNACKS    vitamin renal formula, B-complex-vitamin c-folic acid, (NEPHROCAP) 1 mg Cap Take 1 capsule by mouth once daily.      Family History     Problem Relation (Age of Onset)    Diabetes Father    Heart disease Mother        Tobacco Use    Smoking status: Never Smoker    Smokeless tobacco: Never Used   Substance and Sexual Activity    Alcohol use: No    Drug use: No    Sexual activity: Not on file     Review of Systems     ROS:  GENERAL: No fever, chills,  or weight loss.  SKIN: No rashes, itching or changes in color or texture of skin.  HEAD: See comments in present illness.  Denies recent head trauma  EYES: Visual acuity fine. No photophobia, ocular pain or diplopia.  EARS: Denies ear pain, discharge or vertigo.  NOSE: No loss of smell, no epistaxis or postnasal drip.  MOUTH & THROAT: No hoarseness or change in voice. No excessive gum bleeding.  NODES: Denies swollen glands.  CHEST: Reports MORALES, but denies cyanosis, wheezing, cough and sputum production.  CARDIOVASCULAR:  Denies chest pain, PND, orthopnea   ABDOMEN:  Reports nausea. No weight loss. Denies diarrhea, abdominal pain, hematemesis or blood in stool.  URINARY: No flank pain, dysuria or hematuria.  PERIPHERAL VASCULAR: No claudication or cyanosis.  MUSCULOSKELETAL: No joint stiffness or swelling. Denies back pain.  NEUROLOGIC: No history of seizures, or unexplained LOC      Objective:     Vital Signs (Most Recent):  Temp: 97.7 °F (36.5 °C) (06/26/20 0725)  Pulse: 80 (06/26/20 0725)  Resp: 16 (06/26/20 0725)  BP: 126/64 (06/26/20 0725)  SpO2: 96 % (06/26/20 0725) Vital Signs (24h Range):  Temp:  [97.7 °F (36.5 °C)-98.5 °F (36.9 °C)] 97.7 °F (36.5 °C)  Pulse:  [60-85] 80  Resp:  [16-19] 16  SpO2:  [95 %-100 %] 96 %  BP: (120-194)/(58-92) 126/64     Weight: 105.1 kg (231 lb 11.3 oz)  Body mass index is 36.29 kg/m².    Physical Exam   APPEARANCE: Well nourished, well developed, morbidly obese woman     HEAD: Normocephalic, atraumatic.  EYES: PERRL. EOMI.  Non-icteric sclerae.     NOSE: Mucosa pink. Airway clear.  MOUTH & THROAT: No tonsillar enlargement.  Uvula is midline.  NECK: Supple. No bruits.  CHEST: Lungs clear to auscultation.  CARDIOVASCULAR: Regular rhythm without significant murmurs.  MUSCULOSKELETAL:  No bony deformity seen.   NEUROLOGIC:   Mental Status:  The patient is well oriented to person, time, place, and situation.  The patient is attentive to the environment and cooperative for the exam.  Cranial Nerves:  Fundoscopic exam is normal.  No hemorrhage, exudate or papilledema is present. The extraocular muscles are intact in the cardinal directions of gaze.  No ptosis is present. Facial features are symmetrical.  Speech is slow in production with slow word finding and articulation.  Not dysarthric. Tongue protrudes in the midline.    Gait and Station:  Observed gait with PT:  Able to come sit to stand with stand by assist.  Ambulates with walker, very slowly with contact guard.  Motor:  Has left sided weakness  in all muscle groups, upper and lower extremities.  Sensory:  Intact both upper and lower extremities to pin prick, touch, and vibration.  Cerebellar:  Finger to nose done well on the right, but was clumsy on the left.   No involuntary movements or tremor seen.  Reflexes:  Stretch reflexes are 2+ both upper and lower extremities.  Plantar stimulation is flexor bilaterally and no pathological reflexes are seen              Significant Labs:   CBC:   Recent Labs   Lab 06/25/20  1440 06/26/20  0450   WBC 8.08 7.69   HGB 13.2 12.3   HCT 44.4 40.8    249     CMP:   Recent Labs   Lab 06/25/20  1440 06/26/20  0450   * 154*    138   K 4.3 4.4    99   CO2 24 24   BUN 41* 52*   CREATININE 9.1* 10.5*   CALCIUM 9.8 9.2   MG  --  2.2   PROT 7.6 7.1   ALBUMIN 3.2* 3.0*   BILITOT 0.3 0.3   ALKPHOS 77 68   AST 13 11   ALT 15 13   ANIONGAP 14 15   EGFRNONAA 4* 4*     All pertinent lab results from the past 24 hours have been reviewed.    Significant Imaging: I have reviewed and interpreted all pertinent imaging results/findings within the past 24 hours.    Assessment and Plan:     1.  Left hemiparesis, acute onset suggestive of right MCA stroke in patient with multiple risk factors for stroke.    RECOMMENDATIONS  Agree with studies ordered, particularly the MRI brain to confirm the diagnosis.  She will need aggressive PT, OT and ST, most likely as in patient rehab.    Active Diagnoses:    Diagnosis Date Noted POA    PRINCIPAL PROBLEM:  Dysarthria [R47.1] 06/25/2020 Yes    ESRD (end stage renal disease) [N18.6] 12/26/2018 Yes     Chronic    Chronic diastolic congestive heart failure [I50.32] 10/18/2015 Yes     Chronic    Essential hypertension [I10] 03/21/2015 Yes     Chronic    Type 2 diabetes mellitus, with long-term current use of insulin [E11.9, Z79.4] 03/21/2015 Not Applicable     Chronic      Problems Resolved During this Admission:       VTE Risk Mitigation (From admission, onward)          Ordered     heparin (porcine) injection 5,000 Units  Every 8 hours      06/25/20 1908     IP VTE HIGH RISK PATIENT  Once      06/25/20 1908     Place sequential compression device  Until discontinued      06/25/20 1908                Thank you for your consult. I will follow-up with patient. Please contact us if you have any additional questions.    Rick Park MD  Neurology  [facility]

## 2020-06-26 NOTE — ASSESSMENT & PLAN NOTE
- Stuttering speech remains.  - CT of the head was negative for acute process.   - Will obtain MRI of the brain, carotid US, and 2D echo.  - Continue ASA and statin.  - Check FLP and HbA1c.  - Neuro checks.  - PT/OT/ST consult to eval and treat.  - Neurology consult.

## 2020-06-26 NOTE — PT/OT/SLP EVAL
Speech Language Pathology Evaluation  Cognitive/Bedside Swallow    Patient Name:  Cordell Angulo   MRN:  [id number]  Admitting Diagnosis: Dysarthria    Recommendations:                  General Recommendations:  Speech/language therapy  Diet recommendations:  Regular, Thin   Aspiration Precautions: 1 bite/sip at a time, HOB to 90 degrees and Remain upright 30 minutes post meal   General Precautions: Standard, fall    History:     Past Medical History:   Diagnosis Date    Asthma     CHF (congestive heart failure)     CKD (chronic kidney disease) stage 5, GFR less than 15 ml/min     Depression     Diabetes mellitus     Dialysis patient     Hypercholesterolemia     Hypertension     PAF (paroxysmal atrial fibrillation)        Past Surgical History:   Procedure Laterality Date    AV FISTULA PLACEMENT      CARDIAC ELECTROPHYSIOLOGY MAPPING AND ABLATION      CATHETERIZATION OF BOTH LEFT AND RIGHT HEART N/A 12/28/2018    Procedure: CATHETERIZATION, HEART, BOTH LEFT AND RIGHT;  Surgeon: Enrique Jj MD;  Location: [facility] C[facility] LAB;  Service: Cardiology;  Laterality: N/A;  To follow his cardioversion case in [facility]    cesarian section      TUMOR REMOVAL      L lung       Subjective     Pt was seen sitting upright in the bed with no family present.  She is awake, alert, and oriented.  Pt is somewhat sleepy stating she did not sleep last night.    Patient goals: none stated     Pain/Comfort:  · Pain Rating 1: 4/10  · Location - Side 1: Left  · Location 1: head  · Pain Addressed 1: Distraction    Objective:     Cognitive Status:    No acute deficits      Receptive Language:   Comprehension:      WFL    Pragmatics:    No acurte deficits    Expressive Language:  Verbal:    Pt experiences decreased fluency characterized by neurogenic stuttering with mild word finding difficulties.  Pt is able to communicate with increased time   Motor Speech:  stuttering - onset yesterday    Voice:    WFL    Visual-Spatial:  WFL      Oral Musculature Evaluation  · Oral Musculature: other (see comments)  · Dentition: present and adequate    Bedside Swallow Eval:   Consistencies Assessed:  · Thin liquids, puree, and solids      Oral Phase:   · WFL    Pharyngeal Phase:   · no overt clinical signs/symptoms of aspiration  · no overt clinical signs/symptoms of pharyngeal dysphagia    Pt reported dysphagia over time secondary to enlarged tonsils.  No h/o aspiration pneumonia and current bedside swallow eval revealed no difficulty.     Assessment:     Cordell Angulo is a 55 y.o. female with an SLP diagnosis of stuttering and mild aphasia.  She presents with communication impairment with no acute swallowing difficulties.  She will benefit from ST for stated deficits per POC.    Goals:   Multidisciplinary Problems     SLP Goals        Problem: SLP Goal    Goal Priority Disciplines Outcome   SLP Goal     SLP    Description: 1. Pt will express wants and needs with 100% speech intelligibility  2. Pt will complete conversational speech tasks with min a for increased fluency and word finding   3. Pt will complete oral motor ex's with I for increased strength and ROM                   Plan:     · Patient to be seen:  2 x/week   · Plan of Care expires:  07/03/20  · Plan of Care reviewed with:      · SLP Follow-Up:  Yes       Discharge recommendations:  Discharge Facility/Level of Care Needs: home health speech therapy   Barriers to Discharge:  None    Time Tracking:     SLP Treatment Date:   06/26/20  Speech Start Time:  0920  Speech Stop Time:  0940     Speech Total Time (min):  20 min    Billable Minutes: Eval 10  and Eval Swallow and Oral Function 10    Erin Adams CCC-SLP  06/26/2020

## 2020-06-26 NOTE — PLAN OF CARE
Fall prevention precautions maintained, pt remained free of falls throughout shift, call bell and personal items within reach, neuro checks completed every 4 hours, LUE/LLE weak, speech remains slurred with stuttering. 24 hour chart check completed. Will continue to monitor

## 2020-06-26 NOTE — NURSING
Talked to Diego (pt's daughter) to update about care. She said she will call Yenni (pt's daughter) to update her as well.

## 2020-06-26 NOTE — HPI
Cordell Angulo is a 55 y.o. female patient with a PMHx of asthma, HTN, HLD, chronic diastolic HFpEF, ESRD on HD, DM II, and PAF.  She presented to the ED with c/o stuttering speech which onset suddenly yesterday afternoon after dialysis. Symptoms are constant and moderate in severity. No mitigating or exacerbating factors reported. Associated sxs include L-sided headache. Patient denies any syncope, confusion, extremity weakness/numbness, dizziness, facial droop, and all other sxs at this time. No prior Tx included. No further complaints or concerns at this time. Patient states she continued to notice stuttering this AM upon awakening from sleep. Work-up in the ED was benign with CT of the head showing no acute process.  TeleStroke consult completed in the ED, who recommend admission for full CVA work-up.  No tPA due to being outside treatment window.  Hospital Medicine was contacted for admission.

## 2020-06-26 NOTE — H&P
[facility] Hospital Medicine  History & Physical    Patient Name: Cordell Angulo  MRN: [id number]  Admission Date: 6/25/2020  Attending Physician: Geovani Leon MD   Primary Care Provider: Chuck Grider MD         Patient information was obtained from patient, past medical records and ER records.     Subjective:     Principal Problem:Dysarthria    Chief Complaint:   Chief Complaint   Patient presents with    Altered Mental Status     pt stuttering since yesterday after dialysis        HPI: Cordell Angulo is a 55 y.o. female patient with a PMHx of asthma, HTN, HLD, chronic diastolic HFpEF, ESRD on HD, DM II, and PAF.  She presented to the ED with c/o stuttering speech which onset suddenly yesterday afternoon after dialysis. Symptoms are constant and moderate in severity. No mitigating or exacerbating factors reported. Associated sxs include L-sided headache. Patient denies any syncope, confusion, extremity weakness/numbness, dizziness, facial droop, and all other sxs at this time. No prior Tx included. No further complaints or concerns at this time. Patient states she continued to notice stuttering this AM upon awakening from sleep. Work-up in the ED was benign with CT of the head showing no acute process.  TeleStroke consult completed in the ED, who recommend admission for full CVA work-up.  No tPA due to being outside treatment window.  Hospital Medicine was contacted for admission.       Past Medical History:   Diagnosis Date    Asthma     CHF (congestive heart failure)     CKD (chronic kidney disease) stage 5, GFR less than 15 ml/min     Depression     Diabetes mellitus     Dialysis patient     Hypercholesterolemia     Hypertension     PAF (paroxysmal atrial fibrillation)        Past Surgical History:   Procedure Laterality Date    AV FISTULA PLACEMENT      CARDIAC ELECTROPHYSIOLOGY MAPPING AND ABLATION      CATHETERIZATION OF BOTH LEFT AND RIGHT HEART N/A 12/28/2018    Procedure:  CATHETERIZATION, HEART, BOTH LEFT AND RIGHT;  Surgeon: Enrique Jj MD;  Location: [facility] CATH LAB;  Service: Cardiology;  Laterality: N/A;  To follow his cardioversion case in [facility]    cesarian section      TUMOR REMOVAL      L lung       Review of patient's allergies indicates:   Allergen Reactions    Morphine Itching    Sulfa (sulfonamide antibiotics) Rash       No current facility-administered medications on file prior to encounter.      Current Outpatient Medications on File Prior to Encounter   Medication Sig    albuterol (PROVENTIL/VENTOLIN HFA) 90 mcg/actuation inhaler INHALE 2 PUFFS INTO THE LUNGS EVERY 6 HOURS AS NEEDED FOR WHEEZING(RESCUE)    albuterol (PROVENTIL/VENTOLIN HFA) 90 mcg/actuation inhaler Inhale 1-2 puffs into the lungs every 6 (six) hours as needed for Wheezing.    aspirin (ECOTRIN) 81 MG EC tablet Take 1 tablet (81 mg total) by mouth once daily.    azithromycin (Z-SHYANN) 250 MG tablet Take 1 tablet (250 mg total) by mouth once daily. (Patient not taking: Reported on 2/10/2020)    azithromycin (Z-SHYANN) 250 MG tablet Take 2 tablets by mouth on day 1; Take 1 tablet by mouth on days 2-5 (Patient not taking: Reported on 2/10/2020)    benzocaine 10 % Oint Apply 1 application topically 3 (three) times daily as needed. Apply to left ear for pain    benzonatate (TESSALON) 100 MG capsule Take 2 capsules (200 mg total) by mouth 3 (three) times daily as needed for Cough.    citalopram (CELEXA) 20 MG tablet Take 1 tablet (20 mg total) by mouth once daily.    cyclobenzaprine (FLEXERIL) 10 MG tablet Take 0.5 tablets (5 mg total) by mouth 3 (three) times daily as needed for Muscle spasms.    diazePAM (VALIUM) 5 MG tablet Take 2 tablets (10 mg total) by mouth nightly as needed for Anxiety or Insomnia. One to 2 tablets as needed for sleep and anxiety    fluticasone-salmeterol 250-50 mcg/dose (ADVAIR) 250-50 mcg/dose diskus inhaler Inhale 1 puff into the lungs 2 (two) times daily. Controller     hydrOXYzine pamoate (VISTARIL) 25 MG Cap Take 1 capsule (25 mg total) by mouth every 8 (eight) hours as needed (itching dont drive). (Patient not taking: Reported on 2/10/2020)    insulin glargine (LANTUS) 100 unit/mL injection Inject 10 Units into the skin every evening.     levocetirizine (XYZAL) 2.5 mg/5 mL solution Take 5 mLs (2.5 mg total) by mouth every evening.    levoFLOXacin (LEVAQUIN) 750 MG tablet Take 1 tablet (750 mg total) by mouth once daily. (Patient not taking: Reported on 2/10/2020)    lisinopril (PRINIVIL,ZESTRIL) 40 MG tablet Take 40 mg by mouth once daily.    meclizine (ANTIVERT) 25 mg tablet Take 1 tablet (25 mg total) by mouth 3 (three) times daily as needed.    medroxyPROGESTERone (PROVERA) 10 MG tablet Take 10 mg by mouth once daily.    metoprolol succinate (TOPROL-XL) 200 MG 24 hr tablet Take 1 tablet (200 mg total) by mouth every evening.    pravastatin (PRAVACHOL) 40 MG tablet Take 40 mg by mouth once daily.    sevelamer carbonate (RENVELA) 800 mg Tab TK 2 TS PO TID WITH MEALS    traMADol (ULTRAM) 50 mg tablet TK 1 T PO Q 6 H PRN P    VELPHORO 500 mg Chew CHEW & SWALLOW 1 TABLET BY MOUTH THREE TIMES A DAY WITH MEALS AND 1 TABLET TWO TIMES A DAY WITH SNACKS    vitamin renal formula, B-complex-vitamin c-folic acid, (NEPHROCAP) 1 mg Cap Take 1 capsule by mouth once daily.     Family History     Problem Relation (Age of Onset)    Diabetes Father    Heart disease Mother        Tobacco Use    Smoking status: Never Smoker    Smokeless tobacco: Never Used   Substance and Sexual Activity    Alcohol use: No    Drug use: No    Sexual activity: Not on file     Review of Systems   Constitutional: Negative for activity change, chills, diaphoresis, fatigue and fever.   HENT: Negative for congestion, postnasal drip, rhinorrhea, sinus pressure and sore throat.    Respiratory: Negative for cough, shortness of breath and wheezing.    Cardiovascular: Negative for chest pain, palpitations  and leg swelling.   Gastrointestinal: Negative for abdominal pain, constipation, diarrhea, nausea and vomiting.   Genitourinary: Negative for dysuria, hematuria and urgency.   Musculoskeletal: Negative for arthralgias, back pain, myalgias and neck pain.   Skin: Negative for pallor and rash.   Neurological: Positive for speech difficulty and headaches. Negative for dizziness, seizures, syncope, facial asymmetry, weakness, light-headedness and numbness.   Psychiatric/Behavioral: Positive for confusion. The patient is not nervous/anxious.    All other systems reviewed and are negative.    Objective:     Vital Signs (Most Recent):  Temp: 98.1 °F (36.7 °C) (06/25/20 1856)  Pulse: 62 (06/25/20 1856)  Resp: 17 (06/25/20 1856)  BP: (!) 170/80 (06/25/20 1856)  SpO2: 100 % (06/25/20 1856) Vital Signs (24h Range):  Temp:  [98.1 °F (36.7 °C)-98.5 °F (36.9 °C)] 98.1 °F (36.7 °C)  Pulse:  [60-68] 62  Resp:  [17-18] 17  SpO2:  [95 %-100 %] 100 %  BP: (120-172)/(70-80) 170/80        There is no height or weight on file to calculate BMI.    Physical Exam  Vitals signs and nursing note reviewed.   Constitutional:       General: She is not in acute distress.     Appearance: She is well-developed. She is not diaphoretic.   HENT:      Head: Normocephalic and atraumatic.   Eyes:      Conjunctiva/sclera: Conjunctivae normal.   Neck:      Musculoskeletal: Normal range of motion and neck supple.      Vascular: No JVD.   Cardiovascular:      Rate and Rhythm: Normal rate and regular rhythm.      Heart sounds: S1 normal and S2 normal. No murmur.   Pulmonary:      Effort: Pulmonary effort is normal.      Breath sounds: Normal breath sounds. No wheezing, rhonchi or rales.   Abdominal:      General: Bowel sounds are normal. There is no distension.      Palpations: Abdomen is soft.      Tenderness: There is no abdominal tenderness. There is no guarding or rebound.   Musculoskeletal: Normal range of motion.         General: No tenderness.   Skin:      General: Skin is warm and dry.      Capillary Refill: Capillary refill takes less than 2 seconds.      Findings: No erythema or rash.   Neurological:      General: No focal deficit present.      Mental Status: She is alert and oriented to person, place, and time.      GCS: GCS eye subscore is 4. GCS verbal subscore is 5. GCS motor subscore is 6.      Sensory: Sensation is intact.      Motor: Motor function is intact.      Coordination: Coordination is intact.      Gait: Gait is intact.      Comments: Stuttering speech noted.  No aphasia or slurred speech.   Psychiatric:         Mood and Affect: Mood normal.         Speech: Speech is not slurred.         Behavior: Behavior normal.         Cognition and Memory: Cognition and memory normal.             Significant Labs:  Results for orders placed or performed during the hospital encounter of 06/25/20   CBC W/ AUTO DIFFERENTIAL   Result Value Ref Range    WBC 8.08 3.90 - 12.70 K/uL    RBC 5.18 4.00 - 5.40 M/uL    Hemoglobin 13.2 12.0 - 16.0 g/dL    Hematocrit 44.4 37.0 - 48.5 %    Mean Corpuscular Volume 86 82 - 98 fL    Mean Corpuscular Hemoglobin 25.5 (L) 27.0 - 31.0 pg    Mean Corpuscular Hemoglobin Conc 29.7 (L) 32.0 - 36.0 g/dL    RDW 14.9 (H) 11.5 - 14.5 %    Platelets 243 150 - 350 K/uL    MPV 8.9 (L) 9.2 - 12.9 fL    Immature Granulocytes 0.4 0.0 - 0.5 %    Gran # (ANC) 5.2 1.8 - 7.7 K/uL    Immature Grans (Abs) 0.03 0.00 - 0.04 K/uL    Lymph # 1.9 1.0 - 4.8 K/uL    Mono # 0.7 0.3 - 1.0 K/uL    Eos # 0.2 0.0 - 0.5 K/uL    Baso # 0.03 0.00 - 0.20 K/uL    nRBC 0 0 /100 WBC    Gran% 64.7 38.0 - 73.0 %    Lymph% 22.9 18.0 - 48.0 %    Mono% 9.2 4.0 - 15.0 %    Eosinophil% 2.4 0.0 - 8.0 %    Basophil% 0.4 0.0 - 1.9 %    Differential Method Automated    Comprehensive metabolic panel   Result Value Ref Range    Sodium 138 136 - 145 mmol/L    Potassium 4.3 3.5 - 5.1 mmol/L    Chloride 100 95 - 110 mmol/L    CO2 24 23 - 29 mmol/L    Glucose 120 (H) 70 - 110 mg/dL     BUN, Bld 41 (H) 6 - 20 mg/dL    Creatinine 9.1 (H) 0.5 - 1.4 mg/dL    Calcium 9.8 8.7 - 10.5 mg/dL    Total Protein 7.6 6.0 - 8.4 g/dL    Albumin 3.2 (L) 3.5 - 5.2 g/dL    Total Bilirubin 0.3 0.1 - 1.0 mg/dL    Alkaline Phosphatase 77 55 - 135 U/L    AST 13 10 - 40 U/L    ALT 15 10 - 44 U/L    Anion Gap 14 8 - 16 mmol/L    eGFR if African American 5 (A) >60 mL/min/1.73 m^2    eGFR if non African American 4 (A) >60 mL/min/1.73 m^2   Protime-INR   Result Value Ref Range    Prothrombin Time 10.3 9.0 - 12.5 sec    INR 1.0 0.8 - 1.2   TSH   Result Value Ref Range    TSH 1.283 0.400 - 4.000 uIU/mL   COVID-19 Rapid Screening   Result Value Ref Range    SARS-CoV-2 RNA, Amplification, Qual Negative Negative   POCT glucose   Result Value Ref Range    POCT Glucose 109 70 - 110 mg/dL      All pertinent labs within the past 24 hours have been reviewed.    Significant Imaging:  Imaging Results          CT Head Without Contrast (Final result)  Result time 06/25/20 13:58:15    Final result by Kvng Banks MD (06/25/20 13:58:15)                 Impression:      1.  Negative for acute intracranial process. Negative for hemorrhage, or skull fracture.    2.  Atrophy, intracranial atherosclerotic disease and small vessel ischemic changes again seen.    3.  Stable findings as noted above.    All CT scans at this facility are performed  using dose modulation techniques as appropriate to performed exam including the following:  automated exposure control; adjustment of mA and/or kV according to the patients size (this includes techniques or standardized protocols for targeted exams where dose is matched to indication/reason for exam: i.e. extremities or head);  iterative reconstruction technique.      Electronically signed by: Kvng Banks MD  Date:    06/25/2020  Time:    13:58             Narrative:    EXAMINATION:  CT HEAD WITHOUT CONTRAST    CLINICAL HISTORY:  Neuro deficit, acute, stroke suspected;    TECHNIQUE:  Axial images  through the brain and posterior fossa were obtained without the use of IV contrast.  Sagittal and coronal reconstructions are provided for review.    COMPARISON:  February 10, 2020    FINDINGS:  The ventricles are midline and the CSF spaces are prominent.  The gray-white matter junction is well preserved. Negative for intracranial vascular abnormalities. Negative for mass, mass effect, cerebral edema, hemorrhage or abnormal fluid collections.  Intracranial atherosclerotic disease.  Small vessel ischemic changes, focally prominent in the posterior periventricular white matter regions.  Small cavum septum pellucidum.  Basal ganglia calcifications.  Falx calcifications.  Arachnoid granulation calcifications.    The skull and scalp are intact.    The   paranasal sinuses, mastoid air cells, middle ears and ear canals are clear. The globes are intact.  Postoperative changes to the lens regions.                               I have reviewed all pertinent imaging results/findings within the past 24 hours.     EKG: (personally reviewed)  Normal sinus rhythm, possible LAE, incomplete RBBB, LVH.  No significant change from previous tracings.           Assessment/Plan:     * Dysarthria  - Stuttering speech remains.  - CT of the head was negative for acute process.   - Will obtain MRI of the brain, carotid US, and 2D echo.  - Continue ASA and statin.  - Check FLP and HbA1c.  - Neuro checks.  - PT/OT/ST consult to eval and treat.  - Neurology consult.    ESRD (end stage renal disease)  - Nephrology consult for HD management during hospital stay.     Chronic diastolic congestive heart failure  - Clinically compensated.  Volume management via dialysis.    Type 2 diabetes mellitus, with long-term current use of insulin  - Continue basal insulin 10 units nightly.  - AccuChecks with low dose SSI.  - Diabetic diet.  - Check HbA1c.    Essential hypertension  - Will hold home antihypertensives to allow for permissive HTN.  - IV  hydralazine PRN SBP >220.      VTE Risk Mitigation (From admission, onward)         Ordered     heparin (porcine) injection 5,000 Units  Every 8 hours      06/25/20 1908     IP VTE HIGH RISK PATIENT  Once      06/25/20 1908     Place sequential compression device  Until discontinued      06/25/20 1908                   Felicia Warren NP  Department of Hospital Medicine   [facility]

## 2020-06-26 NOTE — SUBJECTIVE & OBJECTIVE
Past Medical History:   Diagnosis Date    Asthma     CHF (congestive heart failure)     CKD (chronic kidney disease) stage 5, GFR less than 15 ml/min     Depression     Diabetes mellitus     Dialysis patient     Hypercholesterolemia     Hypertension     PAF (paroxysmal atrial fibrillation)        Past Surgical History:   Procedure Laterality Date    AV FISTULA PLACEMENT      CARDIAC ELECTROPHYSIOLOGY MAPPING AND ABLATION      CATHETERIZATION OF BOTH LEFT AND RIGHT HEART N/A 12/28/2018    Procedure: CATHETERIZATION, HEART, BOTH LEFT AND RIGHT;  Surgeon: Enrique Jj MD;  Location: [facility] CATH LAB;  Service: Cardiology;  Laterality: N/A;  To follow his cardioversion case in [facility]    cesarian section      TUMOR REMOVAL      L lung       Review of patient's allergies indicates:   Allergen Reactions    Morphine Itching    Sulfa (sulfonamide antibiotics) Rash       No current facility-administered medications on file prior to encounter.      Current Outpatient Medications on File Prior to Encounter   Medication Sig    albuterol (PROVENTIL/VENTOLIN HFA) 90 mcg/actuation inhaler INHALE 2 PUFFS INTO THE LUNGS EVERY 6 HOURS AS NEEDED FOR WHEEZING(RESCUE)    albuterol (PROVENTIL/VENTOLIN HFA) 90 mcg/actuation inhaler Inhale 1-2 puffs into the lungs every 6 (six) hours as needed for Wheezing.    aspirin (ECOTRIN) 81 MG EC tablet Take 1 tablet (81 mg total) by mouth once daily.    azithromycin (Z-SHYANN) 250 MG tablet Take 1 tablet (250 mg total) by mouth once daily. (Patient not taking: Reported on 2/10/2020)    azithromycin (Z-SHYANN) 250 MG tablet Take 2 tablets by mouth on day 1; Take 1 tablet by mouth on days 2-5 (Patient not taking: Reported on 2/10/2020)    benzocaine 10 % Oint Apply 1 application topically 3 (three) times daily as needed. Apply to left ear for pain    benzonatate (TESSALON) 100 MG capsule Take 2 capsules (200 mg total) by mouth 3 (three) times daily as needed for Cough.     citalopram (CELEXA) 20 MG tablet Take 1 tablet (20 mg total) by mouth once daily.    cyclobenzaprine (FLEXERIL) 10 MG tablet Take 0.5 tablets (5 mg total) by mouth 3 (three) times daily as needed for Muscle spasms.    diazePAM (VALIUM) 5 MG tablet Take 2 tablets (10 mg total) by mouth nightly as needed for Anxiety or Insomnia. One to 2 tablets as needed for sleep and anxiety    fluticasone-salmeterol 250-50 mcg/dose (ADVAIR) 250-50 mcg/dose diskus inhaler Inhale 1 puff into the lungs 2 (two) times daily. Controller    hydrOXYzine pamoate (VISTARIL) 25 MG Cap Take 1 capsule (25 mg total) by mouth every 8 (eight) hours as needed (itching dont drive). (Patient not taking: Reported on 2/10/2020)    insulin glargine (LANTUS) 100 unit/mL injection Inject 10 Units into the skin every evening.     levocetirizine (XYZAL) 2.5 mg/5 mL solution Take 5 mLs (2.5 mg total) by mouth every evening.    levoFLOXacin (LEVAQUIN) 750 MG tablet Take 1 tablet (750 mg total) by mouth once daily. (Patient not taking: Reported on 2/10/2020)    lisinopril (PRINIVIL,ZESTRIL) 40 MG tablet Take 40 mg by mouth once daily.    meclizine (ANTIVERT) 25 mg tablet Take 1 tablet (25 mg total) by mouth 3 (three) times daily as needed.    medroxyPROGESTERone (PROVERA) 10 MG tablet Take 10 mg by mouth once daily.    metoprolol succinate (TOPROL-XL) 200 MG 24 hr tablet Take 1 tablet (200 mg total) by mouth every evening.    pravastatin (PRAVACHOL) 40 MG tablet Take 40 mg by mouth once daily.    sevelamer carbonate (RENVELA) 800 mg Tab TK 2 TS PO TID WITH MEALS    traMADol (ULTRAM) 50 mg tablet TK 1 T PO Q 6 H PRN P    VELPHORO 500 mg Chew CHEW & SWALLOW 1 TABLET BY MOUTH THREE TIMES A DAY WITH MEALS AND 1 TABLET TWO TIMES A DAY WITH SNACKS    vitamin renal formula, B-complex-vitamin c-folic acid, (NEPHROCAP) 1 mg Cap Take 1 capsule by mouth once daily.     Family History     Problem Relation (Age of Onset)    Diabetes Father    Heart disease  Mother        Tobacco Use    Smoking status: Never Smoker    Smokeless tobacco: Never Used   Substance and Sexual Activity    Alcohol use: No    Drug use: No    Sexual activity: Not on file     Review of Systems   Constitutional: Negative for activity change, chills, diaphoresis, fatigue and fever.   HENT: Negative for congestion, postnasal drip, rhinorrhea, sinus pressure and sore throat.    Respiratory: Negative for cough, shortness of breath and wheezing.    Cardiovascular: Negative for chest pain, palpitations and leg swelling.   Gastrointestinal: Negative for abdominal pain, constipation, diarrhea, nausea and vomiting.   Genitourinary: Negative for dysuria, hematuria and urgency.   Musculoskeletal: Negative for arthralgias, back pain, myalgias and neck pain.   Skin: Negative for pallor and rash.   Neurological: Positive for speech difficulty and headaches. Negative for dizziness, seizures, syncope, facial asymmetry, weakness, light-headedness and numbness.   Psychiatric/Behavioral: Positive for confusion. The patient is not nervous/anxious.    All other systems reviewed and are negative.    Objective:     Vital Signs (Most Recent):  Temp: 98.1 °F (36.7 °C) (06/25/20 1856)  Pulse: 62 (06/25/20 1856)  Resp: 17 (06/25/20 1856)  BP: (!) 170/80 (06/25/20 1856)  SpO2: 100 % (06/25/20 1856) Vital Signs (24h Range):  Temp:  [98.1 °F (36.7 °C)-98.5 °F (36.9 °C)] 98.1 °F (36.7 °C)  Pulse:  [60-68] 62  Resp:  [17-18] 17  SpO2:  [95 %-100 %] 100 %  BP: (120-172)/(70-80) 170/80        There is no height or weight on file to calculate BMI.    Physical Exam  Vitals signs and nursing note reviewed.   Constitutional:       General: She is not in acute distress.     Appearance: She is well-developed. She is not diaphoretic.   HENT:      Head: Normocephalic and atraumatic.   Eyes:      Conjunctiva/sclera: Conjunctivae normal.   Neck:      Musculoskeletal: Normal range of motion and neck supple.      Vascular: No JVD.    Cardiovascular:      Rate and Rhythm: Normal rate and regular rhythm.      Heart sounds: S1 normal and S2 normal. No murmur.   Pulmonary:      Effort: Pulmonary effort is normal.      Breath sounds: Normal breath sounds. No wheezing, rhonchi or rales.   Abdominal:      General: Bowel sounds are normal. There is no distension.      Palpations: Abdomen is soft.      Tenderness: There is no abdominal tenderness. There is no guarding or rebound.   Musculoskeletal: Normal range of motion.         General: No tenderness.   Skin:     General: Skin is warm and dry.      Capillary Refill: Capillary refill takes less than 2 seconds.      Findings: No erythema or rash.   Neurological:      General: No focal deficit present.      Mental Status: She is alert and oriented to person, place, and time.      GCS: GCS eye subscore is 4. GCS verbal subscore is 5. GCS motor subscore is 6.      Sensory: Sensation is intact.      Motor: Motor function is intact.      Coordination: Coordination is intact.      Gait: Gait is intact.      Comments: Stuttering speech noted.  No aphasia or slurred speech.   Psychiatric:         Mood and Affect: Mood normal.         Speech: Speech is not slurred.         Behavior: Behavior normal.         Cognition and Memory: Cognition and memory normal.             Significant Labs:  Results for orders placed or performed during the hospital encounter of 06/25/20   CBC W/ AUTO DIFFERENTIAL   Result Value Ref Range    WBC 8.08 3.90 - 12.70 K/uL    RBC 5.18 4.00 - 5.40 M/uL    Hemoglobin 13.2 12.0 - 16.0 g/dL    Hematocrit 44.4 37.0 - 48.5 %    Mean Corpuscular Volume 86 82 - 98 fL    Mean Corpuscular Hemoglobin 25.5 (L) 27.0 - 31.0 pg    Mean Corpuscular Hemoglobin Conc 29.7 (L) 32.0 - 36.0 g/dL    RDW 14.9 (H) 11.5 - 14.5 %    Platelets 243 150 - 350 K/uL    MPV 8.9 (L) 9.2 - 12.9 fL    Immature Granulocytes 0.4 0.0 - 0.5 %    Gran # (ANC) 5.2 1.8 - 7.7 K/uL    Immature Grans (Abs) 0.03 0.00 - 0.04 K/uL     Lymph # 1.9 1.0 - 4.8 K/uL    Mono # 0.7 0.3 - 1.0 K/uL    Eos # 0.2 0.0 - 0.5 K/uL    Baso # 0.03 0.00 - 0.20 K/uL    nRBC 0 0 /100 WBC    Gran% 64.7 38.0 - 73.0 %    Lymph% 22.9 18.0 - 48.0 %    Mono% 9.2 4.0 - 15.0 %    Eosinophil% 2.4 0.0 - 8.0 %    Basophil% 0.4 0.0 - 1.9 %    Differential Method Automated    Comprehensive metabolic panel   Result Value Ref Range    Sodium 138 136 - 145 mmol/L    Potassium 4.3 3.5 - 5.1 mmol/L    Chloride 100 95 - 110 mmol/L    CO2 24 23 - 29 mmol/L    Glucose 120 (H) 70 - 110 mg/dL    BUN, Bld 41 (H) 6 - 20 mg/dL    Creatinine 9.1 (H) 0.5 - 1.4 mg/dL    Calcium 9.8 8.7 - 10.5 mg/dL    Total Protein 7.6 6.0 - 8.4 g/dL    Albumin 3.2 (L) 3.5 - 5.2 g/dL    Total Bilirubin 0.3 0.1 - 1.0 mg/dL    Alkaline Phosphatase 77 55 - 135 U/L    AST 13 10 - 40 U/L    ALT 15 10 - 44 U/L    Anion Gap 14 8 - 16 mmol/L    eGFR if African American 5 (A) >60 mL/min/1.73 m^2    eGFR if non African American 4 (A) >60 mL/min/1.73 m^2   Protime-INR   Result Value Ref Range    Prothrombin Time 10.3 9.0 - 12.5 sec    INR 1.0 0.8 - 1.2   TSH   Result Value Ref Range    TSH 1.283 0.400 - 4.000 uIU/mL   COVID-19 Rapid Screening   Result Value Ref Range    SARS-CoV-2 RNA, Amplification, Qual Negative Negative   POCT glucose   Result Value Ref Range    POCT Glucose 109 70 - 110 mg/dL      All pertinent labs within the past 24 hours have been reviewed.    Significant Imaging:  Imaging Results          CT Head Without Contrast (Final result)  Result time 06/25/20 13:58:15    Final result by Kvng Banks MD (06/25/20 13:58:15)                 Impression:      1.  Negative for acute intracranial process. Negative for hemorrhage, or skull fracture.    2.  Atrophy, intracranial atherosclerotic disease and small vessel ischemic changes again seen.    3.  Stable findings as noted above.    All CT scans at this facility are performed  using dose modulation techniques as appropriate to performed exam including the  following:  automated exposure control; adjustment of mA and/or kV according to the patients size (this includes techniques or standardized protocols for targeted exams where dose is matched to indication/reason for exam: i.e. extremities or head);  iterative reconstruction technique.      Electronically signed by: Kvng Banks MD  Date:    06/25/2020  Time:    13:58             Narrative:    EXAMINATION:  CT HEAD WITHOUT CONTRAST    CLINICAL HISTORY:  Neuro deficit, acute, stroke suspected;    TECHNIQUE:  Axial images through the brain and posterior fossa were obtained without the use of IV contrast.  Sagittal and coronal reconstructions are provided for review.    COMPARISON:  February 10, 2020    FINDINGS:  The ventricles are midline and the CSF spaces are prominent.  The gray-white matter junction is well preserved. Negative for intracranial vascular abnormalities. Negative for mass, mass effect, cerebral edema, hemorrhage or abnormal fluid collections.  Intracranial atherosclerotic disease.  Small vessel ischemic changes, focally prominent in the posterior periventricular white matter regions.  Small cavum septum pellucidum.  Basal ganglia calcifications.  Falx calcifications.  Arachnoid granulation calcifications.    The skull and scalp are intact.    The   paranasal sinuses, mastoid air cells, middle ears and ear canals are clear. The globes are intact.  Postoperative changes to the lens regions.                               I have reviewed all pertinent imaging results/findings within the past 24 hours.     EKG: (personally reviewed)  Normal sinus rhythm, possible LAE, incomplete RBBB, LVH.  No significant change from previous tracings.

## 2020-06-26 NOTE — CONSULTS
Food & Nutrition  Education    Diet Education: Cardiac Diet Education  Time Spent: 15 min  Learners: Patient      Nutrition Education provided with handouts: TLC- Cardiac Nutrition Therapy from Nutrition Care Manual     Comments: Patient states she is used to eating hamburgers and pork chops most with fruits like strawberries and grapes. Patient was educated on incorporating lean proteins, fruits, vegetables, and whole grains into meals. We discussed reducing sat and trans fat intake while opting for unsaturated fats more often.       All questions and concerns answered. Dietitian's contact information provided.       Follow-Up: No    Please Re-consult as needed    Thanks!    Shaista Pretty, MS, RD, LDN

## 2020-06-26 NOTE — ASSESSMENT & PLAN NOTE
- CT of the head was negative for acute process.   - Will obtain MRI of the brain, carotid US, and 2D echo.  - Continue ASA and statin.  - Check FLP and HbA1c.  - Neuro checks.  - PT/OT/ST consult to eval and treat.  - Neurology consult.    06/26  - Neurology following -- left hemiparesis, acute onset suggestive of right MCA stroke in patient with multiple risk factors for stroke.   - MRI brain with no acute findings. Carotid US negative for elevated velocities or gray scale images to suggest significant stenosis or occlusion. Will discuss MRI findings with neurologist.    - 2D ECHO pending

## 2020-06-26 NOTE — PT/OT/SLP EVAL
Physical Therapy Evaluation    Patient Name:  Cordell Angulo   MRN:  [id number]    Recommendations:     Discharge Recommendations:  rehabilitation facility   Discharge Equipment Recommendations: bedside commode, bath bench   Barriers to discharge: Decreased caregiver support    Assessment:     Cordell Angulo is a 55 y.o. female admitted with a medical diagnosis of Dysarthria.  She presents with the following impairments/functional limitations:  weakness, impaired endurance, impaired balance, gait instability, impaired functional mobilty.    Rehab Prognosis: Good; patient would benefit from acute skilled PT services to address these deficits and reach maximum level of function.    Recent Surgery: * No surgery found *    Plan:     During this hospitalization, patient to be seen 5 x/week to address the identified rehab impairments via gait training, therapeutic activities, therapeutic exercises and progress toward the following goals:    · Plan of Care Expires:  07/03/20    Subjective     Chief Complaint:   Patient/Family Comments/goals:   Pain/Comfort:  · Pain Rating 1: 0/10    Patients cultural, spiritual, Rastafari conflicts given the current situation:      Living Environment:  PT LIVES ALONE, REPORTS SHE HAS HER 'S KIDS CHECK IN ON HER, 1 STORY HOUSE NO STEPS, AMB INDEP HOUSEHOLD DISTANCES PTA, DOES NOT DRIVE OR WORK, FAMILY DRIVES FOR HER, INDEP WITH ADL'S  Prior to admission, patients level of function was INDEP.  Equipment used at home: walker, rolling.  DME owned (not currently used): rolling walker.  Upon discharge, patient will have assistance from ?.    Objective:     Communicated with NURSE CHAPMAN prior to session.  Patient found supine with peripheral IV, telemetry  upon PT entry to room.    General Precautions: Standard, fall   Orthopedic Precautions:N/A   Braces: N/A     Exams:  · Cognitive Exam:  Patient is oriented to Person, Place, Time, Situation and PT WITH SLOW STUTTERING SPEECH, ABLE TO ANSWER  QUESTIONS APPROPRIATELY BUT DELAYED AT TIMES  · Postural Exam:  Patient presented with the following abnormalities:    · -       Rounded shoulders  · -       Forward head  · Sensation:    · -       Intact  light/touch BUE AND BLE  · RLE ROM: WFL  · RLE Strength: GROSSLY 4/5  · LLE ROM: WFL  · LLE Strength: GROSSLY 3/5    Functional Mobility:  · Bed Mobility:     · Rolling Left:  stand by assistance  · Scooting: stand by assistance  · Supine to Sit: stand by assistance  · Transfers:     · Sit to Stand:  minimum assistance with rolling walker  · Bed to Chair: minimum assistance with  rolling walker  using  Step Transfer  · Gait: PT AMB 15' IN ROOM WITH RW AND MOD/YEYO, SLOW PACE, UNSTEADY, 1 SMALL LOB TOWARD R SIDE, INCREASED LATERAL SWAY  · Balance: POOR+    Therapeutic Activities and Exercises:   PT EDUCATED IN ROLE OF P.T.  AND POC, PT EDUCATED IN RW USE AND SAFETY DURING TF'S AND GAIT, PT EDUCATED IN BLE THEREX TO PERFORM WHILE SEATED IN CHAIR, PT RETURN TO SUPINE FOR FURTHER ASSESSMENT BY MD    AM-PAC 6 CLICK MOBILITY  Total Score:17     Patient left HOB elevated with all lines intact, call button in reach, NURSE notified and MD present.    GOALS:   Multidisciplinary Problems     Physical Therapy Goals        Problem: Physical Therapy Goal    Goal Priority Disciplines Outcome Goal Variances Interventions   Physical Therapy Goal     PT, PT/OT      Description: LTG'S TO BE ME IN 7 DAYS (7-3-20)1. PT WILL BE BARBARA WITH BED MOBILITY  2. PT WILL REQUIRE SBA FOR TF'S  3. PT WILL ' WITH RW AND CGA                   History:     Past Medical History:   Diagnosis Date    Asthma     CHF (congestive heart failure)     CKD (chronic kidney disease) stage 5, GFR less than 15 ml/min     Depression     Diabetes mellitus     Dialysis patient     Hypercholesterolemia     Hypertension     PAF (paroxysmal atrial fibrillation)        Past Surgical History:   Procedure Laterality Date    AV FISTULA PLACEMENT       CARDIAC ELECTROPHYSIOLOGY MAPPING AND ABLATION      CATHETERIZATION OF BOTH LEFT AND RIGHT HEART N/A 12/28/2018    Procedure: CATHETERIZATION, HEART, BOTH LEFT AND RIGHT;  Surgeon: Enrique Jj MD;  Location: [facility] CATH LAB;  Service: Cardiology;  Laterality: N/A;  To follow his cardioversion case in [facility]    cesarian section      TUMOR REMOVAL      L lung       Time Tracking:     PT Received On: 06/26/20  PT Start Time: 0715     PT Stop Time: 0740  PT Total Time (min): 25 min     Billable Minutes: Evaluation 15 and Gait Training 10    Delmy Swan, PT  06/26/2020

## 2020-06-26 NOTE — ASSESSMENT & PLAN NOTE
- Rate controlled. Restart Metoprolol.  - Not on anticoagulation. May need to be started due to CHADS-VASc score    Age 0 - <65 years old   CHF History 1 - yes   HTN History 1 - yes   Stroke/TIA/Thromboembolism History 0 - no   Vascular Disease History 0 - no   Diabetes Mellitus in history 1 - yes   Female 1 - yes   [id number] VASc Scale Total Score 4

## 2020-06-26 NOTE — PLAN OF CARE
Fall precautions maintained. Pain is managed with prn meds.Aox4. Pt continues to stutter when talking. Left sided weakness noted. Blood pressure monitored. NSR on cardiac monitor. Blood sugar monitored. Tolerating renal diet. All needs met. Will continue to monitor.

## 2020-06-26 NOTE — PLAN OF CARE
ST eval completed with pt exhibiting neurogenic stuttering, mild expressive aphasia, and left sided facial weakness.  Swallowing appears WFL at bedside and a Regular consistency diet is recommended.

## 2020-06-26 NOTE — PLAN OF CARE
"CM met with patient in the HD treatment room.  Patient states that she mostly lives alone and her  lives with her "sometimes".  She said prior to this admission she was not using any medical equipment but has been staggering.  She stated that she has not receive her test results back but needs rehab.  CM explained that rehab would have to be approved by her insurance and she would like for CM to make referrals.  She has never been to rehab before but is open to any facility that would accept.  She requested to be updated with which facility can take her.  Choice form completed and placed in the patient blue folder.   Referrals made via right care.  CM provided a transitional care folder, information on advanced directives, information on pharmacy bedside delivery, and discharge planning begins on admission with contact information for any needs/questions.     D/C Plan: Rehab  PCP: Dr tang  Preferred [facility]acy[facility]  Discharge transportation: unsure  My [facility]: link sent  Pharmacy Bedside Delivery: no, possible placement     06/26/20 6486   Discharge Assessment   Assessment Type Discharge Planning Assessment   Confirmed/corrected address and phone number on facesheet? Yes   Assessment information obtained from? Patient;Medical Record   Expected Length of Stay (days)   (TBD)   Communicated expected length of stay with patient/caregiver yes   Prior to hospitilization cognitive status: Alert/Oriented   Prior to hospitalization functional status: Independent   Current cognitive status: Alert/Oriented   Current Functional Status: Needs Assistance   Facility Arrived From: Home   Lives With spouse   Able to Return to Prior Arrangements yes   Is patient able to care for self after discharge? Unable to determine at this time (comments)   Who are your caregiver(s) and their phone number(s)? Diego Angulo, daughter [phone]   Patient's perception of discharge disposition rehab facility   Readmission Within " the Last 30 Days no previous admission in last 30 days   Patient currently being followed by outpatient case management? No   Patient currently receives any other outside agency services? No   Equipment Currently Used at Home none   Do you have any problems affording any of your prescribed medications? No   Is the patient taking medications as prescribed? yes   Does the patient have transportation home? Yes   Transportation Anticipated family or friend will provide   Dialysis Name and Scheduled days [facility]   Does the patient receive services at the Coumadin Clinic? No   Discharge Plan A Rehab   Patient/Family in Agreement with Plan yes

## 2020-06-27 VITALS
TEMPERATURE: 98 F | HEIGHT: 67 IN | SYSTOLIC BLOOD PRESSURE: 180 MMHG | BODY MASS INDEX: 36.36 KG/M2 | HEART RATE: 60 BPM | DIASTOLIC BLOOD PRESSURE: 77 MMHG | RESPIRATION RATE: 18 BRPM | OXYGEN SATURATION: 99 % | WEIGHT: 231.69 LBS

## 2020-06-27 LAB
ALBUMIN SERPL BCP-MCNC: 3 G/DL (ref 3.5–5.2)
ALP SERPL-CCNC: 68 U/L (ref 55–135)
ALT SERPL W/O P-5'-P-CCNC: 14 U/L (ref 10–44)
ANION GAP SERPL CALC-SCNC: 12 MMOL/L (ref 8–16)
AST SERPL-CCNC: 11 U/L (ref 10–40)
BASOPHILS # BLD AUTO: 0.04 K/UL (ref 0–0.2)
BASOPHILS NFR BLD: 0.4 % (ref 0–1.9)
BILIRUB SERPL-MCNC: 0.3 MG/DL (ref 0.1–1)
BUN SERPL-MCNC: 40 MG/DL (ref 6–20)
CALCIUM SERPL-MCNC: 9.8 MG/DL (ref 8.7–10.5)
CHLORIDE SERPL-SCNC: 104 MMOL/L (ref 95–110)
CO2 SERPL-SCNC: 20 MMOL/L (ref 23–29)
CREAT SERPL-MCNC: 9 MG/DL (ref 0.5–1.4)
DIFFERENTIAL METHOD: ABNORMAL
EOSINOPHIL # BLD AUTO: 0.3 K/UL (ref 0–0.5)
EOSINOPHIL NFR BLD: 2.9 % (ref 0–8)
ERYTHROCYTE [DISTWIDTH] IN BLOOD BY AUTOMATED COUNT: 15 % (ref 11.5–14.5)
EST. GFR  (AFRICAN AMERICAN): 5 ML/MIN/1.73 M^2
EST. GFR  (NON AFRICAN AMERICAN): 4 ML/MIN/1.73 M^2
GLUCOSE SERPL-MCNC: 106 MG/DL (ref 70–110)
HCT VFR BLD AUTO: 42.4 % (ref 37–48.5)
HGB BLD-MCNC: 12.6 G/DL (ref 12–16)
IMM GRANULOCYTES # BLD AUTO: 0.03 K/UL (ref 0–0.04)
IMM GRANULOCYTES NFR BLD AUTO: 0.3 % (ref 0–0.5)
LYMPHOCYTES # BLD AUTO: 1.5 K/UL (ref 1–4.8)
LYMPHOCYTES NFR BLD: 16.4 % (ref 18–48)
MCH RBC QN AUTO: 25.8 PG (ref 27–31)
MCHC RBC AUTO-ENTMCNC: 29.7 G/DL (ref 32–36)
MCV RBC AUTO: 87 FL (ref 82–98)
MONOCYTES # BLD AUTO: 0.5 K/UL (ref 0.3–1)
MONOCYTES NFR BLD: 6 % (ref 4–15)
NEUTROPHILS # BLD AUTO: 6.7 K/UL (ref 1.8–7.7)
NEUTROPHILS NFR BLD: 74 % (ref 38–73)
NRBC BLD-RTO: 0 /100 WBC
PLATELET # BLD AUTO: 206 K/UL (ref 150–350)
PMV BLD AUTO: 9.3 FL (ref 9.2–12.9)
POCT GLUCOSE: 136 MG/DL (ref 70–110)
POCT GLUCOSE: 99 MG/DL (ref 70–110)
POTASSIUM SERPL-SCNC: 5 MMOL/L (ref 3.5–5.1)
PROT SERPL-MCNC: 7.4 G/DL (ref 6–8.4)
RBC # BLD AUTO: 4.89 M/UL (ref 4–5.4)
SODIUM SERPL-SCNC: 136 MMOL/L (ref 136–145)
WBC # BLD AUTO: 9.01 K/UL (ref 3.9–12.7)

## 2020-06-27 PROCEDURE — 25000003 PHARM REV CODE 250: Performed by: INTERNAL MEDICINE

## 2020-06-27 PROCEDURE — 92507 TX SP LANG VOICE COMM INDIV: CPT

## 2020-06-27 PROCEDURE — 25000003 PHARM REV CODE 250: Performed by: NURSE PRACTITIONER

## 2020-06-27 PROCEDURE — 80053 COMPREHEN METABOLIC PANEL: CPT

## 2020-06-27 PROCEDURE — 97110 THERAPEUTIC EXERCISES: CPT | Mod: 59

## 2020-06-27 PROCEDURE — 96372 THER/PROPH/DIAG INJ SC/IM: CPT | Mod: 59 | Performed by: EMERGENCY MEDICINE

## 2020-06-27 PROCEDURE — 63600175 PHARM REV CODE 636 W HCPCS: Performed by: NURSE PRACTITIONER

## 2020-06-27 PROCEDURE — G0378 HOSPITAL OBSERVATION PER HR: HCPCS

## 2020-06-27 PROCEDURE — 97116 GAIT TRAINING THERAPY: CPT | Mod: CQ

## 2020-06-27 PROCEDURE — 36415 COLL VENOUS BLD VENIPUNCTURE: CPT

## 2020-06-27 PROCEDURE — 94761 N-INVAS EAR/PLS OXIMETRY MLT: CPT

## 2020-06-27 PROCEDURE — 85025 COMPLETE CBC W/AUTO DIFF WBC: CPT | Mod: ER

## 2020-06-27 PROCEDURE — 97530 THERAPEUTIC ACTIVITIES: CPT | Mod: CQ

## 2020-06-27 RX ORDER — METOPROLOL SUCCINATE 50 MG/1
50 TABLET, EXTENDED RELEASE ORAL DAILY
Qty: 30 TABLET | Refills: 1 | OUTPATIENT
Start: 2020-06-28 | End: 2020-07-13

## 2020-06-27 RX ADMIN — PRAVASTATIN SODIUM 40 MG: 20 TABLET ORAL at 08:06

## 2020-06-27 RX ADMIN — METOPROLOL SUCCINATE 50 MG: 50 TABLET, EXTENDED RELEASE ORAL at 08:06

## 2020-06-27 RX ADMIN — CYCLOBENZAPRINE HYDROCHLORIDE 10 MG: 10 TABLET, FILM COATED ORAL at 08:06

## 2020-06-27 RX ADMIN — NEPHROCAP 1 CAPSULE: 1 CAP ORAL at 08:06

## 2020-06-27 RX ADMIN — SEVELAMER CARBONATE 1600 MG: 800 TABLET, FILM COATED ORAL at 12:06

## 2020-06-27 RX ADMIN — HYDROCODONE BITARTRATE AND ACETAMINOPHEN 1 TABLET: 5; 325 TABLET ORAL at 08:06

## 2020-06-27 RX ADMIN — HEPARIN SODIUM 5000 UNITS: 5000 INJECTION, SOLUTION INTRAVENOUS; SUBCUTANEOUS at 06:06

## 2020-06-27 RX ADMIN — MUPIROCIN: 20 OINTMENT TOPICAL at 08:06

## 2020-06-27 RX ADMIN — PANTOPRAZOLE SODIUM 40 MG: 40 TABLET, DELAYED RELEASE ORAL at 08:06

## 2020-06-27 RX ADMIN — SEVELAMER CARBONATE 1600 MG: 800 TABLET, FILM COATED ORAL at 08:06

## 2020-06-27 RX ADMIN — ASPIRIN 81 MG: 81 TABLET, COATED ORAL at 08:06

## 2020-06-27 RX ADMIN — CITALOPRAM HYDROBROMIDE 20 MG: 20 TABLET ORAL at 08:06

## 2020-06-27 NOTE — PLAN OF CARE
Discharge orders noted for a rw and hh. Met with patient discussed options for receiving home health. Preference letter obtained for [facility]. Referral placed in [facility] and contacted [facility] on call nurse to notify of discharge.     Contacted Willis at [facility], approved to deliver the walker. Walker delivered to room.   Update to Neda Ruano Nurse.         06/27/20 0694   Post-Acute Status   Post-Acute Authorization HME;Home Health   HME Status Set-up Complete   Home Health Status Referrals Sent   Discharge Delays (!) Other

## 2020-06-27 NOTE — CONSULTS
[facility] -   Nephrology  Consult Note    Patient Name: Cordell Angulo  MRN: [id number]  Admission Date: 6/25/2020  Hospital Length of Stay: 0 days  Attending Provider: Bethany Hermosillo MD   Primary Care Physician: Chuck Grider MD  Principal Problem:Dysarthria    Inpatient consult to Nephrology  Consult performed by: Donovan Gardner MD  Consult ordered by: Felicia Warren NP  Reason for consult: ESRD on HD         Subjective:     HPI:  Cordell Angulo is a pleasant 55-year-old  woman with history of hypertension, morbid obesity, chronic diastolic heart failure, type 2 diabetes, paroxysmal atrial fibrillation, end-stage renal disease on hemodialysis, [facility], [facility] [place], Dr Yovani BECKER [facility], patient was admitted to the hospital with left-sided weakness and dysarthria, she was diagnosed with left MCA stroke, neurology notes reviewed, she likely will need rehab therapy, we were consulted for maintenance dialysis,  patient finished her dialysis treatment today,           Past Medical History:   Diagnosis Date    Asthma     CHF (congestive heart failure)     CKD (chronic kidney disease) stage 5, GFR less than 15 ml/min     Depression     Diabetes mellitus     Dialysis patient     Hypercholesterolemia     Hypertension     PAF (paroxysmal atrial fibrillation)        Past Surgical History:   Procedure Laterality Date    AV FISTULA PLACEMENT      CARDIAC ELECTROPHYSIOLOGY MAPPING AND ABLATION      CATHETERIZATION OF BOTH LEFT AND RIGHT HEART N/A 12/28/2018    Procedure: CATHETERIZATION, HEART, BOTH LEFT AND RIGHT;  Surgeon: Enrique Jj MD;  Location: [facility] CATH LAB;  Service: Cardiology;  Laterality: N/A;  To follow his cardioversion case in CVRU    cesarian section      TUMOR REMOVAL      L lung       Review of patient's allergies indicates:   Allergen Reactions    Morphine Itching    Sulfa (sulfonamide antibiotics) Rash     Current Facility-Administered Medications    Medication Frequency    aspirin EC tablet 81 mg Daily    citalopram tablet 20 mg Daily    cyclobenzaprine tablet 10 mg TID PRN    dextrose 50% injection 12.5 g PRN    dextrose 50% injection 25 g PRN    diazePAM tablet 5 mg Q12H PRN    glucagon (human recombinant) injection 1 mg PRN    glucose chewable tablet 16 g PRN    glucose chewable tablet 24 g PRN    heparin (porcine) injection 5,000 Units Q8H    hydrALAZINE injection 10 mg Q8H PRN    HYDROcodone-acetaminophen 5-325 mg per tablet 1 tablet Q6H PRN    insulin aspart U-100 pen 0-5 Units QID (AC + HS) PRN    insulin detemir U-100 pen 10 Units QHS    [START ON 6/27/2020] metoprolol succinate (TOPROL-XL) 24 hr tablet 50 mg Daily    mupirocin 2 % ointment BID    ondansetron injection 4 mg Q12H PRN    pantoprazole EC tablet 40 mg Daily    pravastatin tablet 40 mg Daily    promethazine (PHENERGAN) 6.25 mg in dextrose 5 % 50 mL IVPB Q6H PRN    sevelamer carbonate tablet 1,600 mg TID WM    sodium chloride 0.9% bolus 500 mL Continuous PRN    sodium chloride 0.9% flush 10 mL PRN    sucroferric oxyhydroxide Chew 500 mg TID WM    traMADoL tablet 50 mg Q6H PRN    vitamin renal formula (B-complex-vitamin c-folic acid) 1 mg per capsule 1 capsule Daily     Family History     Problem Relation (Age of Onset)    Diabetes Father    Heart disease Mother        Tobacco Use    Smoking status: Never Smoker    Smokeless tobacco: Never Used   Substance and Sexual Activity    Alcohol use: No    Drug use: No    Sexual activity: Not on file     Review of Systems   Constitutional: Positive for activity change. Negative for appetite change, fatigue and fever.   HENT: Negative for congestion, facial swelling, sore throat, trouble swallowing and voice change.    Eyes: Negative for redness and visual disturbance.   Respiratory: Negative for apnea, cough, chest tightness, shortness of breath and wheezing.    Cardiovascular: Negative for chest pain, palpitations and  leg swelling.   Gastrointestinal: Negative for abdominal distention, abdominal pain, blood in stool, constipation, diarrhea, nausea and vomiting.   Genitourinary: Negative for decreased urine volume, difficulty urinating, dysuria, flank pain, frequency, hematuria, pelvic pain and urgency.   Musculoskeletal: Negative for back pain, gait problem and joint swelling.   Skin: Negative for color change and rash.   Neurological: Positive for speech difficulty and weakness. Negative for dizziness, syncope and headaches.   Hematological: Does not bruise/bleed easily.   Psychiatric/Behavioral: Negative for agitation, behavioral problems and confusion. The patient is not nervous/anxious.      Objective:     Vital Signs (Most Recent):  Temp: 97.6 °F (36.4 °C) (06/26/20 1757)  Pulse: 67 (06/26/20 1759)  Resp: 18 (06/26/20 1757)  BP: (!) 137/96 (06/26/20 1757)  SpO2: 100 % (06/26/20 1757)  O2 Device (Oxygen Therapy): room air (06/26/20 0725) Vital Signs (24h Range):  Temp:  [97.1 °F (36.2 °C)-98.3 °F (36.8 °C)] 97.6 °F (36.4 °C)  Pulse:  [55-94] 67  Resp:  [16-19] 18  SpO2:  [96 %-100 %] 100 %  BP: ()/(42-96) 137/96     Weight: 105.1 kg (231 lb 11.3 oz) (06/25/20 1930)  Body mass index is 36.29 kg/m².  Body surface area is 2.23 meters squared.    I/O last 3 completed shifts:  In: 960 [P.O.:660; Other:250; IV Piggyback:50]  Out: 2000 [Other:2000]    Physical Exam  Constitutional:       General: She is not in acute distress.     Appearance: She is well-developed.   HENT:      Head: Normocephalic and atraumatic.      Mouth/Throat:      Pharynx: No oropharyngeal exudate.   Eyes:      General: No scleral icterus.     Conjunctiva/sclera: Conjunctivae normal.      Pupils: Pupils are equal, round, and reactive to light.   Neck:      Musculoskeletal: Normal range of motion and neck supple.      Thyroid: No thyroid mass or thyromegaly.      Vascular: No carotid bruit or JVD.      Trachea: No tracheal deviation.   Cardiovascular:       Rate and Rhythm: Normal rate and regular rhythm.      Heart sounds: Normal heart sounds. No murmur. No friction rub. No gallop.    Pulmonary:      Effort: Pulmonary effort is normal. No respiratory distress.      Breath sounds: Normal breath sounds. No wheezing or rales.   Chest:      Chest wall: No tenderness.   Abdominal:      General: Bowel sounds are normal. There is no distension or abdominal bruit.      Palpations: Abdomen is soft. There is no mass.      Tenderness: There is no abdominal tenderness. There is no guarding or rebound.      Comments: Obese    Musculoskeletal:         General: No tenderness.      Comments: LUE AVF   Lymphadenopathy:      Cervical: No cervical adenopathy.   Skin:     General: Skin is warm.      Coloration: Skin is not pale.      Findings: No erythema or rash.   Neurological:      Mental Status: She is alert and oriented to person, place, and time.      Cranial Nerves: No cranial nerve deficit.      Motor: No abnormal muscle tone.   Psychiatric:         Behavior: Behavior normal.         Judgment: Judgment normal.         Significant Labs:  Cardiac Markers: No results for input(s): CKMB, TROPONINT, MYOGLOBIN in the last 168 hours.  CBC:   Recent Labs   Lab 06/26/20  0450   WBC 7.69   RBC 4.75   HGB 12.3   HCT 40.8      MCV 86   MCH 25.9*   MCHC 30.1*     CMP:   Recent Labs   Lab 06/26/20  0450   *   CALCIUM 9.2   ALBUMIN 3.0*   PROT 7.1      K 4.4   CO2 24   CL 99   BUN 52*   CREATININE 10.5*   ALKPHOS 68   ALT 13   AST 11   BILITOT 0.3     Coagulation:   Recent Labs   Lab 06/26/20  0450   INR 1.0   APTT 27.3     All labs within the past 24 hours have been reviewed.    Significant Imaging:  Reviewed    Lab Results   Component Value Date    .0 (H) 05/08/2015    CALCIUM 9.2 06/26/2020    PHOS 7.0 (H) 06/26/2020       Lab Results   Component Value Date    ALBUMIN 3.0 (L) 06/26/2020         Assessment/Plan:     Active Diagnoses:    Diagnosis Date Noted POA     PRINCIPAL PROBLEM:  Dysarthria [R47.1] 06/25/2020 Yes    ESRD (end stage renal disease) [N18.6] 12/26/2018 Yes     Chronic    PAF (paroxysmal atrial fibrillation) [I48.0] 11/28/2018 Yes     Chronic    Chronic diastolic congestive heart failure [I50.32] 10/18/2015 Yes     Chronic    Essential hypertension [I10] 03/21/2015 Yes     Chronic    Type 2 diabetes mellitus, with long-term current use of insulin [E11.9, Z79.4] 03/21/2015 Not Applicable     Chronic      Problems Resolved During this Admission:       Impression and Plan :  55-year-old  woman seen in consultation for following medical problem    1.  End-stage renal disease on hemodialysis, MWF, LUE AVF, patient had a dialysis treatment today, tolerated well,    2.  Hypertension, blood pressure is controlled,    3. Left MCA CVA, neurology notes reviewed, patient needs rehab,    4.  Anemia of chronic kidney disease, monitor hemoglobin, no indication for Procrit therapy,    5.  Secondary hyperparathyroidism, continue renal diet, continue phosphorus binders,    Thank you for your consult. I will follow-up with patient. Please contact us if you have any additional questions.     Total time spent 70 minutes including time needed to review the records,  patient  evaluation, documentation, face-to-face discussion with the patient,  primary team, more than 50% of the time was spent on coordination of care and counseling.       Donovan Gardner MD  Nephrology  [facility]

## 2020-06-27 NOTE — PT/OT/SLP PROGRESS
Occupational Therapy      Patient Name:  Cordell Angulo   MRN:  [id number]    TREATMENT TIME:  9740-1451    S:  PATIENT STATED SHE HAS TROUBLE WITH (L) SHLD FLEX EVER SINCE SHUNT PLACED FOR DIALYSIS.    O:  PATIENT PERFORMED X1 SET X10 REPS OF BUE STRENGTHENING THERE EX WITH 1.5# DOWEL FOR SHLD FLEX/EXTEN, CHEST PRESS, AND ELBOW FLEX/EXTEN.  PATIENT ISSUED THERAPY BALL FOR (B) GROSS /FM STRENGTHENING.  PATIENT PERFORMED X30 REPS OF GROSS GRASP STRENGTHENING WITH EACH HAND FOLLOWED BY FM STRENGTHENING AS TOLERATED FOR PINCER GRASP WITH EACH HAND.    A:  PATIENT ACKNOWLEDGED UNDERSTANDING OF THERAPY BALL HEP.  PATIENT UNABLE TO ACHIEVE (B) SHLD FLEX PAST 90 DEGRESS DURING DOWEL THERE EX DUE TO C/O SHUNT PLACEMENT LIMITING (L) SHLD AROM.    P:   CONT SKILLED OT INTERVENTION TO INCREASE SAFETY AND (I) WITH ALL LEVELS OF SELF CARE.        CHARGES:  TE1    Aicha Rivera OT  6/27/2020

## 2020-06-27 NOTE — PLAN OF CARE
Fall precautions maintained. Pain is well controlled. All needs met. Pt is ready for discharge. AVS explained, all questions and concerns addressed. Teach back done. IVs removed.

## 2020-06-27 NOTE — DISCHARGE SUMMARY
[facility] Hospital Medicine  Discharge Summary      Patient Name: Cordell Angulo  MRN: [id number]  Admission Date: 6/25/2020  Hospital Length of Stay: 0 days  Discharge Date and Time:  06/27/2020 3:16 PM  Attending Physician: No att. providers found   Discharging Provider: SOUMYA Haines  Primary Care Provider: Chuck Grider MD      HPI:   Cordell Angulo is a 55 y.o. female patient with a PMHx of asthma, HTN, HLD, chronic diastolic HFpEF, ESRD on HD, DM II, and PAF.  She presented to the ED with c/o stuttering speech which onset suddenly yesterday afternoon after dialysis. Symptoms are constant and moderate in severity. No mitigating or exacerbating factors reported. Associated sxs include L-sided headache. Patient denies any syncope, confusion, extremity weakness/numbness, dizziness, facial droop, and all other sxs at this time. No prior Tx included. No further complaints or concerns at this time. Patient states she continued to notice stuttering this AM upon awakening from sleep. Work-up in the ED was benign with CT of the head showing no acute process.  TeleStroke consult completed in the ED, who recommend admission for full CVA work-up.  No tPA due to being outside treatment window.  Hospital Medicine was contacted for admission.       * No surgery found *      Hospital Course:   Cordell Angulo is a 55 year old female placed on observation for Dysarthria. Dr. Santoro (neurology) consulted. Left hemiparesis, acute onset suggestive of right MCA stroke in patient with multiple risk factors for stroke. MRI brain with no acute intracranial findings, mild chronic microvascular ischemic changes. Carotid US negative for elevated velocities or gray scale images to suggest significant stenosis or occlusion. Left side weakness has resolved. PT recommended home health and rolling walker. Pt ready for discharge. Pt will be discharged home with home health (PT/OT/SN) and rolling walker. Pt will follow up  with PCP within 2-3 days after discharge for hospital follow up. She will resume her scheduled HD. This patient was seen and examined on the date of discharge and determined suitable for discharge.      Consults:   Consults (From admission, onward)        Status Ordering Provider     Inpatient consult to Nephrology  Once     Provider:  Donovan Gardner MD    Completed KRISTI FANG     Inpatient consult to Neurology  Once     Provider:  Rick Park MD    Completed KRISTI FANG     Inpatient consult to Registered Dietitian/Nutritionist  Once     Provider:  (Not yet assigned)    Completed KRISTI FANG.     Inpatient consult to Telemedicine-Stroke  Once     Provider:  (Not yet assigned)    DOMINICK William     IP consult to case management/social work  Once     Provider:  (Not yet assigned)    Completed KRISTI FANG.          No new Assessment & Plan notes have been filed under this hospital service since the last note was generated.  Service: Hospital Medicine    Final Active Diagnoses:    Diagnosis Date Noted POA    PRINCIPAL PROBLEM:  Dysarthria [R47.1] 06/25/2020 Yes    ESRD (end stage renal disease) [N18.6] 12/26/2018 Yes     Chronic    PAF (paroxysmal atrial fibrillation) [I48.0] 11/28/2018 Yes     Chronic    Chronic diastolic congestive heart failure [I50.32] 10/18/2015 Yes     Chronic    Essential hypertension [I10] 03/21/2015 Yes     Chronic    Type 2 diabetes mellitus, with long-term current use of insulin [E11.9, Z79.4] 03/21/2015 Not Applicable     Chronic      Problems Resolved During this Admission:       Discharged Condition: stable    Disposition: Home-Health Care [facility] [place]    Follow Up:  Follow-up Information     Chuck Grider MD. Schedule an appointment as soon as possible for a visit in 3 days.    Specialty: Internal Medicine  Why: hospital follow up  Contact information:  [address] [place] [id number] [phone]             [facility].    Specialty: Home Health Services  Why: Home Health  Contact information:  [address]  [place] [address]  [phone]             [facility]    Specialty: DME Provider  Why: DME  Contact information:  [address] [place] [id number]  [phone]                 Patient Instructions:      WALKER FOR HOME USE     Order Specific Question Answer Comments   Type of Walker: Adult (5'4"-6'6")    With wheels? Yes    Height: 5' 7" (1.702 m)    Weight: 105.1 kg (231 lb 11.3 oz)    Length of need (1-99 months): 99    Does patient have medical equipment at home? none    Please check all that apply: Patient's condition impairs ambulation.    Please check all that apply: Walker will be used for gait training.    Please check all that apply: Patient needs help to get in and out of chair.    Please check all that apply: Patient is unable to safely ambulate without equipment.      Ambulatory referral/consult to Home Health   Referral Priority: Routine Referral Type: Home Health   Referral Reason: Specialty Services Required   Requested Specialty: Home Health Services   Number of Visits Requested: 1     Notify your health care provider if you experience any of the following:  increased confusion or weakness     Notify your health care provider if you experience any of the following:  persistent dizziness, light-headedness, or visual disturbances     Notify your health care provider if you experience any of the following:  difficulty breathing or increased cough     Activity as tolerated       Significant Diagnostic Studies: Labs:   CMP   Recent Labs   Lab 06/26/20  0450 06/27/20  0558    136   K 4.4 5.0   CL 99 104   CO2 24 20*   * 106   BUN 52* 40*   CREATININE 10.5* 9.0*   CALCIUM 9.2 9.8   PROT 7.1 7.4   ALBUMIN 3.0* 3.0*   BILITOT 0.3 0.3   ALKPHOS 68 68   AST 11 11   ALT 13 14   ANIONGAP 15 12   ESTGFRAFRICA 4* 5*   EGFRNONAA 4* 4*    and " CBC   Recent Labs   Lab 06/26/20  0450 06/27/20  0558   WBC 7.69 9.01   HGB 12.3 12.6   HCT 40.8 42.4    206     Radiology:   Imaging Results          CT Head Without Contrast (Final result)  Result time 06/25/20 13:58:15    Final result by Kvng Banks MD (06/25/20 13:58:15)                 Impression:      1.  Negative for acute intracranial process. Negative for hemorrhage, or skull fracture.    2.  Atrophy, intracranial atherosclerotic disease and small vessel ischemic changes again seen.    3.  Stable findings as noted above.    All CT scans at this facility are performed  using dose modulation techniques as appropriate to performed exam including the following:  automated exposure control; adjustment of mA and/or kV according to the patients size (this includes techniques or standardized protocols for targeted exams where dose is matched to indication/reason for exam: i.e. extremities or head);  iterative reconstruction technique.      Electronically signed by: Kvng Banks MD  Date:    06/25/2020  Time:    13:58             Narrative:    EXAMINATION:  CT HEAD WITHOUT CONTRAST    CLINICAL HISTORY:  Neuro deficit, acute, stroke suspected;    TECHNIQUE:  Axial images through the brain and posterior fossa were obtained without the use of IV contrast.  Sagittal and coronal reconstructions are provided for review.    COMPARISON:  February 10, 2020    FINDINGS:  The ventricles are midline and the CSF spaces are prominent.  The gray-white matter junction is well preserved. Negative for intracranial vascular abnormalities. Negative for mass, mass effect, cerebral edema, hemorrhage or abnormal fluid collections.  Intracranial atherosclerotic disease.  Small vessel ischemic changes, focally prominent in the posterior periventricular white matter regions.  Small cavum septum pellucidum.  Basal ganglia calcifications.  Falx calcifications.  Arachnoid granulation calcifications.    The skull and scalp are  intact.    The   paranasal sinuses, mastoid air cells, middle ears and ear canals are clear. The globes are intact.  Postoperative changes to the lens regions.                                Pending Diagnostic Studies:     None         Medications:  Reconciled Home Medications:      Medication List      CHANGE how you take these medications    albuterol 90 mcg/actuation inhaler  Commonly known as: PROVENTIL/VENTOLIN HFA  Inhale 1-2 puffs into the lungs every 6 (six) hours as needed for Wheezing.  What changed: Another medication with the same name was removed. Continue taking this medication, and follow the directions you see here.     metoprolol succinate 50 MG 24 hr tablet  Commonly known as: TOPROL-XL  Take 1 tablet (50 mg total) by mouth once daily.  Start taking on: June 28, 2020  What changed:   · how much to take  · Another medication with the same name was removed. Continue taking this medication, and follow the directions you see here.        CONTINUE taking these medications    aspirin 81 MG EC tablet  Commonly known as: ECOTRIN  Take 1 tablet (81 mg total) by mouth once daily.     benzocaine 10 % Oint  Apply 1 application topically 3 (three) times daily as needed. Apply to left ear for pain     citalopram 20 MG tablet  Commonly known as: CELEXA  Take 1 tablet (20 mg total) by mouth once daily.     cyclobenzaprine 10 MG tablet  Commonly known as: FLEXERIL  Take 0.5 tablets (5 mg total) by mouth 3 (three) times daily as needed for Muscle spasms.     diazePAM 5 MG tablet  Commonly known as: VALIUM  Take 2 tablets (10 mg total) by mouth nightly as needed for Anxiety or Insomnia. One to 2 tablets as needed for sleep and anxiety     insulin glargine 100 unit/mL injection  Commonly known as: LANTUS  Inject 10 Units into the skin every evening.     levocetirizine 2.5 mg/5 mL solution  Commonly known as: XYZAL  Take 5 mLs (2.5 mg total) by mouth every evening.     lisinopriL 40 MG tablet  Commonly known as:  PRINIVIL,ZESTRIL  Take 40 mg by mouth once daily.     meclizine 25 mg tablet  Commonly known as: ANTIVERT  Take 1 tablet (25 mg total) by mouth 3 (three) times daily as needed.     pravastatin 40 MG tablet  Commonly known as: PRAVACHOL  Take 40 mg by mouth once daily.     sevelamer carbonate 800 mg Tab  Commonly known as: RENVELA  TK 2 TS PO TID WITH MEALS     traMADoL 50 mg tablet  Commonly known as: ULTRAM  TK 1 T PO Q 6 H PRN P     VELPHORO 500 mg Chew  Generic drug: sucroferric oxyhydroxide  CHEW & SWALLOW 1 TABLET BY MOUTH THREE TIMES A DAY WITH MEALS AND 1 TABLET TWO TIMES A DAY WITH SNACKS     vitamin renal formula (B-complex-vitamin c-folic acid) 1 mg Cap  Commonly known as: NEPHROCAP  Take 1 capsule by mouth once daily.        STOP taking these medications    azithromycin 250 MG tablet  Commonly known as: Z-SHYANN     benzonatate 100 MG capsule  Commonly known as: TESSALON     fluticasone-salmeterol 250-50 mcg/dose 250-50 mcg/dose diskus inhaler  Commonly known as: ADVAIR     hydrOXYzine pamoate 25 MG Cap  Commonly known as: VISTARIL     levoFLOXacin 750 MG tablet  Commonly known as: LEVAQUIN     medroxyPROGESTERone 10 MG tablet  Commonly known as: PROVERA            Indwelling Lines/Drains at time of discharge:   Lines/Drains/Airways     Drain                 Hemodialysis AV Fistula Left upper arm -- days                Time spent on the discharge of patient: 35 minutes  Patient was seen and examined on the date of discharge and determined to be suitable for discharge.         SOUMYA Haines  Department of Hospital Medicine  [facility]

## 2020-06-27 NOTE — PLAN OF CARE
PATIENT AMBULATED FARTHER TODAY COMPARED TO YESTERDAY WITH 2 LOSSES OF BALANCE BOTH TO THE LEFT WITH PATIENT TURNING TO THE RIGHT. PATIENT AND  AGREE TO PATIENT THAT PATIENT WOULD BENEFIT FROM HOME HEALTH PHYSICAL THERAPY. PATIENT WILL NEED A ROLLING WALKER TO INCREASED SAFETY AFTER D/C.

## 2020-06-27 NOTE — PT/OT/SLP PROGRESS
Speech Language Pathology Treatment    Patient Name:  Cordell Angulo   MRN:  [id number]  Admitting Diagnosis: Dysarthria    Recommendations:                 General Recommendations:  Cognitive-linguistic therapy  Diet recommendations:  Regular, Liquid Diet Level: Thin   Aspiration Precautions: Standard aspiration precautions   General Precautions: Standard, fall  Communication strategies:  none    Subjective     Pt alert and cooperative, seen at bedside.   Patient goals: Improve ability to talk.     Pain/Comfort:  · No c/o pain.     Objective:     Has the patient been evaluated by SLP for swallowing?   Yes  Keep patient NPO?   No   Current Respiratory Status: room air      Pt was instructed in oral motor exercises x 10 repetitions each given min A via verbal cues. Pt was instructed in automatic speech tasks consisting of deep breathing and adequate breath support with mod A given verbal and visual cues.     Assessment:     Cordell Angulo is a 55 y.o. female with an SLP diagnosis of Dysarthria.  She presents with decreased intelligibility during speech production d/t neurogenic stuttering, which negatively impacts her ability to effectively communicate basic medical and social wants/ needs during ADLs. .    Goals:   Multidisciplinary Problems     SLP Goals        Problem: SLP Goal    Goal Priority Disciplines Outcome   SLP Goal     SLP    Description: 1. Pt will express wants and needs with 100% speech intelligibility  2. Pt will complete conversational speech tasks with min a for increased fluency and word finding   3. Pt will complete oral motor ex's with I for increased strength and ROM                   Plan:     · Patient to be seen:  3 x/week   · Plan of Care expires:  07/03/20  · Plan of Care reviewed with:  patient(Simultaneous filing. User may not have seen previous data.)   · SLP Follow-Up:  Yes       Discharge recommendations:  home health speech therapy   Barriers to Discharge:  Level of Skilled Assistance  Needed mod A    Time Tracking:     SLP Treatment Date:   06/27/20  Speech Start Time:  1042  Speech Stop Time:  1110     Speech Total Time (min):  28 min    Billable Minutes: Speech Therapy Individual 28    Luciana Rubio CCC-TETE  06/27/2020

## 2020-06-27 NOTE — PT/OT/SLP PROGRESS
Physical Therapy  Treatment    Cordell Angulo   MRN: [id number]   Admitting Diagnosis: Dysarthria       PT Start Time: 1050     PT Stop Time: 1115    PT Total Time (min): 25 min       Billable Minutes:  Gait Training 15 and Therapeutic Activity 10       PT/PTA: PTA     PTA Visit Number: 1       General Precautions: Standard, fall  Orthopedic Precautions: N/A   Braces:           Subjective:  Communicated with NSG prior to session.      Pain/Comfort  Pain Rating Post-Intervention 1: 0/10    Objective:        Functional Mobility:  Bed Mobility:        Transfers:       Gait:        Stairs:          Balance:   Static Sit:   Dynamic Sit:   Static Stand:   Dynamic stand:        Therapeutic Activities and Exercises:  BED MOB WITH I  SIT<-->STAND WITH CG AND VERBAL CUES FOR HAND PLACEMENT AND T INCREASE FORWARD LEAN  AMBULATED WITH ROLLING WALKER 2X65' CG WITH 2 LOSSES OF BALANCE TO THE LEFT BOTH WHILE PATIENT WAS TURNING TO THE RIGHT.   RETURNED TO SITTING I BED SIDE CHAIR WITH  IN ROOM     AM-PAC 6 CLICK MOBILITY  How much help from another person does this patient currently need?   1 = Unable, Total/Dependent Assistance  2 = A lot, Maximum/Moderate Assistance  3 = A little, Minimum/Contact Guard/Supervision  4 = None, Modified Pepin/Independent         AM-PAC Raw Score CMS G-Code Modifier Level of Impairment Assistance   6 % Total / Unable   7 - 9 CM 80 - 100% Maximal Assist   10 - 14 CL 60 - 80% Moderate Assist   15 - 19 CK 40 - 60% Moderate Assist   20 - 22 CJ 20 - 40% Minimal Assist   23 CI 1-20% SBA / CGA   24 CH 0% Independent/ Mod I     Patient left up in chair with all lines intact, call button in reach, chair alarm on, NSG notified and  present.    Assessment:  Cordell Angulo is a 55 y.o. female with a medical diagnosis of Dysarthria and presents with .    Rehab identified problem list/impairments:      Rehab potential is good.    Activity tolerance: Good    Discharge recommendations:  HOME  HEALTH PT AND ROLLING WALKER     Barriers to discharge:      Equipment recommendations: Equipment Needed After Discharge: walker, rolling, wheelchair     GOALS:   Multidisciplinary Problems     Physical Therapy Goals        Problem: Physical Therapy Goal    Goal Priority Disciplines Outcome Goal Variances Interventions   Physical Therapy Goal     PT, PT/OT      Description: LTG'S TO BE ME IN 7 DAYS (7-3-20)1. PT WILL BE BARBARA WITH BED MOBILITY  2. PT WILL REQUIRE SBA FOR TF'S  3. PT WILL ' WITH RW AND CGA                   PLAN:    Patient to be seen 5 x/week  to address the above listed problems via gait training, therapeutic activities, therapeutic exercises  Plan of Care expires: 07/03/20  Plan of Care reviewed with: patient         Laureano Raygoza, PTA  06/27/2020

## 2020-06-27 NOTE — PLAN OF CARE
Verbally Informed patient of observation status , advised patient status may change per provider if needed . Patient understands. Gave verbal consent. Informed patient of financial services contact number [phone] if needed. No further action taken

## 2020-06-27 NOTE — PROGRESS NOTES
Patient hemodialysis treatment duration was 2 hours. Patient requested early end to HD treatment. Dr Gardner notified. 1500 ml was removed. No medications were given during HD treatment. L AVF access preformed without difficulty.

## 2020-06-29 NOTE — PLAN OF CARE
[facility]     06/29/20 0723   Final Note   Assessment Type Final Discharge Note   Anticipated Discharge Disposition Home-Health   Right Care Referral Info   Post Acute Recommendation Home-care   Facility Name [facility]

## 2020-07-13 ENCOUNTER — HOSPITAL ENCOUNTER (EMERGENCY)
Facility: HOSPITAL | Age: 55
Discharge: HOME | End: 2020-07-13
Attending: EMERGENCY MEDICINE
Payer: MEDICARE

## 2020-07-13 VITALS
HEIGHT: 67 IN | DIASTOLIC BLOOD PRESSURE: 65 MMHG | SYSTOLIC BLOOD PRESSURE: 170 MMHG | OXYGEN SATURATION: 99 % | TEMPERATURE: 98 F | WEIGHT: 239.63 LBS | BODY MASS INDEX: 37.61 KG/M2 | RESPIRATION RATE: 20 BRPM | HEART RATE: 76 BPM

## 2020-07-13 DIAGNOSIS — T44.7X5A ADVERSE EFFECT OF BETA-BLOCKER, INITIAL ENCOUNTER: ICD-10-CM

## 2020-07-13 DIAGNOSIS — R42 DIZZINESS: ICD-10-CM

## 2020-07-13 DIAGNOSIS — R55 NEAR SYNCOPE: ICD-10-CM

## 2020-07-13 DIAGNOSIS — E87.6 HYPOKALEMIA: ICD-10-CM

## 2020-07-13 DIAGNOSIS — R00.1 SYMPTOMATIC BRADYCARDIA: Primary | ICD-10-CM

## 2020-07-13 LAB
ALBUMIN SERPL BCP-MCNC: 3.2 G/DL (ref 3.5–5.2)
ALP SERPL-CCNC: 73 U/L (ref 55–135)
ALT SERPL W/O P-5'-P-CCNC: 15 U/L (ref 10–44)
ANION GAP SERPL CALC-SCNC: 16 MMOL/L (ref 8–16)
AST SERPL-CCNC: 15 U/L (ref 10–40)
BASOPHILS # BLD AUTO: 0.04 K/UL (ref 0–0.2)
BASOPHILS NFR BLD: 0.6 % (ref 0–1.9)
BILIRUB SERPL-MCNC: 0.3 MG/DL (ref 0.1–1)
BNP SERPL-MCNC: 424 PG/ML (ref 0–99)
BUN SERPL-MCNC: 20 MG/DL (ref 6–20)
CALCIUM SERPL-MCNC: 9.4 MG/DL (ref 8.7–10.5)
CHLORIDE SERPL-SCNC: 98 MMOL/L (ref 95–110)
CO2 SERPL-SCNC: 24 MMOL/L (ref 23–29)
CREAT SERPL-MCNC: 5.8 MG/DL (ref 0.5–1.4)
DIFFERENTIAL METHOD: ABNORMAL
EOSINOPHIL # BLD AUTO: 0.2 K/UL (ref 0–0.5)
EOSINOPHIL NFR BLD: 2.3 % (ref 0–8)
ERYTHROCYTE [DISTWIDTH] IN BLOOD BY AUTOMATED COUNT: 15.3 % (ref 11.5–14.5)
EST. GFR  (AFRICAN AMERICAN): 9 ML/MIN/1.73 M^2
EST. GFR  (NON AFRICAN AMERICAN): 7 ML/MIN/1.73 M^2
GLUCOSE SERPL-MCNC: 95 MG/DL (ref 70–110)
HCT VFR BLD AUTO: 38.1 % (ref 37–48.5)
HCV AB SERPL QL IA: NEGATIVE
HGB BLD-MCNC: 11.7 G/DL (ref 12–16)
HIV 1+2 AB+HIV1 P24 AG SERPL QL IA: NEGATIVE
IMM GRANULOCYTES # BLD AUTO: 0.02 K/UL (ref 0–0.04)
IMM GRANULOCYTES NFR BLD AUTO: 0.3 % (ref 0–0.5)
LYMPHOCYTES # BLD AUTO: 1.2 K/UL (ref 1–4.8)
LYMPHOCYTES NFR BLD: 17.4 % (ref 18–48)
MAGNESIUM SERPL-MCNC: 1.8 MG/DL (ref 1.6–2.6)
MCH RBC QN AUTO: 25.7 PG (ref 27–31)
MCHC RBC AUTO-ENTMCNC: 30.7 G/DL (ref 32–36)
MCV RBC AUTO: 84 FL (ref 82–98)
MONOCYTES # BLD AUTO: 0.4 K/UL (ref 0.3–1)
MONOCYTES NFR BLD: 5.9 % (ref 4–15)
NEUTROPHILS # BLD AUTO: 5.2 K/UL (ref 1.8–7.7)
NEUTROPHILS NFR BLD: 73.5 % (ref 38–73)
NRBC BLD-RTO: 0 /100 WBC
PLATELET # BLD AUTO: 223 K/UL (ref 150–350)
PMV BLD AUTO: 8.7 FL (ref 9.2–12.9)
POTASSIUM SERPL-SCNC: 3 MMOL/L (ref 3.5–5.1)
PROT SERPL-MCNC: 7.5 G/DL (ref 6–8.4)
RBC # BLD AUTO: 4.55 M/UL (ref 4–5.4)
SODIUM SERPL-SCNC: 138 MMOL/L (ref 136–145)
TROPONIN I SERPL DL<=0.01 NG/ML-MCNC: 0.01 NG/ML (ref 0–0.03)
TSH SERPL DL<=0.005 MIU/L-ACNC: 2.94 UIU/ML (ref 0.4–4)
WBC # BLD AUTO: 7.11 K/UL (ref 3.9–12.7)

## 2020-07-13 PROCEDURE — 63600175 PHARM REV CODE 636 W HCPCS: Performed by: EMERGENCY MEDICINE

## 2020-07-13 PROCEDURE — 93010 EKG 12-LEAD: ICD-10-PCS | Mod: ,,, | Performed by: INTERNAL MEDICINE

## 2020-07-13 PROCEDURE — 25000003 PHARM REV CODE 250: Performed by: EMERGENCY MEDICINE

## 2020-07-13 PROCEDURE — 84484 ASSAY OF TROPONIN QUANT: CPT

## 2020-07-13 PROCEDURE — 86803 HEPATITIS C AB TEST: CPT

## 2020-07-13 PROCEDURE — 99284 EMERGENCY DEPT VISIT MOD MDM: CPT | Mod: 25

## 2020-07-13 PROCEDURE — 93010 ELECTROCARDIOGRAM REPORT: CPT | Mod: ,,, | Performed by: INTERNAL MEDICINE

## 2020-07-13 PROCEDURE — 93005 ELECTROCARDIOGRAM TRACING: CPT

## 2020-07-13 PROCEDURE — 36415 COLL VENOUS BLD VENIPUNCTURE: CPT

## 2020-07-13 PROCEDURE — 83735 ASSAY OF MAGNESIUM: CPT

## 2020-07-13 PROCEDURE — 83880 ASSAY OF NATRIURETIC PEPTIDE: CPT

## 2020-07-13 PROCEDURE — 85025 COMPLETE CBC W/AUTO DIFF WBC: CPT

## 2020-07-13 PROCEDURE — 96374 THER/PROPH/DIAG INJ IV PUSH: CPT

## 2020-07-13 PROCEDURE — 86703 HIV-1/HIV-2 1 RESULT ANTBDY: CPT

## 2020-07-13 PROCEDURE — 80053 COMPREHEN METABOLIC PANEL: CPT

## 2020-07-13 PROCEDURE — 84443 ASSAY THYROID STIM HORMONE: CPT

## 2020-07-13 RX ORDER — INSULIN GLARGINE 100 [IU]/ML
15 INJECTION, SOLUTION SUBCUTANEOUS
COMMUNITY
Start: 2020-03-02

## 2020-07-13 RX ORDER — SUCRALFATE 1 G/10ML
SUSPENSION ORAL
COMMUNITY
Start: 2020-07-09

## 2020-07-13 RX ORDER — PANTOPRAZOLE SODIUM 40 MG/1
40 TABLET, DELAYED RELEASE ORAL
COMMUNITY
Start: 2020-05-08

## 2020-07-13 RX ORDER — METOPROLOL SUCCINATE 25 MG/1
25 TABLET, EXTENDED RELEASE ORAL DAILY
Qty: 30 TABLET | Refills: 0 | Status: SHIPPED | OUTPATIENT
Start: 2020-07-13 | End: 2020-12-07

## 2020-07-13 RX ORDER — ALPRAZOLAM 0.5 MG/1
0.5 TABLET ORAL
COMMUNITY
Start: 2020-04-24 | End: 2021-04-24

## 2020-07-13 RX ORDER — ONDANSETRON 2 MG/ML
4 INJECTION INTRAMUSCULAR; INTRAVENOUS
Status: COMPLETED | OUTPATIENT
Start: 2020-07-13 | End: 2020-07-13

## 2020-07-13 RX ORDER — ONDANSETRON 2 MG/ML
8 INJECTION INTRAMUSCULAR; INTRAVENOUS
Status: DISCONTINUED | OUTPATIENT
Start: 2020-07-13 | End: 2020-07-13

## 2020-07-13 RX ORDER — POTASSIUM CHLORIDE 20 MEQ/1
40 TABLET, EXTENDED RELEASE ORAL
Status: COMPLETED | OUTPATIENT
Start: 2020-07-13 | End: 2020-07-13

## 2020-07-13 RX ORDER — ATORVASTATIN CALCIUM 40 MG/1
TABLET, FILM COATED ORAL
COMMUNITY
Start: 2020-02-25 | End: 2020-11-10

## 2020-07-13 RX ORDER — FERRIC CITRATE 210 MG/1
TABLET, COATED ORAL
COMMUNITY
Start: 2020-02-07 | End: 2020-11-10

## 2020-07-13 RX ORDER — POTASSIUM CHLORIDE 20 MEQ/1
20 TABLET, EXTENDED RELEASE ORAL
Status: DISCONTINUED | OUTPATIENT
Start: 2020-07-13 | End: 2020-07-13

## 2020-07-13 RX ADMIN — POTASSIUM CHLORIDE 40 MEQ: 1500 TABLET, EXTENDED RELEASE ORAL at 05:07

## 2020-07-13 RX ADMIN — ONDANSETRON 4 MG: 2 INJECTION INTRAMUSCULAR; INTRAVENOUS at 01:07

## 2020-07-13 NOTE — ED PROVIDER NOTES
SCRIBE #1 NOTE: I, Anuj Nelson, am scribing for, and in the presence of, Nitish Tai MD. I have scribed the entire note.       History     Chief Complaint   Patient presents with    Fatigue     generalized weakness and dizziness started during dialysis. ems reports pt had cva 2 weeks ago, w/ unresolved L sided weakness and slurred speach.      Review of patient's allergies indicates:   Allergen Reactions    Morphine Itching    Sulfa (sulfonamide antibiotics) Rash         History of Present Illness     HPI    7/13/2020, 12:52 PM  History obtained from the patient      History of Present Illness: Cordell Angulo is a 55 y.o. female patient with a PMHx of asthma, CHF, CKD (stage V), depression, DM, HLD, HTN, and paroxysmal atrial fibrillation who presents to the Emergency Department for evaluation of fatigue which onset gradually after dialysis today. Symptoms are constant and moderate in severity. No mitigating or exacerbating factors reported. Associated sxs include N/V and light-headedness. Patient denies any fever, chills, diarrhea, cough, CP, SOB, and all other sxs at this time. No prior Tx reported. No further complaints or concerns at this time.       Arrival mode: EMS    PCP: Chuck Grider MD        Past Medical History:  Past Medical History:   Diagnosis Date    Asthma     CHF (congestive heart failure)     CKD (chronic kidney disease) stage 5, GFR less than 15 ml/min     Depression     Diabetes mellitus     Dialysis patient     Hypercholesterolemia     Hypertension     PAF (paroxysmal atrial fibrillation)        Past Surgical History:  Past Surgical History:   Procedure Laterality Date    AV FISTULA PLACEMENT      CARDIAC ELECTROPHYSIOLOGY MAPPING AND ABLATION      CATHETERIZATION OF BOTH LEFT AND RIGHT HEART N/A 12/28/2018    Procedure: CATHETERIZATION, HEART, BOTH LEFT AND RIGHT;  Surgeon: Enrique Jj MD;  Location: [facility] CATH LAB;  Service: Cardiology;  Laterality:  N/A;  To follow [facility] cardioversion [facility]    cesarian section      TUMOR REMOVAL      L lung         Family History:  Family History   Problem Relation Age of Onset    Heart disease Mother     Diabetes Father        Social History:  Social History     Tobacco Use    Smoking status: Never Smoker    Smokeless tobacco: Never Used   Substance and Sexual Activity    Alcohol use: No    Drug use: No    Sexual activity: Unknown        Review of Systems     Review of Systems   Constitutional: Positive for fatigue. Negative for chills and fever.   HENT: Negative for sore throat.    Respiratory: Negative for cough and shortness of breath.    Cardiovascular: Negative for chest pain and leg swelling.   Gastrointestinal: Positive for nausea and vomiting. Negative for abdominal pain and diarrhea.   Musculoskeletal: Negative for back pain, neck pain and neck stiffness.   Skin: Negative for rash and wound.   Neurological: Positive for light-headedness. Negative for dizziness, weakness, numbness and headaches.   Hematological: Does not bruise/bleed easily.   All other systems reviewed and are negative.     Physical Exam     Initial Vitals [07/13/20 1248]   BP Pulse Resp Temp SpO2   131/78 (!) 58 16 97.7 °F (36.5 °C) 98 %      MAP       --          Physical Exam  Nursing Notes and Vital Signs Reviewed.  Constitutional: Patient is in no acute distress. Appears weak.  Head: Atraumatic. Normocephalic.  Eyes: PERRL. EOM intact. Conjunctivae are not pale. No scleral icterus.  ENT: Mucous membranes are dry. Oropharynx is clear and symmetric.    Neck: Supple. Full ROM.  Cardiovascular: Regular rate. Regular rhythm. No murmurs, rubs, or gallops. Distal pulses are 2+ and symmetric.  Pulmonary/Chest: No respiratory distress. Clear to auscultation bilaterally. No wheezing or rales.  Abdominal: Soft and non-distended. There is no tenderness to palpation. No rebound or guarding.  Genitourinary: No CVA tenderness  Musculoskeletal:  "Moves all extremities. No obvious deformities. No edema. No calf tenderness.  Skin: Warm and dry.  Neurological:  Alert, awake, and appropriate. Mild aphasia. Left arm has 4/5 strength.  Psychiatric: Normal affect. Good eye contact. Appropriate in content.     ED Course   Procedures  ED Vital Signs:  Vitals:    07/13/20 1248 07/13/20 1342 07/13/20 1345 07/13/20 1500   BP: 131/78  (!) 140/85 (!) 165/84   Pulse: (!) 58 (!) 56 (!) 58 (!) 58   Resp: 16  20 (!) 21   Temp: 97.7 °F (36.5 °C)      TempSrc: Tympanic      SpO2: 98%  100% 100%   Weight: 108.7 kg (239 lb 10.2 oz)      Height: 5' 7" (1.702 m)       07/13/20 1600 07/13/20 1601 07/13/20 1630 07/13/20 1742   BP:  (!) 174/74 (!) 147/67 (!) 170/65   Pulse: (!) 54  (!) 55 76   Resp: 20  18 20   Temp:    98.3 °F (36.8 °C)   TempSrc:    Oral   SpO2: 100%  100% 99%   Weight:       Height:           Abnormal Lab Results:  Labs Reviewed   CBC W/ AUTO DIFFERENTIAL - Abnormal; Notable for the following components:       Result Value    Hemoglobin 11.7 (*)     Mean Corpuscular Hemoglobin 25.7 (*)     Mean Corpuscular Hemoglobin Conc 30.7 (*)     RDW 15.3 (*)     MPV 8.7 (*)     Gran% 73.5 (*)     Lymph% 17.4 (*)     All other components within normal limits   COMPREHENSIVE METABOLIC PANEL - Abnormal; Notable for the following components:    Potassium 3.0 (*)     Creatinine 5.8 (*)     Albumin 3.2 (*)     eGFR if  9 (*)     eGFR if non  7 (*)     All other components within normal limits   B-TYPE NATRIURETIC PEPTIDE - Abnormal; Notable for the following components:     (*)     All other components within normal limits   HIV 1 / 2 ANTIBODY   HEPATITIS C ANTIBODY   TROPONIN I   MAGNESIUM   TSH        All Lab Results:  Results for orders placed or performed during the hospital encounter of 07/13/20   HIV 1/2 Ag/Ab (4th Gen)   Result Value Ref Range    HIV 1/2 Ag/Ab Negative Negative   Hepatitis C antibody   Result Value Ref Range    " Hepatitis C Ab Negative Negative   CBC auto differential   Result Value Ref Range    WBC 7.11 3.90 - 12.70 K/uL    RBC 4.55 4.00 - 5.40 M/uL    Hemoglobin 11.7 (L) 12.0 - 16.0 g/dL    Hematocrit 38.1 37.0 - 48.5 %    Mean Corpuscular Volume 84 82 - 98 fL    Mean Corpuscular Hemoglobin 25.7 (L) 27.0 - 31.0 pg    Mean Corpuscular Hemoglobin Conc 30.7 (L) 32.0 - 36.0 g/dL    RDW 15.3 (H) 11.5 - 14.5 %    Platelets 223 150 - 350 K/uL    MPV 8.7 (L) 9.2 - 12.9 fL    Immature Granulocytes 0.3 0.0 - 0.5 %    Gran # (ANC) 5.2 1.8 - 7.7 K/uL    Immature Grans (Abs) 0.02 0.00 - 0.04 K/uL    Lymph # 1.2 1.0 - 4.8 K/uL    Mono # 0.4 0.3 - 1.0 K/uL    Eos # 0.2 0.0 - 0.5 K/uL    Baso # 0.04 0.00 - 0.20 K/uL    nRBC 0 0 /100 WBC    Gran% 73.5 (H) 38.0 - 73.0 %    Lymph% 17.4 (L) 18.0 - 48.0 %    Mono% 5.9 4.0 - 15.0 %    Eosinophil% 2.3 0.0 - 8.0 %    Basophil% 0.6 0.0 - 1.9 %    Differential Method Automated    Comprehensive metabolic panel   Result Value Ref Range    Sodium 138 136 - 145 mmol/L    Potassium 3.0 (L) 3.5 - 5.1 mmol/L    Chloride 98 95 - 110 mmol/L    CO2 24 23 - 29 mmol/L    Glucose 95 70 - 110 mg/dL    BUN, Bld 20 6 - 20 mg/dL    Creatinine 5.8 (H) 0.5 - 1.4 mg/dL    Calcium 9.4 8.7 - 10.5 mg/dL    Total Protein 7.5 6.0 - 8.4 g/dL    Albumin 3.2 (L) 3.5 - 5.2 g/dL    Total Bilirubin 0.3 0.1 - 1.0 mg/dL    Alkaline Phosphatase 73 55 - 135 U/L    AST 15 10 - 40 U/L    ALT 15 10 - 44 U/L    Anion Gap 16 8 - 16 mmol/L    eGFR if African American 9 (A) >60 mL/min/1.73 m^2    eGFR if non African American 7 (A) >60 mL/min/1.73 m^2   Troponin I   Result Value Ref Range    Troponin I 0.013 0.000 - 0.026 ng/mL   Brain natriuretic peptide   Result Value Ref Range     (H) 0 - 99 pg/mL   Magnesium   Result Value Ref Range    Magnesium 1.8 1.6 - 2.6 mg/dL   TSH   Result Value Ref Range    TSH 2.944 0.400 - 4.000 uIU/mL       Imaging Results:  Imaging Results    None          The EKG was ordered, reviewed, and  independently interpreted by the ED provider.  Interpretation time: 1326  Rate: 55 BPM  Rhythm: sinus bradycardia  Interpretation: Left ventricular hypertrophy wit repolarization abnormality. Cannot rule out septal infarct, age undetermined. Prolonged QT. Lateral T wave inversions. No STEMI.  When compared to EKG performed 25-JUN-2020, there are no significant changes.           The Emergency Provider reviewed the vital signs and test results, which are outlined above.     ED Discussion     5:51 PM: Reassessed pt at this time.  Pt states her condition has improved at this time. Discussed with pt all pertinent ED information and results. Discussed pt dx and plan of tx. Gave pt all f/u and return to the ED instructions. All questions and concerns were addressed at this time. Pt expresses understanding of information and instructions, and is comfortable with plan to discharge. Pt is stable for discharge.    I discussed with patient and/or family/caretaker that evaluation in the ED does not suggest any emergent or life threatening medical conditions requiring immediate intervention beyond what was provided in the ED, and I believe patient is safe for discharge.  Regardless, an unremarkable evaluation in the ED does not preclude the development or presence of a serious of life threatening condition. As such, patient was instructed to return immediately for any worsening or change in current symptoms.       Medical Decision Making:   Clinical Tests:   Lab Tests: Ordered and Reviewed  Medical Tests: Ordered and Reviewed           ED Medication(s):  Medications   ondansetron injection 4 mg (4 mg Intravenous Given 7/13/20 1328)   potassium chloride SA CR tablet 40 mEq (40 mEq Oral Given 7/13/20 2090)       Discharge Medication List as of 7/13/2020  5:28 PM      START taking these medications    Details   metoprolol succinate (TOPROL-XL) 25 MG 24 hr tablet Take 1 tablet (25 mg total) by mouth once daily., Starting Mon  7/13/2020, Until Wed 8/12/2020, Print             Follow-up Information     Chuck Grider MD In 5 days.    Specialty: Internal Medicine  Contact information:  [address] [facility] [place] [id number] [phone]             Heriberto Peoples MD.    Specialties: Cardiology, INTERVENTIONAL CARDIOLOGY  Why: As needed, if continued low heart rate problems  Contact information:  [id number] [facility]  [place] [address]  [phone]                       Scribe Attestation:   Scribe #1: I performed the above scribed service and the documentation accurately describes the services I performed. I attest to the accuracy of the note.     Attending:   Physician Attestation Statement for Scribe #1: I, Nitish Tai MD, personally performed the services described in this documentation, as scribed by Anuj Nelson, in my presence, and it is both accurate and complete.           Clinical Impression       ICD-10-CM ICD-9-CM   1. Symptomatic bradycardia  R00.1 427.89   2. Near syncope  R55 780.2   3. Hypokalemia  E87.6 276.8   4. Dizziness  R42 780.4   5. Adverse effect of beta-blocker, initial encounter  T44.7X5A E942.0       Disposition:   Disposition: Discharged  Condition: Stable       Nitish Tai MD  07/14/20 0687

## 2020-07-13 NOTE — DISCHARGE INSTRUCTIONS
You are having a side effect from your Toprol XL (metoprolol) medicine -- low heart rate with dizziness.  I am prescribing a lower dose for you to start.     Follow up with your doctor and return to ED if worse.

## 2020-07-21 ENCOUNTER — EXTERNAL HOME HEALTH (OUTPATIENT)
Dept: HOME HEALTH SERVICES | Facility: HOSPITAL | Age: 55
End: 2020-07-21
Payer: MEDICARE

## 2020-08-10 DIAGNOSIS — K31.84 GASTROPARESIS: ICD-10-CM

## 2020-08-10 DIAGNOSIS — R11.2 NAUSEA AND VOMITING, INTRACTABILITY OF VOMITING NOT SPECIFIED, UNSPECIFIED VOMITING TYPE: ICD-10-CM

## 2020-10-19 ENCOUNTER — TELEPHONE (OUTPATIENT)
Dept: ENDOSCOPY | Facility: HOSPITAL | Age: 55
End: 2020-10-19

## 2020-10-19 NOTE — TELEPHONE ENCOUNTER
Patient called office to schedule an upper endoscopy.  Informed patient of no open order being available to schedule a procedure at this time.  Patient will contact provider for order and she is aware of the scheduling process.

## 2020-11-10 ENCOUNTER — OFFICE VISIT (OUTPATIENT)
Dept: GASTROENTEROLOGY | Facility: CLINIC | Age: 55
End: 2020-11-10
Payer: MEDICARE

## 2020-11-10 ENCOUNTER — LAB VISIT (OUTPATIENT)
Dept: LAB | Facility: HOSPITAL | Age: 55
End: 2020-11-10
Attending: PHYSICIAN ASSISTANT
Payer: COMMERCIAL

## 2020-11-10 VITALS
HEART RATE: 71 BPM | WEIGHT: 241.88 LBS | HEIGHT: 67 IN | SYSTOLIC BLOOD PRESSURE: 118 MMHG | DIASTOLIC BLOOD PRESSURE: 64 MMHG | BODY MASS INDEX: 37.96 KG/M2

## 2020-11-10 DIAGNOSIS — R19.5 DARK STOOLS: ICD-10-CM

## 2020-11-10 DIAGNOSIS — R14.2 BELCHING: ICD-10-CM

## 2020-11-10 DIAGNOSIS — K21.9 GASTROESOPHAGEAL REFLUX DISEASE, UNSPECIFIED WHETHER ESOPHAGITIS PRESENT: ICD-10-CM

## 2020-11-10 DIAGNOSIS — R19.5 DARK STOOLS: Primary | ICD-10-CM

## 2020-11-10 DIAGNOSIS — R10.10 UPPER ABDOMINAL PAIN: ICD-10-CM

## 2020-11-10 PROCEDURE — 99203 OFFICE O/P NEW LOW 30 MIN: CPT | Mod: S$PBB,,, | Performed by: PHYSICIAN ASSISTANT

## 2020-11-10 PROCEDURE — 99214 OFFICE O/P EST MOD 30 MIN: CPT | Mod: PBBFAC | Performed by: PHYSICIAN ASSISTANT

## 2020-11-10 PROCEDURE — 36415 COLL VENOUS BLD VENIPUNCTURE: CPT

## 2020-11-10 PROCEDURE — 99203 PR OFFICE/OUTPT VISIT, NEW, LEVL III, 30-44 MIN: ICD-10-PCS | Mod: S$PBB,,, | Performed by: PHYSICIAN ASSISTANT

## 2020-11-10 PROCEDURE — 85025 COMPLETE CBC W/AUTO DIFF WBC: CPT

## 2020-11-10 PROCEDURE — 99999 PR PBB SHADOW E&M-EST. PATIENT-LVL IV: CPT | Mod: PBBFAC,,, | Performed by: PHYSICIAN ASSISTANT

## 2020-11-10 PROCEDURE — 99999 PR PBB SHADOW E&M-EST. PATIENT-LVL IV: ICD-10-PCS | Mod: PBBFAC,,, | Performed by: PHYSICIAN ASSISTANT

## 2020-11-10 RX ORDER — APIXABAN 5 MG/1
TABLET, FILM COATED ORAL
COMMUNITY
Start: 2020-09-22

## 2020-11-10 NOTE — PROGRESS NOTES
"Clinic Consult:  [facility] Consultation Note    Reason for Consult:  The primary encounter diagnosis was Dark stools. Diagnoses of Gastroesophageal reflux disease, unspecified whether esophagitis present, Upper abdominal pain, and Belching were also pertinent to this visit.    PCP: Chuck Grider   [id number] [facility] / [place] [id number]    CC: Nausea and Emesis      HPI:  This is a 55 y.o. female here for evaluation of the above. She reports she has had problems with her stomach for years. Has had a bleed in the past which resulted in a hospitalization. She takes Protonix and Carafate. She notes recurrent reflux, burning, chest pain, belching with acid coming up into her mouth on occasion. She also has nausea and vomiting. This occurs if she eats too much and also during her dialysis treatments. Feels as though air gets into her stomach and she has to vomit. She is now eating smaller portions throughout the day which seems to show some improvement. She was scheduled for an EGD at [facility] 2 months ago, but missed the appointment because she was at [facility] - diagnosed with stroke. She has A fib and is now on Eliquis. Additional symptoms include diarrhea. This began after starting on "vitamins for her dialysis". It is not black, but stools are dark. Last saw cardiology for follow up on 10/13.      Dr. Sam PCP with [facility]. And Wong Awan with cardiology at [facility].    Review of Systems   Constitutional: Negative for activity change, appetite change, chills, diaphoresis, fatigue, fever and unexpected weight change.   HENT: Negative for sore throat, trouble swallowing and voice change.    Respiratory: Negative for cough and shortness of breath.    Cardiovascular: Positive for chest pain. Negative for palpitations.   Gastrointestinal: Positive for abdominal pain, diarrhea, nausea and vomiting. Negative for abdominal distention, anal bleeding and constipation. Blood in stool: unsure if blood " in stool. She states stools are dark.   Genitourinary: Positive for difficulty urinating (dialysis).   Neurological: Negative for dizziness, weakness and light-headedness.   Psychiatric/Behavioral: Negative for dysphoric mood. The patient is not nervous/anxious.         Medical History:   Past Medical History:   Diagnosis Date    Asthma     CHF (congestive heart failure)     CKD (chronic kidney disease) stage 5, GFR less than 15 ml/min     Depression     Diabetes mellitus     Dialysis patient     Hypercholesterolemia     Hypertension     PAF (paroxysmal atrial fibrillation)        Surgical History:   Past Surgical History:   Procedure Laterality Date    AV FISTULA PLACEMENT      CARDIAC ELECTROPHYSIOLOGY MAPPING AND ABLATION      CATHETERIZATION OF BOTH LEFT AND RIGHT HEART N/A 12/28/2018    Procedure: CATHETERIZATION, HEART, BOTH LEFT AND RIGHT;  Surgeon: Enrique Jj MD;  Location: [facility] C[facility] LAB;  Service: Cardiology;  Laterality: N/A;  To follow his cardioversion case in [facility]    cesarian section      TUMOR REMOVAL      L lung       Family History:    Family History   Problem Relation Age of Onset    Heart disease Mother     Diabetes Father        Social History:   Social History     Socioeconomic History    Marital status:      Spouse name: Not on file    Number of children: Not on file    Years of education: Not on file    Highest education level: Not on file   Occupational History    Not on file   Social Needs    Financial resource strain: Not on file    Food insecurity     Worry: Not on file     Inability: Not on file    Transportation needs     Medical: Not on file     Non-medical: Not on file   Tobacco Use    Smoking status: Never Smoker    Smokeless tobacco: Never Used   Substance and Sexual Activity    Alcohol use: No    Drug use: No    Sexual activity: Not Currently   Lifestyle    Physical activity     Days per week: Not on file     Minutes per session: Not on  file    Stress: Not on file   Relationships    Social connections     Talks on phone: Not on file     Gets together: Not on file     Attends [employer] service: Not on file     Active member of club or organization: Not on file     Attends meetings of clubs or organizations: Not on file     Relationship status: Not on file   Other Topics Concern    Not on file   Social History Narrative    Not on file       Allergies:   Review of patient's allergies indicates:   Allergen Reactions    Morphine Itching    Sulfa (sulfonamide antibiotics) Rash       Home Medications:   Current Outpatient Medications on File Prior to Visit   Medication Sig Dispense Refill    ALPRAZolam (XANAX) 0.5 MG tablet Take 0.5 mg by mouth.      ELIQUIS 5 mg Tab TK 1 T PO BID      insulin (BASAGLAR KWIKPEN U-100 INSULIN) glargine 100 units/mL (3mL) SubQ pen Inject 15 Units into the skin.      lisinopril (PRINIVIL,ZESTRIL) 40 MG tablet Take 40 mg by mouth once daily.      metoprolol succinate (TOPROL-XL) 25 MG 24 hr tablet Take 1 tablet (25 mg total) by mouth once daily. 30 tablet 0    pantoprazole (PROTONIX) 40 MG tablet Take 40 mg by mouth.      pravastatin (PRAVACHOL) 40 MG tablet Take 40 mg by mouth once daily.      sucralfate (CARAFATE) 100 mg/mL suspension SHAKE LIQUID AND TAKE 10 ML BY MOUTH FOUR TIMES DAILY      VELPHORO 500 mg Chew CHEW & SWALLOW 1 TABLET BY MOUTH THREE TIMES A DAY WITH MEALS AND 1 TABLET TWO TIMES A DAY WITH SNACKS  11    albuterol (PROVENTIL/VENTOLIN HFA) 90 mcg/actuation inhaler Inhale 1-2 puffs into the lungs every 6 (six) hours as needed for Wheezing. 1 Inhaler 0    aspirin (ECOTRIN) 81 MG EC tablet Take 1 tablet (81 mg total) by mouth once daily.  0    benzocaine 10 % Oint Apply 1 application topically 3 (three) times daily as needed. Apply to left ear for pain (Patient not taking: Reported on 11/10/2020) 15 g 0    citalopram (CELEXA) 20 MG tablet Take 1 tablet (20 mg total) by mouth once daily. 30  "tablet 11    levocetirizine (XYZAL) 2.5 mg/5 mL solution Take 5 mLs (2.5 mg total) by mouth every evening. (Patient not taking: Reported on 11/10/2020) 118 mL 0    meclizine (ANTIVERT) 25 mg tablet Take 1 tablet (25 mg total) by mouth 3 (three) times daily as needed. (Patient not taking: Reported on 11/10/2020) 20 tablet 0    sevelamer carbonate (RENVELA) 800 mg Tab TK 2 TS PO TID WITH MEALS      vitamin renal formula, B-complex-vitamin c-folic acid, (NEPHROCAP) 1 mg Cap Take 1 capsule by mouth once daily. (Patient not taking: Reported on 11/10/2020) 30 capsule 1    vitamin renal formula, B-complex-vitamin c-folic acid, (NEPHROCAP) 1 mg Cap Take 1 capsule by mouth.      [DISCONTINUED] atorvastatin (LIPITOR) 40 MG tablet TAKE 1 TABLET BY MOUTH EVERY DAY      [DISCONTINUED] diazePAM (VALIUM) 5 MG tablet Take 2 tablets (10 mg total) by mouth nightly as needed for Anxiety or Insomnia. One to 2 tablets as needed for sleep and anxiety 30 tablet 1    [DISCONTINUED] ferric citrate (AURYXIA) 210 mg iron Tab       [DISCONTINUED] insulin glargine (LANTUS) 100 unit/mL injection Inject 10 Units into the skin every evening.        No current facility-administered medications on file prior to visit.        Physical Exam:  /64   Pulse 71   Ht 5' 7" (1.702 m)   Wt 109.7 kg (241 lb 13.5 oz)   BMI 37.88 kg/m²   Body mass index is 37.88 kg/m².  Physical Exam  Constitutional:       General: She is not in acute distress.     Appearance: Normal appearance. She is obese. She is not ill-appearing, toxic-appearing or diaphoretic.   HENT:      Head: Normocephalic and atraumatic.   Eyes:      Extraocular Movements: Extraocular movements intact.   Neck:      Musculoskeletal: Normal range of motion.   Cardiovascular:      Rate and Rhythm: Normal rate and regular rhythm.      Heart sounds: Normal heart sounds.   Pulmonary:      Effort: Pulmonary effort is normal. No respiratory distress.      Breath sounds: Normal breath " "sounds. No wheezing.   Abdominal:      General: Bowel sounds are normal. There is no distension.      Palpations: Abdomen is soft. There is no mass.      Tenderness: There is no abdominal tenderness. There is no guarding or rebound.   Musculoskeletal: Normal range of motion.   Skin:     General: Skin is warm and dry.      Coloration: Skin is not pale.      Findings: No erythema.   Neurological:      General: No focal deficit present.      Mental Status: She is alert and oriented to person, place, and time.   Psychiatric:         Mood and Affect: Mood normal.         Behavior: Behavior normal.         Thought Content: Thought content normal.         Judgment: Judgment normal.           Labs: Pertinent labs reviewed.  Endoscopy: "years ago"  CRC Screening: "years ago"  Anticoagulation: Eliquis    Assessment:  1. Dark stools    2. Gastroesophageal reflux disease, unspecified whether esophagitis present    3. Upper abdominal pain    4. Belching         Recommendations:  -Obtain repeat blood work today.  -Patient in need of EGD and screening colonoscopy. Unlikely that she will be cleared by cardiology due to recent stroke and anticoagulation.  -Continue with Protonix and Carafate.  -Diarrhea likely due to supplements while on dialysis.  -Reflux likely the cause of upper GI symptoms.  -GERD diet discussed in detail.  -Begin with upper GI series.      Dark stools  -     CBC Auto Differential; Future; Expected date: 11/10/2020  -     FL Upper GI; Future; Expected date: 11/10/2020    Gastroesophageal reflux disease, unspecified whether esophagitis present  -     FL Upper GI; Future; Expected date: 11/10/2020    Upper abdominal pain  -     FL Upper GI; Future; Expected date: 11/10/2020    Belching  -     FL Upper GI; Future; Expected date: 11/10/2020        No follow-ups on file.    Thank you so much for allowing me to participate in the care of Cordell Garcia PA-C    "

## 2020-11-10 NOTE — LETTER
November 10, 2020      Jada Ballard, LESLY  [address]  [place] [address]           [facility]  [address] [place] [id number]  Phone: [phone]  Fax: [phone]          Patient: Cordell Angulo   MR Number: [id number]   YOB: 1965   Date of Visit: 11/10/2020       Dear Jada Ballard:    Thank you for referring Cordell Angulo to me for evaluation. Attached you will find relevant portions of my assessment and plan of care.    If you have questions, please do not hesitate to call me. I look forward to following Cordell Angulo along with you.    Sincerely,    Urvashi Garcia PA-C    Enclosure  CC:  No Recipients    If you would like to receive this communication electronically, please contact [email or web address] or [phone] to request more information on [facility] access.    For providers and/or their staff who would like to refer a patient to [facility], please contact us through our one-stop-shop provider referral line, [facility] , at [phone].    If you feel you have received this communication in error or would no longer like to receive these types of communications, please e-mail [email or web address]

## 2020-11-11 ENCOUNTER — DOCUMENTATION ONLY (OUTPATIENT)
Dept: GASTROENTEROLOGY | Facility: CLINIC | Age: 55
End: 2020-11-11

## 2020-11-11 LAB
BASOPHILS # BLD AUTO: 0.06 K/UL (ref 0–0.2)
BASOPHILS NFR BLD: 0.6 % (ref 0–1.9)
DIFFERENTIAL METHOD: ABNORMAL
EOSINOPHIL # BLD AUTO: 0.2 K/UL (ref 0–0.5)
EOSINOPHIL NFR BLD: 2.4 % (ref 0–8)
ERYTHROCYTE [DISTWIDTH] IN BLOOD BY AUTOMATED COUNT: 14.9 % (ref 11.5–14.5)
HCT VFR BLD AUTO: 40.9 % (ref 37–48.5)
HGB BLD-MCNC: 11.9 G/DL (ref 12–16)
IMM GRANULOCYTES # BLD AUTO: 0.03 K/UL (ref 0–0.04)
IMM GRANULOCYTES NFR BLD AUTO: 0.3 % (ref 0–0.5)
LYMPHOCYTES # BLD AUTO: 1.8 K/UL (ref 1–4.8)
LYMPHOCYTES NFR BLD: 19 % (ref 18–48)
MCH RBC QN AUTO: 27 PG (ref 27–31)
MCHC RBC AUTO-ENTMCNC: 29.1 G/DL (ref 32–36)
MCV RBC AUTO: 93 FL (ref 82–98)
MONOCYTES # BLD AUTO: 0.8 K/UL (ref 0.3–1)
MONOCYTES NFR BLD: 8.4 % (ref 4–15)
NEUTROPHILS # BLD AUTO: 6.6 K/UL (ref 1.8–7.7)
NEUTROPHILS NFR BLD: 69.3 % (ref 38–73)
NRBC BLD-RTO: 0 /100 WBC
PLATELET # BLD AUTO: 344 K/UL (ref 150–350)
PMV BLD AUTO: 9.3 FL (ref 9.2–12.9)
RBC # BLD AUTO: 4.4 M/UL (ref 4–5.4)
WBC # BLD AUTO: 9.46 K/UL (ref 3.9–12.7)

## 2020-11-11 NOTE — PROGRESS NOTES
11/11/2020-CARDS Clearance requested for needed scopes from Dr. Wong Awan with [facility].    Fax: [phone]                     Phone: [phone]

## 2020-11-12 ENCOUNTER — HOSPITAL ENCOUNTER (OUTPATIENT)
Dept: RADIOLOGY | Facility: HOSPITAL | Age: 55
Discharge: HOME | End: 2020-11-12
Attending: PHYSICIAN ASSISTANT
Payer: COMMERCIAL

## 2020-11-12 DIAGNOSIS — R14.2 BELCHING: ICD-10-CM

## 2020-11-12 DIAGNOSIS — R10.10 UPPER ABDOMINAL PAIN: ICD-10-CM

## 2020-11-12 DIAGNOSIS — K21.9 GASTROESOPHAGEAL REFLUX DISEASE, UNSPECIFIED WHETHER ESOPHAGITIS PRESENT: ICD-10-CM

## 2020-11-12 DIAGNOSIS — R19.5 DARK STOOLS: ICD-10-CM

## 2020-11-17 ENCOUNTER — HOSPITAL ENCOUNTER (OUTPATIENT)
Dept: RADIOLOGY | Facility: HOSPITAL | Age: 55
Discharge: HOME | End: 2020-11-17
Attending: PHYSICIAN ASSISTANT
Payer: MEDICARE

## 2020-11-17 PROCEDURE — 25500020 PHARM REV CODE 255: Performed by: PHYSICIAN ASSISTANT

## 2020-11-17 PROCEDURE — 74240 X-RAY XM UPR GI TRC 1CNTRST: CPT | Mod: TC

## 2020-11-17 PROCEDURE — A9698 NON-RAD CONTRAST MATERIALNOC: HCPCS | Performed by: PHYSICIAN ASSISTANT

## 2020-11-17 RX ADMIN — BARIUM SULFATE 50 G: 960 POWDER, FOR SUSPENSION ORAL at 09:11

## 2020-11-17 RX ADMIN — BARIUM SULFATE 135 ML: 980 POWDER, FOR SUSPENSION ORAL at 09:11

## 2020-12-03 ENCOUNTER — HOSPITAL ENCOUNTER (EMERGENCY)
Facility: HOSPITAL | Age: 55
Discharge: HOME | End: 2020-12-03
Attending: FAMILY MEDICINE
Payer: COMMERCIAL

## 2020-12-03 VITALS
HEART RATE: 91 BPM | RESPIRATION RATE: 18 BRPM | SYSTOLIC BLOOD PRESSURE: 164 MMHG | OXYGEN SATURATION: 100 % | DIASTOLIC BLOOD PRESSURE: 81 MMHG

## 2020-12-03 DIAGNOSIS — U07.1 COVID-19 VIRUS DETECTED: ICD-10-CM

## 2020-12-03 DIAGNOSIS — U07.1 COVID-19: Primary | ICD-10-CM

## 2020-12-03 LAB
ALBUMIN SERPL BCP-MCNC: 3.1 G/DL (ref 3.5–5.2)
ALP SERPL-CCNC: 73 U/L (ref 55–135)
ALT SERPL W/O P-5'-P-CCNC: 10 U/L (ref 10–44)
ANION GAP SERPL CALC-SCNC: 19 MMOL/L (ref 8–16)
AST SERPL-CCNC: 18 U/L (ref 10–40)
BASOPHILS # BLD AUTO: 0.02 K/UL (ref 0–0.2)
BASOPHILS NFR BLD: 0.3 % (ref 0–1.9)
BILIRUB SERPL-MCNC: 0.4 MG/DL (ref 0.1–1)
BNP SERPL-MCNC: 105 PG/ML (ref 0–99)
BUN SERPL-MCNC: 35 MG/DL (ref 6–20)
CALCIUM SERPL-MCNC: 7.8 MG/DL (ref 8.7–10.5)
CHLORIDE SERPL-SCNC: 97 MMOL/L (ref 95–110)
CO2 SERPL-SCNC: 22 MMOL/L (ref 23–29)
CREAT SERPL-MCNC: 10.1 MG/DL (ref 0.5–1.4)
DIFFERENTIAL METHOD: ABNORMAL
EOSINOPHIL # BLD AUTO: 0.1 K/UL (ref 0–0.5)
EOSINOPHIL NFR BLD: 1.2 % (ref 0–8)
ERYTHROCYTE [DISTWIDTH] IN BLOOD BY AUTOMATED COUNT: 14.5 % (ref 11.5–14.5)
EST. GFR  (AFRICAN AMERICAN): 4 ML/MIN/1.73 M^2
EST. GFR  (NON AFRICAN AMERICAN): 4 ML/MIN/1.73 M^2
GLUCOSE SERPL-MCNC: 95 MG/DL (ref 70–110)
HCT VFR BLD AUTO: 39.3 % (ref 37–48.5)
HGB BLD-MCNC: 11.8 G/DL (ref 12–16)
IMM GRANULOCYTES # BLD AUTO: 0.03 K/UL (ref 0–0.04)
IMM GRANULOCYTES NFR BLD AUTO: 0.4 % (ref 0–0.5)
LYMPHOCYTES # BLD AUTO: 0.9 K/UL (ref 1–4.8)
LYMPHOCYTES NFR BLD: 13.7 % (ref 18–48)
MCH RBC QN AUTO: 26.3 PG (ref 27–31)
MCHC RBC AUTO-ENTMCNC: 30 G/DL (ref 32–36)
MCV RBC AUTO: 88 FL (ref 82–98)
MONOCYTES # BLD AUTO: 0.5 K/UL (ref 0.3–1)
MONOCYTES NFR BLD: 7.1 % (ref 4–15)
NEUTROPHILS # BLD AUTO: 5.2 K/UL (ref 1.8–7.7)
NEUTROPHILS NFR BLD: 77.3 % (ref 38–73)
NRBC BLD-RTO: 0 /100 WBC
PLATELET # BLD AUTO: 225 K/UL (ref 150–350)
PMV BLD AUTO: 8.8 FL (ref 9.2–12.9)
POTASSIUM SERPL-SCNC: 4.4 MMOL/L (ref 3.5–5.1)
PROT SERPL-MCNC: 7.9 G/DL (ref 6–8.4)
RBC # BLD AUTO: 4.49 M/UL (ref 4–5.4)
SARS-COV-2 RDRP RESP QL NAA+PROBE: POSITIVE
SODIUM SERPL-SCNC: 138 MMOL/L (ref 136–145)
TROPONIN I SERPL DL<=0.01 NG/ML-MCNC: 0.01 NG/ML (ref 0–0.03)
WBC # BLD AUTO: 6.77 K/UL (ref 3.9–12.7)

## 2020-12-03 PROCEDURE — 84484 ASSAY OF TROPONIN QUANT: CPT

## 2020-12-03 PROCEDURE — 99284 EMERGENCY DEPT VISIT MOD MDM: CPT | Mod: 25

## 2020-12-03 PROCEDURE — 80053 COMPREHEN METABOLIC PANEL: CPT

## 2020-12-03 PROCEDURE — 85025 COMPLETE CBC W/AUTO DIFF WBC: CPT

## 2020-12-03 PROCEDURE — 83880 ASSAY OF NATRIURETIC PEPTIDE: CPT

## 2020-12-03 PROCEDURE — U0002 COVID-19 LAB TEST NON-CDC: HCPCS

## 2020-12-04 NOTE — ED PROVIDER NOTES
SCRIBE #1 NOTE: I, Tali Armenta, am scribing for, and in the presence of, Delmy Noe MD. I have scribed the entire note.      History      Chief Complaint   Patient presents with    Anxiety     dizziness, chest pain resloved       Review of patient's allergies indicates:   Allergen Reactions    Morphine Itching    Sulfa (sulfonamide antibiotics) Rash        HPI   HPI    12/3/2020, 6:52 PM   History obtained from the patient      History of Present Illness: Cordell Angulo is a 55 y.o. female patient, with a history of CHF, CKD, diabetes, paroxysmal atrial fibrillation, and hypertension, who presents to the Emergency Department for an evaluation of anxiety which onset gradually approximately four days ago, when the patient reports that she began to experience a subjective fever. Symptoms are constant and moderate in severity. No other mitigating or exacerbating factors reported. Associated sxs include palpitations, diarrhea, nausea, and left sided arm pain. Patient denies any SOB, vomiting, abdominal pain, and all other sxs at this time. No prior treatment reported. No further complaints or concerns at this time.       Arrival mode: AASI    PCP: Chuck Grider MD       Past Medical History:  Past Medical History:   Diagnosis Date    Asthma     CHF (congestive heart failure)     CKD (chronic kidney disease) stage 5, GFR less than 15 ml/min     Depression     Diabetes mellitus     Dialysis patient     Hypercholesterolemia     Hypertension     PAF (paroxysmal atrial fibrillation)        Past Surgical History:  Past Surgical History:   Procedure Laterality Date    AV FISTULA PLACEMENT      CARDIAC ELECTROPHYSIOLOGY MAPPING AND ABLATION      CATHETERIZATION OF BOTH LEFT AND RIGHT HEART N/A 12/28/2018    Procedure: CATHETERIZATION, HEART, BOTH LEFT AND RIGHT;  Surgeon: Enrique Jj MD;  Location: [facility] CATH LAB;  Service: Cardiology;  Laterality: N/A;  To follow his cardioversion case  in [facility]    cesarian section      TUMOR REMOVAL      L lung         Family History:  Family History   Problem Relation Age of Onset    Heart disease Mother     Diabetes Father        Social History:  Social History     Tobacco Use    Smoking status: Never Smoker    Smokeless tobacco: Never Used   Substance and Sexual Activity    Alcohol use: No    Drug use: No    Sexual activity: Not Currently       ROS   Review of Systems   Constitutional: Positive for fever.   HENT: Negative for sore throat.    Respiratory: Negative for shortness of breath.    Cardiovascular: Positive for palpitations. Negative for chest pain.   Gastrointestinal: Positive for diarrhea and nausea. Negative for abdominal pain and vomiting.   Genitourinary: Negative for dysuria.   Musculoskeletal: Negative for back pain.        Positive for left sided arm pain.   Skin: Negative for rash.   Neurological: Negative for weakness.   Hematological: Does not bruise/bleed easily.   Psychiatric/Behavioral: The patient is nervous/anxious.    All other systems reviewed and are negative.      Physical Exam      Initial Vitals [12/03/20 1445]   BP Pulse Resp Temp SpO2   (!) 161/78 84 18 -- 100 %      MAP       --          Physical Exam  Nursing Notes and Vital Signs Reviewed.  Constitutional: Patient is in no apparent distress. Well-developed and well-nourished.  Head: Atraumatic. Normocephalic.  Eyes: PERRL. EOM intact. Conjunctivae are not pale. No scleral icterus.  ENT: Mucous membranes are moist. Oropharynx is clear and symmetric.    Neck: Supple. Full ROM. No lymphadenopathy.  Cardiovascular: Regular rate. Regular rhythm. No murmurs, rubs, or gallops. Distal pulses are 2+ and symmetric.  Pulmonary/Chest: No respiratory distress. Clear to auscultation bilaterally. No wheezing or rales.  Abdominal: Soft and non-distended.  There is no tenderness.  No rebound, guarding, or rigidity. Good bowel sounds.  Genitourinary: No CVA tenderness  Musculoskeletal:  Moves all extremities. No obvious deformities. No edema. No calf tenderness.  Skin: There is an AV fistula in the left arm that is tender to palpation, has mild swelling and has good thrill. Warm and dry.  Neurological:  Alert, awake, and appropriate.  Normal speech.  No acute focal neurological deficits are appreciated.  Psychiatric: Normal affect. Good eye contact. Appropriate in content.    ED Course    Procedures  ED Vital Signs:  Vitals:    12/03/20 1445 12/03/20 1744   BP: (!) 161/78 (!) 164/81   Pulse: 84 91   Resp: 18    SpO2: 100% 100%       Abnormal Lab Results:  Labs Reviewed   CBC W/ AUTO DIFFERENTIAL - Abnormal; Notable for the following components:       Result Value    Hemoglobin 11.8 (*)     MCH 26.3 (*)     MCHC 30.0 (*)     MPV 8.8 (*)     Lymph # 0.9 (*)     Gran % 77.3 (*)     Lymph % 13.7 (*)     All other components within normal limits   COMPREHENSIVE METABOLIC PANEL - Abnormal; Notable for the following components:    CO2 22 (*)     BUN 35 (*)     Creatinine 10.1 (*)     Calcium 7.8 (*)     Albumin 3.1 (*)     Anion Gap 19 (*)     eGFR if  4 (*)     eGFR if non  4 (*)     All other components within normal limits   B-TYPE NATRIURETIC PEPTIDE - Abnormal; Notable for the following components:     (*)     All other components within normal limits   SARS-COV-2 RNA AMPLIFICATION, QUAL - Abnormal; Notable for the following components:    SARS-CoV-2 RNA, Amplification, Qual Positive (*)     All other components within normal limits   TROPONIN I        All Lab Results:   Results for orders placed or performed during the hospital encounter of 12/03/20   CBC auto differential   Result Value Ref Range    WBC 6.77 3.90 - 12.70 K/uL    RBC 4.49 4.00 - 5.40 M/uL    Hemoglobin 11.8 (L) 12.0 - 16.0 g/dL    Hematocrit 39.3 37.0 - 48.5 %    MCV 88 82 - 98 fL    MCH 26.3 (L) 27.0 - 31.0 pg    MCHC 30.0 (L) 32.0 - 36.0 g/dL    RDW 14.5 11.5 - 14.5 %    Platelets 225 150 -  350 K/uL    MPV 8.8 (L) 9.2 - 12.9 fL    Immature Granulocytes 0.4 0.0 - 0.5 %    Gran # (ANC) 5.2 1.8 - 7.7 K/uL    Immature Grans (Abs) 0.03 0.00 - 0.04 K/uL    Lymph # 0.9 (L) 1.0 - 4.8 K/uL    Mono # 0.5 0.3 - 1.0 K/uL    Eos # 0.1 0.0 - 0.5 K/uL    Baso # 0.02 0.00 - 0.20 K/uL    nRBC 0 0 /100 WBC    Gran % 77.3 (H) 38.0 - 73.0 %    Lymph % 13.7 (L) 18.0 - 48.0 %    Mono % 7.1 4.0 - 15.0 %    Eosinophil % 1.2 0.0 - 8.0 %    Basophil % 0.3 0.0 - 1.9 %    Differential Method Automated    Comprehensive metabolic panel   Result Value Ref Range    Sodium 138 136 - 145 mmol/L    Potassium 4.4 3.5 - 5.1 mmol/L    Chloride 97 95 - 110 mmol/L    CO2 22 (L) 23 - 29 mmol/L    Glucose 95 70 - 110 mg/dL    BUN 35 (H) 6 - 20 mg/dL    Creatinine 10.1 (H) 0.5 - 1.4 mg/dL    Calcium 7.8 (L) 8.7 - 10.5 mg/dL    Total Protein 7.9 6.0 - 8.4 g/dL    Albumin 3.1 (L) 3.5 - 5.2 g/dL    Total Bilirubin 0.4 0.1 - 1.0 mg/dL    Alkaline Phosphatase 73 55 - 135 U/L    AST 18 10 - 40 U/L    ALT 10 10 - 44 U/L    Anion Gap 19 (H) 8 - 16 mmol/L    eGFR if African American 4 (A) >60 mL/min/1.73 m^2    eGFR if non African American 4 (A) >60 mL/min/1.73 m^2   Troponin I   Result Value Ref Range    Troponin I 0.015 0.000 - 0.026 ng/mL   B-Type natriuretic peptide (BNP)   Result Value Ref Range     (H) 0 - 99 pg/mL   COVID-19 Rapid Screening   Result Value Ref Range    SARS-CoV-2 RNA, Amplification, Qual Positive (A) Negative         Imaging Results:  Imaging Results          X-Ray Chest AP Portable (Final result)  Result time 12/03/20 17:30:23    Final result by Nathaniel Vanegas MD (12/03/20 17:30:23)                 Impression:      No acute abnormality.      Electronically signed by: Nathaniel Vanegas  Date:    12/03/2020  Time:    17:30             Narrative:    EXAMINATION:  XR CHEST AP PORTABLE    CLINICAL HISTORY:  Chest Pain;    TECHNIQUE:  Single frontal view of the chest was performed.    COMPARISON:  02/10/2020.    FINDINGS:  The  lungs are clear, with normal appearance of pulmonary vasculature and no pleural effusion or pneumothorax.    The cardiac silhouette is normal in size. The hilar and mediastinal contours are unremarkable.    Bones are intact.                                        The Emergency Provider reviewed the vital signs and test results, which are outlined above.    ED Discussion     7:32 PM: Reassessed pt at this time.  Pt states her condition has improved at this time. Discussed with pt all pertinent ED information and results. Discussed pt dx and plan of tx. Gave pt all f/u and return to the ED instructions. All questions and concerns were addressed at this time. Pt expresses understanding of information and instructions, and is comfortable with plan to discharge. Pt is stable for discharge.    I discussed with patient and/or family/caretaker that evaluation in the ED does not suggest any emergent or life threatening medical conditions requiring immediate intervention beyond what was provided in the ED, and I believe patient is safe for discharge.  Regardless, an unremarkable evaluation in the ED does not preclude the development or presence of a serious of life threatening condition. As such, patient was instructed to return immediately for any worsening or change in current symptoms.             ED Medication(s):  Medications - No data to display        Discharge Medication List as of 12/3/2020  7:05 PM            Medical Decision Making    Medical Decision Making:   Clinical Tests:   Lab Tests: Ordered and Reviewed  Radiological Study: Ordered and Reviewed           Scribe Attestation:   Scribe #1: I performed the above scribed service and the documentation accurately describes the services I performed. I attest to the accuracy of the note.    Attending:   Physician Attestation Statement for Scribe #1: I, Delmy Noe MD, personally performed the services described in this documentation, as scribed by Tali  Geovany, in my presence, and it is both accurate and complete.          Clinical Impression       ICD-10-CM ICD-9-CM   1. COVID-19  U07.1 079.89       Disposition:   Disposition: Discharged  Condition: Stable         Delmy Noe MD  12/04/20 [id number]

## 2020-12-07 ENCOUNTER — HOSPITAL ENCOUNTER (EMERGENCY)
Facility: HOSPITAL | Age: 55
Discharge: HOME | End: 2020-12-07
Attending: EMERGENCY MEDICINE
Payer: MEDICARE

## 2020-12-07 VITALS
RESPIRATION RATE: 22 BRPM | SYSTOLIC BLOOD PRESSURE: 189 MMHG | DIASTOLIC BLOOD PRESSURE: 80 MMHG | HEART RATE: 103 BPM | TEMPERATURE: 101 F | OXYGEN SATURATION: 96 %

## 2020-12-07 DIAGNOSIS — I10 CHRONIC HYPERTENSION: ICD-10-CM

## 2020-12-07 DIAGNOSIS — R06.02 SOB (SHORTNESS OF BREATH): ICD-10-CM

## 2020-12-07 DIAGNOSIS — N18.6 ESRD (END STAGE RENAL DISEASE) ON DIALYSIS: ICD-10-CM

## 2020-12-07 DIAGNOSIS — Z99.2 ESRD (END STAGE RENAL DISEASE) ON DIALYSIS: ICD-10-CM

## 2020-12-07 DIAGNOSIS — U07.1 COVID-19 VIRUS INFECTION: Primary | ICD-10-CM

## 2020-12-07 DIAGNOSIS — Z79.01 CHRONIC ANTICOAGULATION: ICD-10-CM

## 2020-12-07 LAB
ALBUMIN SERPL BCP-MCNC: 2.7 G/DL (ref 3.5–5.2)
ALLENS TEST: ABNORMAL
ALP SERPL-CCNC: 69 U/L (ref 55–135)
ALT SERPL W/O P-5'-P-CCNC: 16 U/L (ref 10–44)
ANION GAP SERPL CALC-SCNC: 14 MMOL/L (ref 8–16)
AST SERPL-CCNC: 21 U/L (ref 10–40)
BASOPHILS # BLD AUTO: 0.02 K/UL (ref 0–0.2)
BASOPHILS NFR BLD: 0.3 % (ref 0–1.9)
BILIRUB SERPL-MCNC: 0.5 MG/DL (ref 0.1–1)
BNP SERPL-MCNC: 982 PG/ML (ref 0–99)
BUN SERPL-MCNC: 31 MG/DL (ref 6–20)
CALCIUM SERPL-MCNC: 7.4 MG/DL (ref 8.7–10.5)
CHLORIDE SERPL-SCNC: 97 MMOL/L (ref 95–110)
CK SERPL-CCNC: 116 U/L (ref 20–180)
CO2 SERPL-SCNC: 28 MMOL/L (ref 23–29)
CREAT SERPL-MCNC: 11.8 MG/DL (ref 0.5–1.4)
DELSYS: ABNORMAL
DIFFERENTIAL METHOD: ABNORMAL
EOSINOPHIL # BLD AUTO: 0 K/UL (ref 0–0.5)
EOSINOPHIL NFR BLD: 0.4 % (ref 0–8)
ERYTHROCYTE [DISTWIDTH] IN BLOOD BY AUTOMATED COUNT: 14.8 % (ref 11.5–14.5)
EST. GFR  (AFRICAN AMERICAN): 4 ML/MIN/1.73 M^2
EST. GFR  (NON AFRICAN AMERICAN): 3 ML/MIN/1.73 M^2
FIO2: 21
GLUCOSE SERPL-MCNC: 97 MG/DL (ref 70–110)
HCO3 UR-SCNC: 25.6 MMOL/L (ref 24–28)
HCT VFR BLD AUTO: 34.2 % (ref 37–48.5)
HGB BLD-MCNC: 10.3 G/DL (ref 12–16)
IMM GRANULOCYTES # BLD AUTO: 0.08 K/UL (ref 0–0.04)
IMM GRANULOCYTES NFR BLD AUTO: 1.2 % (ref 0–0.5)
LYMPHOCYTES # BLD AUTO: 0.6 K/UL (ref 1–4.8)
LYMPHOCYTES NFR BLD: 9.2 % (ref 18–48)
MCH RBC QN AUTO: 26.5 PG (ref 27–31)
MCHC RBC AUTO-ENTMCNC: 30.1 G/DL (ref 32–36)
MCV RBC AUTO: 88 FL (ref 82–98)
MODE: ABNORMAL
MONOCYTES # BLD AUTO: 0.4 K/UL (ref 0.3–1)
MONOCYTES NFR BLD: 5.2 % (ref 4–15)
NEUTROPHILS # BLD AUTO: 5.7 K/UL (ref 1.8–7.7)
NEUTROPHILS NFR BLD: 83.7 % (ref 38–73)
NRBC BLD-RTO: 0 /100 WBC
PCO2 BLDA: 39.2 MMHG (ref 35–45)
PH SMN: 7.42 [PH] (ref 7.35–7.45)
PLATELET # BLD AUTO: 224 K/UL (ref 150–350)
PMV BLD AUTO: 9.4 FL (ref 9.2–12.9)
PO2 BLDA: 59 MMHG (ref 80–100)
POC BE: 1 MMOL/L
POC SATURATED O2: 91 % (ref 95–100)
POTASSIUM SERPL-SCNC: 4.1 MMOL/L (ref 3.5–5.1)
PROT SERPL-MCNC: 7.1 G/DL (ref 6–8.4)
RBC # BLD AUTO: 3.88 M/UL (ref 4–5.4)
SAMPLE: ABNORMAL
SITE: ABNORMAL
SODIUM SERPL-SCNC: 139 MMOL/L (ref 136–145)
TROPONIN I SERPL DL<=0.01 NG/ML-MCNC: 0.04 NG/ML (ref 0–0.03)
WBC # BLD AUTO: 6.76 K/UL (ref 3.9–12.7)

## 2020-12-07 PROCEDURE — 27000221 HC OXYGEN, UP TO 24 HOURS

## 2020-12-07 PROCEDURE — 93010 EKG 12-LEAD: ICD-10-PCS | Mod: ,,, | Performed by: INTERNAL MEDICINE

## 2020-12-07 PROCEDURE — 82550 ASSAY OF CK (CPK): CPT

## 2020-12-07 PROCEDURE — 84484 ASSAY OF TROPONIN QUANT: CPT

## 2020-12-07 PROCEDURE — 25000003 PHARM REV CODE 250: Performed by: EMERGENCY MEDICINE

## 2020-12-07 PROCEDURE — 36415 COLL VENOUS BLD VENIPUNCTURE: CPT

## 2020-12-07 PROCEDURE — 99285 EMERGENCY DEPT VISIT HI MDM: CPT | Mod: 25

## 2020-12-07 PROCEDURE — 96374 THER/PROPH/DIAG INJ IV PUSH: CPT

## 2020-12-07 PROCEDURE — 93010 ELECTROCARDIOGRAM REPORT: CPT | Mod: ,,, | Performed by: INTERNAL MEDICINE

## 2020-12-07 PROCEDURE — 99284 EMERGENCY DEPT VISIT MOD MDM: CPT | Mod: ,,, | Performed by: INTERNAL MEDICINE

## 2020-12-07 PROCEDURE — 99900035 HC TECH TIME PER 15 MIN (STAT)

## 2020-12-07 PROCEDURE — 85025 COMPLETE CBC W/AUTO DIFF WBC: CPT

## 2020-12-07 PROCEDURE — 82803 BLOOD GASES ANY COMBINATION: CPT

## 2020-12-07 PROCEDURE — 36600 WITHDRAWAL OF ARTERIAL BLOOD: CPT

## 2020-12-07 PROCEDURE — 80053 COMPREHEN METABOLIC PANEL: CPT

## 2020-12-07 PROCEDURE — 99284 PR EMERGENCY DEPT VISIT,LEVEL IV: ICD-10-PCS | Mod: ,,, | Performed by: INTERNAL MEDICINE

## 2020-12-07 PROCEDURE — 83880 ASSAY OF NATRIURETIC PEPTIDE: CPT

## 2020-12-07 PROCEDURE — 63600175 PHARM REV CODE 636 W HCPCS: Performed by: EMERGENCY MEDICINE

## 2020-12-07 PROCEDURE — 93005 ELECTROCARDIOGRAM TRACING: CPT

## 2020-12-07 RX ORDER — ONDANSETRON 2 MG/ML
4 INJECTION INTRAMUSCULAR; INTRAVENOUS
Status: COMPLETED | OUTPATIENT
Start: 2020-12-07 | End: 2020-12-07

## 2020-12-07 RX ORDER — ACETAMINOPHEN 325 MG/1
650 TABLET ORAL
Status: COMPLETED | OUTPATIENT
Start: 2020-12-07 | End: 2020-12-07

## 2020-12-07 RX ADMIN — ACETAMINOPHEN 650 MG: 325 TABLET ORAL at 04:12

## 2020-12-07 RX ADMIN — ONDANSETRON 4 MG: 2 INJECTION INTRAMUSCULAR; INTRAVENOUS at 04:12

## 2020-12-07 NOTE — ED NOTES
"Pt o2% sat 97% on room air. Pt O2% sat 100% on 2L nasal cannula when put in room by EMS. Patient does not wear oxygen at home. Temp 102.4. Patient is complaining of n/v/d, wheezing, worsening SOB, and nonproductive cough despite feeling like she "needs to cough something up." Pt has been unable to tolerate medications PO at home this AM. Was able to take medications yesterday AM.  "

## 2020-12-07 NOTE — ED PROVIDER NOTES
SCRIBE #1 NOTE: IDenisha am scribing for, and in the presence of, Rivas Bobo MD. I have scribed the HPI, ROS, and PEx.     SCRIBE #2 NOTE: I, Ashlie Martinez am scribing for, and in the presence of,  Denise Fraser MD. I have scribed the remaining portions of the note not scribed by Scribe #1.      History     Chief Complaint   Patient presents with    Shortness of Breath     fever, shortness of breath COVID +     Review of patient's allergies indicates:   Allergen Reactions    Morphine Itching    Sulfa (sulfonamide antibiotics) Rash         History of Present Illness     HPI    12/7/2020, 2:38 PM  History obtained from the patient      History of Present Illness: Cordell Angulo is a 55 y.o. female patient with a PMHx of asthma, CHF, CKD on dialysis, DM, HTN, and paroxysmal Afib who presents to the Emergency Department for evaluation of worsening SOB which onset gradually. Patient tested positive for COVID on 12/3/20. Per [facility], patient was satting 90% on room air. Symptoms are constant and moderate in severity. No mitigating or exacerbating factors reported. Associated sxs include intermittent fever (Tmax 102 F), cough, wheezing, and n/v/d. Patient denies any CP, palpitations, fatigue, diaphoresis, chills, abdominal pain, dizziness, lightheadedness, HA, and all other sxs at this time. No prior Tx. No further complaints or concerns at this time.       Arrival mode: [facility]    PCP: Chuck Grider MD        Past Medical History:  Past Medical History:   Diagnosis Date    Asthma     CHF (congestive heart failure)     CKD (chronic kidney disease) stage 5, GFR less than 15 ml/min     Depression     Diabetes mellitus     Dialysis patient     Hypercholesterolemia     Hypertension     PAF (paroxysmal atrial fibrillation)        Past Surgical History:  Past Surgical History:   Procedure Laterality Date    AV FISTULA PLACEMENT      CARDIAC ELECTROPHYSIOLOGY MAPPING AND ABLATION       CATHETERIZATION OF BOTH LEFT AND RIGHT HEART N/A 12/28/2018    Procedure: CATHETERIZATION, HEART, BOTH LEFT AND RIGHT;  Surgeon: Enrique Jj MD;  Location: [facility] CATH LAB;  Service: Cardiology;  Laterality: N/A;  To follow his cardioversion case in [facility]    cesarian section      TUMOR REMOVAL      L lung         Family History:  Family History   Problem Relation Age of Onset    Heart disease Mother     Diabetes Father        Social History:  Social History     Tobacco Use    Smoking status: Never Smoker    Smokeless tobacco: Never Used   Substance and Sexual Activity    Alcohol use: No    Drug use: No    Sexual activity: Not Currently        Review of Systems     Review of Systems   Constitutional: Positive for fever. Negative for activity change, appetite change, chills, diaphoresis and fatigue.   HENT: Negative for congestion, ear pain, nosebleeds, rhinorrhea, sinus pain, sore throat and trouble swallowing.    Eyes: Negative for pain and discharge.   Respiratory: Positive for cough, shortness of breath and wheezing. Negative for chest tightness and stridor.    Cardiovascular: Negative for chest pain, palpitations and leg swelling.   Gastrointestinal: Positive for diarrhea, nausea and vomiting. Negative for abdominal distention, abdominal pain, blood in stool and constipation.   Genitourinary: Negative for difficulty urinating, dysuria, flank pain, frequency, hematuria and urgency.   Musculoskeletal: Negative for arthralgias, back pain, myalgias and neck pain.   Skin: Negative for pallor, rash and wound.   Neurological: Negative for dizziness, syncope, weakness, light-headedness, numbness and headaches.   Hematological: Does not bruise/bleed easily.   Psychiatric/Behavioral: Negative for confusion and self-injury.   All other systems reviewed and are negative.     Physical Exam     Initial Vitals   BP Pulse Resp Temp SpO2   12/07/20 1447 12/07/20 1447 12/07/20 1447 12/07/20 1528 12/07/20 1447   (!)  145/90 92 (!) 22 (!) 102.4 °F (39.1 °C) 95 %      MAP       --                 Physical Exam  Nursing Notes and Vital Signs Reviewed.  Constitutional: Patient is in no acute distress. Well-developed and well-nourished.  Head: Atraumatic. Normocephalic.  Eyes: PERRL. EOM intact. Conjunctivae are not pale. No scleral icterus.  ENT: Mucous membranes are moist. Oropharynx is clear and symmetric.    Neck: Supple. Full ROM. No lymphadenopathy.  Cardiovascular: Regular rate. Regular rhythm. No murmurs, rubs, or gallops. Distal pulses are 2+ and symmetric.  Pulmonary/Chest: No respiratory distress. Clear to auscultation bilaterally. No wheezing or rales.  Abdominal: Soft and non-distended.  There is no tenderness.  No rebound, guarding, or rigidity. Good bowel sounds.  Genitourinary: No CVA tenderness  Musculoskeletal: Moves all extremities. No obvious deformities. No edema. No calf tenderness.  Skin: Warm and dry.  Neurological:  Alert, awake, and appropriate.  Normal speech.  No acute focal neurological deficits are appreciated.  Psychiatric: Normal affect. Good eye contact. Appropriate in content.     ED Course   Procedures  ED Vital Signs:  Vitals:    12/07/20 1447 12/07/20 1528 12/07/20 1539 12/07/20 1546   BP: (!) 145/90  (!) 189/80    Pulse: 92  79 76   Resp: (!) 22  20    Temp:  (!) 102.4 °F (39.1 °C)     TempSrc:  Oral     SpO2: 95%  100%     12/07/20 1655 12/07/20 1700 12/07/20 1748   BP:      Pulse: 89 81    Resp: (!) 22 (!) 22    Temp:   (!) 101 °F (38.3 °C)   TempSrc:   Oral   SpO2: 96% 100%        Abnormal Lab Results:  Labs Reviewed   CBC W/ AUTO DIFFERENTIAL - Abnormal; Notable for the following components:       Result Value    RBC 3.88 (*)     Hemoglobin 10.3 (*)     Hematocrit 34.2 (*)     MCH 26.5 (*)     MCHC 30.1 (*)     RDW 14.8 (*)     Immature Granulocytes 1.2 (*)     Immature Grans (Abs) 0.08 (*)     Lymph # 0.6 (*)     Gran % 83.7 (*)     Lymph % 9.2 (*)     All other components within normal  limits   COMPREHENSIVE METABOLIC PANEL - Abnormal; Notable for the following components:    BUN 31 (*)     Creatinine 11.8 (*)     Calcium 7.4 (*)     Albumin 2.7 (*)     eGFR if  4 (*)     eGFR if non  3 (*)     All other components within normal limits   B-TYPE NATRIURETIC PEPTIDE - Abnormal; Notable for the following components:     (*)     All other components within normal limits   TROPONIN I - Abnormal; Notable for the following components:    Troponin I 0.035 (*)     All other components within normal limits   ISTAT PROCEDURE - Abnormal; Notable for the following components:    POC PO2 59 (*)     POC SATURATED O2 91 (*)     All other components within normal limits   CK   URINALYSIS, REFLEX TO URINE CULTURE        All Lab Results:  Results for orders placed or performed during the hospital encounter of 12/07/20   CBC Auto Differential   Result Value Ref Range    WBC 6.76 3.90 - 12.70 K/uL    RBC 3.88 (L) 4.00 - 5.40 M/uL    Hemoglobin 10.3 (L) 12.0 - 16.0 g/dL    Hematocrit 34.2 (L) 37.0 - 48.5 %    MCV 88 82 - 98 fL    MCH 26.5 (L) 27.0 - 31.0 pg    MCHC 30.1 (L) 32.0 - 36.0 g/dL    RDW 14.8 (H) 11.5 - 14.5 %    Platelets 224 150 - 350 K/uL    MPV 9.4 9.2 - 12.9 fL    Immature Granulocytes 1.2 (H) 0.0 - 0.5 %    Gran # (ANC) 5.7 1.8 - 7.7 K/uL    Immature Grans (Abs) 0.08 (H) 0.00 - 0.04 K/uL    Lymph # 0.6 (L) 1.0 - 4.8 K/uL    Mono # 0.4 0.3 - 1.0 K/uL    Eos # 0.0 0.0 - 0.5 K/uL    Baso # 0.02 0.00 - 0.20 K/uL    nRBC 0 0 /100 WBC    Gran % 83.7 (H) 38.0 - 73.0 %    Lymph % 9.2 (L) 18.0 - 48.0 %    Mono % 5.2 4.0 - 15.0 %    Eosinophil % 0.4 0.0 - 8.0 %    Basophil % 0.3 0.0 - 1.9 %    Differential Method Automated    Comprehensive Metabolic Panel   Result Value Ref Range    Sodium 139 136 - 145 mmol/L    Potassium 4.1 3.5 - 5.1 mmol/L    Chloride 97 95 - 110 mmol/L    CO2 28 23 - 29 mmol/L    Glucose 97 70 - 110 mg/dL    BUN 31 (H) 6 - 20 mg/dL    Creatinine 11.8  (H) 0.5 - 1.4 mg/dL    Calcium 7.4 (L) 8.7 - 10.5 mg/dL    Total Protein 7.1 6.0 - 8.4 g/dL    Albumin 2.7 (L) 3.5 - 5.2 g/dL    Total Bilirubin 0.5 0.1 - 1.0 mg/dL    Alkaline Phosphatase 69 55 - 135 U/L    AST 21 10 - 40 U/L    ALT 16 10 - 44 U/L    Anion Gap 14 8 - 16 mmol/L    eGFR if African American 4 (A) >60 mL/min/1.73 m^2    eGFR if non African American 3 (A) >60 mL/min/1.73 m^2   BNP   Result Value Ref Range     (H) 0 - 99 pg/mL   CK   Result Value Ref Range     20 - 180 U/L   Troponin I   Result Value Ref Range    Troponin I 0.035 (H) 0.000 - 0.026 ng/mL   ISTAT PROCEDURE   Result Value Ref Range    POC PH 7.423 7.35 - 7.45    POC PCO2 39.2 35 - 45 mmHg    POC PO2 59 (LL) 80 - 100 mmHg    POC HCO3 25.6 24 - 28 mmol/L    POC BE 1 -2 to 2 mmol/L    POC SATURATED O2 91 (L) 95 - 100 %    Sample ARTERIAL     Site RR     Allens Test Pass     DelSys Room Air     Mode SPONT     FiO2 21          Imaging Results:  Imaging Results          X-Ray Chest AP Portable (Final result)  Result time 12/07/20 16:08:16    Final result by Kvng Banks MD (12/07/20 16:08:16)                 Impression:      1.  Interval development of significant reticular interstitial changes as well as patchy ground-glass opacities throughout the mid and lower lungs, especially the lower lobes, without effusions.  Findings are concerning for atypical viral pneumonia such as COVID-19 infection.  Clinical correlation is advised.    2.  Stable findings as noted above.      Electronically signed by: Kvng Banks MD  Date:    12/07/2020  Time:    16:08             Narrative:    EXAMINATION:  XR CHEST AP PORTABLE    CLINICAL HISTORY:  sob;    COMPARISON:  December 3, 2020    FINDINGS:  There is been interval development of patchy alveolar opacities throughout the mid lower lungs, especially the lower lobes, with superimposed reticular interstitial changes.  The hilar and mediastinal contours and osseous structures are unchanged.   Stable marginal spondylosis, ectatic and tortuous aorta and calcifications of the aortic knob.  Stable degenerative changes of the left greater than right glenohumeral joints.                                 The EKG was ordered, reviewed, and independently interpreted by the ED provider.  Interpretation time: 15:04  Rate: 86 BPM  Rhythm: normal sinus rhythm  Interpretation: Minimal voltage criteria for LVH, may be normal variant. Nonspecific T wave abnormality. Prolonged QT. No STEMI.             The Emergency Provider reviewed the vital signs and test results, which are outlined above.     ED Discussion     4:06 PM: Dr. Bobo transfers care of patient to Dr. Fraser pending lab and imaging results.    6:05 PM: Re-evaluated pt. Pt is resting comfortably and is in no acute distress. Pt's O2 sats are 100% on RA and is in no respiratory distress. The pt is scheduled for dialysis tomorrow and does not meet admission criteria at this time. Pt states that she is comfortable with being discharged home.  D/w pt all pertinent results. D/w pt any concerns expressed at this time. Answered all questions. Pt expresses understanding at this time.    6:09 PM: Reassessed pt at this time, case discussed with Dr. Yovani yap who has evaluated patient and agrees with discharge plan.  Pt states her condition has improved at this time. Discussed with pt all pertinent ED information and results. Discussed pt dx and plan of tx. Gave pt all f/u and return to the ED instructions. All questions and concerns were addressed at this time. Pt expresses understanding of information and instructions, and is comfortable with plan to discharge. Pt is stable for discharge.    I discussed with patient that evaluation in the ED does not suggest any emergent or life threatening medical conditions requiring immediate intervention beyond what was provided in the ED, and I believe patient is safe for discharge.  Regardless, an unremarkable evaluation in  the ED does not preclude the development or presence of a serious of life threatening condition. As such, patient was instructed to return immediately for any worsening or change in current symptoms.  \     Medical Decision Making:   Clinical Tests:   Lab Tests: Ordered and Reviewed  Radiological Study: Ordered and Reviewed  Medical Tests: Ordered and Reviewed           ED Medication(s):  Medications   acetaminophen tablet 650 mg (650 mg Oral Given 12/7/20 1616)   ondansetron injection 4 mg (4 mg Intravenous Given 12/7/20 1634)       New Prescriptions    No medications on file               Scribe Attestation:   Scribe #1: I performed the above scribed service and the documentation accurately describes the services I performed. I attest to the accuracy of the note.     Attending:   Physician Attestation Statement for Scribe #1: I, Rivas Bobo MD, personally performed the services described in this documentation, as scribed by Denisha Nix, in my presence, and it is both accurate and complete.       Scribe Attestation:   Scribe #2: I performed the above scribed service and the documentation accurately describes the services I performed. I attest to the accuracy of the note.    Attending Attestation:           Physician Attestation for Scribe:    Physician Attestation Statement for Scribe #2: I, Denise Fraser MD, reviewed documentation, as scribed by Ashlie Martinez in my presence, and it is both accurate and complete. I also acknowledge and confirm the content of the note done by Scribe #1.           Clinical Impression       ICD-10-CM ICD-9-CM   1. SOB (shortness of breath)  R06.02 786.05       Disposition:   Disposition: Discharged  Condition: Stable         Denise Fraser MD  12/08/20 0035

## 2020-12-08 NOTE — HPI
Patient is a 55-year-old female with ESRD on hemodialysis on Monday-Wed- Friday schedule in [facility] under my care.  Patient has recently become COVID-19 positive.  Patient since then has [place] [facility] [facility] [place] on a Epqrokp-Nvjicjuh-Xutlbene schedule.  Last dialysis was Saturday.  Patient comes to the hospital in the emergency room with short worsening shortness of breath.  Patient seen and evaluated at bedside.  No acute indication for dialysis.  Patient has mild hypoxemia.  Chest x-ray was stable.  Patient probably may qualify for home O2.

## 2020-12-08 NOTE — ASSESSMENT & PLAN NOTE
Patient is a 55-year-old female with ESRD on hemodialysis on Monday-Wed- Friday schedule in [facility] under my care.  Patient has recently become COVID-19 positive.  Patient since then has been moved to the [facility] on a Upnbpdj-Nciisjry-Qvxitdzi schedule.  Last dialysis was Saturday.  Patient comes to the hospital in the emergency room with short worsening shortness of breath.  Patient seen and evaluated at bedside.  No acute indication for dialysis.  Patient has mild hypoxemia.  Chest x-ray was stable.  Patient probably may qualify for home O2.

## 2020-12-08 NOTE — SUBJECTIVE & OBJECTIVE
Past Medical History:   Diagnosis Date    Asthma     CHF (congestive heart failure)     CKD (chronic kidney disease) stage 5, GFR less than 15 ml/min     Depression     Diabetes mellitus     Dialysis patient     Hypercholesterolemia     Hypertension     PAF (paroxysmal atrial fibrillation)        Past Surgical History:   Procedure Laterality Date    AV FISTULA PLACEMENT      CARDIAC ELECTROPHYSIOLOGY MAPPING AND ABLATION      CATHETERIZATION OF BOTH LEFT AND RIGHT HEART N/A 12/28/2018    Procedure: CATHETERIZATION, HEART, BOTH LEFT AND RIGHT;  Surgeon: Enrique Jj MD;  Location: [facility] CATH LAB;  Service: Cardiology;  Laterality: N/A;  To follow his cardioversion case in [facility]    cesarian section      TUMOR REMOVAL      L lung       Review of patient's allergies indicates:   Allergen Reactions    Morphine Itching    Sulfa (sulfonamide antibiotics) Rash     No current facility-administered medications for this encounter.      Current Outpatient Medications   Medication    albuterol (PROVENTIL/VENTOLIN HFA) 90 mcg/actuation inhaler    ALPRAZolam (XANAX) 0.5 MG tablet    aspirin (ECOTRIN) 81 MG EC tablet    citalopram (CELEXA) 20 MG tablet    ELIQUIS 5 mg Tab    ferric citrate (AURYXIA ORAL)    insulin (BASAGLAR KWIKPEN U-100 INSULIN) glargine 100 units/mL (3mL) SubQ pen    lisinopril (PRINIVIL,ZESTRIL) 40 MG tablet    metoprolol succinate (TOPROL-XL) 25 MG 24 hr tablet    pantoprazole (PROTONIX) 40 MG tablet    pravastatin (PRAVACHOL) 40 MG tablet    sevelamer carbonate (RENVELA) 800 mg Tab    sucralfate (CARAFATE) 100 mg/mL suspension    VELPHORO 500 mg Chew    vitamin renal formula, B-complex-vitamin c-folic acid, (NEPHROCAP) 1 mg Cap    benzocaine 10 % Oint    vitamin renal formula, B-complex-vitamin c-folic acid, (NEPHROCAP) 1 mg Cap     Family History     Problem Relation (Age of Onset)    Diabetes Father    Heart disease Mother        Tobacco Use    Smoking status: Never  Smoker    Smokeless tobacco: Never Used   Substance and Sexual Activity    Alcohol use: No    Drug use: No    Sexual activity: Not Currently     Review of Systems   Constitutional: Positive for activity change, appetite change and chills. Negative for diaphoresis, fatigue, fever and unexpected weight change.   HENT: Negative for congestion, dental problem, drooling, postnasal drip, rhinorrhea and voice change.    Eyes: Negative for discharge.   Respiratory: Positive for cough, chest tightness, shortness of breath and wheezing. Negative for apnea, choking and stridor.    Cardiovascular: Negative for chest pain, palpitations and leg swelling.   Gastrointestinal: Negative for abdominal distention, blood in stool, constipation, diarrhea, nausea, rectal pain and vomiting.   Endocrine: Negative for cold intolerance, heat intolerance, polydipsia and polyuria.   Genitourinary: Negative for decreased urine volume, difficulty urinating, dysuria, enuresis, flank pain, frequency, hematuria and urgency.   Musculoskeletal: Negative for arthralgias, back pain, gait problem and joint swelling.   Skin: Negative for rash.   Allergic/Immunologic: Negative for food allergies and immunocompromised state.   Neurological: Negative for dizziness, tremors, syncope, numbness and headaches.   Hematological: Does not bruise/bleed easily.   Psychiatric/Behavioral: Negative for agitation, behavioral problems and self-injury. The patient is not nervous/anxious and is not hyperactive.    All other systems reviewed and are negative.    Objective:     Vital Signs (Most Recent):  Temp: (!) 101 °F (38.3 °C) (12/07/20 1748)  Pulse: 81 (12/07/20 1700)  Resp: (!) 22 (12/07/20 1700)  BP: (!) 189/80 (12/07/20 1539)  SpO2: 100 % (12/07/20 1700)  O2 Device (Oxygen Therapy): nasal cannula (12/07/20 1700) Vital Signs (24h Range):  Temp:  [101 °F (38.3 °C)-102.4 °F (39.1 °C)] 101 °F (38.3 °C)  Pulse:  [76-92] 81  Resp:  [20-22] 22  SpO2:  [95 %-100 %] 100  %  BP: (145-189)/(80-90) 189/80        There is no height or weight on file to calculate BMI.  There is no height or weight on file to calculate BSA.    No intake/output data recorded.    Physical Exam  Vitals signs and nursing note reviewed.   Constitutional:       Appearance: She is well-developed.   HENT:      Head: Normocephalic and atraumatic.   Eyes:      Conjunctiva/sclera: Conjunctivae normal.      Pupils: Pupils are equal, round, and reactive to light.   Neck:      Musculoskeletal: Full passive range of motion without pain, normal range of motion and neck supple. No edema.      Thyroid: No thyroid mass or thyromegaly.      Vascular: No carotid bruit.   Cardiovascular:      Rate and Rhythm: Normal rate and regular rhythm.  No extrasystoles are present.     Chest Wall: PMI is displaced.      Pulses: Normal pulses.      Heart sounds: S1 normal and S2 normal. No murmur. No friction rub.      Comments: RV heave loud P2   Pulmonary:      Effort: Pulmonary effort is normal. No accessory muscle usage or respiratory distress.      Breath sounds: Wheezing present. No rales.   Chest:      Chest wall: No tenderness.   Abdominal:      General: Bowel sounds are normal. There is no distension.      Palpations: Abdomen is soft. There is no mass.      Tenderness: There is no abdominal tenderness. There is no rebound.      Hernia: No hernia is present.   Musculoskeletal: Normal range of motion.         General: No tenderness.      Comments: Left upper arm fistula is positive for bruit and thrill   Skin:     General: Skin is warm and dry.      Coloration: Skin is not pale.      Findings: No bruising, ecchymosis, erythema or rash.      Nails: There is no clubbing.     Neurological:      Mental Status: She is alert and oriented to person, place, and time.      Cranial Nerves: No cranial nerve deficit.      Sensory: No sensory deficit.      Motor: No abnormal muscle tone.      Coordination: Coordination normal.      Deep Tendon  Reflexes: Reflexes are normal and symmetric.   Psychiatric:         Speech: Speech normal.         Behavior: Behavior normal.         Thought Content: Thought content normal.         Judgment: Judgment normal.         Significant Labs:  ABGs:   Recent Labs   Lab 12/07/20  1657   PH 7.423   PCO2 39.2   HCO3 25.6   POCSATURATED 91*   BE 1     CBC:   Recent Labs   Lab 12/07/20  1542   WBC 6.76   RBC 3.88*   HGB 10.3*   HCT 34.2*      MCV 88   MCH 26.5*   MCHC 30.1*     CMP:   Recent Labs   Lab 12/07/20  1542   GLU 97   CALCIUM 7.4*   ALBUMIN 2.7*   PROT 7.1      K 4.1   CO2 28   CL 97   BUN 31*   CREATININE 11.8*   ALKPHOS 69   ALT 16   AST 21   BILITOT 0.5     All labs within the past 24 hours have been reviewed.    Significant Imaging:  Labs: Reviewed  X-Ray: Reviewed

## 2020-12-10 ENCOUNTER — HOSPITAL ENCOUNTER (INPATIENT)
Facility: HOSPITAL | Age: 55
LOS: 4 days | Discharge: HOME HEALTH | DRG: 177 | End: 2020-12-14
Attending: EMERGENCY MEDICINE | Admitting: FAMILY MEDICINE
Payer: MEDICARE

## 2020-12-10 DIAGNOSIS — U07.1 PNEUMONIA DUE TO COVID-19 VIRUS: ICD-10-CM

## 2020-12-10 DIAGNOSIS — I50.32 CHRONIC DIASTOLIC CONGESTIVE HEART FAILURE: Chronic | ICD-10-CM

## 2020-12-10 DIAGNOSIS — U07.1 COVID-19: Primary | ICD-10-CM

## 2020-12-10 DIAGNOSIS — J18.9 PNEUMONIA OF BOTH LOWER LOBES DUE TO INFECTIOUS ORGANISM: ICD-10-CM

## 2020-12-10 DIAGNOSIS — J81.0 ACUTE PULMONARY EDEMA: ICD-10-CM

## 2020-12-10 DIAGNOSIS — J12.82 PNEUMONIA DUE TO COVID-19 VIRUS: ICD-10-CM

## 2020-12-10 DIAGNOSIS — R00.0 TACHYCARDIA: ICD-10-CM

## 2020-12-10 PROBLEM — J96.01 ACUTE RESPIRATORY FAILURE WITH HYPOXIA: Status: ACTIVE | Noted: 2018-12-26

## 2020-12-10 PROBLEM — R47.1 DYSARTHRIA: Status: RESOLVED | Noted: 2020-06-25 | Resolved: 2020-12-10

## 2020-12-10 LAB
ALBUMIN SERPL BCP-MCNC: 2.5 G/DL (ref 3.5–5.2)
ALLENS TEST: ABNORMAL
ALP SERPL-CCNC: 93 U/L (ref 55–135)
ALT SERPL W/O P-5'-P-CCNC: 15 U/L (ref 10–44)
ANION GAP SERPL CALC-SCNC: 17 MMOL/L (ref 8–16)
APTT BLDCRRT: 36.7 SEC (ref 21–32)
AST SERPL-CCNC: 34 U/L (ref 10–40)
BASOPHILS # BLD AUTO: 0.02 K/UL (ref 0–0.2)
BASOPHILS NFR BLD: 0.2 % (ref 0–1.9)
BILIRUB SERPL-MCNC: 0.6 MG/DL (ref 0.1–1)
BNP SERPL-MCNC: 770 PG/ML (ref 0–99)
BUN SERPL-MCNC: 33 MG/DL (ref 6–20)
CALCIUM SERPL-MCNC: 7.5 MG/DL (ref 8.7–10.5)
CHLORIDE SERPL-SCNC: 93 MMOL/L (ref 95–110)
CK SERPL-CCNC: 438 U/L (ref 20–180)
CO2 SERPL-SCNC: 28 MMOL/L (ref 23–29)
CREAT SERPL-MCNC: 12.2 MG/DL (ref 0.5–1.4)
CRP SERPL-MCNC: 374 MG/L (ref 0–8.2)
D DIMER PPP IA.FEU-MCNC: 0.91 MG/L FEU
DELSYS: ABNORMAL
DIFFERENTIAL METHOD: ABNORMAL
EOSINOPHIL # BLD AUTO: 0 K/UL (ref 0–0.5)
EOSINOPHIL NFR BLD: 0.5 % (ref 0–8)
ERYTHROCYTE [DISTWIDTH] IN BLOOD BY AUTOMATED COUNT: 15.1 % (ref 11.5–14.5)
EST. GFR  (AFRICAN AMERICAN): 4 ML/MIN/1.73 M^2
EST. GFR  (NON AFRICAN AMERICAN): 3 ML/MIN/1.73 M^2
FIO2: 32
FLOW: 3
GLUCOSE SERPL-MCNC: 105 MG/DL (ref 70–110)
HCO3 UR-SCNC: 31.2 MMOL/L (ref 24–28)
HCT VFR BLD AUTO: 35.6 % (ref 37–48.5)
HGB BLD-MCNC: 10.7 G/DL (ref 12–16)
IMM GRANULOCYTES # BLD AUTO: 0.1 K/UL (ref 0–0.04)
IMM GRANULOCYTES NFR BLD AUTO: 1.2 % (ref 0–0.5)
INR PPP: 1.1 (ref 0.8–1.2)
LACTATE SERPL-SCNC: 1 MMOL/L (ref 0.5–2.2)
LACTATE SERPL-SCNC: 1.7 MMOL/L (ref 0.5–2.2)
LDH SERPL L TO P-CCNC: 526 U/L (ref 110–260)
LYMPHOCYTES # BLD AUTO: 0.5 K/UL (ref 1–4.8)
LYMPHOCYTES NFR BLD: 6.1 % (ref 18–48)
MCH RBC QN AUTO: 26.6 PG (ref 27–31)
MCHC RBC AUTO-ENTMCNC: 30.1 G/DL (ref 32–36)
MCV RBC AUTO: 89 FL (ref 82–98)
MODE: ABNORMAL
MONOCYTES # BLD AUTO: 0.2 K/UL (ref 0.3–1)
MONOCYTES NFR BLD: 2.9 % (ref 4–15)
NEUTROPHILS # BLD AUTO: 7.1 K/UL (ref 1.8–7.7)
NEUTROPHILS NFR BLD: 89.1 % (ref 38–73)
NRBC BLD-RTO: 0 /100 WBC
PCO2 BLDA: 46.1 MMHG (ref 35–45)
PH SMN: 7.44 [PH] (ref 7.35–7.45)
PLATELET # BLD AUTO: 240 K/UL (ref 150–350)
PMV BLD AUTO: 9.1 FL (ref 9.2–12.9)
PO2 BLDA: 69 MMHG (ref 80–100)
POC BE: 7 MMOL/L
POC SATURATED O2: 94 % (ref 95–100)
POCT GLUCOSE: 124 MG/DL (ref 70–110)
POCT GLUCOSE: 251 MG/DL (ref 70–110)
POTASSIUM SERPL-SCNC: 5 MMOL/L (ref 3.5–5.1)
PROCALCITONIN SERPL IA-MCNC: 3.56 NG/ML
PROT SERPL-MCNC: 7.2 G/DL (ref 6–8.4)
PROTHROMBIN TIME: 11.4 SEC (ref 9–12.5)
RBC # BLD AUTO: 4.02 M/UL (ref 4–5.4)
SAMPLE: ABNORMAL
SARS-COV-2 RDRP RESP QL NAA+PROBE: POSITIVE
SITE: ABNORMAL
SODIUM SERPL-SCNC: 138 MMOL/L (ref 136–145)
TROPONIN I SERPL DL<=0.01 NG/ML-MCNC: 0.21 NG/ML (ref 0–0.03)
TROPONIN I SERPL DL<=0.01 NG/ML-MCNC: 0.28 NG/ML (ref 0–0.03)
TROPONIN I SERPL DL<=0.01 NG/ML-MCNC: 0.28 NG/ML (ref 0–0.03)
WBC # BLD AUTO: 8.02 K/UL (ref 3.9–12.7)

## 2020-12-10 PROCEDURE — 96375 TX/PRO/DX INJ NEW DRUG ADDON: CPT

## 2020-12-10 PROCEDURE — 94799 UNLISTED PULMONARY SVC/PX: CPT

## 2020-12-10 PROCEDURE — 84484 ASSAY OF TROPONIN QUANT: CPT

## 2020-12-10 PROCEDURE — U0002 COVID-19 LAB TEST NON-CDC: HCPCS

## 2020-12-10 PROCEDURE — 83605 ASSAY OF LACTIC ACID: CPT

## 2020-12-10 PROCEDURE — 80100014 HC HEMODIALYSIS 1:1

## 2020-12-10 PROCEDURE — 83036 HEMOGLOBIN GLYCOSYLATED A1C: CPT

## 2020-12-10 PROCEDURE — 99223 PR INITIAL HOSPITAL CARE,LEVL III: ICD-10-PCS | Mod: ,,, | Performed by: INTERNAL MEDICINE

## 2020-12-10 PROCEDURE — 99223 1ST HOSP IP/OBS HIGH 75: CPT | Mod: ,,, | Performed by: INTERNAL MEDICINE

## 2020-12-10 PROCEDURE — 63600175 PHARM REV CODE 636 W HCPCS: Performed by: NURSE PRACTITIONER

## 2020-12-10 PROCEDURE — 83880 ASSAY OF NATRIURETIC PEPTIDE: CPT

## 2020-12-10 PROCEDURE — 82550 ASSAY OF CK (CPK): CPT

## 2020-12-10 PROCEDURE — 87040 BLOOD CULTURE FOR BACTERIA: CPT

## 2020-12-10 PROCEDURE — 84145 PROCALCITONIN (PCT): CPT

## 2020-12-10 PROCEDURE — 82803 BLOOD GASES ANY COMBINATION: CPT

## 2020-12-10 PROCEDURE — 84484 ASSAY OF TROPONIN QUANT: CPT | Mod: 91

## 2020-12-10 PROCEDURE — 36600 WITHDRAWAL OF ARTERIAL BLOOD: CPT

## 2020-12-10 PROCEDURE — 93010 EKG 12-LEAD: ICD-10-PCS | Mod: ,,, | Performed by: INTERNAL MEDICINE

## 2020-12-10 PROCEDURE — 25000003 PHARM REV CODE 250: Performed by: NURSE PRACTITIONER

## 2020-12-10 PROCEDURE — 27000221 HC OXYGEN, UP TO 24 HOURS

## 2020-12-10 PROCEDURE — 94761 N-INVAS EAR/PLS OXIMETRY MLT: CPT

## 2020-12-10 PROCEDURE — 63600175 PHARM REV CODE 636 W HCPCS: Performed by: INTERNAL MEDICINE

## 2020-12-10 PROCEDURE — 25000003 PHARM REV CODE 250: Performed by: EMERGENCY MEDICINE

## 2020-12-10 PROCEDURE — 85610 PROTHROMBIN TIME: CPT

## 2020-12-10 PROCEDURE — 36415 COLL VENOUS BLD VENIPUNCTURE: CPT

## 2020-12-10 PROCEDURE — 85379 FIBRIN DEGRADATION QUANT: CPT

## 2020-12-10 PROCEDURE — 93010 ELECTROCARDIOGRAM REPORT: CPT | Mod: ,,, | Performed by: INTERNAL MEDICINE

## 2020-12-10 PROCEDURE — 82728 ASSAY OF FERRITIN: CPT

## 2020-12-10 PROCEDURE — 83615 LACTATE (LD) (LDH) ENZYME: CPT

## 2020-12-10 PROCEDURE — 96365 THER/PROPH/DIAG IV INF INIT: CPT

## 2020-12-10 PROCEDURE — 96367 TX/PROPH/DG ADDL SEQ IV INF: CPT

## 2020-12-10 PROCEDURE — 85025 COMPLETE CBC W/AUTO DIFF WBC: CPT

## 2020-12-10 PROCEDURE — 94760 N-INVAS EAR/PLS OXIMETRY 1: CPT

## 2020-12-10 PROCEDURE — 21400001 HC TELEMETRY ROOM

## 2020-12-10 PROCEDURE — 93005 ELECTROCARDIOGRAM TRACING: CPT

## 2020-12-10 PROCEDURE — 99900035 HC TECH TIME PER 15 MIN (STAT)

## 2020-12-10 PROCEDURE — 85730 THROMBOPLASTIN TIME PARTIAL: CPT

## 2020-12-10 PROCEDURE — 99291 CRITICAL CARE FIRST HOUR: CPT | Mod: 25

## 2020-12-10 PROCEDURE — 25000003 PHARM REV CODE 250: Performed by: INTERNAL MEDICINE

## 2020-12-10 PROCEDURE — 63600175 PHARM REV CODE 636 W HCPCS: Performed by: EMERGENCY MEDICINE

## 2020-12-10 PROCEDURE — 80053 COMPREHEN METABOLIC PANEL: CPT

## 2020-12-10 PROCEDURE — 86140 C-REACTIVE PROTEIN: CPT

## 2020-12-10 RX ORDER — DEXAMETHASONE SODIUM PHOSPHATE 4 MG/ML
6 INJECTION, SOLUTION INTRA-ARTICULAR; INTRALESIONAL; INTRAMUSCULAR; INTRAVENOUS; SOFT TISSUE
Status: COMPLETED | OUTPATIENT
Start: 2020-12-10 | End: 2020-12-10

## 2020-12-10 RX ORDER — IBUPROFEN 800 MG/1
800 TABLET ORAL
Status: DISCONTINUED | OUTPATIENT
Start: 2020-12-10 | End: 2020-12-10

## 2020-12-10 RX ORDER — HEPARIN SODIUM 1000 [USP'U]/ML
2000 INJECTION, SOLUTION INTRAVENOUS; SUBCUTANEOUS ONCE
Status: COMPLETED | OUTPATIENT
Start: 2020-12-10 | End: 2020-12-10

## 2020-12-10 RX ORDER — PANTOPRAZOLE SODIUM 40 MG/1
40 TABLET, DELAYED RELEASE ORAL DAILY
Status: DISCONTINUED | OUTPATIENT
Start: 2020-12-10 | End: 2020-12-14 | Stop reason: HOSPADM

## 2020-12-10 RX ORDER — ACETAMINOPHEN 325 MG/1
650 TABLET ORAL EVERY 8 HOURS PRN
Status: DISCONTINUED | OUTPATIENT
Start: 2020-12-10 | End: 2020-12-14 | Stop reason: HOSPADM

## 2020-12-10 RX ORDER — GLUCAGON 1 MG
1 KIT INJECTION
Status: DISCONTINUED | OUTPATIENT
Start: 2020-12-10 | End: 2020-12-14 | Stop reason: HOSPADM

## 2020-12-10 RX ORDER — MUPIROCIN 20 MG/G
OINTMENT TOPICAL 2 TIMES DAILY
Status: DISCONTINUED | OUTPATIENT
Start: 2020-12-10 | End: 2020-12-14 | Stop reason: HOSPADM

## 2020-12-10 RX ORDER — INSULIN ASPART 100 [IU]/ML
0-5 INJECTION, SOLUTION INTRAVENOUS; SUBCUTANEOUS
Status: DISCONTINUED | OUTPATIENT
Start: 2020-12-10 | End: 2020-12-14 | Stop reason: HOSPADM

## 2020-12-10 RX ORDER — SEVELAMER CARBONATE 800 MG/1
800 TABLET, FILM COATED ORAL
Status: DISCONTINUED | OUTPATIENT
Start: 2020-12-10 | End: 2020-12-14 | Stop reason: HOSPADM

## 2020-12-10 RX ORDER — CHOLECALCIFEROL (VITAMIN D3) 25 MCG
1000 TABLET ORAL DAILY
Status: DISCONTINUED | OUTPATIENT
Start: 2020-12-10 | End: 2020-12-14 | Stop reason: HOSPADM

## 2020-12-10 RX ORDER — IBUPROFEN 800 MG/1
800 TABLET ORAL
Status: COMPLETED | OUTPATIENT
Start: 2020-12-10 | End: 2020-12-10

## 2020-12-10 RX ORDER — IBUPROFEN 200 MG
24 TABLET ORAL
Status: DISCONTINUED | OUTPATIENT
Start: 2020-12-10 | End: 2020-12-14 | Stop reason: HOSPADM

## 2020-12-10 RX ORDER — ASCORBIC ACID 500 MG
500 TABLET ORAL 2 TIMES DAILY
Status: DISCONTINUED | OUTPATIENT
Start: 2020-12-10 | End: 2020-12-14 | Stop reason: HOSPADM

## 2020-12-10 RX ORDER — SODIUM CHLORIDE 0.9 % (FLUSH) 0.9 %
10 SYRINGE (ML) INJECTION
Status: DISCONTINUED | OUTPATIENT
Start: 2020-12-10 | End: 2020-12-14 | Stop reason: HOSPADM

## 2020-12-10 RX ORDER — ONDANSETRON 2 MG/ML
4 INJECTION INTRAMUSCULAR; INTRAVENOUS EVERY 8 HOURS PRN
Status: DISCONTINUED | OUTPATIENT
Start: 2020-12-10 | End: 2020-12-14 | Stop reason: HOSPADM

## 2020-12-10 RX ORDER — IBUPROFEN 400 MG/1
400 TABLET ORAL
Status: DISCONTINUED | OUTPATIENT
Start: 2020-12-10 | End: 2020-12-10

## 2020-12-10 RX ORDER — IBUPROFEN 200 MG
16 TABLET ORAL
Status: DISCONTINUED | OUTPATIENT
Start: 2020-12-10 | End: 2020-12-14 | Stop reason: HOSPADM

## 2020-12-10 RX ORDER — PRAVASTATIN SODIUM 20 MG/1
40 TABLET ORAL DAILY
Status: DISCONTINUED | OUTPATIENT
Start: 2020-12-10 | End: 2020-12-14 | Stop reason: HOSPADM

## 2020-12-10 RX ORDER — ASPIRIN 81 MG/1
81 TABLET ORAL DAILY
Status: DISCONTINUED | OUTPATIENT
Start: 2020-12-10 | End: 2020-12-14 | Stop reason: HOSPADM

## 2020-12-10 RX ORDER — METOPROLOL SUCCINATE 25 MG/1
25 TABLET, EXTENDED RELEASE ORAL DAILY
Status: DISCONTINUED | OUTPATIENT
Start: 2020-12-10 | End: 2020-12-14 | Stop reason: HOSPADM

## 2020-12-10 RX ORDER — SUCRALFATE 1 G/10ML
1 SUSPENSION ORAL
Status: DISCONTINUED | OUTPATIENT
Start: 2020-12-10 | End: 2020-12-10

## 2020-12-10 RX ORDER — CITALOPRAM 20 MG/1
20 TABLET, FILM COATED ORAL DAILY
Status: DISCONTINUED | OUTPATIENT
Start: 2020-12-10 | End: 2020-12-14 | Stop reason: HOSPADM

## 2020-12-10 RX ORDER — ACETAMINOPHEN 500 MG
1000 TABLET ORAL
Status: COMPLETED | OUTPATIENT
Start: 2020-12-10 | End: 2020-12-10

## 2020-12-10 RX ORDER — ALBUTEROL SULFATE 90 UG/1
2 AEROSOL, METERED RESPIRATORY (INHALATION) EVERY 6 HOURS
Status: DISCONTINUED | OUTPATIENT
Start: 2020-12-10 | End: 2020-12-10

## 2020-12-10 RX ADMIN — APIXABAN 5 MG: 2.5 TABLET, FILM COATED ORAL at 11:12

## 2020-12-10 RX ADMIN — PROMETHAZINE HYDROCHLORIDE 12.5 MG: 25 INJECTION INTRAMUSCULAR; INTRAVENOUS at 08:12

## 2020-12-10 RX ADMIN — OXYCODONE HYDROCHLORIDE AND ACETAMINOPHEN 500 MG: 500 TABLET ORAL at 11:12

## 2020-12-10 RX ADMIN — OXYCODONE HYDROCHLORIDE AND ACETAMINOPHEN 500 MG: 500 TABLET ORAL at 09:12

## 2020-12-10 RX ADMIN — ACETAMINOPHEN 1000 MG: 500 TABLET ORAL at 08:12

## 2020-12-10 RX ADMIN — HEPARIN SODIUM 2000 UNITS: 1000 INJECTION INTRAVENOUS; SUBCUTANEOUS at 04:12

## 2020-12-10 RX ADMIN — MUPIROCIN: 20 OINTMENT TOPICAL at 09:12

## 2020-12-10 RX ADMIN — ASPIRIN 81 MG: 81 TABLET, COATED ORAL at 12:12

## 2020-12-10 RX ADMIN — REMDESIVIR 200 MG: 100 INJECTION, POWDER, LYOPHILIZED, FOR SOLUTION INTRAVENOUS at 07:12

## 2020-12-10 RX ADMIN — DEXAMETHASONE 6 MG: 4 TABLET ORAL at 11:12

## 2020-12-10 RX ADMIN — Medication 1000 UNITS: at 12:12

## 2020-12-10 RX ADMIN — CITALOPRAM HYDROBROMIDE 20 MG: 20 TABLET ORAL at 12:12

## 2020-12-10 RX ADMIN — INSULIN ASPART 3 UNITS: 100 INJECTION, SOLUTION INTRAVENOUS; SUBCUTANEOUS at 10:12

## 2020-12-10 RX ADMIN — APIXABAN 5 MG: 2.5 TABLET, FILM COATED ORAL at 09:12

## 2020-12-10 RX ADMIN — CEFTRIAXONE 1 G: 1 INJECTION, SOLUTION INTRAVENOUS at 08:12

## 2020-12-10 RX ADMIN — SEVELAMER CARBONATE 800 MG: 800 TABLET, FILM COATED ORAL at 11:12

## 2020-12-10 RX ADMIN — AZITHROMYCIN MONOHYDRATE 500 MG: 500 INJECTION, POWDER, LYOPHILIZED, FOR SOLUTION INTRAVENOUS at 09:12

## 2020-12-10 RX ADMIN — IBUPROFEN 800 MG: 800 TABLET, FILM COATED ORAL at 08:12

## 2020-12-10 RX ADMIN — DEXAMETHASONE SODIUM PHOSPHATE 6 MG: 4 INJECTION INTRA-ARTICULAR; INTRALESIONAL; INTRAMUSCULAR; INTRAVENOUS; SOFT TISSUE at 08:12

## 2020-12-10 RX ADMIN — PANTOPRAZOLE SODIUM 40 MG: 40 TABLET, DELAYED RELEASE ORAL at 12:12

## 2020-12-10 RX ADMIN — THERA TABS 1 TABLET: TAB at 11:12

## 2020-12-10 NOTE — ASSESSMENT & PLAN NOTE
Patient has not had good dialysis this week.  Patient is short of breath.  Cannot rule out mild component of fluid overload.  Will admit the patient and do dialysis.  Follow very carefully.

## 2020-12-10 NOTE — H&P
[facility] Hospital Medicine  History & Physical    Patient Name: Cordell Angulo  MRN: [id number]  Admission Date: 12/10/2020  Attending Physician: Dr. Lo  Primary Care Provider: Chuck Grider MD         Patient information was obtained from patient and ER records.     Subjective:     Principal Problem:Pneumonia due to COVID-19 virus    Chief Complaint:   Chief Complaint   Patient presents with    Shortness of Breath     COVID +         HPI: The patient is a 56 yo female with ESRD -on HD, Asthma, PAF, HTN, DM, HLD, depression, and morbid obesity -BMI 40 who presented to ED with worsening COVID-19 symptoms. Pt tested positive for COVID-19 on 12/3/20. Since then, the pt reports worsening Fever, SOB, cough, generalized weakness and fatigue,N/V, and loss of taste and smell. Pt reports she was unable to walk but a few steps.   In the ED, Temp 103F, O2sats 90%, CXR showed progressive bilateral ground glass opacities, Trop 0.2, EKG showed no change from previous. procalcitonin 3.56 (dialysis pt)    Past Medical History:   Diagnosis Date    Asthma     CHF (congestive heart failure)     CKD (chronic kidney disease) stage 5, GFR less than 15 ml/min     Depression     Diabetes mellitus     Dialysis patient     Hypercholesterolemia     Hypertension     PAF (paroxysmal atrial fibrillation)        Past Surgical History:   Procedure Laterality Date    AV FISTULA PLACEMENT      CARDIAC ELECTROPHYSIOLOGY MAPPING AND ABLATION      CATHETERIZATION OF BOTH LEFT AND RIGHT HEART N/A 2018    Procedure: CATHETERIZATION, HEART, BOTH LEFT AND RIGHT;  Surgeon: Enrique Jj MD;  Location: [facility] CATH LAB;  Service: Cardiology;  Laterality: N/A;  To follow his cardioversion case in [facility]     SECTION      TUMOR REMOVAL      L lung       Review of patient's allergies indicates:   Allergen Reactions    Morphine Itching    Sulfa (sulfonamide antibiotics) Rash       No current  facility-administered medications on file prior to encounter.      Current Outpatient Medications on File Prior to Encounter   Medication Sig    ALPRAZolam (XANAX) 0.5 MG tablet Take 0.5 mg by mouth.    benzocaine 10 % Oint Apply 1 application topically 3 (three) times daily as needed. Apply to left ear for pain    ELIQUIS 5 mg Tab TK 1 T PO BID    ferric citrate (AURYXIA ORAL) Take by mouth. 2 tabs with every meal    insulin (BASAGLAR KWIKPEN U-100 INSULIN) glargine 100 units/mL (3mL) SubQ pen Inject 15 Units into the skin.    lisinopril (PRINIVIL,ZESTRIL) 40 MG tablet Take 40 mg by mouth once daily.    pantoprazole (PROTONIX) 40 MG tablet Take 40 mg by mouth.    pravastatin (PRAVACHOL) 40 MG tablet Take 40 mg by mouth once daily.    sevelamer carbonate (RENVELA) 800 mg Tab TK 2 TS PO TID WITH MEALS    sucralfate (CARAFATE) 100 mg/mL suspension SHAKE LIQUID AND TAKE 10 ML BY MOUTH FOUR TIMES DAILY    VELPHORO 500 mg Chew CHEW & SWALLOW 1 TABLET BY MOUTH THREE TIMES A DAY WITH MEALS AND 1 TABLET TWO TIMES A DAY WITH SNACKS    vitamin renal formula, B-complex-vitamin c-folic acid, (NEPHROCAP) 1 mg Cap Take 1 capsule by mouth once daily.    vitamin renal formula, B-complex-vitamin c-folic acid, (NEPHROCAP) 1 mg Cap Take 1 capsule by mouth.    albuterol (PROVENTIL/VENTOLIN HFA) 90 mcg/actuation inhaler Inhale 1-2 puffs into the lungs every 6 (six) hours as needed for Wheezing.    aspirin (ECOTRIN) 81 MG EC tablet Take 1 tablet (81 mg total) by mouth once daily.    citalopram (CELEXA) 20 MG tablet Take 1 tablet (20 mg total) by mouth once daily.    metoprolol succinate (TOPROL-XL) 25 MG 24 hr tablet Take 1 tablet (25 mg total) by mouth once daily.    [DISCONTINUED] diazePAM (VALIUM) 5 MG tablet Take 2 tablets (10 mg total) by mouth nightly as needed for Anxiety or Insomnia. One to 2 tablets as needed for sleep and anxiety     Family History     Problem Relation (Age of Onset)    Diabetes Father     Heart disease Mother        Tobacco Use    Smoking status: Never Smoker    Smokeless tobacco: Never Used   Substance and Sexual Activity    Alcohol use: No    Drug use: No    Sexual activity: Not Currently     Review of Systems   Constitutional: Positive for activity change, appetite change, chills, diaphoresis, fatigue and fever. Negative for unexpected weight change.   HENT: Positive for congestion. Negative for nosebleeds, sinus pressure and sore throat.         Loss of taste and smell    Eyes: Negative for pain, discharge and visual disturbance.   Respiratory: Positive for cough, shortness of breath and wheezing. Negative for chest tightness and stridor.    Cardiovascular: Negative for chest pain, palpitations and leg swelling.   Gastrointestinal: Positive for nausea and vomiting. Negative for abdominal distention, abdominal pain, blood in stool, constipation and diarrhea.   Endocrine: Negative for cold intolerance and heat intolerance.   Genitourinary: Negative for difficulty urinating, dysuria, flank pain, frequency and urgency.   Musculoskeletal: Positive for myalgias. Negative for arthralgias, back pain, joint swelling, neck pain and neck stiffness.   Skin: Negative for rash and wound.   Allergic/Immunologic: Negative for food allergies and immunocompromised state.   Neurological: Positive for weakness (generalized). Negative for dizziness, seizures, syncope, facial asymmetry, speech difficulty, light-headedness, numbness and headaches.   Hematological: Negative for adenopathy.   Psychiatric/Behavioral: Negative for agitation, confusion and hallucinations.     Objective:     Vital Signs (Most Recent):  Temp: 99.1 °F (37.3 °C) (12/10/20 1117)  Pulse: 95 (12/10/20 1105)  Resp: (!) 22 (12/10/20 1105)  BP: 104/63 (12/10/20 1105)  SpO2: 95 % (12/10/20 1105) Vital Signs (24h Range):  Temp:  [99.1 °F (37.3 °C)-103.2 °F (39.6 °C)] 99.1 °F (37.3 °C)  Pulse:  [] 95  Resp:  [16-24] 22  SpO2:  [92 %-98 %] 95  %  BP: (104-151)/(63-77) 104/63        There is no height or weight on file to calculate BMI.    Physical Exam  Vitals signs and nursing note reviewed.   Constitutional:       General: She is not in acute distress.     Appearance: She is well-developed. She is obese. She is not diaphoretic.   HENT:      Head: Normocephalic and atraumatic.      Nose: Nose normal.   Eyes:      General: No scleral icterus.     Conjunctiva/sclera: Conjunctivae normal.   Neck:      Musculoskeletal: Normal range of motion and neck supple.      Trachea: No tracheal deviation.   Cardiovascular:      Rate and Rhythm: Regular rhythm. Tachycardia present.      Heart sounds: Normal heart sounds. No murmur. No friction rub. No gallop.    Pulmonary:      Effort: Pulmonary effort is normal. No respiratory distress.      Breath sounds: No stridor. Rales present. No wheezing.   Chest:      Chest wall: No tenderness.   Abdominal:      General: Bowel sounds are normal. There is no distension.      Palpations: Abdomen is soft. There is no mass.      Tenderness: There is no abdominal tenderness. There is no guarding or rebound.   Musculoskeletal: Normal range of motion.         General: No tenderness or deformity.   Skin:     General: Skin is warm and dry.      Coloration: Skin is not pale.      Findings: No erythema or rash.   Neurological:      Mental Status: She is alert and oriented to person, place, and time.      Cranial Nerves: No cranial nerve deficit.      Motor: No abnormal muscle tone.      Coordination: Coordination normal.   Psychiatric:         Behavior: Behavior normal.         Thought Content: Thought content normal.             Significant Labs: All pertinent labs within the past 24 hours have been reviewed.    Significant Imaging: I have reviewed all pertinent imaging results/findings within the past 24 hours.    Assessment/Plan:     * Pneumonia due to COVID-19 virus  - COVID-19 testing   - Infection Control notified     - Isolation:    - Airborne, Contact and Droplet Precautions  - Cohort patients into COVID units  - N95 mask, wear eye protection  - 20 second hand hygiene              - Limit visitors per hospital policy              - Consolidating lab draws, nursing care, provider visits, and interventions    - Diagnostics: (leukopenia, hyponatremia, hyperferritinemia, elevated troponin, elevated d-dimer, age, and comorbidities are significant predictors of poor clinical outcome)  CBC, CMP, Ferritin, CRP, Troponin, Blood Culture x2 and Portable CXR    - Management:  Supplemental O2 to maintain SpO2 >92%  Telemetry  Continuous/intermittent Pulse Ox  Albuterol treatment PRN    Remdesivir, Dexamethasone, IV Rocephin, IV Azithromycin         Acute respiratory failure with hypoxia  Oxygen -RT to titrate as needed      ESRD (end stage renal disease) on dialysis  Nephology following   Resume HD      PAF (paroxysmal atrial fibrillation)  S/p ablation   Rate controlled  Cont Toprol and Eliquis       Chronic diastolic congestive heart failure  Compensated  Cont BB, hold Lisinopril       Type 2 diabetes mellitus, with long-term current use of insulin  Hold home insulin for now  NISS      Essential hypertension  BP low normal  Cont Toprol, hold lisinopril        VTE Risk Mitigation (From admission, onward)         Ordered     apixaban tablet 5 mg  2 times daily      12/10/20 1102     IP VTE HIGH RISK PATIENT  Once      12/10/20 1057     Place sequential compression device  Until discontinued      12/10/20 1057                   Hanna Murphy NP  Department of Hospital Medicine   [facility]

## 2020-12-10 NOTE — ED NOTES
Pharmacy dropped off pt's Remdesivir. Will place in pt's specific pixis bin until pt has completed dialysis

## 2020-12-10 NOTE — ED NOTES
The patient is resting quietly, eyes closed, arouses easily to stimuli. Airway is open and patent, AAOx3, side rails up x 2, call bell within reach. Dialysis nurse at bedside. NAD at this time. Will continue to monitor.

## 2020-12-10 NOTE — ED NOTES
Secure chat sent to hospital medicine stating pt's BP is currently 91/54 and her scheduled metoprolol was held and am waiting on further instructions from them.

## 2020-12-10 NOTE — ED PROVIDER NOTES
SCRIBE #1 NOTE: I, Emiliano Hopkins, dragan scribing for, and in the presence of, Brian Díaz Do, MD. I have scribed the entire note.      History      Chief Complaint   Patient presents with    Shortness of Breath     COVID +        Review of patient's allergies indicates:   Allergen Reactions    Morphine Itching    Sulfa (sulfonamide antibiotics) Rash        HPI   HPI    12/10/2020, 8:20 AM   History obtained from the patient      History of Present Illness: Cordell Angulo is a 55 y.o. female patient with a PMHx of CHF, ESRD who presents to the Emergency Department for SOB, onset 3 days PTA. Pt was dx with COVID-19 on 12/3/20. Pt dialyzes every Tues/Thurs/Sat; pt last dialyzed 2 days ago (Tuesday), but only had 2 hours of dialysis due to n/v. Symptoms are constant and moderate in severity. No mitigating or exacerbating factors reported. Associated sxs include fever (Tnow 103.2), n/v, and cough. Pt also notes wheezing at baseline. Patient denies any chills, CP, weakness, numbness, dizziness, headache, and all other sxs at this time. Pt presented to the ED on 12/3 and 12/7. No further complaints or concerns at this time.     Arrival mode: EMS    PCP: Chuck Grider MD       Past Medical History:  Past Medical History:   Diagnosis Date    Asthma     CHF (congestive heart failure)     CKD (chronic kidney disease) stage 5, GFR less than 15 ml/min     Depression     Diabetes mellitus     Dialysis patient     Hypercholesterolemia     Hypertension     PAF (paroxysmal atrial fibrillation)        Past Surgical History:  Past Surgical History:   Procedure Laterality Date    AV FISTULA PLACEMENT      CARDIAC ELECTROPHYSIOLOGY MAPPING AND ABLATION      CATHETERIZATION OF BOTH LEFT AND RIGHT HEART N/A 12/28/2018    Procedure: CATHETERIZATION, HEART, BOTH LEFT AND RIGHT;  Surgeon: Enrique Jj MD;  Location: [facility] CATH LAB;  Service: Cardiology;  Laterality: N/A;  To follow his cardioversion case in [facility]      SECTION      TUMOR REMOVAL      L lung         Family History:  Family History   Problem Relation Age of Onset    Heart disease Mother     Diabetes Father     Stroke Father     Heart attack Sister        Social History:  Social History     Tobacco Use    Smoking status: Never Smoker    Smokeless tobacco: Never Used   Substance and Sexual Activity    Alcohol use: No    Drug use: No    Sexual activity: Not Currently       ROS   Review of Systems   Constitutional: Positive for fever (Tnow 103.2). Negative for chills.   HENT: Negative for sore throat.    Respiratory: Positive for cough, shortness of breath and wheezing (at baseline).    Cardiovascular: Negative for chest pain.   Gastrointestinal: Positive for nausea and vomiting. Negative for diarrhea.   Genitourinary: Negative for dysuria.   Musculoskeletal: Negative for back pain.   Skin: Negative for rash.   Neurological: Negative for dizziness, seizures, weakness, light-headedness, numbness and headaches.   Hematological: Does not bruise/bleed easily.   All other systems reviewed and are negative.    Physical Exam      Initial Vitals   BP Pulse Resp Temp SpO2   12/10/20 0808 12/10/20 0808 12/10/20 0808 12/10/20 0825 12/10/20 0808   128/67 (!) 116 20 (!) 103.2 °F (39.6 °C) 96 %      MAP       --                 Physical Exam  Nursing Notes and Vital Signs Reviewed.  Constitutional: Patient is in mild distress. Well-developed and well-nourished.  Head: Atraumatic. Normocephalic.  Eyes: PERRL. EOM intact. Conjunctivae are not pale. No scleral icterus.  ENT: Mucous membranes are moist. Oropharynx is clear and symmetric.    Neck: Supple. Full ROM. No lymphadenopathy.  Cardiovascular: Regular rate. Regular rhythm. No murmurs, rubs, or gallops. Distal pulses are 2+ and symmetric.  Pulmonary/Chest: Respiratory distress. Nonproductive cough noted during exam. Rales. On 4L NC.  Abdominal: Soft and non-distended.  There is no tenderness.  No rebound,  guarding, or rigidity.   Musculoskeletal: Moves all extremities. No obvious deformities. No edema.  Skin: Warm and dry.  Neurological:  Alert, awake, and appropriate.  Normal speech.  No acute focal neurological deficits are appreciated.  Psychiatric: Normal affect. Good eye contact. Appropriate in content.    ED Course    Critical Care    Date/Time: 12/10/2020 10:27 AM  Performed by: Brian Díaz Do, MD  Authorized by: Brian Díaz Do, MD   Direct patient critical care time: 10 minutes  Additional history critical care time: 5 minutes  Ordering / reviewing critical care time: 10 minutes  Documentation critical care time: 10 minutes  Consulting other physicians critical care time: 5 minutes  Total critical care time (exclusive of procedural time) : 40 minutes  Critical care time was exclusive of separately billable procedures and treating other patients and teaching time.  Critical care was necessary to treat or prevent imminent or life-threatening deterioration of the following conditions: Pneumonia, acute pulmonary edema.  Critical care was time spent personally by me on the following activities: blood draw for specimens, development of treatment plan with patient or surrogate, discussions with consultants, interpretation of cardiac output measurements, evaluation of patient's response to treatment, examination of patient, obtaining history from patient or surrogate, ordering and performing treatments and interventions, ordering and review of laboratory studies, ordering and review of radiographic studies, pulse oximetry, re-evaluation of patient's condition and review of old charts.        ED Vital Signs:  Vitals:    12/10/20 0842 12/10/20 0856 12/10/20 0910 12/10/20 0930   BP:   (!) 151/77 112/65   Pulse: (!) 115  (!) 115 (!) 116   Resp:   16 (!) 24   Temp:  (!) 103.2 °F (39.6 °C)     TempSrc:       SpO2: 98%  (!) 93% (!) 93%    12/10/20 1000 12/10/20 1008 12/10/20 1105 12/10/20 1117   BP: 109/68  104/63     Pulse: 109  95    Resp: 20  (!) 22    Temp:  (!) 101.8 °F (38.8 °C)  99.1 °F (37.3 °C)   TempSrc:  Oral  Oral   SpO2: (!) 92%  95%     12/10/20 1130 12/10/20 1200 12/10/20 1210 12/10/20 1230   BP: 109/65 (!) 91/54 117/64 112/63   Pulse: 89 86 86 82   Resp: 18 (!) 23 (!) 24 (!) 22   Temp:       TempSrc:       SpO2: 96% 97% 98% 97%    12/10/20 1300 12/10/20 1330 12/10/20 1400   BP: 121/66 122/67 124/69   Pulse: 79 78 75   Resp: (!) 23 (!) 23 20   Temp:      TempSrc:      SpO2: 100% 98% 100%       Abnormal Lab Results:  Labs Reviewed   CBC W/ AUTO DIFFERENTIAL - Abnormal; Notable for the following components:       Result Value    Hemoglobin 10.7 (*)     Hematocrit 35.6 (*)     MCH 26.6 (*)     MCHC 30.1 (*)     RDW 15.1 (*)     MPV 9.1 (*)     Immature Granulocytes 1.2 (*)     Immature Grans (Abs) 0.10 (*)     Lymph # 0.5 (*)     Mono # 0.2 (*)     Gran % 89.1 (*)     Lymph % 6.1 (*)     Mono % 2.9 (*)     All other components within normal limits   COMPREHENSIVE METABOLIC PANEL - Abnormal; Notable for the following components:    Chloride 93 (*)     BUN 33 (*)     Creatinine 12.2 (*)     Calcium 7.5 (*)     Albumin 2.5 (*)     Anion Gap 17 (*)     eGFR if  4 (*)     eGFR if non  3 (*)     All other components within normal limits   APTT - Abnormal; Notable for the following components:    aPTT 36.7 (*)     All other components within normal limits   B-TYPE NATRIURETIC PEPTIDE - Abnormal; Notable for the following components:     (*)     All other components within normal limits   TROPONIN I - Abnormal; Notable for the following components:    Troponin I 0.280 (*)     All other components within normal limits   PROCALCITONIN - Abnormal; Notable for the following components:    Procalcitonin 3.56 (*)     All other components within normal limits   SARS-COV-2 RNA AMPLIFICATION, QUAL - Abnormal; Notable for the following components:    SARS-CoV-2 RNA, Amplification, Qual Positive  (*)     All other components within normal limits    Narrative:     [id number]   TROPONIN I - Abnormal; Notable for the following components:    Troponin I 0.280 (*)     All other components within normal limits    Narrative:     STAT, if not done in ED, then at 2nd and 6th hour from  initial draw.   ISTAT PROCEDURE - Abnormal; Notable for the following components:    POC PCO2 46.1 (*)     POC PO2 69 (*)     POC HCO3 31.2 (*)     POC SATURATED O2 94 (*)     All other components within normal limits   CULTURE, BLOOD   CULTURE, BLOOD   LACTIC ACID, PLASMA   PROTIME-INR   LACTIC ACID, PLASMA    Narrative:     STAT, if not done in ED, then at 2nd and 6th hour from  initial draw.   C-REACTIVE PROTEIN   CK   D DIMER, QUANTITATIVE   FERRITIN   LACTATE DEHYDROGENASE   SEDIMENTATION RATE   URINALYSIS, REFLEX TO URINE CULTURE   HEMOGLOBIN A1C   POCT GLUCOSE MONITORING CONTINUOUS        All Lab Results:  Results for orders placed or performed during the hospital encounter of 12/10/20   CBC auto differential   Result Value Ref Range    WBC 8.02 3.90 - 12.70 K/uL    RBC 4.02 4.00 - 5.40 M/uL    Hemoglobin 10.7 (L) 12.0 - 16.0 g/dL    Hematocrit 35.6 (L) 37.0 - 48.5 %    MCV 89 82 - 98 fL    MCH 26.6 (L) 27.0 - 31.0 pg    MCHC 30.1 (L) 32.0 - 36.0 g/dL    RDW 15.1 (H) 11.5 - 14.5 %    Platelets 240 150 - 350 K/uL    MPV 9.1 (L) 9.2 - 12.9 fL    Immature Granulocytes 1.2 (H) 0.0 - 0.5 %    Gran # (ANC) 7.1 1.8 - 7.7 K/uL    Immature Grans (Abs) 0.10 (H) 0.00 - 0.04 K/uL    Lymph # 0.5 (L) 1.0 - 4.8 K/uL    Mono # 0.2 (L) 0.3 - 1.0 K/uL    Eos # 0.0 0.0 - 0.5 K/uL    Baso # 0.02 0.00 - 0.20 K/uL    nRBC 0 0 /100 WBC    Gran % 89.1 (H) 38.0 - 73.0 %    Lymph % 6.1 (L) 18.0 - 48.0 %    Mono % 2.9 (L) 4.0 - 15.0 %    Eosinophil % 0.5 0.0 - 8.0 %    Basophil % 0.2 0.0 - 1.9 %    Differential Method Automated    Comprehensive metabolic panel   Result Value Ref Range    Sodium 138 136 - 145 mmol/L    Potassium 5.0 3.5 - 5.1 mmol/L     Chloride 93 (L) 95 - 110 mmol/L    CO2 28 23 - 29 mmol/L    Glucose 105 70 - 110 mg/dL    BUN 33 (H) 6 - 20 mg/dL    Creatinine 12.2 (H) 0.5 - 1.4 mg/dL    Calcium 7.5 (L) 8.7 - 10.5 mg/dL    Total Protein 7.2 6.0 - 8.4 g/dL    Albumin 2.5 (L) 3.5 - 5.2 g/dL    Total Bilirubin 0.6 0.1 - 1.0 mg/dL    Alkaline Phosphatase 93 55 - 135 U/L    AST 34 10 - 40 U/L    ALT 15 10 - 44 U/L    Anion Gap 17 (H) 8 - 16 mmol/L    eGFR if African American 4 (A) >60 mL/min/1.73 m^2    eGFR if non African American 3 (A) >60 mL/min/1.73 m^2   Lactic acid, plasma #1   Result Value Ref Range    Lactate (Lactic Acid) 1.7 0.5 - 2.2 mmol/L   APTT   Result Value Ref Range    aPTT 36.7 (H) 21.0 - 32.0 sec   Protime-INR   Result Value Ref Range    Prothrombin Time 11.4 9.0 - 12.5 sec    INR 1.1 0.8 - 1.2   Brain natriuretic peptide   Result Value Ref Range     (H) 0 - 99 pg/mL   Troponin I   Result Value Ref Range    Troponin I 0.280 (H) 0.000 - 0.026 ng/mL   Procalcitonin   Result Value Ref Range    Procalcitonin 3.56 (H) <0.25 ng/mL   COVID-19 Rapid Screening   Result Value Ref Range    SARS-CoV-2 RNA, Amplification, Qual Positive (A) Negative   Lactic acid, plasma #2   Result Value Ref Range    Lactate (Lactic Acid) 1.0 0.5 - 2.2 mmol/L   Troponin I   Result Value Ref Range    Troponin I 0.280 (H) 0.000 - 0.026 ng/mL   ISTAT PROCEDURE   Result Value Ref Range    POC PH 7.438 7.35 - 7.45    POC PCO2 46.1 (H) 35 - 45 mmHg    POC PO2 69 (L) 80 - 100 mmHg    POC HCO3 31.2 (H) 24 - 28 mmol/L    POC BE 7 -2 to 2 mmol/L    POC SATURATED O2 94 (L) 95 - 100 %    Sample ARTERIAL     Site RR     Allens Test Pass     DelSys Nasal Can     Mode SPONT     Flow 3     FiO2 32      Imaging Results:  Imaging Results          X-Ray Chest AP Portable (Final result)  Result time 12/10/20 09:21:46    Final result by Kvng Banks MD (12/10/20 09:21:46)                 Impression:      1.  Progressive patchy ground-glass/alveolar opacities throughout the  lungs concerning for worsening pneumonia, such as community-acquired pneumonia or atypical viral pneumonia.      Electronically signed by: Kvng Banks MD  Date:    12/10/2020  Time:    09:21             Narrative:    EXAMINATION:  XR CHEST AP PORTABLE    CLINICAL HISTORY:  Sepsis;    COMPARISON:  December 7, 2020    FINDINGS:  EKG leads overlie the chest.  Worsening patchy ground-glass/alveolar opacities throughout the lungs.  The hilar and mediastinal contours and osseous structures are unchanged.  There remains to be cardiac silhouette size enlargement, ectatic and tortuous aorta with calcifications of the aortic knob and degenerative changes of the spine as well as the left greater than right shoulder girdles.                               The EKG was ordered, reviewed, and independently interpreted by the ED provider.  Interpretation time: 08:42  Rate: 119 BPM  Rhythm: sinus tachycardia  Interpretation: LVH. Nonspecific T wave abnormality. No STEMI.           The Emergency Provider reviewed the vital signs and test results, which are outlined above.    ED Discussion     8:30 AM: Discussed pt's case with Dr. Pina (Nephrology), who evaluated the pt at bedside and will arrange for dialysis.    10:40 AM: Discussed case with Irqa Levi NP (Hospital Medicine). Dr. Hermosillo agrees with current care and management of pt and accepts admission.   Admitting Service: Hospital Medicine  Admitting Physician: Dr. Hermosillo  Admit to: Inpatient Tele    10:41 AM: Re-evaluated pt. I have discussed test results, shared treatment plan, and the need for admission with patient at bedside. Pt expresses understanding at this time and agrees with all information. All questions answered. Pt has no further questions or concerns at this time. Pt is ready for admit.           ED Medication(s):  Medications   sodium chloride 0.9% flush 10 mL (has no administration in time range)   remdesivir 200 mg in sodium chloride 0.9% 250 mL infusion  (has no administration in time range)   remdesivir 100 mg in sodium chloride 0.9% 250 mL infusion (has no administration in time range)   glucose chewable tablet 16 g (has no administration in time range)   glucose chewable tablet 24 g (has no administration in time range)   dextrose 50% injection 12.5 g (has no administration in time range)   dextrose 50% injection 25 g (has no administration in time range)   glucagon (human recombinant) injection 1 mg (has no administration in time range)   dexAMETHasone tablet 6 mg (6 mg Oral Given 12/10/20 1158)   ondansetron injection 4 mg (has no administration in time range)   ascorbic acid (vitamin C) tablet 500 mg (500 mg Oral Given 12/10/20 1159)   vitamin D 1000 units tablet 1,000 Units (1,000 Units Oral Given 12/10/20 1200)   multivitamin tablet (1 tablet Oral Given 12/10/20 1159)   acetaminophen tablet 650 mg (has no administration in time range)   azithromycin 500 mg in dextrose 5 % 250 mL IVPB (ready to mix system) (has no administration in time range)   cefTRIAXone (ROCEPHIN) 1 g/50 mL D5W IVPB (has no administration in time range)   aspirin EC tablet 81 mg (81 mg Oral Given 12/10/20 1200)   citalopram tablet 20 mg (20 mg Oral Given 12/10/20 1200)   apixaban tablet 5 mg (5 mg Oral Given 12/10/20 1159)   metoprolol succinate (TOPROL-XL) 24 hr tablet 25 mg (0 mg Oral Hold 12/10/20 1159)   pantoprazole EC tablet 40 mg (40 mg Oral Given 12/10/20 1200)   pravastatin tablet 40 mg (0 mg Oral Hold 12/10/20 1215)   sevelamer carbonate tablet 800 mg (800 mg Oral Given 12/10/20 1159)   vitamin renal formula (B-complex-vitamin c-folic acid) 1 mg per capsule 1 capsule (0 capsules Oral Hold 12/10/20 1215)   sucroferric oxyhydroxide Chew 500 mg (500 mg Oral Not Given 12/10/20 1215)   ipratropium-albuteroL inhaler 1 puff (has no administration in time range)   mupirocin 2 % ointment ( Nasal Not Given 12/10/20 1230)   insulin aspart U-100 pen 0-5 Units (has no administration in time  range)   cefTRIAXone (ROCEPHIN) 1 g/50 mL D5W IVPB (0 g Intravenous Stopped 12/10/20 0919)   azithromycin 500 mg in dextrose 5 % 250 mL IVPB (ready to mix system) (0 mg Intravenous Stopped 12/10/20 1001)   promethazine (PHENERGAN) 12.5 mg in dextrose 5 % 50 mL IVPB (0 mg Intravenous Stopped 12/10/20 0858)   dexamethasone injection 6 mg (6 mg Intravenous Given 12/10/20 0846)   acetaminophen tablet 1,000 mg (1,000 mg Oral Given 12/10/20 0840)   ibuprofen tablet 800 mg (800 mg Oral Given 12/10/20 0856)     New Prescriptions    No medications on file           Medical Decision Making    Medical Decision Making:   Clinical Tests:   Lab Tests: Ordered and Reviewed  Radiological Study: Ordered and Reviewed  Medical Tests: Ordered and Reviewed           Scribe Attestation:   Scribe #1: I performed the above scribed service and the documentation accurately describes the services I performed. I attest to the accuracy of the note.    Attending:   Physician Attestation Statement for Scribe #1: I, Brian Díaz Do, MD, personally performed the services described in this documentation, as scribed by Emiliano Hopkins, in my presence, and it is both accurate and complete.          Clinical Impression       ICD-10-CM ICD-9-CM   1. COVID-19  U07.1 079.89   2. Tachycardia  R00.0 785.0   3. Pneumonia of both lower lobes due to infectious organism  J18.9 486   4. Acute pulmonary edema  J81.0 518.4       Disposition:   Disposition: Admitted  Condition: Fair         Brian Díaz Do, MD  12/10/20 [id number]

## 2020-12-10 NOTE — ED NOTES
Patient placed in a gown and  on continuous cardiac monitor, automatic blood pressure cuff and continuous pulse oximeter.

## 2020-12-10 NOTE — HPI
Patient is a 55-year-old female with ESRD on hemodialysis on Monday-Wed- Friday schedule in [facility] under my care.  Patient has recently become COVID-19 positive.  Patient since then has been [facility] on a [id number] schedule.  Last dialysis was short 2 hours  Patient comes to the hospital in the emergency room with short worsening shortness of breath.  Patient seen and evaluated at bedside.

## 2020-12-10 NOTE — ED NOTES
Pt in NAD, VSS, Resp e/u. Stretcher locked in lowest position. Side rails up x2. Call bell within reach. Dialysis nurse at bedside. Will continue to monitor.

## 2020-12-10 NOTE — SUBJECTIVE & OBJECTIVE
Past Medical History:   Diagnosis Date    Asthma     CHF (congestive heart failure)     CKD (chronic kidney disease) stage 5, GFR less than 15 ml/min     Depression     Diabetes mellitus     Dialysis patient     Hypercholesterolemia     Hypertension     PAF (paroxysmal atrial fibrillation)        Past Surgical History:   Procedure Laterality Date    AV FISTULA PLACEMENT      CARDIAC ELECTROPHYSIOLOGY MAPPING AND ABLATION      CATHETERIZATION OF BOTH LEFT AND RIGHT HEART N/A 2018    Procedure: CATHETERIZATION, HEART, BOTH LEFT AND RIGHT;  Surgeon: Enrique Jj MD;  Location: [facility] CATH LAB;  Service: Cardiology;  Laterality: N/A;  To follow his cardioversion case in [facility]     SECTION      TUMOR REMOVAL      L lung       Review of patient's allergies indicates:   Allergen Reactions    Morphine Itching    Sulfa (sulfonamide antibiotics) Rash     Current Facility-Administered Medications   Medication Frequency    acetaminophen tablet 650 mg Q8H PRN    apixaban tablet 5 mg BID    ascorbic acid (vitamin C) tablet 500 mg BID    aspirin EC tablet 81 mg Daily    [START ON 2020] azithromycin 500 mg in dextrose 5 % 250 mL IVPB (ready to mix system) Q24H    [START ON 2020] cefTRIAXone (ROCEPHIN) 1 g/50 mL D5W IVPB Q24H    citalopram tablet 20 mg Daily    dexAMETHasone tablet 6 mg Daily    dextrose 50% injection 12.5 g PRN    dextrose 50% injection 25 g PRN    glucagon (human recombinant) injection 1 mg PRN    glucose chewable tablet 16 g PRN    glucose chewable tablet 24 g PRN    ipratropium-albuteroL inhaler 1 puff Q6H PRN    metoprolol succinate (TOPROL-XL) 24 hr tablet 25 mg Daily    multivitamin tablet Daily    ondansetron injection 4 mg Q8H PRN    pantoprazole EC tablet 40 mg Daily    pravastatin tablet 40 mg Daily    [START ON 2020] remdesivir 100 mg in sodium chloride 0.9% 250 mL infusion Q24H    remdesivir 200 mg in sodium chloride 0.9% 250 mL  infusion Q24H    sevelamer carbonate tablet 800 mg TID WM    sodium chloride 0.9% flush 10 mL PRN    sucroferric oxyhydroxide Chew 500 mg TID WM    vitamin D 1000 units tablet 1,000 Units Daily    vitamin renal formula (B-complex-vitamin c-folic acid) 1 mg per capsule 1 capsule Daily     Current Outpatient Medications   Medication    ALPRAZolam (XANAX) 0.5 MG tablet    benzocaine 10 % Oint    ELIQUIS 5 mg Tab    ferric citrate (AURYXIA ORAL)    insulin (BASAGLAR KWIKPEN U-100 INSULIN) glargine 100 units/mL (3mL) SubQ pen    lisinopril (PRINIVIL,ZESTRIL) 40 MG tablet    pantoprazole (PROTONIX) 40 MG tablet    pravastatin (PRAVACHOL) 40 MG tablet    sevelamer carbonate (RENVELA) 800 mg Tab    sucralfate (CARAFATE) 100 mg/mL suspension    VELPHORO 500 mg Chew    vitamin renal formula, B-complex-vitamin c-folic acid, (NEPHROCAP) 1 mg Cap    vitamin renal formula, B-complex-vitamin c-folic acid, (NEPHROCAP) 1 mg Cap    albuterol (PROVENTIL/VENTOLIN HFA) 90 mcg/actuation inhaler    aspirin (ECOTRIN) 81 MG EC tablet    citalopram (CELEXA) 20 MG tablet    metoprolol succinate (TOPROL-XL) 25 MG 24 hr tablet     Family History     Problem Relation (Age of Onset)    Diabetes Father    Heart disease Mother        Tobacco Use    Smoking status: Never Smoker    Smokeless tobacco: Never Used   Substance and Sexual Activity    Alcohol use: No    Drug use: No    Sexual activity: Not Currently     Review of Systems   Constitutional: Positive for activity change, appetite change, chills, diaphoresis, fatigue, fever and unexpected weight change.   HENT: Negative for congestion, dental problem, drooling, postnasal drip, rhinorrhea and voice change.    Eyes: Negative for discharge.   Respiratory: Positive for cough, choking, chest tightness, shortness of breath and wheezing. Negative for apnea and stridor.    Cardiovascular: Negative for chest pain, palpitations and leg swelling.   Gastrointestinal: Negative  for abdominal distention, blood in stool, constipation, diarrhea, nausea, rectal pain and vomiting.   Endocrine: Negative for cold intolerance, heat intolerance, polydipsia and polyuria.   Genitourinary: Negative for decreased urine volume, difficulty urinating, dysuria, enuresis, flank pain, frequency, hematuria and urgency.   Musculoskeletal: Negative for arthralgias, back pain, gait problem and joint swelling.   Skin: Negative for rash.   Allergic/Immunologic: Negative for food allergies and immunocompromised state.   Neurological: Negative for dizziness, tremors, syncope, numbness and headaches.   Hematological: Does not bruise/bleed easily.   Psychiatric/Behavioral: Negative for agitation, behavioral problems and self-injury. The patient is not nervous/anxious and is not hyperactive.    All other systems reviewed and are negative.    Objective:     Vital Signs (Most Recent):  Temp: 99.1 °F (37.3 °C) (12/10/20 1117)  Pulse: 95 (12/10/20 1105)  Resp: (!) 22 (12/10/20 1105)  BP: 104/63 (12/10/20 1105)  SpO2: 95 % (12/10/20 1105)  O2 Device (Oxygen Therapy): nasal cannula (12/10/20 1105) Vital Signs (24h Range):  Temp:  [99.1 °F (37.3 °C)-103.2 °F (39.6 °C)] 99.1 °F (37.3 °C)  Pulse:  [] 95  Resp:  [16-24] 22  SpO2:  [92 %-98 %] 95 %  BP: (104-151)/(63-77) 104/63        There is no height or weight on file to calculate BMI.  There is no height or weight on file to calculate BSA.    No intake/output data recorded.    Physical Exam  Vitals signs and nursing note reviewed. Exam conducted with a chaperone present.   Constitutional:       General: She is in acute distress.      Appearance: She is well-developed. She is ill-appearing, toxic-appearing and diaphoretic.   HENT:      Head: Normocephalic and atraumatic.   Eyes:      Conjunctiva/sclera: Conjunctivae normal.      Pupils: Pupils are equal, round, and reactive to light.   Neck:      Musculoskeletal: Full passive range of motion without pain, normal range of  motion and neck supple. No edema.      Thyroid: No thyroid mass or thyromegaly.      Vascular: No carotid bruit.   Cardiovascular:      Rate and Rhythm: Normal rate and regular rhythm.  No extrasystoles are present.     Chest Wall: PMI is displaced.      Pulses: Normal pulses.      Heart sounds: S1 normal and S2 normal. No murmur. No friction rub.      Comments: RV heave loud P2   Pulmonary:      Effort: Respiratory distress present. No accessory muscle usage.      Breath sounds: Wheezing, rhonchi and rales present.   Chest:      Chest wall: No tenderness.   Abdominal:      General: Bowel sounds are normal. There is no distension.      Palpations: Abdomen is soft. There is no mass.      Tenderness: There is no abdominal tenderness. There is no rebound.      Hernia: No hernia is present.   Musculoskeletal: Normal range of motion.         General: No tenderness.      Comments: Left upper arm fistula is positive for bruit and thrill   Skin:     General: Skin is warm.      Coloration: Skin is not pale.      Findings: No bruising, ecchymosis, erythema or rash.      Nails: There is no clubbing.     Neurological:      Mental Status: She is alert and oriented to person, place, and time.      Cranial Nerves: No cranial nerve deficit.      Sensory: No sensory deficit.      Motor: No abnormal muscle tone.      Coordination: Coordination normal.      Deep Tendon Reflexes: Reflexes are normal and symmetric.   Psychiatric:         Speech: Speech normal.         Behavior: Behavior normal.         Thought Content: Thought content normal.         Judgment: Judgment normal.         Significant Labs:  CBC:   Recent Labs   Lab 12/10/20  0837   WBC 8.02   RBC 4.02   HGB 10.7*   HCT 35.6*      MCV 89   MCH 26.6*   MCHC 30.1*     CMP:   Recent Labs   Lab 12/10/20  0837      CALCIUM 7.5*   ALBUMIN 2.5*   PROT 7.2      K 5.0   CO2 28   CL 93*   BUN 33*   CREATININE 12.2*   ALKPHOS 93   ALT 15   AST 34   BILITOT 0.6      All labs within the past 24 hours have been reviewed.    Significant Imaging:  Labs: Reviewed  X-Ray: Reviewed

## 2020-12-10 NOTE — HPI
The patient is a 54 yo female with ESRD -on HD, Asthma, PAF, HTN, DM, HLD, depression, and morbid obesity -BMI 40 who presented to ED with worsening COVID-19 symptoms. Pt tested positive for COVID-19 on 12/3/20. Since then, the pt reports worsening Fever, SOB, cough, generalized weakness and fatigue,N/V, and loss of taste and smell. Pt reports she was unable to walk but a few steps.   In the ED, Temp 103F, O2sats 90%, CXR showed progressive bilateral ground glass opacities, Trop 0.2, EKG showed no change from previous. procalcitonin 3.56 (dialysis pt)

## 2020-12-10 NOTE — SUBJECTIVE & OBJECTIVE
Past Medical History:   Diagnosis Date    Asthma     CHF (congestive heart failure)     CKD (chronic kidney disease) stage 5, GFR less than 15 ml/min     Depression     Diabetes mellitus     Dialysis patient     Hypercholesterolemia     Hypertension     PAF (paroxysmal atrial fibrillation)        Past Surgical History:   Procedure Laterality Date    AV FISTULA PLACEMENT      CARDIAC ELECTROPHYSIOLOGY MAPPING AND ABLATION      CATHETERIZATION OF BOTH LEFT AND RIGHT HEART N/A 2018    Procedure: CATHETERIZATION, HEART, BOTH LEFT AND RIGHT;  Surgeon: Enrique Jj MD;  Location: [facility] CATH LAB;  Service: Cardiology;  Laterality: N/A;  To follow his cardioversion case in [facility]     SECTION      TUMOR REMOVAL      L lung       Review of patient's allergies indicates:   Allergen Reactions    Morphine Itching    Sulfa (sulfonamide antibiotics) Rash       No current facility-administered medications on file prior to encounter.      Current Outpatient Medications on File Prior to Encounter   Medication Sig    ALPRAZolam (XANAX) 0.5 MG tablet Take 0.5 mg by mouth.    benzocaine 10 % Oint Apply 1 application topically 3 (three) times daily as needed. Apply to left ear for pain    ELIQUIS 5 mg Tab TK 1 T PO BID    ferric citrate (AURYXIA ORAL) Take by mouth. 2 tabs with every meal    insulin (BASAGLAR KWIKPEN U-100 INSULIN) glargine 100 units/mL (3mL) SubQ pen Inject 15 Units into the skin.    lisinopril (PRINIVIL,ZESTRIL) 40 MG tablet Take 40 mg by mouth once daily.    pantoprazole (PROTONIX) 40 MG tablet Take 40 mg by mouth.    pravastatin (PRAVACHOL) 40 MG tablet Take 40 mg by mouth once daily.    sevelamer carbonate (RENVELA) 800 mg Tab TK 2 TS PO TID WITH MEALS    sucralfate (CARAFATE) 100 mg/mL suspension SHAKE LIQUID AND TAKE 10 ML BY MOUTH FOUR TIMES DAILY    VELPHORO 500 mg Chew CHEW & SWALLOW 1 TABLET BY MOUTH THREE TIMES A DAY WITH MEALS AND 1 TABLET TWO TIMES A DAY WITH  SNACKS    vitamin renal formula, B-complex-vitamin c-folic acid, (NEPHROCAP) 1 mg Cap Take 1 capsule by mouth once daily.    vitamin renal formula, B-complex-vitamin c-folic acid, (NEPHROCAP) 1 mg Cap Take 1 capsule by mouth.    albuterol (PROVENTIL/VENTOLIN HFA) 90 mcg/actuation inhaler Inhale 1-2 puffs into the lungs every 6 (six) hours as needed for Wheezing.    aspirin (ECOTRIN) 81 MG EC tablet Take 1 tablet (81 mg total) by mouth once daily.    citalopram (CELEXA) 20 MG tablet Take 1 tablet (20 mg total) by mouth once daily.    metoprolol succinate (TOPROL-XL) 25 MG 24 hr tablet Take 1 tablet (25 mg total) by mouth once daily.    [DISCONTINUED] diazePAM (VALIUM) 5 MG tablet Take 2 tablets (10 mg total) by mouth nightly as needed for Anxiety or Insomnia. One to 2 tablets as needed for sleep and anxiety     Family History     Problem Relation (Age of Onset)    Diabetes Father    Heart disease Mother        Tobacco Use    Smoking status: Never Smoker    Smokeless tobacco: Never Used   Substance and Sexual Activity    Alcohol use: No    Drug use: No    Sexual activity: Not Currently     Review of Systems   Constitutional: Positive for activity change, appetite change, chills, diaphoresis, fatigue and fever. Negative for unexpected weight change.   HENT: Positive for congestion. Negative for nosebleeds, sinus pressure and sore throat.         Loss of taste and smell    Eyes: Negative for pain, discharge and visual disturbance.   Respiratory: Positive for cough, shortness of breath and wheezing. Negative for chest tightness and stridor.    Cardiovascular: Negative for chest pain, palpitations and leg swelling.   Gastrointestinal: Positive for nausea and vomiting. Negative for abdominal distention, abdominal pain, blood in stool, constipation and diarrhea.   Endocrine: Negative for cold intolerance and heat intolerance.   Genitourinary: Negative for difficulty urinating, dysuria, flank pain, frequency and  urgency.   Musculoskeletal: Positive for myalgias. Negative for arthralgias, back pain, joint swelling, neck pain and neck stiffness.   Skin: Negative for rash and wound.   Allergic/Immunologic: Negative for food allergies and immunocompromised state.   Neurological: Positive for weakness (generalized). Negative for dizziness, seizures, syncope, facial asymmetry, speech difficulty, light-headedness, numbness and headaches.   Hematological: Negative for adenopathy.   Psychiatric/Behavioral: Negative for agitation, confusion and hallucinations.     Objective:     Vital Signs (Most Recent):  Temp: 99.1 °F (37.3 °C) (12/10/20 1117)  Pulse: 95 (12/10/20 1105)  Resp: (!) 22 (12/10/20 1105)  BP: 104/63 (12/10/20 1105)  SpO2: 95 % (12/10/20 1105) Vital Signs (24h Range):  Temp:  [99.1 °F (37.3 °C)-103.2 °F (39.6 °C)] 99.1 °F (37.3 °C)  Pulse:  [] 95  Resp:  [16-24] 22  SpO2:  [92 %-98 %] 95 %  BP: (104-151)/(63-77) 104/63        There is no height or weight on file to calculate BMI.    Physical Exam  Vitals signs and nursing note reviewed.   Constitutional:       General: She is not in acute distress.     Appearance: She is well-developed. She is obese. She is not diaphoretic.   HENT:      Head: Normocephalic and atraumatic.      Nose: Nose normal.   Eyes:      General: No scleral icterus.     Conjunctiva/sclera: Conjunctivae normal.   Neck:      Musculoskeletal: Normal range of motion and neck supple.      Trachea: No tracheal deviation.   Cardiovascular:      Rate and Rhythm: Regular rhythm. Tachycardia present.      Heart sounds: Normal heart sounds. No murmur. No friction rub. No gallop.    Pulmonary:      Effort: Pulmonary effort is normal. No respiratory distress.      Breath sounds: No stridor. Rales present. No wheezing.   Chest:      Chest wall: No tenderness.   Abdominal:      General: Bowel sounds are normal. There is no distension.      Palpations: Abdomen is soft. There is no mass.      Tenderness: There  is no abdominal tenderness. There is no guarding or rebound.   Musculoskeletal: Normal range of motion.         General: No tenderness or deformity.   Skin:     General: Skin is warm and dry.      Coloration: Skin is not pale.      Findings: No erythema or rash.   Neurological:      Mental Status: She is alert and oriented to person, place, and time.      Cranial Nerves: No cranial nerve deficit.      Motor: No abnormal muscle tone.      Coordination: Coordination normal.   Psychiatric:         Behavior: Behavior normal.         Thought Content: Thought content normal.             Significant Labs: All pertinent labs within the past 24 hours have been reviewed.    Significant Imaging: I have reviewed all pertinent imaging results/findings within the past 24 hours.

## 2020-12-10 NOTE — CONSULTS
[facility] -   Nephrology  Consult Note        Patient Name: Cordell Angulo  MRN: [id number]  Admission Date: 12/10/2020  Hospital Length of Stay: 0 days  Attending Provider: Brian Díaz Do, MD   Primary Care Physician: Chuck Grider MD  Principal Problem:<principal problem not specified>    Consults  Subjective:     HPI: Patient is a 55-year-old female with ESRD on hemodialysis on Monday- schedule in [facility] under my care.  Patient has recently become COVID-19 positive.  Patient since then has been moved to the [facility] unit on a [facility] schedule.  Last dialysis was short 2 hours  Patient comes to the hospital in the emergency room with short worsening shortness of breath.  Patient seen and evaluated at bedside.         Past Medical History:   Diagnosis Date    Asthma     CHF (congestive heart failure)     CKD (chronic kidney disease) stage 5, GFR less than 15 ml/min     Depression     Diabetes mellitus     Dialysis patient     Hypercholesterolemia     Hypertension     PAF (paroxysmal atrial fibrillation)        Past Surgical History:   Procedure Laterality Date    AV FISTULA PLACEMENT      CARDIAC ELECTROPHYSIOLOGY MAPPING AND ABLATION      CATHETERIZATION OF BOTH LEFT AND RIGHT HEART N/A 2018    Procedure: CATHETERIZATION, HEART, BOTH LEFT AND RIGHT;  Surgeon: Enrique Jj MD;  Location: [facility] CATH LAB;  Service: Cardiology;  Laterality: N/A;  To follow his cardioversion case in [facility]     SECTION      TUMOR REMOVAL      L lung       Review of patient's allergies indicates:   Allergen Reactions    Morphine Itching    Sulfa (sulfonamide antibiotics) Rash     Current Facility-Administered Medications   Medication Frequency    acetaminophen tablet 650 mg Q8H PRN    apixaban tablet 5 mg BID    ascorbic acid (vitamin C) tablet 500 mg BID    aspirin EC tablet 81 mg Daily    [START ON 2020] azithromycin 500 mg in  dextrose 5 % 250 mL IVPB (ready to mix system) Q24H    [START ON 12/11/2020] cefTRIAXone (ROCEPHIN) 1 g/50 mL D5W IVPB Q24H    citalopram tablet 20 mg Daily    dexAMETHasone tablet 6 mg Daily    dextrose 50% injection 12.5 g PRN    dextrose 50% injection 25 g PRN    glucagon (human recombinant) injection 1 mg PRN    glucose chewable tablet 16 g PRN    glucose chewable tablet 24 g PRN    ipratropium-albuteroL inhaler 1 puff Q6H PRN    metoprolol succinate (TOPROL-XL) 24 hr tablet 25 mg Daily    multivitamin tablet Daily    ondansetron injection 4 mg Q8H PRN    pantoprazole EC tablet 40 mg Daily    pravastatin tablet 40 mg Daily    [START ON 12/11/2020] remdesivir 100 mg in sodium chloride 0.9% 250 mL infusion Q24H    remdesivir 200 mg in sodium chloride 0.9% 250 mL infusion Q24H    sevelamer carbonate tablet 800 mg TID WM    sodium chloride 0.9% flush 10 mL PRN    sucroferric oxyhydroxide Chew 500 mg TID WM    vitamin D 1000 units tablet 1,000 Units Daily    vitamin renal formula (B-complex-vitamin c-folic acid) 1 mg per capsule 1 capsule Daily     Current Outpatient Medications   Medication    ALPRAZolam (XANAX) 0.5 MG tablet    benzocaine 10 % Oint    ELIQUIS 5 mg Tab    ferric citrate (AURYXIA ORAL)    insulin (BASAGLAR KWIKPEN U-100 INSULIN) glargine 100 units/mL (3mL) SubQ pen    lisinopril (PRINIVIL,ZESTRIL) 40 MG tablet    pantoprazole (PROTONIX) 40 MG tablet    pravastatin (PRAVACHOL) 40 MG tablet    sevelamer carbonate (RENVELA) 800 mg Tab    sucralfate (CARAFATE) 100 mg/mL suspension    VELPHORO 500 mg Chew    vitamin renal formula, B-complex-vitamin c-folic acid, (NEPHROCAP) 1 mg Cap    vitamin renal formula, B-complex-vitamin c-folic acid, (NEPHROCAP) 1 mg Cap    albuterol (PROVENTIL/VENTOLIN HFA) 90 mcg/actuation inhaler    aspirin (ECOTRIN) 81 MG EC tablet    citalopram (CELEXA) 20 MG tablet    metoprolol succinate (TOPROL-XL) 25 MG 24 hr tablet     Family History      Problem Relation (Age of Onset)    Diabetes Father    Heart disease Mother        Tobacco Use    Smoking status: Never Smoker    Smokeless tobacco: Never Used   Substance and Sexual Activity    Alcohol use: No    Drug use: No    Sexual activity: Not Currently     Review of Systems   Constitutional: Positive for activity change, appetite change, chills, diaphoresis, fatigue, fever and unexpected weight change.   HENT: Negative for congestion, dental problem, drooling, postnasal drip, rhinorrhea and voice change.    Eyes: Negative for discharge.   Respiratory: Positive for cough, choking, chest tightness, shortness of breath and wheezing. Negative for apnea and stridor.    Cardiovascular: Negative for chest pain, palpitations and leg swelling.   Gastrointestinal: Negative for abdominal distention, blood in stool, constipation, diarrhea, nausea, rectal pain and vomiting.   Endocrine: Negative for cold intolerance, heat intolerance, polydipsia and polyuria.   Genitourinary: Negative for decreased urine volume, difficulty urinating, dysuria, enuresis, flank pain, frequency, hematuria and urgency.   Musculoskeletal: Negative for arthralgias, back pain, gait problem and joint swelling.   Skin: Negative for rash.   Allergic/Immunologic: Negative for food allergies and immunocompromised state.   Neurological: Negative for dizziness, tremors, syncope, numbness and headaches.   Hematological: Does not bruise/bleed easily.   Psychiatric/Behavioral: Negative for agitation, behavioral problems and self-injury. The patient is not nervous/anxious and is not hyperactive.    All other systems reviewed and are negative.    Objective:     Vital Signs (Most Recent):  Temp: 99.1 °F (37.3 °C) (12/10/20 1117)  Pulse: 95 (12/10/20 1105)  Resp: (!) 22 (12/10/20 1105)  BP: 104/63 (12/10/20 1105)  SpO2: 95 % (12/10/20 1105)  O2 Device (Oxygen Therapy): nasal cannula (12/10/20 1105) Vital Signs (24h Range):  Temp:  [99.1 °F (37.3  °C)-103.2 °F (39.6 °C)] 99.1 °F (37.3 °C)  Pulse:  [] 95  Resp:  [16-24] 22  SpO2:  [92 %-98 %] 95 %  BP: (104-151)/(63-77) 104/63        There is no height or weight on file to calculate BMI.  There is no height or weight on file to calculate BSA.    No intake/output data recorded.    Physical Exam  Vitals signs and nursing note reviewed. Exam conducted with a chaperone present.   Constitutional:       General: She is in acute distress.      Appearance: She is well-developed. She is ill-appearing, toxic-appearing and diaphoretic.   HENT:      Head: Normocephalic and atraumatic.   Eyes:      Conjunctiva/sclera: Conjunctivae normal.      Pupils: Pupils are equal, round, and reactive to light.   Neck:      Musculoskeletal: Full passive range of motion without pain, normal range of motion and neck supple. No edema.      Thyroid: No thyroid mass or thyromegaly.      Vascular: No carotid bruit.   Cardiovascular:      Rate and Rhythm: Normal rate and regular rhythm.  No extrasystoles are present.     Chest Wall: PMI is displaced.      Pulses: Normal pulses.      Heart sounds: S1 normal and S2 normal. No murmur. No friction rub.      Comments: RV heave loud P2   Pulmonary:      Effort: Respiratory distress present. No accessory muscle usage.      Breath sounds: Wheezing, rhonchi and rales present.   Chest:      Chest wall: No tenderness.   Abdominal:      General: Bowel sounds are normal. There is no distension.      Palpations: Abdomen is soft. There is no mass.      Tenderness: There is no abdominal tenderness. There is no rebound.      Hernia: No hernia is present.   Musculoskeletal: Normal range of motion.         General: No tenderness.      Comments: Left upper arm fistula is positive for bruit and thrill   Skin:     General: Skin is warm.      Coloration: Skin is not pale.      Findings: No bruising, ecchymosis, erythema or rash.      Nails: There is no clubbing.     Neurological:      Mental Status: She is  alert and oriented to person, place, and time.      Cranial Nerves: No cranial nerve deficit.      Sensory: No sensory deficit.      Motor: No abnormal muscle tone.      Coordination: Coordination normal.      Deep Tendon Reflexes: Reflexes are normal and symmetric.   Psychiatric:         Speech: Speech normal.         Behavior: Behavior normal.         Thought Content: Thought content normal.         Judgment: Judgment normal.         Significant Labs:  CBC:   Recent Labs   Lab 12/10/20  0837   WBC 8.02   RBC 4.02   HGB 10.7*   HCT 35.6*      MCV 89   MCH 26.6*   MCHC 30.1*     CMP:   Recent Labs   Lab 12/10/20  0837      CALCIUM 7.5*   ALBUMIN 2.5*   PROT 7.2      K 5.0   CO2 28   CL 93*   BUN 33*   CREATININE 12.2*   ALKPHOS 93   ALT 15   AST 34   BILITOT 0.6     All labs within the past 24 hours have been reviewed.    Significant Imaging:  Labs: Reviewed  X-Ray: Reviewed    Assessment/Plan:     Hypoxia  Agree with Hospital Medicine and emergency room managed and as discussed with Dr. Contreras    ESRD (end stage renal disease)  Patient has not had good dialysis this week.  Patient is short of breath.  Cannot rule out mild component of fluid overload.  Will admit the patient and do dialysis.  Follow very carefully.    Chronic diastolic congestive heart failure  Urgent hemodialysis today        Thank you for your consult.     Lola Pina MD   Nephrology  [facility] -

## 2020-12-10 NOTE — ASSESSMENT & PLAN NOTE
- COVID-19 testing   - Infection Control notified     - Isolation:   - Airborne, Contact and Droplet Precautions  - Cohort patients into COVID units  - N95 mask, wear eye protection  - 20 second hand hygiene              - Limit visitors per hospital policy              - Consolidating lab draws, nursing care, provider visits, and interventions    - Diagnostics: (leukopenia, hyponatremia, hyperferritinemia, elevated troponin, elevated d-dimer, age, and comorbidities are significant predictors of poor clinical outcome)  CBC, CMP, Ferritin, CRP, Troponin, Blood Culture x2 and Portable CXR    - Management:  Supplemental O2 to maintain SpO2 >92%  Telemetry  Continuous/intermittent Pulse Ox  Albuterol treatment PRN    Remdesivir, Dexamethasone, IV Rocephin, IV Azithromycin

## 2020-12-11 LAB
ALBUMIN SERPL BCP-MCNC: 2.4 G/DL (ref 3.5–5.2)
ALP SERPL-CCNC: 98 U/L (ref 55–135)
ALT SERPL W/O P-5'-P-CCNC: 16 U/L (ref 10–44)
ANION GAP SERPL CALC-SCNC: 14 MMOL/L (ref 8–16)
AST SERPL-CCNC: 29 U/L (ref 10–40)
BASOPHILS # BLD AUTO: 0.02 K/UL (ref 0–0.2)
BASOPHILS NFR BLD: 0.2 % (ref 0–1.9)
BILIRUB SERPL-MCNC: 0.4 MG/DL (ref 0.1–1)
BUN SERPL-MCNC: 31 MG/DL (ref 6–20)
CALCIUM SERPL-MCNC: 7.7 MG/DL (ref 8.7–10.5)
CHLORIDE SERPL-SCNC: 97 MMOL/L (ref 95–110)
CO2 SERPL-SCNC: 25 MMOL/L (ref 23–29)
CREAT SERPL-MCNC: 9.4 MG/DL (ref 0.5–1.4)
DIFFERENTIAL METHOD: ABNORMAL
EOSINOPHIL # BLD AUTO: 0 K/UL (ref 0–0.5)
EOSINOPHIL NFR BLD: 0 % (ref 0–8)
ERYTHROCYTE [DISTWIDTH] IN BLOOD BY AUTOMATED COUNT: 15 % (ref 11.5–14.5)
EST. GFR  (AFRICAN AMERICAN): 5 ML/MIN/1.73 M^2
EST. GFR  (NON AFRICAN AMERICAN): 4 ML/MIN/1.73 M^2
ESTIMATED AVG GLUCOSE: 143 MG/DL (ref 68–131)
FERRITIN SERPL-MCNC: ABNORMAL NG/ML (ref 20–300)
GLUCOSE SERPL-MCNC: 175 MG/DL (ref 70–110)
HBA1C MFR BLD HPLC: 6.6 % (ref 4–5.6)
HCT VFR BLD AUTO: 36.5 % (ref 37–48.5)
HGB BLD-MCNC: 11 G/DL (ref 12–16)
IMM GRANULOCYTES # BLD AUTO: 0.15 K/UL (ref 0–0.04)
IMM GRANULOCYTES NFR BLD AUTO: 1.5 % (ref 0–0.5)
LYMPHOCYTES # BLD AUTO: 0.6 K/UL (ref 1–4.8)
LYMPHOCYTES NFR BLD: 5.4 % (ref 18–48)
MAGNESIUM SERPL-MCNC: 2.3 MG/DL (ref 1.6–2.6)
MCH RBC QN AUTO: 26.2 PG (ref 27–31)
MCHC RBC AUTO-ENTMCNC: 30.1 G/DL (ref 32–36)
MCV RBC AUTO: 87 FL (ref 82–98)
MONOCYTES # BLD AUTO: 0.3 K/UL (ref 0.3–1)
MONOCYTES NFR BLD: 3.1 % (ref 4–15)
NEUTROPHILS # BLD AUTO: 9.2 K/UL (ref 1.8–7.7)
NEUTROPHILS NFR BLD: 89.8 % (ref 38–73)
NRBC BLD-RTO: 0 /100 WBC
PHOSPHATE SERPL-MCNC: 4.9 MG/DL (ref 2.7–4.5)
PLATELET # BLD AUTO: 269 K/UL (ref 150–350)
PMV BLD AUTO: 9.6 FL (ref 9.2–12.9)
POCT GLUCOSE: 166 MG/DL (ref 70–110)
POCT GLUCOSE: 196 MG/DL (ref 70–110)
POCT GLUCOSE: 217 MG/DL (ref 70–110)
POCT GLUCOSE: 256 MG/DL (ref 70–110)
POTASSIUM SERPL-SCNC: 4.5 MMOL/L (ref 3.5–5.1)
PROT SERPL-MCNC: 7.3 G/DL (ref 6–8.4)
RBC # BLD AUTO: 4.2 M/UL (ref 4–5.4)
SODIUM SERPL-SCNC: 136 MMOL/L (ref 136–145)
TROPONIN I SERPL DL<=0.01 NG/ML-MCNC: 0.13 NG/ML (ref 0–0.03)
TROPONIN I SERPL DL<=0.01 NG/ML-MCNC: 0.16 NG/ML (ref 0–0.03)
WBC # BLD AUTO: 10.24 K/UL (ref 3.9–12.7)

## 2020-12-11 PROCEDURE — 85025 COMPLETE CBC W/AUTO DIFF WBC: CPT

## 2020-12-11 PROCEDURE — 80053 COMPREHEN METABOLIC PANEL: CPT

## 2020-12-11 PROCEDURE — 90935 PR HEMODIALYSIS, ONE EVALUATION: ICD-10-PCS | Mod: ,,, | Performed by: INTERNAL MEDICINE

## 2020-12-11 PROCEDURE — 83735 ASSAY OF MAGNESIUM: CPT

## 2020-12-11 PROCEDURE — 94761 N-INVAS EAR/PLS OXIMETRY MLT: CPT

## 2020-12-11 PROCEDURE — 80100014 HC HEMODIALYSIS 1:1

## 2020-12-11 PROCEDURE — 99900035 HC TECH TIME PER 15 MIN (STAT)

## 2020-12-11 PROCEDURE — 84484 ASSAY OF TROPONIN QUANT: CPT

## 2020-12-11 PROCEDURE — 84484 ASSAY OF TROPONIN QUANT: CPT | Mod: 91

## 2020-12-11 PROCEDURE — 99223 1ST HOSP IP/OBS HIGH 75: CPT | Mod: ,,, | Performed by: INTERNAL MEDICINE

## 2020-12-11 PROCEDURE — 25000003 PHARM REV CODE 250: Performed by: INTERNAL MEDICINE

## 2020-12-11 PROCEDURE — 94640 AIRWAY INHALATION TREATMENT: CPT

## 2020-12-11 PROCEDURE — 84100 ASSAY OF PHOSPHORUS: CPT

## 2020-12-11 PROCEDURE — 94799 UNLISTED PULMONARY SVC/PX: CPT

## 2020-12-11 PROCEDURE — 63600175 PHARM REV CODE 636 W HCPCS: Performed by: NURSE PRACTITIONER

## 2020-12-11 PROCEDURE — 90935 HEMODIALYSIS ONE EVALUATION: CPT | Mod: ,,, | Performed by: INTERNAL MEDICINE

## 2020-12-11 PROCEDURE — 21400001 HC TELEMETRY ROOM

## 2020-12-11 PROCEDURE — 99223 PR INITIAL HOSPITAL CARE,LEVL III: ICD-10-PCS | Mod: ,,, | Performed by: INTERNAL MEDICINE

## 2020-12-11 PROCEDURE — 25000242 PHARM REV CODE 250 ALT 637 W/ HCPCS: Performed by: NURSE PRACTITIONER

## 2020-12-11 PROCEDURE — 63600175 PHARM REV CODE 636 W HCPCS: Performed by: INTERNAL MEDICINE

## 2020-12-11 PROCEDURE — 25000003 PHARM REV CODE 250: Performed by: NURSE PRACTITIONER

## 2020-12-11 PROCEDURE — 36415 COLL VENOUS BLD VENIPUNCTURE: CPT

## 2020-12-11 RX ORDER — ONDANSETRON 2 MG/ML
4 INJECTION INTRAMUSCULAR; INTRAVENOUS EVERY 8 HOURS PRN
Status: DISCONTINUED | OUTPATIENT
Start: 2020-12-11 | End: 2020-12-11 | Stop reason: SDUPTHER

## 2020-12-11 RX ORDER — SODIUM CHLORIDE 9 MG/ML
INJECTION, SOLUTION INTRAVENOUS ONCE
Status: COMPLETED | OUTPATIENT
Start: 2020-12-11 | End: 2020-12-11

## 2020-12-11 RX ORDER — HEPARIN SODIUM 1000 [USP'U]/ML
2000 INJECTION, SOLUTION INTRAVENOUS; SUBCUTANEOUS ONCE
Status: COMPLETED | OUTPATIENT
Start: 2020-12-11 | End: 2020-12-11

## 2020-12-11 RX ADMIN — SEVELAMER CARBONATE 800 MG: 800 TABLET, FILM COATED ORAL at 12:12

## 2020-12-11 RX ADMIN — NEPHROCAP 1 CAPSULE: 1 CAP ORAL at 08:12

## 2020-12-11 RX ADMIN — SUCROFERRIC OXYHYDROXIDE 500 MG: 500 TABLET, CHEWABLE ORAL at 01:12

## 2020-12-11 RX ADMIN — ASPIRIN 81 MG: 81 TABLET, COATED ORAL at 08:12

## 2020-12-11 RX ADMIN — MUPIROCIN: 20 OINTMENT TOPICAL at 08:12

## 2020-12-11 RX ADMIN — INSULIN ASPART 2 UNITS: 100 INJECTION, SOLUTION INTRAVENOUS; SUBCUTANEOUS at 06:12

## 2020-12-11 RX ADMIN — IPRATROPIUM BROMIDE AND ALBUTEROL 1 PUFF: 20; 100 SPRAY, METERED RESPIRATORY (INHALATION) at 01:12

## 2020-12-11 RX ADMIN — APIXABAN 5 MG: 2.5 TABLET, FILM COATED ORAL at 08:12

## 2020-12-11 RX ADMIN — Medication 1000 UNITS: at 08:12

## 2020-12-11 RX ADMIN — SEVELAMER CARBONATE 800 MG: 800 TABLET, FILM COATED ORAL at 08:12

## 2020-12-11 RX ADMIN — OXYCODONE HYDROCHLORIDE AND ACETAMINOPHEN 500 MG: 500 TABLET ORAL at 08:12

## 2020-12-11 RX ADMIN — METOPROLOL SUCCINATE 25 MG: 25 TABLET, EXTENDED RELEASE ORAL at 08:12

## 2020-12-11 RX ADMIN — SODIUM CHLORIDE 100 ML/HR: 0.9 INJECTION, SOLUTION INTRAVENOUS at 04:12

## 2020-12-11 RX ADMIN — DEXAMETHASONE 6 MG: 4 TABLET ORAL at 08:12

## 2020-12-11 RX ADMIN — SUCROFERRIC OXYHYDROXIDE 500 MG: 500 TABLET, CHEWABLE ORAL at 08:12

## 2020-12-11 RX ADMIN — REMDESIVIR 100 MG: 100 INJECTION, POWDER, LYOPHILIZED, FOR SOLUTION INTRAVENOUS at 04:12

## 2020-12-11 RX ADMIN — CEFTRIAXONE 1 G: 1 INJECTION, SOLUTION INTRAVENOUS at 08:12

## 2020-12-11 RX ADMIN — AZITHROMYCIN MONOHYDRATE 500 MG: 500 INJECTION, POWDER, LYOPHILIZED, FOR SOLUTION INTRAVENOUS at 12:12

## 2020-12-11 RX ADMIN — CITALOPRAM HYDROBROMIDE 20 MG: 20 TABLET ORAL at 08:12

## 2020-12-11 RX ADMIN — INSULIN ASPART 3 UNITS: 100 INJECTION, SOLUTION INTRAVENOUS; SUBCUTANEOUS at 10:12

## 2020-12-11 RX ADMIN — THERA TABS 1 TABLET: TAB at 08:12

## 2020-12-11 RX ADMIN — HEPARIN SODIUM 2000 UNITS: 1000 INJECTION, SOLUTION INTRAVENOUS; SUBCUTANEOUS at 10:12

## 2020-12-11 RX ADMIN — PANTOPRAZOLE SODIUM 40 MG: 40 TABLET, DELAYED RELEASE ORAL at 08:12

## 2020-12-11 RX ADMIN — AZITHROMYCIN MONOHYDRATE 500 MG: 500 INJECTION, POWDER, LYOPHILIZED, FOR SOLUTION INTRAVENOUS at 11:12

## 2020-12-11 RX ADMIN — PRAVASTATIN SODIUM 40 MG: 20 TABLET ORAL at 08:12

## 2020-12-11 NOTE — SUBJECTIVE & OBJECTIVE
Interval History: Pt seen at HD, tolerating well, c/o back pain ,     Review of patient's allergies indicates:   Allergen Reactions    Morphine Itching    Sulfa (sulfonamide antibiotics) Rash     Current Facility-Administered Medications   Medication Frequency    0.9%  NaCl infusion Once    0.9%  NaCl infusion Once    acetaminophen tablet 650 mg Q8H PRN    apixaban tablet 5 mg BID    ascorbic acid (vitamin C) tablet 500 mg BID    aspirin EC tablet 81 mg Daily    azithromycin 500 mg in dextrose 5 % 250 mL IVPB (ready to mix system) Q24H    cefTRIAXone (ROCEPHIN) 1 g/50 mL D5W IVPB Q24H    citalopram tablet 20 mg Daily    dexAMETHasone tablet 6 mg Daily    dextrose 50% injection 12.5 g PRN    dextrose 50% injection 25 g PRN    glucagon (human recombinant) injection 1 mg PRN    glucose chewable tablet 16 g PRN    glucose chewable tablet 24 g PRN    insulin aspart U-100 pen 0-5 Units QID (AC + HS) PRN    ipratropium-albuteroL inhaler 1 puff Q6H PRN    metoprolol succinate (TOPROL-XL) 24 hr tablet 25 mg Daily    multivitamin tablet Daily    mupirocin 2 % ointment BID    ondansetron injection 4 mg Q8H PRN    pantoprazole EC tablet 40 mg Daily    pravastatin tablet 40 mg Daily    remdesivir 100 mg in sodium chloride 0.9% 250 mL infusion Q24H    sevelamer carbonate tablet 800 mg TID WM    sodium chloride 0.9% flush 10 mL PRN    sucroferric oxyhydroxide Chew 500 mg TID WM    vitamin D 1000 units tablet 1,000 Units Daily    vitamin renal formula (B-complex-vitamin c-folic acid) 1 mg per capsule 1 capsule Daily       Objective:     Vital Signs (Most Recent):  Temp: 97.7 °F (36.5 °C) (12/11/20 0903)  Pulse: 73 (12/11/20 1120)  Resp: 18 (12/11/20 1120)  BP: (!) 146/82 (12/11/20 1120)  SpO2: (!) 93 % (12/11/20 0837)  O2 Device (Oxygen Therapy): nasal cannula (12/11/20 0135) Vital Signs (24h Range):  Temp:  [97.7 °F (36.5 °C)-98.5 °F (36.9 °C)] 97.7 °F (36.5 °C)  Pulse:  [] 73  Resp:  [16-24]  18  SpO2:  [93 %-100 %] 93 %  BP: ()/(59-84) 146/82     Weight: 105.5 kg (232 lb 9.4 oz) (12/11/20 0340)  Body mass index is 36.43 kg/m².  Body surface area is 2.23 meters squared.    I/O last 3 completed shifts:  In: 1470 [P.O.:120; I.V.:250; Other:500; IV Piggyback:600]  Out: 3003 [Other:3003]    Physical Exam     PE not done due to COVID isolation     Significant Labs:    CBC:   Recent Labs   Lab 12/11/20 0718   WBC 10.24   RBC 4.20   HGB 11.0*   HCT 36.5*      MCV 87   MCH 26.2*   MCHC 30.1*     CMP:   Recent Labs   Lab 12/11/20 0718   *   CALCIUM 7.7*   ALBUMIN 2.4*   PROT 7.3      K 4.5   CO2 25   CL 97   BUN 31*   CREATININE 9.4*   ALKPHOS 98   ALT 16   AST 29   BILITOT 0.4     Coagulation:   Recent Labs   Lab 12/10/20  0837   INR 1.1   APTT 36.7*     LFTs:   Recent Labs   Lab 12/11/20 0718   ALT 16   AST 29   ALKPHOS 98   BILITOT 0.4   PROT 7.3   ALBUMIN 2.4*     All labs within the past 24 hours have been reviewed.     Significant Imaging:  Reviewed    Lab Results   Component Value Date    .0 (H) 05/08/2015    CALCIUM 7.7 (L) 12/11/2020    PHOS 4.9 (H) 12/11/2020       Lab Results   Component Value Date    ALBUMIN 2.4 (L) 12/11/2020

## 2020-12-11 NOTE — ED NOTES
Report given to alfonso Zafar nurse. Nurse informed that pt has roughly 40 min left on dialysis and will be brought up when finished dialysis.

## 2020-12-11 NOTE — PLAN OF CARE
Left voicemails for daughter Diego and sister Terrie [phone].  Unable to reach pt room or cell.

## 2020-12-11 NOTE — SUBJECTIVE & OBJECTIVE
Past Medical History:   Diagnosis Date    Asthma     CHF (congestive heart failure)     CKD (chronic kidney disease) stage 5, GFR less than 15 ml/min     Depression     Diabetes mellitus     Dialysis patient     Hypercholesterolemia     Hypertension     PAF (paroxysmal atrial fibrillation)        Past Surgical History:   Procedure Laterality Date    AV FISTULA PLACEMENT      CARDIAC ELECTROPHYSIOLOGY MAPPING AND ABLATION      CATHETERIZATION OF BOTH LEFT AND RIGHT HEART N/A 2018    Procedure: CATHETERIZATION, HEART, BOTH LEFT AND RIGHT;  Surgeon: Enrique Jj MD;  Location: [facility] CATH LAB;  Service: Cardiology;  Laterality: N/A;  To follow [facility] cardioversion [facility] in [facility]     SECTION      TUMOR REMOVAL      L lung       Review of patient's allergies indicates:   Allergen Reactions    Morphine Itching    Sulfa (sulfonamide antibiotics) Rash       No current facility-administered medications on file prior to encounter.      Current Outpatient Medications on File Prior to Encounter   Medication Sig    ALPRAZolam (XANAX) 0.5 MG tablet Take 0.5 mg by mouth.    benzocaine 10 % Oint Apply 1 application topically 3 (three) times daily as needed. Apply to left ear for pain    ELIQUIS 5 mg Tab TK 1 T PO BID    ferric citrate (AURYXIA ORAL) Take by mouth. 2 tabs with every meal    insulin (BASAGLAR KWIKPEN U-100 INSULIN) glargine 100 units/mL (3mL) SubQ pen Inject 15 Units into the skin.    lisinopril (PRINIVIL,ZESTRIL) 40 MG tablet Take 40 mg by mouth once daily.    pantoprazole (PROTONIX) 40 MG tablet Take 40 mg by mouth.    pravastatin (PRAVACHOL) 40 MG tablet Take 40 mg by mouth once daily.    sevelamer carbonate (RENVELA) 800 mg Tab TK 2 TS PO TID WITH MEALS    sucralfate (CARAFATE) 100 mg/mL suspension SHAKE LIQUID AND TAKE 10 ML BY MOUTH FOUR TIMES DAILY    VELPHORO 500 mg Chew CHEW & SWALLOW 1 TABLET BY MOUTH THREE TIMES A DAY WITH MEALS AND 1 TABLET TWO TIMES A DAY WITH  SNACKS    vitamin renal formula, B-complex-vitamin c-folic acid, (NEPHROCAP) 1 mg Cap Take 1 capsule by mouth once daily.    vitamin renal formula, B-complex-vitamin c-folic acid, (NEPHROCAP) 1 mg Cap Take 1 capsule by mouth.    albuterol (PROVENTIL/VENTOLIN HFA) 90 mcg/actuation inhaler Inhale 1-2 puffs into the lungs every 6 (six) hours as needed for Wheezing.    aspirin (ECOTRIN) 81 MG EC tablet Take 1 tablet (81 mg total) by mouth once daily.    citalopram (CELEXA) 20 MG tablet Take 1 tablet (20 mg total) by mouth once daily.    metoprolol succinate (TOPROL-XL) 25 MG 24 hr tablet Take 1 tablet (25 mg total) by mouth once daily.    [DISCONTINUED] diazePAM (VALIUM) 5 MG tablet Take 2 tablets (10 mg total) by mouth nightly as needed for Anxiety or Insomnia. One to 2 tablets as needed for sleep and anxiety     Family History     Problem Relation (Age of Onset)    Diabetes Father    Heart attack Sister    Heart disease Mother    Stroke Father        Tobacco Use    Smoking status: Never Smoker    Smokeless tobacco: Never Used   Substance and Sexual Activity    Alcohol use: No    Drug use: No    Sexual activity: Not Currently     Review of Systems   Constitution: Positive for fever and malaise/fatigue.   Cardiovascular: Positive for dyspnea on exertion and irregular heartbeat.   Respiratory: Positive for shortness of breath.    Hematologic/Lymphatic: Bruises/bleeds easily (on eliquis).   Musculoskeletal: Positive for myalgias.     Objective:     Vital Signs (Most Recent):  Temp: 97.7 °F (36.5 °C) (12/11/20 0903)  Pulse: 85 (12/11/20 1100)  Resp: 18 (12/11/20 1100)  BP: (!) 94/59 (12/11/20 1100)  SpO2: (!) 93 % (12/11/20 0837) Vital Signs (24h Range):  Temp:  [97.7 °F (36.5 °C)-99.1 °F (37.3 °C)] 97.7 °F (36.5 °C)  Pulse:  [] 85  Resp:  [16-24] 18  SpO2:  [93 %-100 %] 93 %  BP: ()/(54-84) 94/59     Weight: 105.5 kg (232 lb 9.4 oz)  Body mass index is 36.43 kg/m².    SpO2: (!) 93 %  O2 Device  (Oxygen Therapy): nasal cannula      Intake/Output Summary (Last 24 hours) at 12/11/2020 1111  Last data filed at 12/11/2020 0500  Gross per 24 hour   Intake 1120 ml   Output 3003 ml   Net -1883 ml       Lines/Drains/Airways     Drain                 Hemodialysis AV Fistula Left upper arm -- days          Peripheral Intravenous Line                 Peripheral IV - Single Lumen 12/10/20 0835 18 G Right Upper Arm 1 day                Physical Exam  Patient not examined given + covid status  Significant Labs:   BMP:   Recent Labs   Lab 12/10/20  0837 12/11/20  0718    175*    136   K 5.0 4.5   CL 93* 97   CO2 28 25   BUN 33* 31*   CREATININE 12.2* 9.4*   CALCIUM 7.5* 7.7*   MG  --  2.3   , CBC   Recent Labs   Lab 12/10/20  0837 12/11/20  0718   WBC 8.02 10.24   HGB 10.7* 11.0*   HCT 35.6* 36.5*    269   , Troponin   Recent Labs   Lab 12/10/20  1939 12/11/20  0048 12/11/20  0718   TROPONINI 0.215* 0.164* 0.132*    and All pertinent lab results from the last 24 hours have been reviewed.    Significant Imaging: Echocardiogram:   Transthoracic echo (TTE) complete (Cupid Only):   Results for orders placed or performed during the hospital encounter of 06/25/20   Echo Color Flow Doppler? Yes   Result Value Ref Range    BSA 2.23 m2    TDI SEPTAL 0.06 m/s    LV LATERAL E/E' RATIO 8.00 m/s    LV SEPTAL E/E' RATIO 10.67 m/s    LA WIDTH 4.14 cm    TDI LATERAL 0.08 m/s    PV PEAK VELOCITY 0.92 cm/s    LVIDd 5.79 3.5 - 6.0 cm    IVS 1.13 (A) 0.6 - 1.1 cm    Posterior Wall 1.41 (A) 0.6 - 1.1 cm    Ao root annulus 2.59 cm    LVIDs 4.67 (A) 2.1 - 4.0 cm    FS 19 28 - 44 %    LA volume 63.86 cm3    Sinus 2.77 cm    LV mass 320.01 g    LA size 3.55 cm    RVDD 2.11 cm    TAPSE 1.73 cm    Left Ventricle Relative Wall Thickness 0.49 cm    AV mean gradient 7 mmHg    AV valve area 2.76 cm2    AV Velocity Ratio 0.59     AV index (prosthetic) 0.62     MV valve area p 1/2 method 5.38 cm2    E/A ratio 0.83     Mean e' 0.07 m/s     E wave decelartion time 140.99 msec    Pulm vein S/D ratio 1.23     LVOT diameter 2.39 cm    LVOT area 4.5 cm2    LVOT peak kameron 1.02 m/s    LVOT peak VTI 20.69 cm    Ao peak kameron 1.72 m/s    Ao VTI 33.64 cm    LVOT stroke volume 92.77 cm3    AV peak gradient 12 mmHg    E/E' ratio 9.14 m/s    MV Peak E Kameron 0.64 m/s    TR Max Kameron 1.87 m/s    MV stenosis pressure 1/2 time 40.89 ms    MV Peak A Kameron 0.77 m/s    PV Peak S Kameron 0.54 m/s    PV Peak D Kameron 0.44 m/s    LV Systolic Volume 100.93 mL    LV Systolic Volume Index 46.9 mL/m2    LV Diastolic Volume 166.12 mL    LV Diastolic Volume Index 77.17 mL/m2    LA Volume Index 29.7 mL/m2    LV Mass Index 149 g/m2    RA Major Axis 4.27 cm    Left Atrium Minor Axis 4.66 cm    Left Atrium Major Axis 5.66 cm    Triscuspid Valve Regurgitation Peak Gradient 14 mmHg    RA Width 3.11 cm    Right Atrial Pressure (from IVC) 3 mmHg    TV rest pulmonary artery pressure 17 mmHg    Narrative    · Mildly decreased left ventricular systolic function. The estimated   ejection fraction is 40%.  · Grade I (mild) left ventricular diastolic dysfunction consistent with   impaired relaxation.  · Normal right ventricular systolic function.  · Concentric left ventricular hypertrophy.

## 2020-12-11 NOTE — PLAN OF CARE
CM spoke with pt's daughter Diego and sister Terrie to complete initial assessment.  Pt independent of ADLs prior to admit.  No DME in use.  May need home o2.  Pt lives with her  Laureano Angulo who is also sick with COVID.  HD at [facility].  Updated with CM contact information provided.  Transitional care folder provided on chart, information on bedside delivery, [facility], discharge begins on admit pamphlet, and  advance directive information provided to sister.  Instructed to call CM with questions or concerns.       D/c plan: home with family  Transport home: daughter  PCP: Marni  [facility]erred [facility]  Bedside delivery: yes  [facility]:  declined   12/11/20 0207   Discharge Assessment   Assessment Type Discharge Planning Assessment   Confirmed/corrected address and phone number on facesheet? Yes   Assessment information obtained from? Other  (sister)   Expected Length of Stay (days) 2   Communicated expected length of stay with patient/caregiver yes   Prior to hospitilization cognitive status: Alert/Oriented;No Deficits   Prior to hospitalization functional status: Independent   Current cognitive status: Alert/Oriented;No Deficits   Current Functional Status: Independent   Facility Arrived From: home   Lives With spouse   Able to Return to Prior Arrangements yes   Is patient able to care for self after discharge? Yes   Who are your caregiver(s) and their phone number(s)? Laureano Angulo, , number not available   Patient's perception of discharge disposition home or selfcare   Readmission Within the Last 30 Days no previous admission in last 30 days   Patient currently being followed by outpatient case management? No   Patient currently receives any other outside agency services? No   Equipment Currently Used at Home none   Part D Coverage NA   Do you have any problems affording any of your prescribed medications? No   Is the patient taking medications as prescribed? yes    Does the patient have transportation home? Yes   Transportation Anticipated family or friend will provide   Dialysis Name and Scheduled days [facility]   Does the patient receive services at the Coumadin Clinic? No   Discharge Plan A Home with family   DME Needed Upon Discharge  oxygen   Patient/Family in Agreement with Plan yes

## 2020-12-11 NOTE — ASSESSMENT & PLAN NOTE
1. ESRD on HD , MWF , originally at [facility], ROSITA Mckeon , now TTS at [facility] due to COVID infection,  Seen at hemodialysis treatment today, tolerating well,    2.  Hypertension, blood pressure is controlled, continue home blood pressure medications,    3.  Anemia of chronic kidney disease, continue Procrit with dialysis as indicated    4. SHPT,  Continue low phosphorus diet, Velphoro     5.  COVID pneumonia, management per medicine team    6.  Disposition, discharge home when clinically stable

## 2020-12-11 NOTE — SUBJECTIVE & OBJECTIVE
Interval History:doing well. Currently on 3L. HD today. Episode of vomiting today.     Review of Systems   Constitutional: Positive for fatigue (exertional). Negative for activity change, appetite change, chills, diaphoresis, fever and unexpected weight change.   HENT: Positive for congestion. Negative for nosebleeds, sinus pressure and sore throat.         Loss of taste and smell    Eyes: Negative for pain, discharge and visual disturbance.   Respiratory: Positive for cough, shortness of breath and wheezing. Negative for chest tightness and stridor.    Cardiovascular: Negative for chest pain, palpitations and leg swelling.   Gastrointestinal: Positive for nausea and vomiting. Negative for abdominal distention, abdominal pain, blood in stool, constipation and diarrhea.   Endocrine: Negative for cold intolerance and heat intolerance.   Genitourinary: Negative for difficulty urinating, dysuria, flank pain, frequency and urgency.   Musculoskeletal: Positive for myalgias. Negative for arthralgias, back pain, joint swelling, neck pain and neck stiffness.   Skin: Negative for rash and wound.   Allergic/Immunologic: Negative for food allergies and immunocompromised state.   Neurological: Positive for weakness (generalized). Negative for dizziness, seizures, syncope, facial asymmetry, speech difficulty, light-headedness, numbness and headaches.   Hematological: Negative for adenopathy.   Psychiatric/Behavioral: Negative for agitation, confusion and hallucinations.      Objective:     Vital Signs (Most Recent):  Temp: 99.8 °F (37.7 °C) (12/11/20 1504)  Pulse: 106 (12/11/20 1504)  Resp: 16 (12/11/20 1504)  BP: 115/70 (12/11/20 1504)  SpO2: 98 % (12/11/20 1504) Vital Signs (24h Range):  Temp:  [97.6 °F (36.4 °C)-99.8 °F (37.7 °C)] 99.8 °F (37.7 °C)  Pulse:  [] 106  Resp:  [16-22] 16  SpO2:  [93 %-100 %] 98 %  BP: ()/(59-84) 115/70     Weight: 105.5 kg (232 lb 9.4 oz)  Body mass index is 36.43  kg/m².    Intake/Output Summary (Last 24 hours) at 12/11/2020 1655  Last data filed at 12/11/2020 1216  Gross per 24 hour   Intake 1620 ml   Output 5003 ml   Net -3383 ml      Physical Exam  Vitals signs and nursing note reviewed.   Constitutional:       General: She is not in acute distress.     Appearance: She is well-developed. She is obese. She is not diaphoretic.   HENT:      Head: Normocephalic and atraumatic.      Nose: Nose normal.   Eyes:      General: No scleral icterus.     Conjunctiva/sclera: Conjunctivae normal.   Neck:      Musculoskeletal: Normal range of motion and neck supple.      Trachea: No tracheal deviation.   Cardiovascular:      Rate and Rhythm: Regular rhythm. Tachycardia present.      Heart sounds: Normal heart sounds. No murmur. No friction rub. No gallop.    Pulmonary:      Effort: Pulmonary effort is normal. No respiratory distress.      Breath sounds: No stridor. Rales present. No wheezing.   Chest:      Chest wall: No tenderness.   Abdominal:      General: Bowel sounds are normal. There is no distension.      Palpations: Abdomen is soft. There is no mass.      Tenderness: There is no abdominal tenderness. There is no guarding or rebound.   Musculoskeletal: Normal range of motion.         General: No tenderness or deformity.   Skin:     General: Skin is warm and dry.      Coloration: Skin is not pale.      Findings: No erythema or rash.   Neurological:      Mental Status: She is alert and oriented to person, place, and time.      Cranial Nerves: No cranial nerve deficit.      Motor: No abnormal muscle tone.      Coordination: Coordination normal.   Psychiatric:         Behavior: Behavior normal.         Thought Content: Thought content normal.       Significant Labs:   CBC:   Recent Labs   Lab 12/10/20  0837 12/11/20  0718   WBC 8.02 10.24   HGB 10.7* 11.0*   HCT 35.6* 36.5*    269     CMP:   Recent Labs   Lab 12/10/20  0837 12/11/20  0718    136   K 5.0 4.5   CL 93* 97    CO2 28 25    175*   BUN 33* 31*   CREATININE 12.2* 9.4*   CALCIUM 7.5* 7.7*   PROT 7.2 7.3   ALBUMIN 2.5* 2.4*   BILITOT 0.6 0.4   ALKPHOS 93 98   AST 34 29   ALT 15 16   ANIONGAP 17* 14   EGFRNONAA 3* 4*       Significant Imaging: I have reviewed all pertinent imaging results/findings within the past 24 hours.

## 2020-12-11 NOTE — PLAN OF CARE
AAO X4. VSS. NSR on monitor. Oxygen at 3 L per NC. IV saline locked. Abx as scheduled. AV fistula positive for bruit and thrill. Ambulation as tolerated with stand by assistance.   Fall precautions in place, call bell in reach, bed in low and locked position.   POC discussed w/, verbalized understanding. Will continue to monitor.

## 2020-12-11 NOTE — NURSING
The patient tolerated 3 hr treatment well.   Pt started to complain about feeling flushed. Dr Pina notified, orders were given to end treatment.   Net Removal 2503 mls.   Left AVF accessed and functioned well   2000 Units of heparin given during dialysis.

## 2020-12-11 NOTE — PROGRESS NOTES
[facility] Hospital Medicine  Progress Note    Patient Name: Cordell Angulo  MRN: [id number]  Patient Class: IP- Inpatient   Admission Date: 12/10/2020  Length of Stay: 1 days  Attending Physician: Jeremy Valerio MD  Primary Care Provider: Chuck Grider MD        Subjective:     Principal Problem:Pneumonia due to COVID-19 virus        HPI:  The patient is a 56 yo female with ESRD -on HD, Asthma, PAF, HTN, DM, HLD, depression, and morbid obesity -BMI 40 who presented to ED with worsening COVID-19 symptoms. Pt tested positive for COVID-19 on 12/3/20. Since then, the pt reports worsening Fever, SOB, cough, generalized weakness and fatigue,N/V, and loss of taste and smell. Pt reports she was unable to walk but a few steps.   In the ED, Temp 103F, O2sats 90%, CXR showed progressive bilateral ground glass opacities, Trop 0.2, EKG showed no change from previous. procalcitonin 3.56 (dialysis pt)    Overview/Hospital Course:  No notes on file    Interval History:doing well. Currently on 3L. HD today. Episode of vomiting today.     Review of Systems   Constitutional: Positive for fatigue (exertional). Negative for activity change, appetite change, chills, diaphoresis, fever and unexpected weight change.   HENT: Positive for congestion. Negative for nosebleeds, sinus pressure and sore throat.         Loss of taste and smell    Eyes: Negative for pain, discharge and visual disturbance.   Respiratory: Positive for cough, shortness of breath and wheezing. Negative for chest tightness and stridor.    Cardiovascular: Negative for chest pain, palpitations and leg swelling.   Gastrointestinal: Positive for nausea and vomiting. Negative for abdominal distention, abdominal pain, blood in stool, constipation and diarrhea.   Endocrine: Negative for cold intolerance and heat intolerance.   Genitourinary: Negative for difficulty urinating, dysuria, flank pain, frequency and urgency.   Musculoskeletal: Positive for  myalgias. Negative for arthralgias, back pain, joint swelling, neck pain and neck stiffness.   Skin: Negative for rash and wound.   Allergic/Immunologic: Negative for food allergies and immunocompromised state.   Neurological: Positive for weakness (generalized). Negative for dizziness, seizures, syncope, facial asymmetry, speech difficulty, light-headedness, numbness and headaches.   Hematological: Negative for adenopathy.   Psychiatric/Behavioral: Negative for agitation, confusion and hallucinations.      Objective:     Vital Signs (Most Recent):  Temp: 99.8 °F (37.7 °C) (12/11/20 1504)  Pulse: 106 (12/11/20 1504)  Resp: 16 (12/11/20 1504)  BP: 115/70 (12/11/20 1504)  SpO2: 98 % (12/11/20 1504) Vital Signs (24h Range):  Temp:  [97.6 °F (36.4 °C)-99.8 °F (37.7 °C)] 99.8 °F (37.7 °C)  Pulse:  [] 106  Resp:  [16-22] 16  SpO2:  [93 %-100 %] 98 %  BP: ()/(59-84) 115/70     Weight: 105.5 kg (232 lb 9.4 oz)  Body mass index is 36.43 kg/m².    Intake/Output Summary (Last 24 hours) at 12/11/2020 1655  Last data filed at 12/11/2020 1216  Gross per 24 hour   Intake 1620 ml   Output 5003 ml   Net -3383 ml      Physical Exam  Vitals signs and nursing note reviewed.   Constitutional:       General: She is not in acute distress.     Appearance: She is well-developed. She is obese. She is not diaphoretic.   HENT:      Head: Normocephalic and atraumatic.      Nose: Nose normal.   Eyes:      General: No scleral icterus.     Conjunctiva/sclera: Conjunctivae normal.   Neck:      Musculoskeletal: Normal range of motion and neck supple.      Trachea: No tracheal deviation.   Cardiovascular:      Rate and Rhythm: Regular rhythm. Tachycardia present.      Heart sounds: Normal heart sounds. No murmur. No friction rub. No gallop.    Pulmonary:      Effort: Pulmonary effort is normal. No respiratory distress.      Breath sounds: No stridor. Rales present. No wheezing.   Chest:      Chest wall: No tenderness.   Abdominal:       General: Bowel sounds are normal. There is no distension.      Palpations: Abdomen is soft. There is no mass.      Tenderness: There is no abdominal tenderness. There is no guarding or rebound.   Musculoskeletal: Normal range of motion.         General: No tenderness or deformity.   Skin:     General: Skin is warm and dry.      Coloration: Skin is not pale.      Findings: No erythema or rash.   Neurological:      Mental Status: She is alert and oriented to person, place, and time.      Cranial Nerves: No cranial nerve deficit.      Motor: No abnormal muscle tone.      Coordination: Coordination normal.   Psychiatric:         Behavior: Behavior normal.         Thought Content: Thought content normal.       Significant Labs:   CBC:   Recent Labs   Lab 12/10/20  0837 12/11/20  0718   WBC 8.02 10.24   HGB 10.7* 11.0*   HCT 35.6* 36.5*    269     CMP:   Recent Labs   Lab 12/10/20  0837 12/11/20  0718    136   K 5.0 4.5   CL 93* 97   CO2 28 25    175*   BUN 33* 31*   CREATININE 12.2* 9.4*   CALCIUM 7.5* 7.7*   PROT 7.2 7.3   ALBUMIN 2.5* 2.4*   BILITOT 0.6 0.4   ALKPHOS 93 98   AST 34 29   ALT 15 16   ANIONGAP 17* 14   EGFRNONAA 3* 4*       Significant Imaging: I have reviewed all pertinent imaging results/findings within the past 24 hours.      Assessment/Plan:      * Pneumonia due to COVID-19 virus  - COVID-19 testing   - Infection Control notified     - Isolation:   - Airborne, Contact and Droplet Precautions  - Cohort patients into COVID units  - N95 mask, wear eye protection  - 20 second hand hygiene              - Limit visitors per hospital policy              - Consolidating lab draws, nursing care, provider visits, and interventions    - Diagnostics: (leukopenia, hyponatremia, hyperferritinemia, elevated troponin, elevated d-dimer, age, and comorbidities are significant predictors of poor clinical outcome)  CBC, CMP, Ferritin, CRP, Troponin, Blood Culture x2 and Portable CXR    -  Management:  Supplemental O2 to maintain SpO2 >92%  Telemetry  Continuous/intermittent Pulse Ox  Albuterol treatment PRN    Remdesivir, Dexamethasone, IV Rocephin, IV Azithromycin         Acute respiratory failure with hypoxia  Oxygen -RT to titrate as needed      PAF (paroxysmal atrial fibrillation)  S/p ablation   Rate controlled  Cont Toprol and Eliquis       Chronic diastolic congestive heart failure  Compensated  Cont BB, hold Lisinopril       Type 2 diabetes mellitus, with long-term current use of insulin  Hold home insulin for now  NISS      Essential hypertension  Cont Toprol, hold lisinopril for now      ESRD (end stage renal disease) on dialysis  Nephology following   Resume HD        VTE Risk Mitigation (From admission, onward)         Ordered     apixaban tablet 5 mg  2 times daily      12/10/20 1102     IP VTE HIGH RISK PATIENT  Once      12/10/20 1057     Place sequential compression device  Until discontinued      12/10/20 1057                Discharge Planning   JOAO:      Code Status: Full Code   Is the patient medically ready for discharge?:     Reason for patient still in hospital (select all that apply): Treatment-currently on 3L nasal cannula. Will monitor overnight and discharge in AM if she remains stable.  Discharge Plan A: Home with family            Ирина Farley NP  Department of Hospital Medicine   [facility]

## 2020-12-11 NOTE — CONSULTS
[facility]  Cardiology  Consult Note    Patient Name: Cordell Angulo  MRN: [id number]  Admission Date: 12/10/2020  Hospital Length of Stay: 1 days  Code Status: Full Code   Attending Provider: Jeremy Valerio MD   Consulting Provider: TIGRE Bui  Primary Care Physician: Chuck Grider MD  Principal Problem:Pneumonia due to COVID-19 virus    Patient information was obtained from patient, past medical records and ER records.     Inpatient consult to Cardiology  Consult performed by: TIGRE Hussein  Consult ordered by: Hanna Murphy NP        Subjective:     Chief Complaint:  Shortness of breath     HPI:   Cordell Angulo is a 55 year old female who presented to [facility] due to fever, shortness of breath, cough, weakness, fatigue, and N/V. She reports that she is unable to walk a few steps without any symptoms. Her current medical conditions include ESRD on HD, PAF on Eliquis, HTN, HLP, DM Type II, Depression, Morbid obesity, HFrEF of 40%, Chronic combined CHF. ED workup revealed T 103F, CXR with progressive bilateral ground glass opacities, Troponin 0.035>0.28>0.28>0.215>0.164. EKG revealed ST, LVH. She was admitted to Telemetry under the care of hospital medicine. Cardiology consulted to assist with medical management. Chart reviewed extensively, patient not seen or examined given + COVID. She is followed by Dr Wong Awan at [facility], last seen in October 2020. No need for repeat echo at this time. Continue Eliquis for cardio-embolic protection and BB.     Past Medical History:   Diagnosis Date    Asthma     CHF (congestive heart failure)     CKD (chronic kidney disease) stage 5, GFR less than 15 ml/min     Depression     Diabetes mellitus     Dialysis patient     Hypercholesterolemia     Hypertension     PAF (paroxysmal atrial fibrillation)        Past Surgical History:   Procedure Laterality Date    AV FISTULA PLACEMENT      CARDIAC ELECTROPHYSIOLOGY MAPPING AND ABLATION       CATHETERIZATION OF BOTH LEFT AND RIGHT HEART N/A 2018    Procedure: CATHETERIZATION, HEART, BOTH LEFT AND RIGHT;  Surgeon: Enrique Jj MD;  Location: [facility] C[facility] LAB;  Service: Cardiology;  Laterality: N/A;  To follow his cardioversion case in [facility]     SECTION      TUMOR REMOVAL      L lung       Review of patient's allergies indicates:   Allergen Reactions    Morphine Itching    Sulfa (sulfonamide antibiotics) Rash       No current facility-administered medications on file prior to encounter.      Current Outpatient Medications on File Prior to Encounter   Medication Sig    ALPRAZolam (XANAX) 0.5 MG tablet Take 0.5 mg by mouth.    benzocaine 10 % Oint Apply 1 application topically 3 (three) times daily as needed. Apply to left ear for pain    ELIQUIS 5 mg Tab TK 1 T PO BID    ferric citrate (AURYXIA ORAL) Take by mouth. 2 tabs with every meal    insulin (BASAGLAR KWIKPEN U-100 INSULIN) glargine 100 units/mL (3mL) SubQ pen Inject 15 Units into the skin.    lisinopril (PRINIVIL,ZESTRIL) 40 MG tablet Take 40 mg by mouth once daily.    pantoprazole (PROTONIX) 40 MG tablet Take 40 mg by mouth.    pravastatin (PRAVACHOL) 40 MG tablet Take 40 mg by mouth once daily.    sevelamer carbonate (RENVELA) 800 mg Tab TK 2 TS PO TID WITH MEALS    sucralfate (CARAFATE) 100 mg/mL suspension SHAKE LIQUID AND TAKE 10 ML BY MOUTH FOUR TIMES DAILY    VELPHORO 500 mg Chew CHEW & SWALLOW 1 TABLET BY MOUTH THREE TIMES A DAY WITH MEALS AND 1 TABLET TWO TIMES A DAY WITH SNACKS    vitamin renal formula, B-complex-vitamin c-folic acid, (NEPHROCAP) 1 mg Cap Take 1 capsule by mouth once daily.    vitamin renal formula, B-complex-vitamin c-folic acid, (NEPHROCAP) 1 mg Cap Take 1 capsule by mouth.    albuterol (PROVENTIL/VENTOLIN HFA) 90 mcg/actuation inhaler Inhale 1-2 puffs into the lungs every 6 (six) hours as needed for Wheezing.    aspirin (ECOTRIN) 81 MG EC tablet Take 1 tablet (81 mg total) by  mouth once daily.    citalopram (CELEXA) 20 MG tablet Take 1 tablet (20 mg total) by mouth once daily.    metoprolol succinate (TOPROL-XL) 25 MG 24 hr tablet Take 1 tablet (25 mg total) by mouth once daily.    [DISCONTINUED] diazePAM (VALIUM) 5 MG tablet Take 2 tablets (10 mg total) by mouth nightly as needed for Anxiety or Insomnia. One to 2 tablets as needed for sleep and anxiety     Family History     Problem Relation (Age of Onset)    Diabetes Father    Heart attack Sister    Heart disease Mother    Stroke Father        Tobacco Use    Smoking status: Never Smoker    Smokeless tobacco: Never Used   Substance and Sexual Activity    Alcohol use: No    Drug use: No    Sexual activity: Not Currently     Review of Systems   Constitution: Positive for fever and malaise/fatigue.   Cardiovascular: Positive for dyspnea on exertion and irregular heartbeat.   Respiratory: Positive for shortness of breath.    Hematologic/Lymphatic: Bruises/bleeds easily (on eliquis).   Musculoskeletal: Positive for myalgias.     Objective:     Vital Signs (Most Recent):  Temp: 97.7 °F (36.5 °C) (12/11/20 0903)  Pulse: 85 (12/11/20 1100)  Resp: 18 (12/11/20 1100)  BP: (!) 94/59 (12/11/20 1100)  SpO2: (!) 93 % (12/11/20 0837) Vital Signs (24h Range):  Temp:  [97.7 °F (36.5 °C)-99.1 °F (37.3 °C)] 97.7 °F (36.5 °C)  Pulse:  [] 85  Resp:  [16-24] 18  SpO2:  [93 %-100 %] 93 %  BP: ()/(54-84) 94/59     Weight: 105.5 kg (232 lb 9.4 oz)  Body mass index is 36.43 kg/m².    SpO2: (!) 93 %  O2 Device (Oxygen Therapy): nasal cannula      Intake/Output Summary (Last 24 hours) at 12/11/2020 1111  Last data filed at 12/11/2020 0500  Gross per 24 hour   Intake 1120 ml   Output 3003 ml   Net -1883 ml       Lines/Drains/Airways     Drain                 Hemodialysis AV Fistula Left upper arm -- days          Peripheral Intravenous Line                 Peripheral IV - Single Lumen 12/10/20 0835 18 G Right Upper Arm 1 day                 Physical Exam  Patient not examined given + covid status  Significant Labs:   BMP:   Recent Labs   Lab 12/10/20  0837 12/11/20  0718    175*    136   K 5.0 4.5   CL 93* 97   CO2 28 25   BUN 33* 31*   CREATININE 12.2* 9.4*   CALCIUM 7.5* 7.7*   MG  --  2.3   , CBC   Recent Labs   Lab 12/10/20  0837 12/11/20  0718   WBC 8.02 10.24   HGB 10.7* 11.0*   HCT 35.6* 36.5*    269   , Troponin   Recent Labs   Lab 12/10/20  1939 12/11/20  0048 12/11/20  0718   TROPONINI 0.215* 0.164* 0.132*    and All pertinent lab results from the last 24 hours have been reviewed.    Significant Imaging: Echocardiogram:   Transthoracic echo (TTE) complete (Cupid Only):   Results for orders placed or performed during the hospital encounter of 06/25/20   Echo Color Flow Doppler? Yes   Result Value Ref Range    BSA 2.23 m2    TDI SEPTAL 0.06 m/s    LV LATERAL E/E' RATIO 8.00 m/s    LV SEPTAL E/E' RATIO 10.67 m/s    LA WIDTH 4.14 cm    TDI LATERAL 0.08 m/s    PV PEAK VELOCITY 0.92 cm/s    LVIDd 5.79 3.5 - 6.0 cm    IVS 1.13 (A) 0.6 - 1.1 cm    Posterior Wall 1.41 (A) 0.6 - 1.1 cm    Ao root annulus 2.59 cm    LVIDs 4.67 (A) 2.1 - 4.0 cm    FS 19 28 - 44 %    LA volume 63.86 cm3    Sinus 2.77 cm    LV mass 320.01 g    LA size 3.55 cm    RVDD 2.11 cm    TAPSE 1.73 cm    Left Ventricle Relative Wall Thickness 0.49 cm    AV mean gradient 7 mmHg    AV valve area 2.76 cm2    AV Velocity Ratio 0.59     AV index (prosthetic) 0.62     MV valve area p 1/2 method 5.38 cm2    E/A ratio 0.83     Mean e' 0.07 m/s    E wave decelartion time 140.99 msec    Pulm vein S/D ratio 1.23     LVOT diameter 2.39 cm    LVOT area 4.5 cm2    LVOT peak kameron 1.02 m/s    LVOT peak VTI 20.69 cm    Ao peak kameron 1.72 m/s    Ao VTI 33.64 cm    LVOT stroke volume 92.77 cm3    AV peak gradient 12 mmHg    E/E' ratio 9.14 m/s    MV Peak E Kameron 0.64 m/s    TR Max Kameron 1.87 m/s    MV stenosis pressure 1/2 time 40.89 ms    MV Peak A Kameron 0.77 m/s    PV Peak S Kameron  0.54 m/s    PV Peak D Kameron 0.44 m/s    LV Systolic Volume 100.93 mL    LV Systolic Volume Index 46.9 mL/m2    LV Diastolic Volume 166.12 mL    LV Diastolic Volume Index 77.17 mL/m2    LA Volume Index 29.7 mL/m2    LV Mass Index 149 g/m2    RA Major Axis 4.27 cm    Left Atrium Minor Axis 4.66 cm    Left Atrium Major Axis 5.66 cm    Triscuspid Valve Regurgitation Peak Gradient 14 mmHg    RA Width 3.11 cm    Right Atrial Pressure (from IVC) 3 mmHg    TV rest pulmonary artery pressure 17 mmHg    Narrative    · Mildly decreased left ventricular systolic function. The estimated   ejection fraction is 40%.  · Grade I (mild) left ventricular diastolic dysfunction consistent with   impaired relaxation.  · Normal right ventricular systolic function.  · Concentric left ventricular hypertrophy.        Assessment and Plan:     * Pneumonia due to COVID-19 virus  Mgmt per primary team    PAF (paroxysmal atrial fibrillation)  Continue BB, Eliquis  Tele monitoring    Chronic diastolic congestive heart failure  Fluid removal per HD    Type 2 diabetes mellitus, with long-term current use of insulin  Mgmt per primary team    Essential hypertension  On medical therapy  Continue BB    ESRD (end stage renal disease) on dialysis  Mgmt per nephrology        VTE Risk Mitigation (From admission, onward)         Ordered     apixaban tablet 5 mg  2 times daily      12/10/20 1102     IP VTE HIGH RISK PATIENT  Once      12/10/20 1057     Place sequential compression device  Until discontinued      12/10/20 1057                Thank you for your consult. I will follow-up with patient. Please contact us if you have any additional questions.    TIGRE Bui  Cardiology   [facility]

## 2020-12-11 NOTE — PROGRESS NOTES
[facility] -   Nephrology  Progress Note    Patient Name: Cordell Angulo  MRN: [id number]  Admission Date: 12/10/2020  Hospital Length of Stay: 1 days  Attending Provider: Jeremy Valerio MD   Primary Care Physician: Chuck Grider MD  Principal Problem:Pneumonia due to COVID-19 virus    Subjective:     HPI: Patient is a 55-year-old female with ESRD on hemodialysis on Monday-Wed- Friday schedule in [facility] under my care.  Patient has recently become COVID-19 positive.  Patient since then has been moved to the [facility] [place] [facility] Dialysis unit on a Yuydktx-Jufbmaax-Jwhcxhjg schedule.  Last dialysis was short 2 hours  Patient comes to the hospital in the emergency room with short worsening shortness of breath.  Patient seen and evaluated at bedside.         Interval History: Pt seen at HD, tolerating well, c/o back pain ,     Review of patient's allergies indicates:   Allergen Reactions    Morphine Itching    Sulfa (sulfonamide antibiotics) Rash     Current Facility-Administered Medications   Medication Frequency    0.9%  NaCl infusion Once    0.9%  NaCl infusion Once    acetaminophen tablet 650 mg Q8H PRN    apixaban tablet 5 mg BID    ascorbic acid (vitamin C) tablet 500 mg BID    aspirin EC tablet 81 mg Daily    azithromycin 500 mg in dextrose 5 % 250 mL IVPB (ready to mix system) Q24H    cefTRIAXone (ROCEPHIN) 1 g/50 mL D5W IVPB Q24H    citalopram tablet 20 mg Daily    dexAMETHasone tablet 6 mg Daily    dextrose 50% injection 12.5 g PRN    dextrose 50% injection 25 g PRN    glucagon (human recombinant) injection 1 mg PRN    glucose chewable tablet 16 g PRN    glucose chewable tablet 24 g PRN    insulin aspart U-100 pen 0-5 Units QID (AC + HS) PRN    ipratropium-albuteroL inhaler 1 puff Q6H PRN    metoprolol succinate (TOPROL-XL) 24 hr tablet 25 mg Daily    multivitamin tablet Daily    mupirocin 2 % ointment BID    ondansetron injection 4 mg Q8H PRN    pantoprazole EC tablet 40 mg  Daily    pravastatin tablet 40 mg Daily    remdesivir 100 mg in sodium chloride 0.9% 250 mL infusion Q24H    sevelamer carbonate tablet 800 mg TID WM    sodium chloride 0.9% flush 10 mL PRN    sucroferric oxyhydroxide Chew 500 mg TID WM    vitamin D 1000 units tablet 1,000 Units Daily    vitamin renal formula (B-complex-vitamin c-folic acid) 1 mg per capsule 1 capsule Daily       Objective:     Vital Signs (Most Recent):  Temp: 97.7 °F (36.5 °C) (12/11/20 0903)  Pulse: 73 (12/11/20 1120)  Resp: 18 (12/11/20 1120)  BP: (!) 146/82 (12/11/20 1120)  SpO2: (!) 93 % (12/11/20 0837)  O2 Device (Oxygen Therapy): nasal cannula (12/11/20 0135) Vital Signs (24h Range):  Temp:  [97.7 °F (36.5 °C)-98.5 °F (36.9 °C)] 97.7 °F (36.5 °C)  Pulse:  [] 73  Resp:  [16-24] 18  SpO2:  [93 %-100 %] 93 %  BP: ()/(59-84) 146/82     Weight: 105.5 kg (232 lb 9.4 oz) (12/11/20 0340)  Body mass index is 36.43 kg/m².  Body surface area is 2.23 meters squared.    I/O last 3 completed shifts:  In: 1470 [P.O.:120; I.V.:250; Other:500; IV Piggyback:600]  Out: 3003 [Other:3003]    Physical Exam     PE not done due to COVID isolation     Significant Labs:    CBC:   Recent Labs   Lab 12/11/20 0718   WBC 10.24   RBC 4.20   HGB 11.0*   HCT 36.5*      MCV 87   MCH 26.2*   MCHC 30.1*     CMP:   Recent Labs   Lab 12/11/20 0718   *   CALCIUM 7.7*   ALBUMIN 2.4*   PROT 7.3      K 4.5   CO2 25   CL 97   BUN 31*   CREATININE 9.4*   ALKPHOS 98   ALT 16   AST 29   BILITOT 0.4     Coagulation:   Recent Labs   Lab 12/10/20  0837   INR 1.1   APTT 36.7*     LFTs:   Recent Labs   Lab 12/11/20  0718   ALT 16   AST 29   ALKPHOS 98   BILITOT 0.4   PROT 7.3   ALBUMIN 2.4*     All labs within the past 24 hours have been reviewed.     Significant Imaging:  Reviewed    Lab Results   Component Value Date    .0 (H) 05/08/2015    CALCIUM 7.7 (L) 12/11/2020    PHOS 4.9 (H) 12/11/2020       Lab Results   Component Value Date     ALBUMIN 2.4 (L) 12/11/2020           Assessment/Plan:     ESRD (end stage renal disease) on dialysis  1. ESRD on HD , MWF , originally at [facility], [facility] , now TTS at [facility] [place] [facility] due to COVID infection,  Seen at hemodialysis treatment today, tolerating well,    2.  Hypertension, blood pressure is controlled, continue home blood pressure medications,    3.  Anemia of chronic kidney disease, continue Procrit with dialysis as indicated    4. SHPT,  Continue low phosphorus diet, Velphoro     5.  COVID pneumonia, management per medicine team    6.  Disposition, discharge home when clinically stable        Thank you for your consult. I will follow-up with patient. Please contact us if you have any additional questions.     Total time spent 40 minutes including time needed to review the records,  patient  evaluation, documentation, face-to-face discussion with the patient,  primary team,   more than 50% of the time was spent on coordination of care and counseling.       Donovan Gardner MD  Nephrology  [facility]

## 2020-12-11 NOTE — NURSING
Pt tolerated 3 hr treatment well.   L AVF worked well.   Net removal 1500mls  Gave 2000 Units of heparin during treatment  Dr Gardner came to visit pt during treatment

## 2020-12-11 NOTE — HPI
Cordell Angulo is a 55 year old female who [place] [facility], shortness of breath, cough, weakness, fatigue, and N/V. She reports that she is unable to walk a few steps without any symptoms. Her current medical conditions include ESRD on HD, PAF on Eliquis, HTN, HLP, DM Type II, Depression, Morbid obesity, HFrEF of 40%, Chronic combined CHF. ED workup revealed T 103F, CXR with progressive bilateral ground glass opacities, Troponin 0.035>0.28>0.28>0.215>0.164. EKG revealed ST, LVH. She was admitted to Telemetry under the care of [facility] medicine. Cardiology consulted to assist with medical management. Chart reviewed extensively, patient not seen or examined given + COVID. She is followed by Dr Wong Awan at [facility], last seen in October 2020. No need for repeat echo at this time. Continue Eliquis for cardio-embolic protection and BB.

## 2020-12-12 LAB
ALBUMIN SERPL BCP-MCNC: 2.4 G/DL (ref 3.5–5.2)
ALP SERPL-CCNC: 91 U/L (ref 55–135)
ALT SERPL W/O P-5'-P-CCNC: 13 U/L (ref 10–44)
ANION GAP SERPL CALC-SCNC: 17 MMOL/L (ref 8–16)
AST SERPL-CCNC: 29 U/L (ref 10–40)
BASOPHILS # BLD AUTO: 0.02 K/UL (ref 0–0.2)
BASOPHILS NFR BLD: 0.1 % (ref 0–1.9)
BILIRUB SERPL-MCNC: 0.3 MG/DL (ref 0.1–1)
BUN SERPL-MCNC: 38 MG/DL (ref 6–20)
CALCIUM SERPL-MCNC: 7.7 MG/DL (ref 8.7–10.5)
CHLORIDE SERPL-SCNC: 93 MMOL/L (ref 95–110)
CO2 SERPL-SCNC: 21 MMOL/L (ref 23–29)
CREAT SERPL-MCNC: 7.8 MG/DL (ref 0.5–1.4)
DIFFERENTIAL METHOD: ABNORMAL
EOSINOPHIL # BLD AUTO: 0 K/UL (ref 0–0.5)
EOSINOPHIL NFR BLD: 0 % (ref 0–8)
ERYTHROCYTE [DISTWIDTH] IN BLOOD BY AUTOMATED COUNT: 15 % (ref 11.5–14.5)
EST. GFR  (AFRICAN AMERICAN): 6 ML/MIN/1.73 M^2
EST. GFR  (NON AFRICAN AMERICAN): 5 ML/MIN/1.73 M^2
GLUCOSE SERPL-MCNC: 145 MG/DL (ref 70–110)
HCT VFR BLD AUTO: 35.2 % (ref 37–48.5)
HGB BLD-MCNC: 10.5 G/DL (ref 12–16)
IMM GRANULOCYTES # BLD AUTO: 0.27 K/UL (ref 0–0.04)
IMM GRANULOCYTES NFR BLD AUTO: 2 % (ref 0–0.5)
LYMPHOCYTES # BLD AUTO: 0.6 K/UL (ref 1–4.8)
LYMPHOCYTES NFR BLD: 4.7 % (ref 18–48)
MAGNESIUM SERPL-MCNC: 2.2 MG/DL (ref 1.6–2.6)
MCH RBC QN AUTO: 26.4 PG (ref 27–31)
MCHC RBC AUTO-ENTMCNC: 29.8 G/DL (ref 32–36)
MCV RBC AUTO: 89 FL (ref 82–98)
MONOCYTES # BLD AUTO: 0.5 K/UL (ref 0.3–1)
MONOCYTES NFR BLD: 3.5 % (ref 4–15)
NEUTROPHILS # BLD AUTO: 12.3 K/UL (ref 1.8–7.7)
NEUTROPHILS NFR BLD: 89.7 % (ref 38–73)
NRBC BLD-RTO: 0 /100 WBC
PHOSPHATE SERPL-MCNC: 4.8 MG/DL (ref 2.7–4.5)
PLATELET # BLD AUTO: 275 K/UL (ref 150–350)
PMV BLD AUTO: 9.4 FL (ref 9.2–12.9)
POCT GLUCOSE: 174 MG/DL (ref 70–110)
POCT GLUCOSE: 189 MG/DL (ref 70–110)
POCT GLUCOSE: 192 MG/DL (ref 70–110)
POTASSIUM SERPL-SCNC: 5.2 MMOL/L (ref 3.5–5.1)
PROT SERPL-MCNC: 7.6 G/DL (ref 6–8.4)
RBC # BLD AUTO: 3.97 M/UL (ref 4–5.4)
SODIUM SERPL-SCNC: 131 MMOL/L (ref 136–145)
WBC # BLD AUTO: 13.74 K/UL (ref 3.9–12.7)

## 2020-12-12 PROCEDURE — 63600175 PHARM REV CODE 636 W HCPCS: Performed by: INTERNAL MEDICINE

## 2020-12-12 PROCEDURE — 25000003 PHARM REV CODE 250: Performed by: INTERNAL MEDICINE

## 2020-12-12 PROCEDURE — 94799 UNLISTED PULMONARY SVC/PX: CPT

## 2020-12-12 PROCEDURE — 83735 ASSAY OF MAGNESIUM: CPT

## 2020-12-12 PROCEDURE — 99900035 HC TECH TIME PER 15 MIN (STAT)

## 2020-12-12 PROCEDURE — 25000003 PHARM REV CODE 250: Performed by: NURSE PRACTITIONER

## 2020-12-12 PROCEDURE — 99232 SBSQ HOSP IP/OBS MODERATE 35: CPT | Mod: ,,, | Performed by: INTERNAL MEDICINE

## 2020-12-12 PROCEDURE — 94761 N-INVAS EAR/PLS OXIMETRY MLT: CPT

## 2020-12-12 PROCEDURE — 36415 COLL VENOUS BLD VENIPUNCTURE: CPT

## 2020-12-12 PROCEDURE — 63600175 PHARM REV CODE 636 W HCPCS: Performed by: NURSE PRACTITIONER

## 2020-12-12 PROCEDURE — 85025 COMPLETE CBC W/AUTO DIFF WBC: CPT

## 2020-12-12 PROCEDURE — 94640 AIRWAY INHALATION TREATMENT: CPT

## 2020-12-12 PROCEDURE — 99232 PR SUBSEQUENT HOSPITAL CARE,LEVL II: ICD-10-PCS | Mod: ,,, | Performed by: INTERNAL MEDICINE

## 2020-12-12 PROCEDURE — 84100 ASSAY OF PHOSPHORUS: CPT

## 2020-12-12 PROCEDURE — 80053 COMPREHEN METABOLIC PANEL: CPT

## 2020-12-12 PROCEDURE — 21400001 HC TELEMETRY ROOM

## 2020-12-12 PROCEDURE — 80100014 HC HEMODIALYSIS 1:1

## 2020-12-12 PROCEDURE — 27000221 HC OXYGEN, UP TO 24 HOURS

## 2020-12-12 RX ORDER — HEPARIN SODIUM 1000 [USP'U]/ML
2000 INJECTION, SOLUTION INTRAVENOUS; SUBCUTANEOUS ONCE
Status: COMPLETED | OUTPATIENT
Start: 2020-12-12 | End: 2020-12-12

## 2020-12-12 RX ORDER — ASCORBIC ACID 500 MG
500 TABLET ORAL 2 TIMES DAILY
COMMUNITY
Start: 2020-12-12

## 2020-12-12 RX ORDER — AZITHROMYCIN 500 MG/1
500 TABLET, FILM COATED ORAL DAILY
Qty: 5 TABLET | Refills: 0 | Status: SHIPPED | OUTPATIENT
Start: 2020-12-12 | End: 2020-12-17

## 2020-12-12 RX ADMIN — MUPIROCIN: 20 OINTMENT TOPICAL at 09:12

## 2020-12-12 RX ADMIN — IPRATROPIUM BROMIDE AND ALBUTEROL 1 PUFF: 20; 100 SPRAY, METERED RESPIRATORY (INHALATION) at 10:12

## 2020-12-12 RX ADMIN — NEPHROCAP 1 CAPSULE: 1 CAP ORAL at 09:12

## 2020-12-12 RX ADMIN — ASPIRIN 81 MG: 81 TABLET, COATED ORAL at 09:12

## 2020-12-12 RX ADMIN — SEVELAMER CARBONATE 800 MG: 800 TABLET, FILM COATED ORAL at 09:12

## 2020-12-12 RX ADMIN — DEXAMETHASONE 6 MG: 4 TABLET ORAL at 09:12

## 2020-12-12 RX ADMIN — PRAVASTATIN SODIUM 40 MG: 20 TABLET ORAL at 09:12

## 2020-12-12 RX ADMIN — CITALOPRAM HYDROBROMIDE 20 MG: 20 TABLET ORAL at 09:12

## 2020-12-12 RX ADMIN — CEFTRIAXONE 1 G: 1 INJECTION, SOLUTION INTRAVENOUS at 08:12

## 2020-12-12 RX ADMIN — THERA TABS 1 TABLET: TAB at 09:12

## 2020-12-12 RX ADMIN — OXYCODONE HYDROCHLORIDE AND ACETAMINOPHEN 500 MG: 500 TABLET ORAL at 09:12

## 2020-12-12 RX ADMIN — METOPROLOL SUCCINATE 25 MG: 25 TABLET, EXTENDED RELEASE ORAL at 09:12

## 2020-12-12 RX ADMIN — Medication 1000 UNITS: at 09:12

## 2020-12-12 RX ADMIN — EPOETIN ALFA-EPBX 10000 UNITS: 10000 INJECTION, SOLUTION INTRAVENOUS; SUBCUTANEOUS at 04:12

## 2020-12-12 RX ADMIN — APIXABAN 5 MG: 2.5 TABLET, FILM COATED ORAL at 09:12

## 2020-12-12 RX ADMIN — PANTOPRAZOLE SODIUM 40 MG: 40 TABLET, DELAYED RELEASE ORAL at 09:12

## 2020-12-12 RX ADMIN — REMDESIVIR 100 MG: 100 INJECTION, POWDER, LYOPHILIZED, FOR SOLUTION INTRAVENOUS at 06:12

## 2020-12-12 RX ADMIN — HEPARIN SODIUM 2000 UNITS: 1000 INJECTION, SOLUTION INTRAVENOUS; SUBCUTANEOUS at 04:12

## 2020-12-12 NOTE — SIGNIFICANT EVENT
Responding to deterioration alert. Patient has tachypnea, but in no distress. Remains on 2L and oxygen saturation is maintaining. Will plant to discharge after HD and oxygen delivery. Will not transfer to ICU

## 2020-12-12 NOTE — DISCHARGE SUMMARY
[facility] Hospital Medicine  Discharge Summary      Patient Name: Cordell Angulo  MRN: [id number]  Admission Date: 12/10/2020  Hospital Length of Stay: 2 days  Discharge Date and Time:  12/12/2020 5:34 PM  Attending Physician: Dylan Guardado, *   Discharging Provider: Kasandra Farley NP  Primary Care Provider: Chuck Grider MD      HPI:   The patient is a 54 yo female with ESRD -on HD, Asthma, PAF, HTN, DM, HLD, depression, and morbid obesity -BMI 40 who presented to ED with worsening COVID-19 symptoms. Pt tested positive for COVID-19 on 12/3/20. Since then, the pt reports worsening Fever, SOB, cough, generalized weakness and fatigue,N/V, and loss of taste and smell. Pt reports she was unable to walk but a few steps.   In the ED, Temp 103F, O2sats 90%, CXR showed progressive bilateral ground glass opacities, Trop 0.2, EKG showed no change from previous. procalcitonin 3.56 (dialysis pt)    * No surgery found *      Hospital Course:   Cordell Angulo is a 55 year old female who was admitted to [facility] for COVID 19 complicated  With pneumonia. She was treated with IV Remdesivir, dexamethasone, ascorbic acid, and MVI. Required supplemental oxygen. Was able to wean from 4L to 2L. Currently has exertional fatigue and dizziness. Reassured this will improve slowly over time. She qualifies for home oxygen per desat study. She will continue Azithromycin x 5 days. Was asked to follow up with PCP in 3 days.     Consults:   Consults (From admission, onward)        Status Ordering Provider     Inpatient consult to Cardiology  Once     Provider:  (Not yet assigned)    Completed RAJNI POST     Inpatient consult to Social Work  Once     Provider:  (Not yet assigned)    Completed KASANDRA FARLEY          No new Assessment & Plan notes have been filed under this hospital service since the last note was generated.  Service: Hospital Medicine    Final Active Diagnoses:    Diagnosis  "Date Noted POA    PRINCIPAL PROBLEM:  Pneumonia due to COVID-19 virus [U07.1, J12.89] 12/10/2020 Yes    Acute respiratory failure with hypoxia [J96.01] 12/26/2018 Yes    PAF (paroxysmal atrial fibrillation) [I48.0] 11/28/2018 Yes     Chronic    Chronic diastolic congestive heart failure [I50.32] 10/18/2015 Yes     Chronic    Essential hypertension [I10] 03/21/2015 Yes     Chronic    Type 2 diabetes mellitus, with long-term current use of insulin [E11.9, Z79.4] 03/21/2015 Not Applicable     Chronic    ESRD (end stage renal disease) on dialysis [N18.6, Z99.2] 03/21/2015 Not Applicable      Problems Resolved During this Admission:       Discharged Condition: stable    Disposition: Home or Self Care    Follow Up:    Patient Instructions:      OXYGEN FOR HOME USE     Order Specific Question Answer Comments   Liter Flow 2    Duration Continuous    Qualifying SpO2: 87    Testing done at: Rest    Route nasal cannula    Portable mode: pulse dose acceptable    Device home concentrator with portable unit    Height: 5' 7" (1.702 m)    Weight: 105.7 kg (233 lb 0.4 oz)    Does patient have medical equipment at home? none    Alternative treatment measures have been tried or considered and deemed clinically ineffective. Yes      COVID-19 Surveillance Program     Order Specific Question Answer Comments   Does patient have a smartphone? Yes    Does patient have the [facility] on their smartphone? No    While in surveillance program, will patient be using home oxygen? Yes        Significant Diagnostic Studies: Labs:   CMP   Recent Labs   Lab 12/11/20  0718 12/12/20  0706    131*   K 4.5 5.2*   CL 97 93*   CO2 25 21*   * 145*   BUN 31* 38*   CREATININE 9.4* 7.8*   CALCIUM 7.7* 7.7*   PROT 7.3 7.6   ALBUMIN 2.4* 2.4*   BILITOT 0.4 0.3   ALKPHOS 98 91   AST 29 29   ALT 16 13   ANIONGAP 14 17*   ESTGFRAFRICA 5* 6*   EGFRNONAA 4* 5*    and CBC   Recent Labs   Lab 12/11/20  0718 12/12/20  0706   WBC 10.24 13.74*   HGB " 11.0* 10.5*   HCT 36.5* 35.2*    275       Pending Diagnostic Studies:     None         Medications:  Reconciled Home Medications:      Medication List      START taking these medications    ascorbic acid (vitamin C) 500 MG tablet  Commonly known as: VITAMIN C  Take 1 tablet (500 mg total) by mouth 2 (two) times daily.     azithromycin 500 MG tablet  Commonly known as: ZITHROMAX  Take 1 tablet (500 mg total) by mouth once daily. for 5 days     multivitamin Tab  Take 1 tablet by mouth once daily.  Start taking on: December 13, 2020     pulse oximeter device  Commonly known as: pulse oximeter  by Apply Externally route 2 (two) times a day. Use twice daily at 8 AM and 3 PM and record the value in [employer] as directed.        CONTINUE taking these medications    albuterol 90 mcg/actuation inhaler  Commonly known as: PROVENTIL/VENTOLIN HFA  Inhale 1-2 puffs into the lungs every 6 (six) hours as needed for Wheezing.     ALPRAZolam 0.5 MG tablet  Commonly known as: XANAX  Take 0.5 mg by mouth.     aspirin 81 MG EC tablet  Commonly known as: ECOTRIN  Take 1 tablet (81 mg total) by mouth once daily.     AURYXIA ORAL  Take by mouth. 2 tabs with every meal     BASAGLAR KWIKPEN U-100 INSULIN glargine 100 units/mL (3mL) SubQ pen  Generic drug: insulin  Inject 15 Units into the skin.     benzocaine 10 % Oint  Apply 1 application topically 3 (three) times daily as needed. Apply to left ear for pain     citalopram 20 MG tablet  Commonly known as: CELEXA  Take 1 tablet (20 mg total) by mouth once daily.     ELIQUIS 5 mg Tab  Generic drug: apixaban  TK 1 T PO BID     lisinopriL 40 MG tablet  Commonly known as: PRINIVIL,ZESTRIL  Take 40 mg by mouth once daily.     metoprolol succinate 25 MG 24 hr tablet  Commonly known as: TOPROL-XL  Take 1 tablet (25 mg total) by mouth once daily.     pantoprazole 40 MG tablet  Commonly known as: PROTONIX  Take 40 mg by mouth.     pravastatin 40 MG tablet  Commonly known as: PRAVACHOL  Take 40  mg by mouth once daily.     sevelamer carbonate 800 mg Tab  Commonly known as: RENVELA  TK 2 TS PO TID WITH MEALS     sucralfate 100 mg/mL suspension  Commonly known as: CARAFATE  SHAKE LIQUID AND TAKE 10 ML BY MOUTH FOUR TIMES DAILY     VELPHORO 500 mg Chew  Generic drug: sucroferric oxyhydroxide  CHEW & SWALLOW 1 TABLET BY MOUTH THREE TIMES A DAY WITH MEALS AND 1 TABLET TWO TIMES A DAY WITH SNACKS     * vitamin renal formula (B-complex-vitamin c-folic acid) 1 mg Cap  Commonly known as: NEPHROCAP  Take 1 capsule by mouth once daily.     * vitamin renal formula (B-complex-vitamin c-folic acid) 1 mg Cap  Commonly known as: NEPHROCAP  Take 1 capsule by mouth.         * This list has 2 medication(s) that are the same as other medications prescribed for you. Read the directions carefully, and ask your doctor or other care provider to review them with you.            STOP taking these medications    diazePAM 5 MG tablet  Commonly known as: VALIUM            Indwelling Lines/Drains at time of discharge:   Lines/Drains/Airways     Drain                 Hemodialysis AV Fistula Left upper arm -- days                Time spent on the discharge of patient: >35 minutes  Patient was seen and examined on the date of discharge and determined to be suitable for discharge.      Ирина Farley NP  Department of Hospital Medicine  [facility]

## 2020-12-12 NOTE — PROGRESS NOTES
Home Oxygen Evaluation    Date Performed: 12/12/2020    1) Patient's Home O2 Sat on room air, while at rest: 87        If O2 sats on room air at rest are 88% or below, patient qualifies. No additional testing needed. Document N/A in steps 2 and 3. If 89% or above, complete steps 2.      2) Patient's O2 Sat on room air while exercising: na        If O2 sats on room air while exercising remain 89% or above patient does not qualify, no further testing needed Document N/A in step 3. If O2 sats on room air while exercising are 88% or below, continue to step 3.      3) Patient's O2 Sat while exercising on O2: na at na LPM         (Must show improvement from #2 for patients to qualify)    If O2 sats improve on oxygen, patient qualifies for portable oxygen. If not, the patient does not qualify.

## 2020-12-12 NOTE — PLAN OF CARE
AAO X4. VSS. NSR on monitor. Oxygen at 3 L per NC. IV saline locked. Abx as scheduled. AV fistula positive for bruit and thrill. Ambulation as tolerated with stand by assistance. Glucose monitoring AC&HS.  Fall precautions in place, call bell in reach, bed in low and locked position.   POC discussed w/, verbalized understanding. Will continue to monitor.

## 2020-12-12 NOTE — HOSPITAL COURSE
Cordell Angulo is a 55 year old female who was admitted to [facility] for COVID 19 complicated  With pneumonia. She was treated with IV Remdesivir, dexamethasone, ascorbic acid, and MVI. Required supplemental oxygen. Was able to wean from 4L to 2L. Currently has exertional fatigue ambulating to bathroom. Will need BSC. Discharge orders held as family is concerned that she will be home alone and is seeking possible placement. Will place order for PT/OT and will re-evaluate in AM. Her oxygen levels have remained stable on 2L. HD was terminated early due to hypotension. She was dialyzed on 11/11/20 as well. Will reassess in AM.  As of 12/13 Pt was suppose to be discharged home yesterday but patient and family were concerend about pateint going home on oxygen. She is currently on 3LPM. PT/OT recommendations pending. Pt reports feeling slightly improved today.     As of 12/14/2020: Patient was evaluated at bedside and deemed stable for discharge, pending PT evaluation. She verbalized understanding regarding the Covid-19 surveillance program. Dexamethasone tabs and pulse oximetry monitor will be delivered to the bedside by [facility] pharmacy. O2 will also be delivered.

## 2020-12-12 NOTE — CARE UPDATE
Deep breathing and 5-10 second breath holds done in place of 10 IS efforts due to decrease effective efforts.

## 2020-12-12 NOTE — ASSESSMENT & PLAN NOTE
1. ESRD on HD , MWF , originally at [facility], [facility] , now TTS at [facility] due to COVID infection,      Plan HD tx today to get back in to her schedule,     2.  Hypertension, blood pressure is controlled, continue home blood pressure medications,    3.  Anemia of chronic kidney disease, continue Procrit with dialysis as indicated    4. SHPT,  Continue low phosphorus diet, Velphoro     5.  COVID pneumonia, management per medicine team    6.  Disposition, discharge home when clinically stable

## 2020-12-12 NOTE — NURSING
Pt 2 1/2 hour treatment ended in 2 hrs due to drop in BP.   Dr Gardner was notified. Orders were given to turn off UF and to  cut treatment time to 2 hrs.  Gave Epoetin 49672P and Heparin 2000U during treatment.    Left AVF accessed and function well.   Net removal of 653mls.

## 2020-12-12 NOTE — CARE UPDATE
IS technique re educated. PT also encouraged to positon change frequently and prone when possible.

## 2020-12-12 NOTE — SUBJECTIVE & OBJECTIVE
Interval History: Pt seen at HD, tolerating well, c/o back pain ,     Review of patient's allergies indicates:   Allergen Reactions    Morphine Itching    Sulfa (sulfonamide antibiotics) Rash     Current Facility-Administered Medications   Medication Frequency    acetaminophen tablet 650 mg Q8H PRN    apixaban tablet 5 mg BID    ascorbic acid (vitamin C) tablet 500 mg BID    aspirin EC tablet 81 mg Daily    azithromycin 500 mg in dextrose 5 % 250 mL IVPB (ready to mix system) Q24H    cefTRIAXone (ROCEPHIN) 1 g/50 mL D5W IVPB Q24H    citalopram tablet 20 mg Daily    dexAMETHasone tablet 6 mg Daily    dextrose 50% injection 12.5 g PRN    dextrose 50% injection 25 g PRN    epoetin devin-epbx injection 10,000 Units Once    glucagon (human recombinant) injection 1 mg PRN    glucose chewable tablet 16 g PRN    glucose chewable tablet 24 g PRN    heparin (porcine) injection 2,000 Units Once    insulin aspart U-100 pen 0-5 Units QID (AC + HS) PRN    ipratropium-albuteroL inhaler 1 puff Q6H PRN    metoprolol succinate (TOPROL-XL) 24 hr tablet 25 mg Daily    multivitamin tablet Daily    mupirocin 2 % ointment BID    ondansetron injection 4 mg Q8H PRN    pantoprazole EC tablet 40 mg Daily    pravastatin tablet 40 mg Daily    remdesivir 100 mg in sodium chloride 0.9% 250 mL infusion Q24H    sevelamer carbonate tablet 800 mg TID WM    sodium chloride 0.9% flush 10 mL PRN    sucroferric oxyhydroxide Chew 500 mg TID WM    vitamin D 1000 units tablet 1,000 Units Daily    vitamin renal formula (B-complex-vitamin c-folic acid) 1 mg per capsule 1 capsule Daily       Objective:     Vital Signs (Most Recent):  Temp: 99 °F (37.2 °C)(recheck) (12/12/20 1312)  Pulse: 87 (12/12/20 1300)  Resp: (!) 24 (12/12/20 1312)  BP: (!) 100/58 (12/12/20 1203)  SpO2: (!) 93 % (12/12/20 1312)  O2 Device (Oxygen Therapy): nasal cannula (12/12/20 1312) Vital Signs (24h Range):  Temp:  [97.6 °F (36.4 °C)-99.8 °F (37.7 °C)] 99  °F (37.2 °C)  Pulse:  [] 87  Resp:  [16-28] 24  SpO2:  [87 %-98 %] 93 %  BP: (100-116)/(58-70) 100/58     Weight: 105.7 kg (233 lb 0.4 oz) (12/12/20 0413)  Body mass index is 36.5 kg/m².  Body surface area is 2.24 meters squared.    I/O last 3 completed shifts:  In: 1980 [P.O.:480; I.V.:250; Other:500; IV Piggyback:750]  Out: 2000 [Other:2000]    Physical Exam       PE not done due to COVID isolation     Significant Labs:    CBC:   Recent Labs   Lab 12/12/20  0706   WBC 13.74*   RBC 3.97*   HGB 10.5*   HCT 35.2*      MCV 89   MCH 26.4*   MCHC 29.8*     CMP:   Recent Labs   Lab 12/12/20  0706   *   CALCIUM 7.7*   ALBUMIN 2.4*   PROT 7.6   *   K 5.2*   CO2 21*   CL 93*   BUN 38*   CREATININE 7.8*   ALKPHOS 91   ALT 13   AST 29   BILITOT 0.3     Coagulation:   Recent Labs   Lab 12/10/20  0837   INR 1.1   APTT 36.7*     LFTs:   Recent Labs   Lab 12/12/20  0706   ALT 13   AST 29   ALKPHOS 91   BILITOT 0.3   PROT 7.6   ALBUMIN 2.4*     All labs within the past 24 hours have been reviewed.     Significant Imaging:  Reviewed    Lab Results   Component Value Date    .0 (H) 05/08/2015    CALCIUM 7.7 (L) 12/12/2020    PHOS 4.8 (H) 12/12/2020       Lab Results   Component Value Date    ALBUMIN 2.4 (L) 12/12/2020

## 2020-12-12 NOTE — PROGRESS NOTES
[facility] -   Nephrology  Progress Note    Patient Name: Cordell Angulo  MRN: [id number]  Admission Date: 12/10/2020  Hospital Length of Stay: 2 days  Attending Provider: Dylan Guardado, *   Primary Care Physician: Chuck Grider MD  Principal Problem:Pneumonia due to COVID-19 virus    Subjective:     HPI: Patient is a 55-year-old female with ESRD on hemodialysis on Monday-Wed- Friday schedule in [facility] under my care.  Patient has recently become COVID-19 positive.  Patient since then has [place] [facility] [place] on a Jyzkxcx-Fkljbmrv-Sdcfpeqx schedule.  Last dialysis was short 2 hours  Patient comes to the hospital in the emergency room with short worsening shortness of breath.  Patient seen and evaluated at bedside.         Interval History: Pt seen at HD, tolerating well, c/o back pain ,     Review of patient's allergies indicates:   Allergen Reactions    Morphine Itching    Sulfa (sulfonamide antibiotics) Rash     Current Facility-Administered Medications   Medication Frequency    acetaminophen tablet 650 mg Q8H PRN    apixaban tablet 5 mg BID    ascorbic acid (vitamin C) tablet 500 mg BID    aspirin EC tablet 81 mg Daily    azithromycin 500 mg in dextrose 5 % 250 mL IVPB (ready to mix system) Q24H    cefTRIAXone (ROCEPHIN) 1 g/50 mL D5W IVPB Q24H    citalopram tablet 20 mg Daily    dexAMETHasone tablet 6 mg Daily    dextrose 50% injection 12.5 g PRN    dextrose 50% injection 25 g PRN    epoetin devin-epbx injection 10,000 Units Once    glucagon (human recombinant) injection 1 mg PRN    glucose chewable tablet 16 g PRN    glucose chewable tablet 24 g PRN    heparin (porcine) injection 2,000 Units Once    insulin aspart U-100 pen 0-5 Units QID (AC + HS) PRN    ipratropium-albuteroL inhaler 1 puff Q6H PRN    metoprolol succinate (TOPROL-XL) 24 hr tablet 25 mg Daily    multivitamin tablet Daily    mupirocin 2 % ointment BID    ondansetron injection  4 mg Q8H PRN    pantoprazole EC tablet 40 mg Daily    pravastatin tablet 40 mg Daily    remdesivir 100 mg in sodium chloride 0.9% 250 mL infusion Q24H    sevelamer carbonate tablet 800 mg TID WM    sodium chloride 0.9% flush 10 mL PRN    sucroferric oxyhydroxide Chew 500 mg TID WM    vitamin D 1000 units tablet 1,000 Units Daily    vitamin renal formula (B-complex-vitamin c-folic acid) 1 mg per capsule 1 capsule Daily       Objective:     Vital Signs (Most Recent):  Temp: 99 °F (37.2 °C)(recheck) (12/12/20 1312)  Pulse: 87 (12/12/20 1300)  Resp: (!) 24 (12/12/20 1312)  BP: (!) 100/58 (12/12/20 1203)  SpO2: (!) 93 % (12/12/20 1312)  O2 Device (Oxygen Therapy): nasal cannula (12/12/20 1312) Vital Signs (24h Range):  Temp:  [97.6 °F (36.4 °C)-99.8 °F (37.7 °C)] 99 °F (37.2 °C)  Pulse:  [] 87  Resp:  [16-28] 24  SpO2:  [87 %-98 %] 93 %  BP: (100-116)/(58-70) 100/58     Weight: 105.7 kg (233 lb 0.4 oz) (12/12/20 0413)  Body mass index is 36.5 kg/m².  Body surface area is 2.24 meters squared.    I/O last 3 completed shifts:  In: 1980 [P.O.:480; I.V.:250; Other:500; IV Piggyback:750]  Out: 2000 [Other:2000]    Physical Exam       PE not done due to COVID isolation     Significant Labs:    CBC:   Recent Labs   Lab 12/12/20  0706   WBC 13.74*   RBC 3.97*   HGB 10.5*   HCT 35.2*      MCV 89   MCH 26.4*   MCHC 29.8*     CMP:   Recent Labs   Lab 12/12/20  0706   *   CALCIUM 7.7*   ALBUMIN 2.4*   PROT 7.6   *   K 5.2*   CO2 21*   CL 93*   BUN 38*   CREATININE 7.8*   ALKPHOS 91   ALT 13   AST 29   BILITOT 0.3     Coagulation:   Recent Labs   Lab 12/10/20  0837   INR 1.1   APTT 36.7*     LFTs:   Recent Labs   Lab 12/12/20  0706   ALT 13   AST 29   ALKPHOS 91   BILITOT 0.3   PROT 7.6   ALBUMIN 2.4*     All labs within the past 24 hours have been reviewed.     Significant Imaging:  Reviewed    Lab Results   Component Value Date    .0 (H) 05/08/2015    CALCIUM 7.7 (L) 12/12/2020    PHOS 4.8  (H) 12/12/2020       Lab Results   Component Value Date    ALBUMIN 2.4 (L) 12/12/2020           Assessment/Plan:     ESRD (end stage renal disease) on dialysis  1. ESRD on HD , MWF , originally at [facility], ROSITA Mckeon , now TTS at [facility] due to COVID infection,      Plan HD tx today to get back in to her schedule,     2.  Hypertension, blood pressure is controlled, continue home blood pressure medications,    3.  Anemia of chronic kidney disease, continue Procrit with dialysis as indicated    4. SHPT,  Continue low phosphorus diet, Velphoro     5.  COVID pneumonia, management per medicine team    6.  Disposition, discharge home when clinically stable        Thank you for your consult. I will follow-up with patient. Please contact us if you have any additional questions.     Total time spent 30 minutes including time needed to review the records,  patient  evaluation, documentation, face-to-face discussion with the patient,    Primary team, more than 50% of the time was spent on coordination of care and counseling.       Donovan Gardner MD  Nephrology  [facility]

## 2020-12-13 LAB
ALBUMIN SERPL BCP-MCNC: 2.3 G/DL (ref 3.5–5.2)
ALP SERPL-CCNC: 83 U/L (ref 55–135)
ALT SERPL W/O P-5'-P-CCNC: 11 U/L (ref 10–44)
ANION GAP SERPL CALC-SCNC: 17 MMOL/L (ref 8–16)
AST SERPL-CCNC: 25 U/L (ref 10–40)
BASOPHILS # BLD AUTO: 0.05 K/UL (ref 0–0.2)
BASOPHILS NFR BLD: 0.4 % (ref 0–1.9)
BILIRUB SERPL-MCNC: 0.4 MG/DL (ref 0.1–1)
BUN SERPL-MCNC: 48 MG/DL (ref 6–20)
CALCIUM SERPL-MCNC: 8 MG/DL (ref 8.7–10.5)
CHLORIDE SERPL-SCNC: 95 MMOL/L (ref 95–110)
CO2 SERPL-SCNC: 21 MMOL/L (ref 23–29)
CREAT SERPL-MCNC: 7.5 MG/DL (ref 0.5–1.4)
DIFFERENTIAL METHOD: ABNORMAL
EOSINOPHIL # BLD AUTO: 0 K/UL (ref 0–0.5)
EOSINOPHIL NFR BLD: 0 % (ref 0–8)
ERYTHROCYTE [DISTWIDTH] IN BLOOD BY AUTOMATED COUNT: 15.2 % (ref 11.5–14.5)
EST. GFR  (AFRICAN AMERICAN): 6 ML/MIN/1.73 M^2
EST. GFR  (NON AFRICAN AMERICAN): 5 ML/MIN/1.73 M^2
GLUCOSE SERPL-MCNC: 151 MG/DL (ref 70–110)
HCT VFR BLD AUTO: 36.6 % (ref 37–48.5)
HGB BLD-MCNC: 11.1 G/DL (ref 12–16)
IMM GRANULOCYTES # BLD AUTO: 0.57 K/UL (ref 0–0.04)
IMM GRANULOCYTES NFR BLD AUTO: 4 % (ref 0–0.5)
LYMPHOCYTES # BLD AUTO: 0.8 K/UL (ref 1–4.8)
LYMPHOCYTES NFR BLD: 5.6 % (ref 18–48)
MAGNESIUM SERPL-MCNC: 2.3 MG/DL (ref 1.6–2.6)
MCH RBC QN AUTO: 26.1 PG (ref 27–31)
MCHC RBC AUTO-ENTMCNC: 30.3 G/DL (ref 32–36)
MCV RBC AUTO: 86 FL (ref 82–98)
MONOCYTES # BLD AUTO: 0.6 K/UL (ref 0.3–1)
MONOCYTES NFR BLD: 4.3 % (ref 4–15)
NEUTROPHILS # BLD AUTO: 12.1 K/UL (ref 1.8–7.7)
NEUTROPHILS NFR BLD: 85.7 % (ref 38–73)
NRBC BLD-RTO: 0 /100 WBC
PHOSPHATE SERPL-MCNC: 4.7 MG/DL (ref 2.7–4.5)
PLATELET # BLD AUTO: 346 K/UL (ref 150–350)
PMV BLD AUTO: 9.4 FL (ref 9.2–12.9)
POCT GLUCOSE: 164 MG/DL (ref 70–110)
POCT GLUCOSE: 203 MG/DL (ref 70–110)
POCT GLUCOSE: 235 MG/DL (ref 70–110)
POCT GLUCOSE: 270 MG/DL (ref 70–110)
POTASSIUM SERPL-SCNC: 4.7 MMOL/L (ref 3.5–5.1)
PROT SERPL-MCNC: 7.3 G/DL (ref 6–8.4)
RBC # BLD AUTO: 4.25 M/UL (ref 4–5.4)
SODIUM SERPL-SCNC: 133 MMOL/L (ref 136–145)
WBC # BLD AUTO: 14.14 K/UL (ref 3.9–12.7)

## 2020-12-13 PROCEDURE — 99232 PR SUBSEQUENT HOSPITAL CARE,LEVL II: ICD-10-PCS | Mod: ,,, | Performed by: INTERNAL MEDICINE

## 2020-12-13 PROCEDURE — 96372 THER/PROPH/DIAG INJ SC/IM: CPT

## 2020-12-13 PROCEDURE — 84100 ASSAY OF PHOSPHORUS: CPT

## 2020-12-13 PROCEDURE — 27000221 HC OXYGEN, UP TO 24 HOURS

## 2020-12-13 PROCEDURE — 25000003 PHARM REV CODE 250: Performed by: NURSE PRACTITIONER

## 2020-12-13 PROCEDURE — 99900038 HC OT GENERIC THERAPY SCREENING (STAT)

## 2020-12-13 PROCEDURE — 36415 COLL VENOUS BLD VENIPUNCTURE: CPT

## 2020-12-13 PROCEDURE — 83735 ASSAY OF MAGNESIUM: CPT

## 2020-12-13 PROCEDURE — 80053 COMPREHEN METABOLIC PANEL: CPT

## 2020-12-13 PROCEDURE — 94799 UNLISTED PULMONARY SVC/PX: CPT

## 2020-12-13 PROCEDURE — 99232 SBSQ HOSP IP/OBS MODERATE 35: CPT | Mod: ,,, | Performed by: INTERNAL MEDICINE

## 2020-12-13 PROCEDURE — 25000003 PHARM REV CODE 250: Performed by: INTERNAL MEDICINE

## 2020-12-13 PROCEDURE — 21400001 HC TELEMETRY ROOM

## 2020-12-13 PROCEDURE — 99900035 HC TECH TIME PER 15 MIN (STAT)

## 2020-12-13 PROCEDURE — 94761 N-INVAS EAR/PLS OXIMETRY MLT: CPT

## 2020-12-13 PROCEDURE — 85025 COMPLETE CBC W/AUTO DIFF WBC: CPT

## 2020-12-13 PROCEDURE — 63600175 PHARM REV CODE 636 W HCPCS: Performed by: NURSE PRACTITIONER

## 2020-12-13 RX ADMIN — REMDESIVIR 100 MG: 100 INJECTION, POWDER, LYOPHILIZED, FOR SOLUTION INTRAVENOUS at 04:12

## 2020-12-13 RX ADMIN — METOPROLOL SUCCINATE 25 MG: 25 TABLET, EXTENDED RELEASE ORAL at 09:12

## 2020-12-13 RX ADMIN — ASPIRIN 81 MG: 81 TABLET, COATED ORAL at 09:12

## 2020-12-13 RX ADMIN — CEFTRIAXONE 1 G: 1 INJECTION, SOLUTION INTRAVENOUS at 09:12

## 2020-12-13 RX ADMIN — OXYCODONE HYDROCHLORIDE AND ACETAMINOPHEN 500 MG: 500 TABLET ORAL at 09:12

## 2020-12-13 RX ADMIN — SEVELAMER CARBONATE 800 MG: 800 TABLET, FILM COATED ORAL at 09:12

## 2020-12-13 RX ADMIN — PANTOPRAZOLE SODIUM 40 MG: 40 TABLET, DELAYED RELEASE ORAL at 09:12

## 2020-12-13 RX ADMIN — APIXABAN 5 MG: 2.5 TABLET, FILM COATED ORAL at 09:12

## 2020-12-13 RX ADMIN — INSULIN ASPART 2 UNITS: 100 INJECTION, SOLUTION INTRAVENOUS; SUBCUTANEOUS at 11:12

## 2020-12-13 RX ADMIN — Medication 1000 UNITS: at 09:12

## 2020-12-13 RX ADMIN — DEXAMETHASONE 6 MG: 4 TABLET ORAL at 09:12

## 2020-12-13 RX ADMIN — NEPHROCAP 1 CAPSULE: 1 CAP ORAL at 09:12

## 2020-12-13 RX ADMIN — MUPIROCIN: 20 OINTMENT TOPICAL at 09:12

## 2020-12-13 RX ADMIN — AZITHROMYCIN MONOHYDRATE 500 MG: 500 INJECTION, POWDER, LYOPHILIZED, FOR SOLUTION INTRAVENOUS at 12:12

## 2020-12-13 RX ADMIN — CITALOPRAM HYDROBROMIDE 20 MG: 20 TABLET ORAL at 09:12

## 2020-12-13 RX ADMIN — PRAVASTATIN SODIUM 40 MG: 20 TABLET ORAL at 09:12

## 2020-12-13 RX ADMIN — THERA TABS 1 TABLET: TAB at 09:12

## 2020-12-13 RX ADMIN — INSULIN ASPART 3 UNITS: 100 INJECTION, SOLUTION INTRAVENOUS; SUBCUTANEOUS at 05:12

## 2020-12-13 RX ADMIN — INSULIN ASPART 1 UNITS: 100 INJECTION, SOLUTION INTRAVENOUS; SUBCUTANEOUS at 09:12

## 2020-12-13 NOTE — ASSESSMENT & PLAN NOTE
Oxygen -RT to titrate as needed    12/12/2020   Currently down to 2L and doing well    12/13  Currently on 3LPM

## 2020-12-13 NOTE — SUBJECTIVE & OBJECTIVE
Interval History: pt seen and examined. PT/OT recs pending.    Review of Systems   Constitutional: Positive for fatigue (exertional). Negative for activity change, appetite change, chills, diaphoresis, fever and unexpected weight change.   HENT: Positive for congestion. Negative for nosebleeds, sinus pressure and sore throat.         Loss of taste and smell    Eyes: Negative for pain, discharge and visual disturbance.   Respiratory: Positive for cough, shortness of breath and wheezing. Negative for chest tightness and stridor.    Cardiovascular: Negative for chest pain, palpitations and leg swelling.   Gastrointestinal: Negative for abdominal distention, abdominal pain, blood in stool, constipation, diarrhea, nausea and vomiting.   Endocrine: Negative for cold intolerance and heat intolerance.   Genitourinary: Negative for difficulty urinating, dysuria, flank pain, frequency and urgency.   Musculoskeletal: Positive for myalgias. Negative for arthralgias, back pain, joint swelling, neck pain and neck stiffness.   Skin: Negative for rash and wound.   Allergic/Immunologic: Negative for food allergies and immunocompromised state.   Neurological: Positive for weakness (generalized). Negative for dizziness, seizures, syncope, facial asymmetry, speech difficulty, light-headedness, numbness and headaches.   Hematological: Negative for adenopathy.   Psychiatric/Behavioral: Negative for agitation, confusion and hallucinations.     Objective:     Vital Signs (Most Recent):  Temp: 98.2 °F (36.8 °C) (12/13/20 1145)  Pulse: 82 (12/13/20 1145)  Resp: 20 (12/13/20 1145)  BP: (!) 117/56 (12/13/20 1145)  SpO2: (!) 94 % (12/13/20 1145) Vital Signs (24h Range):  Temp:  [98 °F (36.7 °C)-99 °F (37.2 °C)] 98.2 °F (36.8 °C)  Pulse:  [70-94] 82  Resp:  [18-22] 20  SpO2:  [90 %-94 %] 94 %  BP: ()/(47-70) 117/56     Weight: 106 kg (233 lb 11 oz)  Body mass index is 36.6 kg/m².    Intake/Output Summary (Last 24 hours) at 12/13/2020  1547  Last data filed at 12/13/2020 0453  Gross per 24 hour   Intake 1910 ml   Output 1353 ml   Net 557 ml      Physical Exam  Vitals signs and nursing note reviewed.   Constitutional:       General: She is not in acute distress.     Appearance: She is well-developed. She is obese. She is not diaphoretic.   HENT:      Head: Normocephalic and atraumatic.      Nose: Nose normal.   Eyes:      General: No scleral icterus.     Conjunctiva/sclera: Conjunctivae normal.   Neck:      Musculoskeletal: Normal range of motion and neck supple.      Trachea: No tracheal deviation.   Cardiovascular:      Rate and Rhythm: Normal rate and regular rhythm.      Heart sounds: Normal heart sounds. No murmur. No friction rub. No gallop.    Pulmonary:      Effort: Pulmonary effort is normal. No respiratory distress.      Breath sounds: No stridor. No wheezing.   Chest:      Chest wall: No tenderness.   Abdominal:      General: Bowel sounds are normal. There is no distension.      Palpations: Abdomen is soft. There is no mass.      Tenderness: There is no abdominal tenderness. There is no guarding or rebound.   Musculoskeletal: Normal range of motion.         General: No tenderness or deformity.   Skin:     General: Skin is warm and dry.      Coloration: Skin is not pale.      Findings: No erythema or rash.   Neurological:      Mental Status: She is alert and oriented to person, place, and time.      Cranial Nerves: No cranial nerve deficit.      Motor: No abnormal muscle tone.      Coordination: Coordination normal.   Psychiatric:         Behavior: Behavior normal.         Thought Content: Thought content normal.         Significant Labs: All pertinent labs within the past 24 hours have been reviewed.    Significant Imaging: I have reviewed all pertinent imaging results/findings within the past 24 hours.

## 2020-12-13 NOTE — PLAN OF CARE
Pt vital signs stable, afebrile  Tele continue to monitor  Fall precautions in place.   No pain at this time  No PRN's given  Pt up in chair  OT evaluated patient  Pt to go home on 2L NC, continues to stay between 2-3 L.

## 2020-12-13 NOTE — ASSESSMENT & PLAN NOTE
- COVID-19 testing   - Infection Control notified     - Isolation:   - Airborne, Contact and Droplet Precautions  - Cohort patients into COVID units  - N95 mask, wear eye protection  - 20 second hand hygiene              - Limit visitors per hospital policy              - Consolidating lab draws, nursing care, provider visits, and interventions    - Diagnostics: (leukopenia, hyponatremia, hyperferritinemia, elevated troponin, elevated d-dimer, age, and comorbidities are significant predictors of poor clinical outcome)  CBC, CMP, Ferritin, CRP, Troponin, Blood Culture x2 and Portable CXR    - Management:  Supplemental O2 to maintain SpO2 >92%  Telemetry  Continuous/intermittent Pulse Ox  Albuterol treatment PRN    Remdesivir, Dexamethasone, IV Rocephin, IV Azithromycin     12/12/2020  Will place orders for PT/OT for possible placement.

## 2020-12-13 NOTE — PT/OT/SLP PROGRESS
Occupational Therapy      Patient Name:  Cordell Angulo   MRN:  [id number]    MD ORDER WAS RECEIVED FOR OT EVAL AND TREATMENT.  OT EVAL INITIATED VIA Epic CHART REVIEW.  WILL FOLLOW UP AT NEXT VISIT.     Lexii Andrade OT  12/13/2020

## 2020-12-13 NOTE — PROGRESS NOTES
[facility] -   Nephrology  Progress Note    Patient Name: Coredll Angulo  MRN: [id number]  Admission Date: 12/10/2020  Hospital Length of Stay: 3 days  Attending Provider: Dylan Guardado, *   Primary Care Physician: Chuck Grider MD  Principal Problem:Pneumonia due to COVID-19 virus    Subjective:     HPI: Patient is a 55-year-old female with ESRD on hemodialysis on Monday-Wed- Friday schedule in [facility] under my care.  Patient has recently become COVID-19 positive.  Patient since then has been moved [place] [facility] [place] on a A[place]hyat-Qudvmaqn-Judjyrff schedule.  Last dialysis was short 2 hours  Patient comes to the hospital in the emergency room with short worsening shortness of breath.  Patient seen and evaluated at bedside.         Interval History: d/c was held due to weakness and for PT/OT evaluation     Review of patient's allergies indicates:   Allergen Reactions    Morphine Itching    Sulfa (sulfonamide antibiotics) Rash     Current Facility-Administered Medications   Medication Frequency    acetaminophen tablet 650 mg Q8H PRN    apixaban tablet 5 mg BID    ascorbic acid (vitamin C) tablet 500 mg BID    aspirin EC tablet 81 mg Daily    azithromycin 500 mg in dextrose 5 % 250 mL IVPB (ready to mix system) Q24H    cefTRIAXone (ROCEPHIN) 1 g/50 mL D5W IVPB Q24H    citalopram tablet 20 mg Daily    dexAMETHasone tablet 6 mg Daily    dextrose 50% injection 12.5 g PRN    dextrose 50% injection 25 g PRN    glucagon (human recombinant) injection 1 mg PRN    glucose chewable tablet 16 g PRN    glucose chewable tablet 24 g PRN    insulin aspart U-100 pen 0-5 Units QID (AC + HS) PRN    ipratropium-albuteroL inhaler 1 puff Q6H PRN    metoprolol succinate (TOPROL-XL) 24 hr tablet 25 mg Daily    multivitamin tablet Daily    mupirocin 2 % ointment BID    ondansetron injection 4 mg Q8H PRN    pantoprazole EC tablet 40 mg Daily    pravastatin tablet 40 mg Daily     remdesivir 100 mg in sodium chloride 0.9% 250 mL infusion Q24H    sevelamer carbonate tablet 800 mg TID WM    sodium chloride 0.9% flush 10 mL PRN    sucroferric oxyhydroxide Chew 500 mg TID WM    vitamin D 1000 units tablet 1,000 Units Daily    vitamin renal formula (B-complex-vitamin c-folic acid) 1 mg per capsule 1 capsule Daily       Objective:     Vital Signs (Most Recent):  Temp: 98.2 °F (36.8 °C) (12/13/20 1145)  Pulse: 82 (12/13/20 1145)  Resp: 20 (12/13/20 1145)  BP: (!) 117/56 (12/13/20 1145)  SpO2: (!) 94 % (12/13/20 1145)  O2 Device (Oxygen Therapy): nasal cannula (12/13/20 0800) Vital Signs (24h Range):  Temp:  [98 °F (36.7 °C)-99 °F (37.2 °C)] 98.2 °F (36.8 °C)  Pulse:  [70-94] 82  Resp:  [18-22] 20  SpO2:  [90 %-94 %] 94 %  BP: ()/(47-70) 117/56     Weight: 106 kg (233 lb 11 oz) (12/13/20 0453)  Body mass index is 36.6 kg/m².  Body surface area is 2.24 meters squared.    I/O last 3 completed shifts:  In: 2520 [P.O.:1070; Other:700; IV Piggyback:750]  Out: 1353 [Other:1353]    Physical Exam       PE not done due to COVID isolation     Significant Labs:    CBC:   Recent Labs   Lab 12/13/20  0659   WBC 14.14*   RBC 4.25   HGB 11.1*   HCT 36.6*      MCV 86   MCH 26.1*   MCHC 30.3*     CMP:   Recent Labs   Lab 12/13/20  0659   *   CALCIUM 8.0*   ALBUMIN 2.3*   PROT 7.3   *   K 4.7   CO2 21*   CL 95   BUN 48*   CREATININE 7.5*   ALKPHOS 83   ALT 11   AST 25   BILITOT 0.4     Coagulation:   Recent Labs   Lab 12/10/20  0837   INR 1.1   APTT 36.7*     LFTs:   Recent Labs   Lab 12/13/20  0659   ALT 11   AST 25   ALKPHOS 83   BILITOT 0.4   PROT 7.3   ALBUMIN 2.3*     All labs within the past 24 hours have been reviewed.     Significant Imaging:  Reviewed    Lab Results   Component Value Date    .0 (H) 05/08/2015    CALCIUM 8.0 (L) 12/13/2020    PHOS 4.7 (H) 12/13/2020       Lab Results   Component Value Date    ALBUMIN 2.3 (L) 12/13/2020           Assessment/Plan:     ESRD  (end stage renal disease) on dialysis  1. ESRD on HD , MWF , originally at [facility], ROSITA Mckeon [facility] , now TTS at [facility] due to COVID infection,      Last HD was yesterday ,      2.  Hypertension, blood pressure is controlled, continue home blood pressure medications,    3.  Anemia of chronic kidney disease, continue Procrit with dialysis as indicated    4. SHPT,  Continue low phosphorus diet, Velphoro     5.  COVID pneumonia, management per medicine team    6.  Disposition, discharge  when clinically stable, PT/OT eval for disposition         Thank you for your consult. I will follow-up with patient. Please contact us if you have any additional questions.     Total time spent 30 minutes including time needed to review the records,  patient  evaluation, documentation, face-to-face discussion with the patient, primary team,    more than 50% of the time was spent on coordination of care and counseling.       Donovan Gardner MD  Nephrology  [facility]

## 2020-12-13 NOTE — DISCHARGE SUMMARY
[facility] Hospital Medicine  Discharge Summary      Patient Name: Cordell Angulo  MRN: [id number]  Admission Date: 12/10/2020  Hospital Length of Stay: 2 days  Discharge Date and Time:  12/12/2020 6:38 PM  Attending Physician: Dylan Guardado, *   Discharging Provider: Kasandra Farley NP  Primary Care Provider: Chuck Grider MD      HPI:   The patient is a 54 yo female with ESRD -on HD, Asthma, PAF, HTN, DM, HLD, depression, and morbid obesity -BMI 40 who presented to ED with worsening COVID-19 symptoms. Pt tested positive for COVID-19 on 12/3/20. Since then, the pt reports worsening Fever, SOB, cough, generalized weakness and fatigue,N/V, and loss of taste and smell. Pt reports she was unable to walk but a few steps.   In the ED, Temp 103F, O2sats 90%, CXR showed progressive bilateral ground glass opacities, Trop 0.2, EKG showed no change from previous. procalcitonin 3.56 (dialysis pt)    * No surgery found *      Hospital Course:   Cordell Angulo is a 55 year old female who was admitted to [facility] for COVID 19 complicated  With pneumonia. She was treated with IV Remdesivir, dexamethasone, ascorbic acid, and MVI. Required supplemental oxygen. Was able to wean from 4L to 2L. Currently has exertional fatigue ambulating to bathroom. Will need BSC. Discharge orders held as family is concerned that she will be home alone and is seeking possible placement. Will place order for PT/OT and will re-evaluate in AM. Her oxygen levels have remained stable on 2L. HD was terminated early due to hypotension. She was dialyzed on 11/11/20 as well. Will reassess in AM.     Consults:   Consults (From admission, onward)        Status Ordering Provider     Inpatient consult to Cardiology  Once     Provider:  (Not yet assigned)    Completed RAJNI POST     Inpatient consult to Social Work  Once     Provider:  (Not yet assigned)    Completed KASANDRA FARLEY          * Pneumonia due to  COVID-19 virus  - COVID-19 testing   - Infection Control notified     - Isolation:   - Airborne, Contact and Droplet Precautions  - Cohort patients into COVID units  - N95 mask, wear eye protection  - 20 second hand hygiene              - Limit visitors per hospital policy              - Consolidating lab draws, nursing care, provider visits, and interventions    - Diagnostics: (leukopenia, hyponatremia, hyperferritinemia, elevated troponin, elevated d-dimer, age, and comorbidities are significant predictors of poor clinical outcome)  CBC, CMP, Ferritin, CRP, Troponin, Blood Culture x2 and Portable CXR    - Management:  Supplemental O2 to maintain SpO2 >92%  Telemetry  Continuous/intermittent Pulse Ox  Albuterol treatment PRN    Remdesivir, Dexamethasone, IV Rocephin, IV Azithromycin     12/12/2020  Will place orders for PT/OT for possible placement.      Acute respiratory failure with hypoxia  Oxygen -RT to titrate as needed    12/12/2020   Currently down to 2L and doing well    ESRD (end stage renal disease) on dialysis  Nephology following   Resume HD        Final Active Diagnoses:    Diagnosis Date Noted POA    PRINCIPAL PROBLEM:  Pneumonia due to COVID-19 virus [U07.1, J12.89] 12/10/2020 Yes    Acute respiratory failure with hypoxia [J96.01] 12/26/2018 Yes    PAF (paroxysmal atrial fibrillation) [I48.0] 11/28/2018 Yes     Chronic    Chronic diastolic congestive heart failure [I50.32] 10/18/2015 Yes     Chronic    Essential hypertension [I10] 03/21/2015 Yes     Chronic    Type 2 diabetes mellitus, with long-term current use of insulin [E11.9, Z79.4] 03/21/2015 Not Applicable     Chronic    ESRD (end stage renal disease) on dialysis [N18.6, Z99.2] 03/21/2015 Not Applicable      Problems Resolved During this Admission:       Discharged Condition: stable    Disposition: Home or Self Care    Follow Up:    Patient Instructions:      OXYGEN FOR HOME USE     Order Specific Question Answer Comments   Liter Flow 2  "   Duration Continuous    Qualifying SpO2: 87    Testing done at: Rest    Route nasal cannula    Portable mode: pulse dose acceptable    Device home concentrator with portable unit    Height: 5' 7" (1.702 m)    Weight: 105.7 kg (233 lb 0.4 oz)    Does patient have medical equipment at home? none    Alternative treatment measures have been tried or considered and deemed clinically ineffective. Yes      COVID-19 Surveillance Program     Order Specific Question Answer Comments   Does patient have a smartphone? Yes    Does patient have the [facility] on their smartphone? No    While in surveillance program, will patient be using home oxygen? Yes        Significant Diagnostic Studies: Labs:   CMP   Recent Labs   Lab 12/11/20  0718 12/12/20  0706    131*   K 4.5 5.2*   CL 97 93*   CO2 25 21*   * 145*   BUN 31* 38*   CREATININE 9.4* 7.8*   CALCIUM 7.7* 7.7*   PROT 7.3 7.6   ALBUMIN 2.4* 2.4*   BILITOT 0.4 0.3   ALKPHOS 98 91   AST 29 29   ALT 16 13   ANIONGAP 14 17*   ESTGFRAFRICA 5* 6*   EGFRNONAA 4* 5*    and CBC   Recent Labs   Lab 12/11/20  0718 12/12/20  0706   WBC 10.24 13.74*   HGB 11.0* 10.5*   HCT 36.5* 35.2*    275       Pending Diagnostic Studies:     None         Medications:  Reconciled Home Medications:      Medication List      START taking these medications    ascorbic acid (vitamin C) 500 MG tablet  Commonly known as: VITAMIN C  Take 1 tablet (500 mg total) by mouth 2 (two) times daily.     azithromycin 500 MG tablet  Commonly known as: ZITHROMAX  Take 1 tablet (500 mg total) by mouth once daily. for 5 days     multivitamin Tab  Take 1 tablet by mouth once daily.  Start taking on: December 13, 2020     pulse oximeter device  Commonly known as: pulse oximeter  by Apply Externally route 2 (two) times a day. Use twice daily at 8 AM and 3 PM and record the value in MyChart as directed.        CONTINUE taking these medications    albuterol 90 mcg/actuation inhaler  Commonly known as: " PROVENTIL/VENTOLIN HFA  Inhale 1-2 puffs into the lungs every 6 (six) hours as needed for Wheezing.     ALPRAZolam 0.5 MG tablet  Commonly known as: XANAX  Take 0.5 mg by mouth.     aspirin 81 MG EC tablet  Commonly known as: ECOTRIN  Take 1 tablet (81 mg total) by mouth once daily.     AURYXIA ORAL  Take by mouth. 2 tabs with every meal     BASAGLAR KWIKPEN U-100 INSULIN glargine 100 units/mL (3mL) SubQ pen  Generic drug: insulin  Inject 15 Units into the skin.     benzocaine 10 % Oint  Apply 1 application topically 3 (three) times daily as needed. Apply to left ear for pain     citalopram 20 MG tablet  Commonly known as: CELEXA  Take 1 tablet (20 mg total) by mouth once daily.     ELIQUIS 5 mg Tab  Generic drug: apixaban  TK 1 T PO BID     lisinopriL 40 MG tablet  Commonly known as: PRINIVIL,ZESTRIL  Take 40 mg by mouth once daily.     metoprolol succinate 25 MG 24 hr tablet  Commonly known as: TOPROL-XL  Take 1 tablet (25 mg total) by mouth once daily.     pantoprazole 40 MG tablet  Commonly known as: PROTONIX  Take 40 mg by mouth.     pravastatin 40 MG tablet  Commonly known as: PRAVACHOL  Take 40 mg by mouth once daily.     sevelamer carbonate 800 mg Tab  Commonly known as: RENVELA  TK 2 TS PO TID WITH MEALS     sucralfate 100 mg/mL suspension  Commonly known as: CARAFATE  SHAKE LIQUID AND TAKE 10 ML BY MOUTH FOUR TIMES DAILY     VELPHORO 500 mg Chew  Generic drug: sucroferric oxyhydroxide  CHEW & SWALLOW 1 TABLET BY MOUTH THREE TIMES A DAY WITH MEALS AND 1 TABLET TWO TIMES A DAY WITH SNACKS     * vitamin renal formula (B-complex-vitamin c-folic acid) 1 mg Cap  Commonly known as: NEPHROCAP  Take 1 capsule by mouth once daily.     * vitamin renal formula (B-complex-vitamin c-folic acid) 1 mg Cap  Commonly known as: NEPHROCAP  Take 1 capsule by mouth.         * This list has 2 medication(s) that are the same as other medications prescribed for you. Read the directions carefully, and ask your doctor or other care  provider to review them with you.            STOP taking these medications    diazePAM 5 MG tablet  Commonly known as: VALIUM            Indwelling Lines/Drains at time of discharge:   Lines/Drains/Airways     Drain                 Hemodialysis AV Fistula Left upper arm -- days                Time spent on the discharge of patient: >35 minutes  Patient was seen and examined on the date of discharge and determined to be suitable for discharge.         Ирина Farley NP  Department of Hospital Medicine  [facility]

## 2020-12-13 NOTE — PROGRESS NOTES
[facility] Hospital Medicine  Progress Note    Patient Name: Cordell Angulo  MRN: [id number]  Patient Class: IP- Inpatient   Admission Date: 12/10/2020  Length of Stay: 3 days  Attending Physician: Dylan Guardado, *  Primary Care Provider: Chuck Grider MD        Subjective:     Principal Problem:Pneumonia due to COVID-19 virus        HPI:  The patient is a 56 yo female with ESRD -on HD, Asthma, PAF, HTN, DM, HLD, depression, and morbid obesity -BMI 40 who presented to ED with worsening COVID-19 symptoms. Pt tested positive for COVID-19 on 12/3/20. Since then, the pt reports worsening Fever, SOB, cough, generalized weakness and fatigue,N/V, and loss of taste and smell. Pt reports she was unable to walk but a few steps.   In the ED, Temp 103F, O2sats 90%, CXR showed progressive bilateral ground glass opacities, Trop 0.2, EKG showed no change from previous. procalcitonin 3.56 (dialysis pt)    Overview/Hospital Course:  Cordell Angulo is a 55 year old female who was admitted to [facility] for COVID 19 complicated  With pneumonia. She was treated with IV Remdesivir, dexamethasone, ascorbic acid, and MVI. Required supplemental oxygen. Was able to wean from 4L to 2L. Currently has exertional fatigue ambulating to bathroom. Will need BSC. Discharge orders held as family is concerned that she will be home alone and is seeking possible placement. Will place order for PT/OT and will re-evaluate in AM. Her oxygen levels have remained stable on 2L. HD was terminated early due to hypotension. She was dialyzed on 11/11/20 as well. Will reassess in AM.  As of 12/13 Pt was suppose to be discharged home yesterday but patient and family were concerend about pateint going home on oxygen. She is currently on 3LPM. PT/OT recommendations pending. Pt reports feeling slightly improved today.       Interval History: pt seen and examined. PT/OT recs pending.    Review of Systems   Constitutional:  Positive for fatigue (exertional). Negative for activity change, appetite change, chills, diaphoresis, fever and unexpected weight change.   HENT: Positive for congestion. Negative for nosebleeds, sinus pressure and sore throat.         Loss of taste and smell    Eyes: Negative for pain, discharge and visual disturbance.   Respiratory: Positive for cough, shortness of breath and wheezing. Negative for chest tightness and stridor.    Cardiovascular: Negative for chest pain, palpitations and leg swelling.   Gastrointestinal: Negative for abdominal distention, abdominal pain, blood in stool, constipation, diarrhea, nausea and vomiting.   Endocrine: Negative for cold intolerance and heat intolerance.   Genitourinary: Negative for difficulty urinating, dysuria, flank pain, frequency and urgency.   Musculoskeletal: Positive for myalgias. Negative for arthralgias, back pain, joint swelling, neck pain and neck stiffness.   Skin: Negative for rash and wound.   Allergic/Immunologic: Negative for food allergies and immunocompromised state.   Neurological: Positive for weakness (generalized). Negative for dizziness, seizures, syncope, facial asymmetry, speech difficulty, light-headedness, numbness and headaches.   Hematological: Negative for adenopathy.   Psychiatric/Behavioral: Negative for agitation, confusion and hallucinations.     Objective:     Vital Signs (Most Recent):  Temp: 98.2 °F (36.8 °C) (12/13/20 1145)  Pulse: 82 (12/13/20 1145)  Resp: 20 (12/13/20 1145)  BP: (!) 117/56 (12/13/20 1145)  SpO2: (!) 94 % (12/13/20 1145) Vital Signs (24h Range):  Temp:  [98 °F (36.7 °C)-99 °F (37.2 °C)] 98.2 °F (36.8 °C)  Pulse:  [70-94] 82  Resp:  [18-22] 20  SpO2:  [90 %-94 %] 94 %  BP: ()/(47-70) 117/56     Weight: 106 kg (233 lb 11 oz)  Body mass index is 36.6 kg/m².    Intake/Output Summary (Last 24 hours) at 12/13/2020 1547  Last data filed at 12/13/2020 0453  Gross per 24 hour   Intake 1910 ml   Output 1353 ml   Net 557  ml      Physical Exam  Vitals signs and nursing note reviewed.   Constitutional:       General: She is not in acute distress.     Appearance: She is well-developed. She is obese. She is not diaphoretic.   HENT:      Head: Normocephalic and atraumatic.      Nose: Nose normal.   Eyes:      General: No scleral icterus.     Conjunctiva/sclera: Conjunctivae normal.   Neck:      Musculoskeletal: Normal range of motion and neck supple.      Trachea: No tracheal deviation.   Cardiovascular:      Rate and Rhythm: Normal rate and regular rhythm.      Heart sounds: Normal heart sounds. No murmur. No friction rub. No gallop.    Pulmonary:      Effort: Pulmonary effort is normal. No respiratory distress.      Breath sounds: No stridor. No wheezing.   Chest:      Chest wall: No tenderness.   Abdominal:      General: Bowel sounds are normal. There is no distension.      Palpations: Abdomen is soft. There is no mass.      Tenderness: There is no abdominal tenderness. There is no guarding or rebound.   Musculoskeletal: Normal range of motion.         General: No tenderness or deformity.   Skin:     General: Skin is warm and dry.      Coloration: Skin is not pale.      Findings: No erythema or rash.   Neurological:      Mental Status: She is alert and oriented to person, place, and time.      Cranial Nerves: No cranial nerve deficit.      Motor: No abnormal muscle tone.      Coordination: Coordination normal.   Psychiatric:         Behavior: Behavior normal.         Thought Content: Thought content normal.         Significant Labs: All pertinent labs within the past 24 hours have been reviewed.    Significant Imaging: I have reviewed all pertinent imaging results/findings within the past 24 hours.      Assessment/Plan:      * Pneumonia due to COVID-19 virus  - COVID-19 testing   - Infection Control notified     - Isolation:   - Airborne, Contact and Droplet Precautions  - Cohort patients into COVID units  - N95 mask, wear eye  protection  - 20 second hand hygiene              - Limit visitors per hospital policy              - Consolidating lab draws, nursing care, provider visits, and interventions    - Diagnostics: (leukopenia, hyponatremia, hyperferritinemia, elevated troponin, elevated d-dimer, age, and comorbidities are significant predictors of poor clinical outcome)  CBC, CMP, Ferritin, CRP, Troponin, Blood Culture x2 and Portable CXR    - Management:  Supplemental O2 to maintain SpO2 >92%  Telemetry  Continuous/intermittent Pulse Ox  Albuterol treatment PRN    Remdesivir, Dexamethasone, IV Rocephin, IV Azithromycin     12/12/2020  Will place orders for PT/OT for possible placement.      Acute respiratory failure with hypoxia  Oxygen -RT to titrate as needed    12/12/2020   Currently down to 2L and doing well    12/13  Currently on 3LPM    PAF (paroxysmal atrial fibrillation)  S/p ablation   Rate controlled  Cont Toprol and Eliquis       Chronic diastolic congestive heart failure  Compensated  Cont BB, hold Lisinopril       Type 2 diabetes mellitus, with long-term current use of insulin  Hold home insulin for now  NISS      Essential hypertension  Cont Toprol, hold lisinopril for now      ESRD (end stage renal disease) on dialysis  Nephology following   Resume HD        VTE Risk Mitigation (From admission, onward)         Ordered     apixaban tablet 5 mg  2 times daily      12/10/20 1102     IP VTE HIGH RISK PATIENT  Once      12/10/20 1057     Place sequential compression device  Until discontinued      12/10/20 1057                Discharge Planning   JOAO: 12/12/2020     Code Status: Full Code   Is the patient medically ready for discharge?:     Reason for patient still in hospital (select all that apply): PT / OT recommendations  Discharge Plan A: Home with family                  SOUMYA Haines  Department of Hospital Medicine   [facility]

## 2020-12-13 NOTE — PLAN OF CARE
Pt AAOx4. VSS. Pt remained free of falls this shift. No complaints of pain or discomfort. Medications administered as ordered. IV ABX. Pt on 2 LPM via NC with SPO2 sats around 91%. Pt is normal sinus on monitor. Hourly rounding completed. Pt instructed to call for assistance. POC reviewed. Pt verbalized understanding.

## 2020-12-13 NOTE — PT/OT/SLP PROGRESS
Physical Therapy      Patient Name:  Cordell Angulo   MRN:  [id number]    PT eval initiated via Epic chart review. Will complete PT eval at next visit.    Shasha Leon, PT, DPT  [id number]

## 2020-12-13 NOTE — ASSESSMENT & PLAN NOTE
1. ESRD on HD , MWF , originally at [facility], ROS[facility] , now TTS at [facility] due to COVID infection,      Last HD was yesterday ,      2.  Hypertension, blood pressure is controlled, continue home blood pressure medications,    3.  Anemia of chronic kidney disease, continue Procrit with dialysis as indicated    4. SHPT,  Continue low phosphorus diet, Velphoro     5.  COVID pneumonia, management per medicine team    6.  Disposition, discharge  when clinically stable, PT/OT eval for disposition

## 2020-12-13 NOTE — SUBJECTIVE & OBJECTIVE
Interval History: family is concerned about her going home alone. Will evaluate for possible placement. Will order PT/OT.    Review of Systems   Constitutional: Positive for fatigue (exertional). Negative for activity change, appetite change, chills, diaphoresis, fever and unexpected weight change.   HENT: Positive for congestion. Negative for nosebleeds, sinus pressure and sore throat.         Loss of taste and smell    Eyes: Negative for pain, discharge and visual disturbance.   Respiratory: Positive for cough, shortness of breath and wheezing. Negative for chest tightness and stridor.    Cardiovascular: Negative for chest pain, palpitations and leg swelling.   Gastrointestinal: Positive for nausea and vomiting. Negative for abdominal distention, abdominal pain, blood in stool, constipation and diarrhea.   Endocrine: Negative for cold intolerance and heat intolerance.   Genitourinary: Negative for difficulty urinating, dysuria, flank pain, frequency and urgency.   Musculoskeletal: Positive for myalgias. Negative for arthralgias, back pain, joint swelling, neck pain and neck stiffness.   Skin: Negative for rash and wound.   Allergic/Immunologic: Negative for food allergies and immunocompromised state.   Neurological: Positive for weakness (generalized). Negative for dizziness, seizures, syncope, facial asymmetry, speech difficulty, light-headedness, numbness and headaches.   Hematological: Negative for adenopathy.   Psychiatric/Behavioral: Negative for agitation, confusion and hallucinations.        Objective:     Vital Signs (Most Recent):  Temp: 98.7 °F (37.1 °C) (12/12/20 1654)  Pulse: 74 (12/12/20 1700)  Resp: 18 (12/12/20 1700)  BP: 117/63 (12/12/20 1700)  SpO2: (!) 94 % (12/12/20 1654) Vital Signs (24h Range):  Temp:  [97.6 °F (36.4 °C)-99.6 °F (37.6 °C)] 98.7 °F (37.1 °C)  Pulse:  [70-97] 74  Resp:  [18-28] 18  SpO2:  [87 %-96 %] 94 %  BP: ()/(47-67) 117/63     Weight: 105.7 kg (233 lb 0.4 oz)  Body  mass index is 36.5 kg/m².    Intake/Output Summary (Last 24 hours) at 12/12/2020 1829  Last data filed at 12/12/2020 1700  Gross per 24 hour   Intake 1780 ml   Output 1353 ml   Net 427 ml      Physical Exam  Vitals signs and nursing note reviewed.   Constitutional:       General: She is not in acute distress.     Appearance: She is well-developed. She is obese. She is not diaphoretic.   HENT:      Head: Normocephalic and atraumatic.      Nose: Nose normal.   Eyes:      General: No scleral icterus.     Conjunctiva/sclera: Conjunctivae normal.   Neck:      Musculoskeletal: Normal range of motion and neck supple.      Trachea: No tracheal deviation.   Cardiovascular:      Rate and Rhythm: Normal rate and regular rhythm.      Heart sounds: Normal heart sounds. No murmur. No friction rub. No gallop.    Pulmonary:      Effort: Pulmonary effort is normal. No respiratory distress.      Breath sounds: No stridor. No wheezing.   Chest:      Chest wall: No tenderness.   Abdominal:      General: Bowel sounds are normal. There is no distension.      Palpations: Abdomen is soft. There is no mass.      Tenderness: There is no abdominal tenderness. There is no guarding or rebound.   Musculoskeletal: Normal range of motion.         General: No tenderness or deformity.   Skin:     General: Skin is warm and dry.      Coloration: Skin is not pale.      Findings: No erythema or rash.   Neurological:      Mental Status: She is alert and oriented to person, place, and time.      Cranial Nerves: No cranial nerve deficit.      Motor: No abnormal muscle tone.      Coordination: Coordination normal.   Psychiatric:         Behavior: Behavior normal.         Thought Content: Thought content normal.       Significant Labs:   CBC:   Recent Labs   Lab 12/11/20  0718 12/12/20  0706   WBC 10.24 13.74*   HGB 11.0* 10.5*   HCT 36.5* 35.2*    275     CMP:   Recent Labs   Lab 12/11/20  0718 12/12/20  0706    131*   K 4.5 5.2*   CL 97 93*    CO2 25 21*   * 145*   BUN 31* 38*   CREATININE 9.4* 7.8*   CALCIUM 7.7* 7.7*   PROT 7.3 7.6   ALBUMIN 2.4* 2.4*   BILITOT 0.4 0.3   ALKPHOS 98 91   AST 29 29   ALT 16 13   ANIONGAP 14 17*   EGFRNONAA 4* 5*       Significant Imaging: I have reviewed all pertinent imaging results/findings within the past 24 hours.

## 2020-12-13 NOTE — SUBJECTIVE & OBJECTIVE
Interval History: d/c was held due to weakness and for PT/OT evaluation     Review of patient's allergies indicates:   Allergen Reactions    Morphine Itching    Sulfa (sulfonamide antibiotics) Rash     Current Facility-Administered Medications   Medication Frequency    acetaminophen tablet 650 mg Q8H PRN    apixaban tablet 5 mg BID    ascorbic acid (vitamin C) tablet 500 mg BID    aspirin EC tablet 81 mg Daily    azithromycin 500 mg in dextrose 5 % 250 mL IVPB (ready to mix system) Q24H    cefTRIAXone (ROCEPHIN) 1 g/50 mL D5W IVPB Q24H    citalopram tablet 20 mg Daily    dexAMETHasone tablet 6 mg Daily    dextrose 50% injection 12.5 g PRN    dextrose 50% injection 25 g PRN    glucagon (human recombinant) injection 1 mg PRN    glucose chewable tablet 16 g PRN    glucose chewable tablet 24 g PRN    insulin aspart U-100 pen 0-5 Units QID (AC + HS) PRN    ipratropium-albuteroL inhaler 1 puff Q6H PRN    metoprolol succinate (TOPROL-XL) 24 hr tablet 25 mg Daily    multivitamin tablet Daily    mupirocin 2 % ointment BID    ondansetron injection 4 mg Q8H PRN    pantoprazole EC tablet 40 mg Daily    pravastatin tablet 40 mg Daily    remdesivir 100 mg in sodium chloride 0.9% 250 mL infusion Q24H    sevelamer carbonate tablet 800 mg TID WM    sodium chloride 0.9% flush 10 mL PRN    sucroferric oxyhydroxide Chew 500 mg TID WM    vitamin D 1000 units tablet 1,000 Units Daily    vitamin renal formula (B-complex-vitamin c-folic acid) 1 mg per capsule 1 capsule Daily       Objective:     Vital Signs (Most Recent):  Temp: 98.2 °F (36.8 °C) (12/13/20 1145)  Pulse: 82 (12/13/20 1145)  Resp: 20 (12/13/20 1145)  BP: (!) 117/56 (12/13/20 1145)  SpO2: (!) 94 % (12/13/20 1145)  O2 Device (Oxygen Therapy): nasal cannula (12/13/20 0800) Vital Signs (24h Range):  Temp:  [98 °F (36.7 °C)-99 °F (37.2 °C)] 98.2 °F (36.8 °C)  Pulse:  [70-94] 82  Resp:  [18-22] 20  SpO2:  [90 %-94 %] 94 %  BP: ()/(47-70) 117/56      Weight: 106 kg (233 lb 11 oz) (12/13/20 1433)  Body mass index is 36.6 kg/m².  Body surface area is 2.24 meters squared.    I/O last 3 completed shifts:  In: 2520 [P.O.:1070; Other:700; IV Piggyback:750]  Out: 1353 [Other:1353]    Physical Exam       PE not done due to COVID isolation     Significant Labs:    CBC:   Recent Labs   Lab 12/13/20  0659   WBC 14.14*   RBC 4.25   HGB 11.1*   HCT 36.6*      MCV 86   MCH 26.1*   MCHC 30.3*     CMP:   Recent Labs   Lab 12/13/20  0659   *   CALCIUM 8.0*   ALBUMIN 2.3*   PROT 7.3   *   K 4.7   CO2 21*   CL 95   BUN 48*   CREATININE 7.5*   ALKPHOS 83   ALT 11   AST 25   BILITOT 0.4     Coagulation:   Recent Labs   Lab 12/10/20  0837   INR 1.1   APTT 36.7*     LFTs:   Recent Labs   Lab 12/13/20  0659   ALT 11   AST 25   ALKPHOS 83   BILITOT 0.4   PROT 7.3   ALBUMIN 2.3*     All labs within the past 24 hours have been reviewed.     Significant Imaging:  Reviewed    Lab Results   Component Value Date    .0 (H) 05/08/2015    CALCIUM 8.0 (L) 12/13/2020    PHOS 4.7 (H) 12/13/2020       Lab Results   Component Value Date    ALBUMIN 2.3 (L) 12/13/2020

## 2020-12-14 VITALS
DIASTOLIC BLOOD PRESSURE: 59 MMHG | BODY MASS INDEX: 36.68 KG/M2 | HEART RATE: 74 BPM | HEIGHT: 67 IN | OXYGEN SATURATION: 99 % | RESPIRATION RATE: 20 BRPM | TEMPERATURE: 97 F | SYSTOLIC BLOOD PRESSURE: 123 MMHG | WEIGHT: 233.69 LBS

## 2020-12-14 LAB
ALBUMIN SERPL BCP-MCNC: 2.2 G/DL (ref 3.5–5.2)
ALP SERPL-CCNC: 83 U/L (ref 55–135)
ALT SERPL W/O P-5'-P-CCNC: 13 U/L (ref 10–44)
ANION GAP SERPL CALC-SCNC: 18 MMOL/L (ref 8–16)
AST SERPL-CCNC: 20 U/L (ref 10–40)
BASOPHILS # BLD AUTO: 0.06 K/UL (ref 0–0.2)
BASOPHILS NFR BLD: 0.4 % (ref 0–1.9)
BILIRUB SERPL-MCNC: 0.3 MG/DL (ref 0.1–1)
BUN SERPL-MCNC: 85 MG/DL (ref 6–20)
CALCIUM SERPL-MCNC: 7.9 MG/DL (ref 8.7–10.5)
CHLORIDE SERPL-SCNC: 93 MMOL/L (ref 95–110)
CO2 SERPL-SCNC: 23 MMOL/L (ref 23–29)
CREAT SERPL-MCNC: 9.5 MG/DL (ref 0.5–1.4)
DIFFERENTIAL METHOD: ABNORMAL
EOSINOPHIL # BLD AUTO: 0 K/UL (ref 0–0.5)
EOSINOPHIL NFR BLD: 0 % (ref 0–8)
ERYTHROCYTE [DISTWIDTH] IN BLOOD BY AUTOMATED COUNT: 15.1 % (ref 11.5–14.5)
EST. GFR  (AFRICAN AMERICAN): 5 ML/MIN/1.73 M^2
EST. GFR  (NON AFRICAN AMERICAN): 4 ML/MIN/1.73 M^2
GLUCOSE SERPL-MCNC: 152 MG/DL (ref 70–110)
HCT VFR BLD AUTO: 35.6 % (ref 37–48.5)
HGB BLD-MCNC: 10.9 G/DL (ref 12–16)
IMM GRANULOCYTES # BLD AUTO: 0.79 K/UL (ref 0–0.04)
IMM GRANULOCYTES NFR BLD AUTO: 4.8 % (ref 0–0.5)
LYMPHOCYTES # BLD AUTO: 1.1 K/UL (ref 1–4.8)
LYMPHOCYTES NFR BLD: 6.4 % (ref 18–48)
MAGNESIUM SERPL-MCNC: 2.5 MG/DL (ref 1.6–2.6)
MCH RBC QN AUTO: 26.2 PG (ref 27–31)
MCHC RBC AUTO-ENTMCNC: 30.6 G/DL (ref 32–36)
MCV RBC AUTO: 86 FL (ref 82–98)
MONOCYTES # BLD AUTO: 1 K/UL (ref 0.3–1)
MONOCYTES NFR BLD: 6 % (ref 4–15)
NEUTROPHILS # BLD AUTO: 13.6 K/UL (ref 1.8–7.7)
NEUTROPHILS NFR BLD: 82.4 % (ref 38–73)
NRBC BLD-RTO: 0 /100 WBC
PHOSPHATE SERPL-MCNC: 5.3 MG/DL (ref 2.7–4.5)
PLATELET # BLD AUTO: 400 K/UL (ref 150–350)
PMV BLD AUTO: 9.7 FL (ref 9.2–12.9)
POCT GLUCOSE: 183 MG/DL (ref 70–110)
POCT GLUCOSE: 190 MG/DL (ref 70–110)
POTASSIUM SERPL-SCNC: 4.8 MMOL/L (ref 3.5–5.1)
PROT SERPL-MCNC: 7.1 G/DL (ref 6–8.4)
RBC # BLD AUTO: 4.16 M/UL (ref 4–5.4)
SODIUM SERPL-SCNC: 134 MMOL/L (ref 136–145)
WBC # BLD AUTO: 16.45 K/UL (ref 3.9–12.7)

## 2020-12-14 PROCEDURE — 63600175 PHARM REV CODE 636 W HCPCS: Performed by: INTERNAL MEDICINE

## 2020-12-14 PROCEDURE — 27000221 HC OXYGEN, UP TO 24 HOURS

## 2020-12-14 PROCEDURE — 97110 THERAPEUTIC EXERCISES: CPT

## 2020-12-14 PROCEDURE — 80053 COMPREHEN METABOLIC PANEL: CPT

## 2020-12-14 PROCEDURE — 94761 N-INVAS EAR/PLS OXIMETRY MLT: CPT

## 2020-12-14 PROCEDURE — 85025 COMPLETE CBC W/AUTO DIFF WBC: CPT

## 2020-12-14 PROCEDURE — 99900031 HC PATIENT EDUCATION (STAT)

## 2020-12-14 PROCEDURE — 63600175 PHARM REV CODE 636 W HCPCS: Performed by: NURSE PRACTITIONER

## 2020-12-14 PROCEDURE — 97161 PT EVAL LOW COMPLEX 20 MIN: CPT

## 2020-12-14 PROCEDURE — 99232 SBSQ HOSP IP/OBS MODERATE 35: CPT | Mod: ,,, | Performed by: INTERNAL MEDICINE

## 2020-12-14 PROCEDURE — 97530 THERAPEUTIC ACTIVITIES: CPT

## 2020-12-14 PROCEDURE — 84100 ASSAY OF PHOSPHORUS: CPT

## 2020-12-14 PROCEDURE — 99232 PR SUBSEQUENT HOSPITAL CARE,LEVL II: ICD-10-PCS | Mod: ,,, | Performed by: INTERNAL MEDICINE

## 2020-12-14 PROCEDURE — 97166 OT EVAL MOD COMPLEX 45 MIN: CPT

## 2020-12-14 PROCEDURE — 99900035 HC TECH TIME PER 15 MIN (STAT)

## 2020-12-14 PROCEDURE — 83735 ASSAY OF MAGNESIUM: CPT

## 2020-12-14 PROCEDURE — 25000003 PHARM REV CODE 250: Performed by: NURSE PRACTITIONER

## 2020-12-14 PROCEDURE — 36415 COLL VENOUS BLD VENIPUNCTURE: CPT

## 2020-12-14 RX ORDER — DEXAMETHASONE 6 MG/1
TABLET ORAL
Qty: 5 TABLET | Refills: 0 | Status: SHIPPED | OUTPATIENT
Start: 2020-12-14

## 2020-12-14 RX ORDER — HEPARIN SODIUM 1000 [USP'U]/ML
2000 INJECTION, SOLUTION INTRAVENOUS; SUBCUTANEOUS ONCE
Status: DISCONTINUED | OUTPATIENT
Start: 2020-12-15 | End: 2020-12-14 | Stop reason: HOSPADM

## 2020-12-14 RX ADMIN — DEXAMETHASONE 6 MG: 4 TABLET ORAL at 09:12

## 2020-12-14 RX ADMIN — CITALOPRAM HYDROBROMIDE 20 MG: 20 TABLET ORAL at 09:12

## 2020-12-14 RX ADMIN — PANTOPRAZOLE SODIUM 40 MG: 40 TABLET, DELAYED RELEASE ORAL at 09:12

## 2020-12-14 RX ADMIN — NEPHROCAP 1 CAPSULE: 1 CAP ORAL at 09:12

## 2020-12-14 RX ADMIN — SEVELAMER CARBONATE 800 MG: 800 TABLET, FILM COATED ORAL at 12:12

## 2020-12-14 RX ADMIN — SEVELAMER CARBONATE 800 MG: 800 TABLET, FILM COATED ORAL at 07:12

## 2020-12-14 RX ADMIN — OXYCODONE HYDROCHLORIDE AND ACETAMINOPHEN 500 MG: 500 TABLET ORAL at 09:12

## 2020-12-14 RX ADMIN — Medication 1000 UNITS: at 09:12

## 2020-12-14 RX ADMIN — THERA TABS 1 TABLET: TAB at 09:12

## 2020-12-14 RX ADMIN — METOPROLOL SUCCINATE 25 MG: 25 TABLET, EXTENDED RELEASE ORAL at 09:12

## 2020-12-14 RX ADMIN — MUPIROCIN: 20 OINTMENT TOPICAL at 09:12

## 2020-12-14 RX ADMIN — CEFTRIAXONE 1 G: 1 INJECTION, SOLUTION INTRAVENOUS at 09:12

## 2020-12-14 RX ADMIN — AZITHROMYCIN MONOHYDRATE 500 MG: 500 INJECTION, POWDER, LYOPHILIZED, FOR SOLUTION INTRAVENOUS at 12:12

## 2020-12-14 RX ADMIN — APIXABAN 5 MG: 2.5 TABLET, FILM COATED ORAL at 09:12

## 2020-12-14 RX ADMIN — ASPIRIN 81 MG: 81 TABLET, COATED ORAL at 09:12

## 2020-12-14 NOTE — PT/OT/SLP EVAL
Occupational Therapy   Evaluation    Name: Cordell Angulo  MRN: [id number]  Admitting Diagnosis:  Pneumonia due to COVID-19 virus      Recommendations:     Discharge Recommendations: home health OT, home health PT  Discharge Equipment Recommendations:  none  Barriers to discharge:       Assessment:     Cordell Angulo is a 55 y.o. female with a medical diagnosis of Pneumonia due to COVID-19 virus.  She presents with debility and generalized weakness. Performance deficits affecting function: weakness, impaired functional mobilty, impaired endurance, gait instability, impaired balance, impaired self care skills.      Rehab Prognosis: Good and Fair; patient would benefit from acute skilled OT services to address these deficits and reach maximum level of function.       Plan:     Patient to be seen 3 x/week to address the above listed problems via self-care/home management, therapeutic activities, therapeutic exercises  · Plan of Care Expires:    · Plan of Care Reviewed with: patient    Subjective     Chief Complaint: debility and generalized weakness    Patient/Family Comments/goals:     Occupational Profile:  Living Environment: lives with spouse in 1 story house with no steps  Previous level of function: (I) with adl's and functional mobility. No work or drive  Roles and Routines: occupational therapy  Equipment Used at Home:  none  Assistance upon Discharge:   Pain/Comfort:  · Pain Rating 1: 0/10    Patients cultural, spiritual, Orthodox conflicts given the current situation:      Objective:     Communicated with: nurse Oliveira and [facility] chart review prior to session.  Patient found HOB elevated with telemetry, peripheral IV upon OT entry to room.    General Precautions: Standard, airborne, contact, droplet   Orthopedic Precautions:N/A   Braces: N/A     Occupational Performance:    Bed Mobility:    · Patient completed Rolling/Turning to Left with  minimum assistance  · Patient completed Scooting/Bridging with minimum  assistance  · Patient completed Supine to Sit with minimum assistance    Functional Mobility/Transfers:  · Patient completed Sit <> Stand Transfer with minimum assistance  with  hand-held assist   · Patient completed Bed <> Chair Transfer using Step Transfer technique with minimum assistance with hand-held assist  · Functional Mobility: na      Activities of Daily Living:  · Lower Body Dressing: minimum assistance vaughn socks    Cognitive/Visual Perceptual:  Cognitive/Psychosocial Skills:     -       Oriented to: Person, Place, Time and Situation   -       Follows Commands/attention:Follows multistep  commands  -       Communication: clear/fluent  -       Memory: No Deficits noted  -       Safety awareness/insight to disability: impaired     Physical Exam:  Upper Extremity Range of Motion:     -       Right Upper Extremity: WFL  -       Left Upper Extremity: WFL  Upper Extremity Strength:    -       Right Upper Extremity: MMT: 3/5 GROSSLY  -       Left Upper Extremity: MMT: 3/5 GROSSLY   Strength:    -       Right Upper Extremity: MMT: 3/5 GROSSLY  -       Left Upper Extremity: MMT: 3/5 GROSSLY    AMPAC 6 Click ADL:  AMPAC Total Score: 20    Treatment & Education:  OT EVAL COMPLETED. PT EDUCATED ON OT POC. OT DISPLAYED DEFICITS WITH ADL'S AND FUNCTIONAL MOBILITY . PT WILL BENEFIT FROM SKILLED OT  Education:    Patient left up in chair with all lines intact, call button in reach and NURSE DILLIION notified    GOALS:   Multidisciplinary Problems     Occupational Therapy Goals        Problem: Occupational Therapy Goal    Goal Priority Disciplines Outcome Interventions   Occupational Therapy Goal     OT, PT/OT     Description: OT goals to be met by 12-21-20   Sba with le dressing  Sba with bsc t/f's  Pt will tolerate 1 set x 10 reps b ue rom exercise                     History:     Past Medical History:   Diagnosis Date    Asthma     CHF (congestive heart failure)     CKD (chronic kidney disease) stage 5, GFR  less than 15 ml/min     Depression     Diabetes mellitus     Dialysis patient     Hypercholesterolemia     Hypertension     PAF (paroxysmal atrial fibrillation)        Past Surgical History:   Procedure Laterality Date    AV FISTULA PLACEMENT      CARDIAC ELECTROPHYSIOLOGY MAPPING AND ABLATION      CATHETERIZATION OF BOTH LEFT AND RIGHT HEART N/A 2018    Procedure: CATHETERIZATION, HEART, BOTH LEFT AND RIGHT;  Surgeon: Enrique Jj MD;  Location: [facility] CATH LAB;  Service: Cardiology;  Laterality: N/A;  To follow his cardioversion case in [facility]     SECTION      TUMOR REMOVAL      L lung       Time Tracking:     OT Date of Treatment: 20  OT Start Time: 1300  OT Stop Time: 1325  OT Total Time (min): 25 min    Billable Minutes:Evaluation 10 MINUTES  Therapeutic Activity 15 MINUTES    Claudia Alcantara, OT  2020

## 2020-12-14 NOTE — DISCHARGE SUMMARY
[facility] Hospital Medicine  Discharge Summary      Patient Name: Cordell Angulo  MRN: [id number]  Admission Date: 12/10/2020  Hospital Length of Stay: 4 days  Discharge Date and Time:  12/14/2020 11:29 AM  Attending Physician: Dylan Guardado, *   Discharging Provider: Anali Wilcox NP  Primary Care Provider: Chuck Grider MD      HPI:   The patient is a 56 yo female with ESRD -on HD, Asthma, PAF, HTN, DM, HLD, depression, and morbid obesity -BMI 40 who presented to ED with worsening COVID-19 symptoms. Pt tested positive for COVID-19 on 12/3/20. Since then, the pt reports worsening Fever, SOB, cough, generalized weakness and fatigue,N/V, and loss of taste and smell. Pt reports she was unable to walk but a few steps.   In the ED, Temp 103F, O2sats 90%, CXR showed progressive bilateral ground glass opacities, Trop 0.2, EKG showed no change from previous. procalcitonin 3.56 (dialysis pt)    * No surgery found *      Hospital Course:   Cordell Angulo is a 55 year old female who was admitted to [facility] for COVID 19 complicated  With pneumonia. She was treated with IV Remdesivir, dexamethasone, ascorbic acid, and MVI. Required supplemental oxygen. Was able to wean from 4L to 2L. Currently has exertional fatigue ambulating to bathroom. Will need BSC. Discharge orders held as family is concerned that she will be home alone and is seeking possible placement. Will place order for PT/OT and will re-evaluate in AM. Her oxygen levels have remained stable on 2L. HD was terminated early due to hypotension. She was dialyzed on 11/11/20 as well. Will reassess in AM.  As of 12/13 Pt was suppose to be discharged home yesterday but patient and family were concerend about pateint going home on oxygen. She is currently on 3LPM. PT/OT recommendations pending. Pt reports feeling slightly improved today.     As of 12/14/2020: Patient was evaluated at bedside and deemed stable for discharge,  pending PT evaluation. She verbalized understanding regarding the Covid-19 surveillance program. Dexamethasone tabs and pulse oximetry monitor will be delivered to the bedside by [facility] pharmacy. O2 will also be delivered.      Consults:   Consults (From admission, onward)        Status Ordering Provider     Inpatient consult to Cardiology  Once     Provider:  (Not yet assigned)    Completed RAJNI POST     Inpatient consult to Social Work  Once     Provider:  (Not yet assigned)    Completed KASANDRA ALCANTARA          No new Assessment & Plan notes have been filed under this hospital service since the last note was generated.  Service: Hospital Medicine    Final Active Diagnoses:    Diagnosis Date Noted POA    PRINCIPAL PROBLEM:  Pneumonia due to COVID-19 virus [U07.1, J12.89] 12/10/2020 Yes    Acute respiratory failure with hypoxia [J96.01] 12/26/2018 Yes    PAF (paroxysmal atrial fibrillation) [I48.0] 11/28/2018 Yes     Chronic    Chronic diastolic congestive heart failure [I50.32] 10/18/2015 Yes     Chronic    Essential hypertension [I10] 03/21/2015 Yes     Chronic    Type 2 diabetes mellitus, with long-term current use of insulin [E11.9, Z79.4] 03/21/2015 Not Applicable     Chronic    ESRD (end stage renal disease) on dialysis [N18.6, Z99.2] 03/21/2015 Not Applicable      Problems Resolved During this Admission:       Discharged Condition: stable    Disposition: Home or Self Care    Follow Up:  Follow-up Information     Chuck Grider MD. Schedule an appointment as soon as possible for a visit in 1 week.    Specialty: Internal Medicine  Contact information:  [address]  [facility] [id number] [phone]                 Patient Instructions:      OXYGEN FOR HOME USE     Order Specific Question Answer Comments   Liter Flow 2    Duration Continuous    Qualifying SpO2: 87    Testing done at: Rest    Route nasal cannula    Portable mode: continuous    Device home concentrator with portable unit  "   Length of need (in months): 12 mos    Patient condition with qualifying saturation  COVID-19    Height: 5' 7" (1.702 m)    Weight: 105.7 kg (233 lb 0.4 oz)    Does patient have medical equipment at home? none    Alternative treatment measures have been tried or considered and deemed clinically ineffective. Yes      Diet diabetic     COVID-19 Surveillance Program     Order Specific Question Answer Comments   Does patient have a smartphone? Yes    Does patient have the [facility] on their smartphone? No    While in surveillance program, will patient be using home oxygen? Yes      COVID-19 Surveillance Program     Order Specific Question Answer Comments   Does patient have a smartphone? Yes    Does patient have the [facility] on their smartphone? No    While in surveillance program, will patient be using home oxygen? Yes      Activity as tolerated       Significant Diagnostic Studies: Labs:   CMP   Recent Labs   Lab 12/13/20  0659 12/14/20  0727   * 134*   K 4.7 4.8   CL 95 93*   CO2 21* 23   * 152*   BUN 48* 85*   CREATININE 7.5* 9.5*   CALCIUM 8.0* 7.9*   PROT 7.3 7.1   ALBUMIN 2.3* 2.2*   BILITOT 0.4 0.3   ALKPHOS 83 83   AST 25 20   ALT 11 13   ANIONGAP 17* 18*   ESTGFRAFRICA 6* 5*   EGFRNONAA 5* 4*    and CBC   Recent Labs   Lab 12/13/20  0659 12/14/20  0727   WBC 14.14* 16.45*   HGB 11.1* 10.9*   HCT 36.6* 35.6*    400*     Microbiology:   Blood Culture   Lab Results   Component Value Date    LABBLOO No growth to date 12/10/2020    LABBLOO No Growth to date 12/10/2020    LABBLOO No Growth to date 12/10/2020    LABBLOO No Growth to date 12/10/2020       Pending Diagnostic Studies:     None         Medications:  Reconciled Home Medications:      Medication List      START taking these medications    ascorbic acid (vitamin C) 500 MG tablet  Commonly known as: VITAMIN C  Take 1 tablet (500 mg total) by mouth 2 (two) times daily.     azithromycin 500 MG tablet  Commonly known as: " ZITHROMAX  Take 1 tablet (500 mg total) by mouth once daily. for 5 days     dexAMETHasone 6 MG tablet  Commonly known as: DECADRON  One tablet by mouth daily     multivitamin Tab  Take 1 tablet by mouth once daily.     pulse oximeter device  Commonly known as: pulse oximeter  Apply Externally route 2 (two) times a day. Use twice daily at 8 AM and 3 PM and record the value in MyChart as directed.        CONTINUE taking these medications    albuterol 90 mcg/actuation inhaler  Commonly known as: PROVENTIL/VENTOLIN HFA  Inhale 1-2 puffs into the lungs every 6 (six) hours as needed for Wheezing.     ALPRAZolam 0.5 MG tablet  Commonly known as: XANAX  Take 0.5 mg by mouth.     aspirin 81 MG EC tablet  Commonly known as: ECOTRIN  Take 1 tablet (81 mg total) by mouth once daily.     AURYXIA ORAL  Take by mouth. 2 tabs with every meal     BASAGLAR KWIKPEN U-100 INSULIN glargine 100 units/mL (3mL) SubQ pen  Generic drug: insulin  Inject 15 Units into the skin.     benzocaine 10 % Oint  Apply 1 application topically 3 (three) times daily as needed. Apply to left ear for pain     citalopram 20 MG tablet  Commonly known as: CELEXA  Take 1 tablet (20 mg total) by mouth once daily.     ELIQUIS 5 mg Tab  Generic drug: apixaban  TK 1 T PO BID     lisinopriL 40 MG tablet  Commonly known as: PRINIVIL,ZESTRIL  Take 40 mg by mouth once daily.     metoprolol succinate 25 MG 24 hr tablet  Commonly known as: TOPROL-XL  Take 1 tablet (25 mg total) by mouth once daily.     pantoprazole 40 MG tablet  Commonly known as: PROTONIX  Take 40 mg by mouth.     pravastatin 40 MG tablet  Commonly known as: PRAVACHOL  Take 40 mg by mouth once daily.     sevelamer carbonate 800 mg Tab  Commonly known as: RENVELA  TK 2 TS PO TID WITH MEALS     sucralfate 100 mg/mL suspension  Commonly known as: CARAFATE  SHAKE LIQUID AND TAKE 10 ML BY MOUTH FOUR TIMES DAILY     VELPHORO 500 mg Chew  Generic drug: sucroferric oxyhydroxide  CHEW & SWALLOW 1 TABLET BY MOUTH  THREE TIMES A DAY WITH MEALS AND 1 TABLET TWO TIMES A DAY WITH SNACKS     * vitamin renal formula (B-complex-vitamin c-folic acid) 1 mg Cap  Commonly known as: NEPHROCAP  Take 1 capsule by mouth once daily.     * vitamin renal formula (B-complex-vitamin c-folic acid) 1 mg Cap  Commonly known as: NEPHROCAP  Take 1 capsule by mouth.         * This list has 2 medication(s) that are the same as other medications prescribed for you. Read the directions carefully, and ask your doctor or other care provider to review them with you.            STOP taking these medications    diazePAM 5 MG tablet  Commonly known as: VALIUM            Indwelling Lines/Drains at time of discharge:   Lines/Drains/Airways     Drain                 Hemodialysis AV Fistula Left upper arm -- days                Time spent on the discharge of patient: 65 minutes  Patient was seen and examined on the date of discharge and determined to be suitable for discharge.         Anali Wilcox NP  Department of Hospital Medicine  [facility]

## 2020-12-14 NOTE — PLAN OF CARE
Pt to DC home with [facility].  Pulse ox and 2 o2 tanks were delivered to bedside over the weekend and confirmed with [facility] bedside nurse. BSC in review with [facility].    12/14/20 1448   Post-Acute Status   Post-Acute Authorization Home Health;[facility] Status Pending Medical Review   Home Health Status Set-up Complete   Part D Coverage NA   Discharge Delays (!) Home Medical Equipment (Insurance, Delivery)   Discharge Plan   Discharge Plan A Home with family;[facility]

## 2020-12-14 NOTE — PLAN OF CARE
Pt to DC home with home o2 at bedside.  Does not qualify for BSC and declined out of pocket.     12/14/20 1703   Final Note   Assessment Type Final Discharge Note   Anticipated Discharge Disposition Home-Health   Right Care Referral Info   Post Acute Recommendation Home-care   Facility Name [facility]   Post-Acute Status   Post-Acute Authorization Home Health   HME Status Set-up Complete   Home Health Status Set-up Complete   Discharge Delays (!) Personal Transportation

## 2020-12-14 NOTE — ASSESSMENT & PLAN NOTE
1. ESRD on HD , MWF , originally at [facility], [place] [facility] , now TTS at [facility] due to COVID infection,      Plan HD in AM ,     2.  Hypertension, blood pressure is controlled, continue home blood pressure medications,    3.  Anemia of chronic kidney disease, continue Procrit with dialysis as indicated    4. SHPT,  Continue low phosphorus diet, Velphoro     5.  COVID pneumonia, management per medicine team    6.  Disposition, discharge  when clinically stable, PT/OT eval for disposition

## 2020-12-14 NOTE — PLAN OF CARE
Pt AAOx4. VSS. Pt remained free of falls this shift. No complaints of pain or discomfort. Medications administered as ordered. IV abx administered.  Pt is on 3 LPM via NC with SPO2 around 93-95%. Pt is normal sinus on monitor. Hourly rounding completed. Pt instructed to call for assistance. POC reviewed. Pt verbalized understanding.

## 2020-12-14 NOTE — CHAPLAIN
Initial visit with patient.  Provided support through listening, presence, and prayer.  Pt currently had no other spiritual needs and spiritual care remains available as needed.    Chaplain Linwood Joyce M.Div., [facility]

## 2020-12-14 NOTE — PT/OT/SLP EVAL
Physical Therapy Evaluation and Discharge Note    Patient Name:  Cordell Angulo   MRN:  [id number]    Recommendations:     Discharge Recommendations:  HHPT and family spv/assist with mobility/adl's for safety  Discharge Equipment Recommendations: bsc; going home with portable O2  Barriers to discharge: needs family assist/spv with mobiltiy & adl's to be safe at home    Assessment:     Coredll Angulo is a 55 y.o. female admitted with a medical diagnosis of Pneumonia due to COVID-19 virus. .  At this time, patient is functioning at their prior level of function and does not require further acute PT services.     Recent Surgery: * No surgery found *      Plan:     During this hospitalization, patient does not require further acute PT services.  Please re-consult if situation changes.      Subjective     Chief Complaint: dizziness after short distance walked  Patient/Family Comments/goals: to go home  Pain/Comfort:  ·      Patients cultural, spiritual, Mosque conflicts given the current situation:      Living Environment:  Patient lives with  and has no steps to enter home; family will help  Prior to admission, patients level of function was mod indep with RW.  Equipment used at home: RW.  DME owned (not currently used): none.  Upon discharge, patient will have assistance from HHPT and needs family assist/spv for safety with adl's/mobility.    Objective:     Communicated with RN prior to session.  Patient found HOB elevated with   upon PT entry to room.    General Precautions: Standard,     Orthopedic Precautions:    Braces:       Exams:  · B LE rom wfl and strength grossly 4-/5    Functional Mobility:  · Bed Mobility - supine to/from sit sba & cues for efficient log-rolling technique  · Transfers - sit to/from stand with RW sba and cues for safe hand placement  · Gt - Amb 10ft in room with O2 donned, but had to return to bed r/t dizziness; cga for safety w/gt; dec speed    AM-PAC 6 CLICK MOBILITY  Total Score:         Therapeutic Activities and Exercises:   PT educated patient on POC, B LE TE to prep mobility/dec stiffness and safety/fall precautions with mobility using RW and O2. PT educated patient on safety/use of O2; she needs more training.    AM-PAC 6 CLICK MOBILITY  Total Score:      Patient left HOB elevated with all lines intact, call button in reach and RN notified. Notified CM of PT rec for HHPT and BSC, plus patient only safe to go home if she will have family spv/assist with mobility/adl's    GOALS:   Multidisciplinary Problems     Physical Therapy Goals     Not on file                History:     Past Medical History:   Diagnosis Date    Asthma     CHF (congestive heart failure)     CKD (chronic kidney disease) stage 5, GFR less than 15 ml/min     Depression     Diabetes mellitus     Dialysis patient     Hypercholesterolemia     Hypertension     PAF (paroxysmal atrial fibrillation)        Past Surgical History:   Procedure Laterality Date    AV FISTULA PLACEMENT      CARDIAC ELECTROPHYSIOLOGY MAPPING AND ABLATION      CATHETERIZATION OF BOTH LEFT AND RIGHT HEART N/A 2018    Procedure: CATHETERIZATION, HEART, BOTH LEFT AND RIGHT;  Surgeon: Enrique Jj MD;  Location: [facility] CATH LAB;  Service: Cardiology;  Laterality: N/A;  To follow his cardioversion case in [facility]     SECTION      TUMOR REMOVAL      L lung       Time Tracking:     PT Received On:    PT Start Time:       PT Stop Time:    PT Total Time (min):       Billable Minutes: Evaluation 15, Therapeutic Activity 15 and Therapeutic Exercise 10      Dl Mayfield, PT  2020

## 2020-12-14 NOTE — NURSING
Gave and reviewed AVS with patient and family member. Removed PIV, catheter intact. Heart monitor removed and returned. Patient has bedside belongings gathered up. Prescriptions placed in personal belongings. Patient is waiting for family for transportation home.

## 2020-12-14 NOTE — PROGRESS NOTES
[facility]   Nephrology  Progress Note    Patient Name: Cordell Angulo  MRN: [id number]  Admission Date: 12/10/2020  Hospital Length of Stay: 4 days  Attending Provider: Dylan Guardado, *   Primary Care Physician: Chuck Grider MD  Principal Problem:Pneumonia due to COVID-19 virus    Subjective:     HPI: Patient is a 55-year-old female with ESRD on hemodialysis on Monday-Wed- Friday schedule in [facility] under my care.  Patient has recently become COVID-19 positive.  Patient since then has been moved to the [facility] unit on a Sxlmhfb-Apxrjsbk-Tnorajrs schedule.  Last dialysis was short 2 hours  Patient comes to the hospital in the emergency room with short worsening shortness of breath.  Patient seen and evaluated at bedside.         Interval History: d/c was held due to weakness and for PT/OT evaluation     Review of patient's allergies indicates:   Allergen Reactions    Morphine Itching    Sulfa (sulfonamide antibiotics) Rash     Current Facility-Administered Medications   Medication Frequency    acetaminophen tablet 650 mg Q8H PRN    apixaban tablet 5 mg BID    ascorbic acid (vitamin C) tablet 500 mg BID    aspirin EC tablet 81 mg Daily    cefTRIAXone (ROCEPHIN) 1 g/50 mL D5W IVPB Q24H    citalopram tablet 20 mg Daily    dexAMETHasone tablet 6 mg Daily    dextrose 50% injection 12.5 g PRN    dextrose 50% injection 25 g PRN    glucagon (human recombinant) injection 1 mg PRN    glucose chewable tablet 16 g PRN    glucose chewable tablet 24 g PRN    insulin aspart U-100 pen 0-5 Units QID (AC + HS) PRN    ipratropium-albuteroL inhaler 1 puff Q6H PRN    metoprolol succinate (TOPROL-XL) 24 hr tablet 25 mg Daily    multivitamin tablet Daily    mupirocin 2 % ointment BID    ondansetron injection 4 mg Q8H PRN    pantoprazole EC tablet 40 mg Daily    pravastatin tablet 40 mg Daily    remdesivir 100 mg in sodium chloride 0.9% 250 mL infusion Q24H    sevelamer  carbonate tablet 800 mg TID WM    sodium chloride 0.9% flush 10 mL PRN    sucroferric oxyhydroxide Chew 500 mg TID WM    vitamin D 1000 units tablet 1,000 Units Daily    vitamin renal formula (B-complex-vitamin c-folic acid) 1 mg per capsule 1 capsule Daily       Objective:     Vital Signs (Most Recent):  Temp: 97.2 °F (36.2 °C) (12/14/20 1128)  Pulse: 76 (12/14/20 1310)  Resp: 20 (12/14/20 1128)  BP: 135/78 (12/14/20 1128)  SpO2: 95 % (12/14/20 1128)  O2 Device (Oxygen Therapy): nasal cannula (12/14/20 0817) Vital Signs (24h Range):  Temp:  [96.3 °F (35.7 °C)-98.7 °F (37.1 °C)] 97.2 °F (36.2 °C)  Pulse:  [61-80] 76  Resp:  [16-20] 20  SpO2:  [92 %-95 %] 95 %  BP: (119-139)/(58-83) 135/78     Weight: 106 kg (233 lb 11 oz) (12/13/20 0453)  Body mass index is 36.6 kg/m².  Body surface area is 2.24 meters squared.    I/O last 3 completed shifts:  In: 1230 [P.O.:480; IV Piggyback:750]  Out: -     Physical Exam       PE not done due to COVID isolation     Significant Labs:    CBC:   Recent Labs   Lab 12/14/20  0727   WBC 16.45*   RBC 4.16   HGB 10.9*   HCT 35.6*   *   MCV 86   MCH 26.2*   MCHC 30.6*     CMP:   Recent Labs   Lab 12/14/20  0727   *   CALCIUM 7.9*   ALBUMIN 2.2*   PROT 7.1   *   K 4.8   CO2 23   CL 93*   BUN 85*   CREATININE 9.5*   ALKPHOS 83   ALT 13   AST 20   BILITOT 0.3     Coagulation:   Recent Labs   Lab 12/10/20  0837   INR 1.1   APTT 36.7*     LFTs:   Recent Labs   Lab 12/14/20  0727   ALT 13   AST 20   ALKPHOS 83   BILITOT 0.3   PROT 7.1   ALBUMIN 2.2*     All labs within the past 24 hours have been reviewed.     Significant Imaging:  Reviewed    Lab Results   Component Value Date    .0 (H) 05/08/2015    CALCIUM 7.9 (L) 12/14/2020    PHOS 5.3 (H) 12/14/2020       Lab Results   Component Value Date    ALBUMIN 2.2 (L) 12/14/2020           Assessment/Plan:     ESRD (end stage renal disease) on dialysis  1. ESRD on HD , MWF , originally at [facility], [facility] , now  TTS at [facility] due to COVID infection,      Plan HD in AM ,     2.  Hypertension, blood pressure is controlled, continue home blood pressure medications,    3.  Anemia of chronic kidney disease, continue Procrit with dialysis as indicated    4. SHPT,  Continue low phosphorus diet, Velphoro     5.  COVID pneumonia, management per medicine team    6.  Disposition, discharge  when clinically stable, PT/OT eval for disposition         Thank you for your consult. I will follow-up with patient. Please contact us if you have any additional questions.     Total time spent 30 minutes including time needed to review the records,  patient  evaluation, documentation, face-to-face discussion with the patient,  primary team,   more than 50% of the time was spent on coordination of care and counseling.       Donovan Gardner MD  Nephrology  [facility]

## 2020-12-14 NOTE — SUBJECTIVE & OBJECTIVE
Interval History: d/c was held due to weakness and for PT/OT evaluation     Review of patient's allergies indicates:   Allergen Reactions    Morphine Itching    Sulfa (sulfonamide antibiotics) Rash     Current Facility-Administered Medications   Medication Frequency    acetaminophen tablet 650 mg Q8H PRN    apixaban tablet 5 mg BID    ascorbic acid (vitamin C) tablet 500 mg BID    aspirin EC tablet 81 mg Daily    cefTRIAXone (ROCEPHIN) 1 g/50 mL D5W IVPB Q24H    citalopram tablet 20 mg Daily    dexAMETHasone tablet 6 mg Daily    dextrose 50% injection 12.5 g PRN    dextrose 50% injection 25 g PRN    glucagon (human recombinant) injection 1 mg PRN    glucose chewable tablet 16 g PRN    glucose chewable tablet 24 g PRN    insulin aspart U-100 pen 0-5 Units QID (AC + HS) PRN    ipratropium-albuteroL inhaler 1 puff Q6H PRN    metoprolol succinate (TOPROL-XL) 24 hr tablet 25 mg Daily    multivitamin tablet Daily    mupirocin 2 % ointment BID    ondansetron injection 4 mg Q8H PRN    pantoprazole EC tablet 40 mg Daily    pravastatin tablet 40 mg Daily    remdesivir 100 mg in sodium chloride 0.9% 250 mL infusion Q24H    sevelamer carbonate tablet 800 mg TID WM    sodium chloride 0.9% flush 10 mL PRN    sucroferric oxyhydroxide Chew 500 mg TID WM    vitamin D 1000 units tablet 1,000 Units Daily    vitamin renal formula (B-complex-vitamin c-folic acid) 1 mg per capsule 1 capsule Daily       Objective:     Vital Signs (Most Recent):  Temp: 97.2 °F (36.2 °C) (12/14/20 1128)  Pulse: 76 (12/14/20 1310)  Resp: 20 (12/14/20 1128)  BP: 135/78 (12/14/20 1128)  SpO2: 95 % (12/14/20 1128)  O2 Device (Oxygen Therapy): nasal cannula (12/14/20 0817) Vital Signs (24h Range):  Temp:  [96.3 °F (35.7 °C)-98.7 °F (37.1 °C)] 97.2 °F (36.2 °C)  Pulse:  [61-80] 76  Resp:  [16-20] 20  SpO2:  [92 %-95 %] 95 %  BP: (119-139)/(58-83) 135/78     Weight: 106 kg (233 lb 11 oz) (12/13/20 0453)  Body mass index is 36.6  kg/m².  Body surface area is 2.24 meters squared.    I/O last 3 completed shifts:  In: 1230 [P.O.:480; IV Piggyback:750]  Out: -     Physical Exam       PE not done due to COVID isolation     Significant Labs:    CBC:   Recent Labs   Lab 12/14/20  0727   WBC 16.45*   RBC 4.16   HGB 10.9*   HCT 35.6*   *   MCV 86   MCH 26.2*   MCHC 30.6*     CMP:   Recent Labs   Lab 12/14/20  0727   *   CALCIUM 7.9*   ALBUMIN 2.2*   PROT 7.1   *   K 4.8   CO2 23   CL 93*   BUN 85*   CREATININE 9.5*   ALKPHOS 83   ALT 13   AST 20   BILITOT 0.3     Coagulation:   Recent Labs   Lab 12/10/20  0837   INR 1.1   APTT 36.7*     LFTs:   Recent Labs   Lab 12/14/20  0727   ALT 13   AST 20   ALKPHOS 83   BILITOT 0.3   PROT 7.1   ALBUMIN 2.2*     All labs within the past 24 hours have been reviewed.     Significant Imaging:  Reviewed    Lab Results   Component Value Date    .0 (H) 05/08/2015    CALCIUM 7.9 (L) 12/14/2020    PHOS 5.3 (H) 12/14/2020       Lab Results   Component Value Date    ALBUMIN 2.2 (L) 12/14/2020

## 2020-12-15 ENCOUNTER — PATIENT OUTREACH (OUTPATIENT)
Dept: ADMINISTRATIVE | Facility: CLINIC | Age: 55
End: 2020-12-15

## 2020-12-15 ENCOUNTER — NURSE TRIAGE (OUTPATIENT)
Dept: ADMINISTRATIVE | Facility: CLINIC | Age: 55
End: 2020-12-15

## 2020-12-15 LAB
BACTERIA BLD CULT: NORMAL
BACTERIA BLD CULT: NORMAL

## 2020-12-15 NOTE — TELEPHONE ENCOUNTER
"Surveillance enrollment Attempt #1:     Attempt to call x 3.  Reached recording x 2 "Call can not be completed at this time."    No portal access. No message sent.     Reason for Disposition   Unable to complete triage due to phone connection issues    Additional Information   Negative: Caller has already spoken with the PCP (or office), and has no further questions   Negative: Caller has already spoken with another triager and has no further questions   Negative: Caller has already spoken with another triager or PCP (or office), and has further questions and triager able to answer questions.    Protocols used: NO CONTACT OR DUPLICATE CONTACT CALL-A-OH      "

## 2020-12-15 NOTE — TELEPHONE ENCOUNTER
C3 nurse attempted to contact patient. No answer.  C3 nurse attempted to contact Tomassuzanne Gudinos  for a TCC post hospital discharge follow up call. No answer at phone number listed and no voicemail available. The patient does not have a scheduled HOSFU appointment within 7-14 days post hospital discharge date 12/14/20.

## 2020-12-17 ENCOUNTER — NURSE TRIAGE (OUTPATIENT)
Dept: ADMINISTRATIVE | Facility: CLINIC | Age: 55
End: 2020-12-17

## 2020-12-17 NOTE — TELEPHONE ENCOUNTER
Pt unable to do program due to phone issues but enrolled in [id number]. Pt has therapist coming to her house to help her.O2 sat 99 right now and NC 68. Pt denies SOB at rest has a little with activity if O2 sat goes to 93 or below will call or go back to hospital/         Reason for Disposition   Information only question and nurse able to answer    Protocols used: INFORMATION ONLY CALL - NO TRIAGE-A-OH

## 2020-12-18 ENCOUNTER — EXTERNAL HOME HEALTH (OUTPATIENT)
Dept: HOME HEALTH SERVICES | Facility: HOSPITAL | Age: 55
End: 2020-12-18

## 2021-01-13 ENCOUNTER — TELEPHONE (OUTPATIENT)
Dept: GASTROENTEROLOGY | Facility: CLINIC | Age: 56
End: 2021-01-13

## 2021-01-14 ENCOUNTER — TELEPHONE (OUTPATIENT)
Dept: GASTROENTEROLOGY | Facility: CLINIC | Age: 56
End: 2021-01-14

## 2021-01-15 ENCOUNTER — DOCUMENTATION ONLY (OUTPATIENT)
Dept: GASTROENTEROLOGY | Facility: CLINIC | Age: 56
End: 2021-01-15

## 2021-01-19 ENCOUNTER — TELEPHONE (OUTPATIENT)
Dept: GASTROENTEROLOGY | Facility: CLINIC | Age: 56
End: 2021-01-19

## 2021-04-29 ENCOUNTER — EXTERNAL HOME HEALTH (OUTPATIENT)
Dept: HOME HEALTH SERVICES | Facility: HOSPITAL | Age: 56
End: 2021-04-29
Payer: MEDICARE

## 2021-12-06 ENCOUNTER — HOSPITAL ENCOUNTER (EMERGENCY)
Facility: HOSPITAL | Age: 56
Discharge: HOME | End: 2021-12-06
Attending: EMERGENCY MEDICINE
Payer: MEDICARE

## 2021-12-06 VITALS
BODY MASS INDEX: 35.24 KG/M2 | HEART RATE: 53 BPM | RESPIRATION RATE: 18 BRPM | SYSTOLIC BLOOD PRESSURE: 143 MMHG | WEIGHT: 225 LBS | OXYGEN SATURATION: 99 % | TEMPERATURE: 98 F | DIASTOLIC BLOOD PRESSURE: 101 MMHG

## 2021-12-06 DIAGNOSIS — G44.209 TENSION-TYPE HEADACHE, NOT INTRACTABLE, UNSPECIFIED CHRONICITY PATTERN: Primary | ICD-10-CM

## 2021-12-06 DIAGNOSIS — N18.6 ESRD ON DIALYSIS: ICD-10-CM

## 2021-12-06 DIAGNOSIS — Z99.2 ESRD ON DIALYSIS: ICD-10-CM

## 2021-12-06 LAB
ALBUMIN SERPL BCP-MCNC: 3.3 G/DL (ref 3.5–5.2)
ALP SERPL-CCNC: 87 U/L (ref 55–135)
ALT SERPL W/O P-5'-P-CCNC: 12 U/L (ref 10–44)
ANION GAP SERPL CALC-SCNC: 14 MMOL/L (ref 8–16)
AST SERPL-CCNC: 12 U/L (ref 10–40)
BASOPHILS # BLD AUTO: 0.03 K/UL (ref 0–0.2)
BASOPHILS NFR BLD: 0.3 % (ref 0–1.9)
BILIRUB SERPL-MCNC: 0.5 MG/DL (ref 0.1–1)
BUN SERPL-MCNC: 33 MG/DL (ref 6–20)
CALCIUM SERPL-MCNC: 8.3 MG/DL (ref 8.7–10.5)
CHLORIDE SERPL-SCNC: 102 MMOL/L (ref 95–110)
CO2 SERPL-SCNC: 26 MMOL/L (ref 23–29)
CREAT SERPL-MCNC: 7.7 MG/DL (ref 0.5–1.4)
DIFFERENTIAL METHOD: ABNORMAL
EOSINOPHIL # BLD AUTO: 0.2 K/UL (ref 0–0.5)
EOSINOPHIL NFR BLD: 1.4 % (ref 0–8)
ERYTHROCYTE [DISTWIDTH] IN BLOOD BY AUTOMATED COUNT: 14.9 % (ref 11.5–14.5)
EST. GFR  (AFRICAN AMERICAN): 6 ML/MIN/1.73 M^2
EST. GFR  (NON AFRICAN AMERICAN): 5 ML/MIN/1.73 M^2
GLUCOSE SERPL-MCNC: 94 MG/DL (ref 70–110)
HCT VFR BLD AUTO: 33.3 % (ref 37–48.5)
HCV AB SERPL QL IA: NEGATIVE
HEP C VIRUS HOLD SPECIMEN: NORMAL
HGB BLD-MCNC: 10.2 G/DL (ref 12–16)
HIV 1+2 AB+HIV1 P24 AG SERPL QL IA: NEGATIVE
IMM GRANULOCYTES # BLD AUTO: 0.07 K/UL (ref 0–0.04)
IMM GRANULOCYTES NFR BLD AUTO: 0.7 % (ref 0–0.5)
LYMPHOCYTES # BLD AUTO: 1.6 K/UL (ref 1–4.8)
LYMPHOCYTES NFR BLD: 14.9 % (ref 18–48)
MCH RBC QN AUTO: 26.6 PG (ref 27–31)
MCHC RBC AUTO-ENTMCNC: 30.6 G/DL (ref 32–36)
MCV RBC AUTO: 87 FL (ref 82–98)
MONOCYTES # BLD AUTO: 0.6 K/UL (ref 0.3–1)
MONOCYTES NFR BLD: 5.7 % (ref 4–15)
NEUTROPHILS # BLD AUTO: 8.2 K/UL (ref 1.8–7.7)
NEUTROPHILS NFR BLD: 77 % (ref 38–73)
NRBC BLD-RTO: 0 /100 WBC
PLATELET # BLD AUTO: 259 K/UL (ref 150–450)
PMV BLD AUTO: 8.6 FL (ref 9.2–12.9)
POTASSIUM SERPL-SCNC: 3.5 MMOL/L (ref 3.5–5.1)
PROT SERPL-MCNC: 6.6 G/DL (ref 6–8.4)
RBC # BLD AUTO: 3.84 M/UL (ref 4–5.4)
SODIUM SERPL-SCNC: 142 MMOL/L (ref 136–145)
WBC # BLD AUTO: 10.61 K/UL (ref 3.9–12.7)

## 2021-12-06 PROCEDURE — 87389 HIV-1 AG W/HIV-1&-2 AB AG IA: CPT | Performed by: EMERGENCY MEDICINE

## 2021-12-06 PROCEDURE — 80053 COMPREHEN METABOLIC PANEL: CPT | Performed by: EMERGENCY MEDICINE

## 2021-12-06 PROCEDURE — 99284 EMERGENCY DEPT VISIT MOD MDM: CPT | Mod: 25

## 2021-12-06 PROCEDURE — 25000003 PHARM REV CODE 250: Performed by: EMERGENCY MEDICINE

## 2021-12-06 PROCEDURE — 86803 HEPATITIS C AB TEST: CPT | Performed by: EMERGENCY MEDICINE

## 2021-12-06 PROCEDURE — 63600175 PHARM REV CODE 636 W HCPCS: Performed by: EMERGENCY MEDICINE

## 2021-12-06 PROCEDURE — 96374 THER/PROPH/DIAG INJ IV PUSH: CPT

## 2021-12-06 PROCEDURE — 85025 COMPLETE CBC W/AUTO DIFF WBC: CPT | Performed by: EMERGENCY MEDICINE

## 2021-12-06 RX ORDER — BUTALBITAL, ACETAMINOPHEN AND CAFFEINE 50; 325; 40 MG/1; MG/1; MG/1
1 TABLET ORAL EVERY 4 HOURS PRN
Qty: 15 TABLET | Refills: 0 | Status: SHIPPED | OUTPATIENT
Start: 2021-12-06 | End: 2022-01-05

## 2021-12-06 RX ORDER — PROCHLORPERAZINE MALEATE 10 MG
10 TABLET ORAL EVERY 6 HOURS PRN
Qty: 15 TABLET | Refills: 0 | Status: SHIPPED | OUTPATIENT
Start: 2021-12-06 | End: 2022-07-15

## 2021-12-06 RX ORDER — BUTALBITAL, ACETAMINOPHEN AND CAFFEINE 50; 325; 40 MG/1; MG/1; MG/1
1 TABLET ORAL
Status: COMPLETED | OUTPATIENT
Start: 2021-12-06 | End: 2021-12-06

## 2021-12-06 RX ORDER — PROCHLORPERAZINE EDISYLATE 5 MG/ML
10 INJECTION INTRAMUSCULAR; INTRAVENOUS
Status: COMPLETED | OUTPATIENT
Start: 2021-12-06 | End: 2021-12-06

## 2021-12-06 RX ADMIN — BUTALBITAL, ACETAMINOPHEN AND CAFFEINE 1 TABLET: 50; 325; 40 TABLET ORAL at 09:12

## 2021-12-06 RX ADMIN — PROCHLORPERAZINE EDISYLATE 10 MG: 5 INJECTION INTRAMUSCULAR; INTRAVENOUS at 10:12

## 2022-02-17 ENCOUNTER — HOSPITAL ENCOUNTER (EMERGENCY)
Facility: HOSPITAL | Age: 57
Discharge: HOME | End: 2022-02-17
Attending: FAMILY MEDICINE
Payer: MEDICARE

## 2022-02-17 VITALS
WEIGHT: 242.63 LBS | HEART RATE: 82 BPM | TEMPERATURE: 98 F | RESPIRATION RATE: 18 BRPM | DIASTOLIC BLOOD PRESSURE: 65 MMHG | HEIGHT: 67 IN | OXYGEN SATURATION: 98 % | BODY MASS INDEX: 38.08 KG/M2 | SYSTOLIC BLOOD PRESSURE: 163 MMHG

## 2022-02-17 DIAGNOSIS — M54.2 NECK PAIN: ICD-10-CM
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
## 2022-02-17 DIAGNOSIS — S09.90XA INJURY OF HEAD, INITIAL ENCOUNTER: Primary | ICD-10-CM

## 2022-02-17 PROCEDURE — 25000003 PHARM REV CODE 250: Performed by: REGISTERED NURSE

## 2022-02-17 PROCEDURE — 99284 EMERGENCY DEPT VISIT MOD MDM: CPT | Mod: 25

## 2022-02-17 RX ORDER — HYDROCODONE BITARTRATE AND ACETAMINOPHEN 5; 325 MG/1; MG/1
1 TABLET ORAL
Status: COMPLETED | OUTPATIENT
Start: 2022-02-17 | End: 2022-02-17

## 2022-02-17 RX ADMIN — HYDROCODONE BITARTRATE AND ACETAMINOPHEN 1 TABLET: 5; 325 TABLET ORAL at 03:02

## 2022-02-17 NOTE — ED PROVIDER NOTES
Encounter Date: 2022       History     Chief Complaint   Patient presents with    Back Pain     Pt. States she had a slip and fall at home this morning around 10 a.m. Pt. Denied any LOC. +blood thinners. Pt. C/o posterior head pain/neck/upper back pain. Pt. States she also hit left arm and worried about her dialysis shunt. Pt. Also advised of nausea and vomiting post fall.      The history is provided by the patient.   Neck Pain   This is a new problem. The current episode started today. The problem occurs constantly. The pain is associated with a fall. The pain is present in the left side. The quality of the pain is described as aching. The pain radiates to the left shoulder. The symptoms are aggravated by bending and twisting. Associated symptoms include headaches. Pertinent negatives include no photophobia, no chest pain, no bowel incontinence, no bladder incontinence and no weakness. She has tried nothing for the symptoms.     Review of patient's allergies indicates:   Allergen Reactions    Morphine Itching    Sulfa (sulfonamide antibiotics) Rash     Past Medical History:   Diagnosis Date    Asthma     CHF (congestive heart failure)     CKD (chronic kidney disease) stage 5, GFR less than 15 ml/min     Depression     Diabetes mellitus     Dialysis patient     Hypercholesterolemia     Hypertension     PAF (paroxysmal atrial fibrillation)      Past Surgical History:   Procedure Laterality Date    AV FISTULA PLACEMENT      CARDIAC ELECTROPHYSIOLOGY MAPPING AND ABLATION      CATHETERIZATION OF BOTH LEFT AND RIGHT HEART N/A 2018    Procedure: CATHETERIZATION, HEART, BOTH LEFT AND RIGHT;  Surgeon: Enrique Jj MD;  Location: [facility] CATH LAB;  Service: Cardiology;  Laterality: N/A;  To follow his cardioversion case in [facility]     SECTION      TUMOR REMOVAL      L lung     Family History   Problem Relation Age of Onset    Heart disease Mother     Diabetes Father     Stroke Father      Heart attack Sister      Social History     Tobacco Use    Smoking status: Never Smoker    Smokeless tobacco: Never Used   Substance Use Topics    Alcohol use: No    Drug use: No     Review of Systems   Constitutional: Negative for fever.   HENT: Negative for sore throat.    Eyes: Negative for photophobia.   Respiratory: Negative for shortness of breath.    Cardiovascular: Negative for chest pain.   Gastrointestinal: Negative for bowel incontinence and nausea.   Genitourinary: Negative for bladder incontinence and dysuria.   Musculoskeletal: Positive for neck pain. Negative for back pain.   Skin: Negative for rash.   Neurological: Positive for headaches. Negative for weakness.   Hematological: Does not bruise/bleed easily.   All other systems reviewed and are negative.      Physical Exam     Initial Vitals [02/17/22 1539]   BP Pulse Resp Temp SpO2   (!) 163/65 82 18 98.2 °F (36.8 °C) 98 %      MAP       --         Physical Exam    Constitutional: She appears well-developed and well-nourished. She is not diaphoretic. No distress.   HENT:   Head: Normocephalic. Head is with contusion. Head is without raccoon's eyes and without Overton's sign.       Eyes: Conjunctivae and EOM are normal. Pupils are equal, round, and reactive to light.   Neck: Neck supple.       Normal range of motion.  Cardiovascular: Normal rate, regular rhythm and normal heart sounds.   No murmur heard.  Pulmonary/Chest: Breath sounds normal. No respiratory distress. She has no wheezes. She has no rales.   Abdominal: Abdomen is soft. Bowel sounds are normal. There is no abdominal tenderness. There is no rebound, no guarding and no CVA tenderness.   Musculoskeletal:         General: No edema. Normal range of motion.      Left shoulder: Tenderness present. No swelling or deformity. Normal range of motion.      Cervical back: Normal range of motion and neck supple. No rigidity. Spinous process tenderness and muscular tenderness present. Normal  range of motion.     Neurological: She is alert and oriented to person, place, and time. No cranial nerve deficit. GCS score is 15. GCS eye subscore is 4. GCS verbal subscore is 5. GCS motor subscore is 6.   Skin: Skin is warm and dry. Capillary refill takes less than 2 seconds.   Psychiatric: She has a normal mood and affect. Thought content normal.         ED Course   Procedures  Labs Reviewed - No data to display       Imaging Results          CT Head Without Contrast (Final result)  Result time 02/17/22 16:39:40    Final result by Kvng Banks MD (02/17/22 16:39:40)                 Impression:      1.  Negative for acute intracranial process. Negative for hemorrhage, or skull fracture.    2.  Intracranial atherosclerotic disease noted.  Small vessel ischemic changes noted.    3.  Stable findings as noted above.    All CT scans at this facility are performed  using dose modulation techniques as appropriate to performed exam including the following:  automated exposure control; adjustment of mA and/or kV according to the patients size (this includes techniques or standardized protocols for targeted exams where dose is matched to indication/reason for exam: i.e. extremities or head);  iterative reconstruction technique.      Electronically signed by: Kvng Banks MD  Date:    02/17/2022  Time:    16:39             Narrative:    EXAMINATION:  CT HEAD WITHOUT CONTRAST    CLINICAL HISTORY:  Head trauma, moderate-severe;    TECHNIQUE:  Axial images through the brain and posterior fossa were obtained without the use of IV contrast.  Sagittal and coronal reconstructions are provided for review.    COMPARISON:  December 6, 2021    FINDINGS:  The ventricles are midline and the CSF spaces are normal. The gray-white matter junction is well preserved. Negative for intracranial vascular abnormalities. Negative for mass, mass effect, cerebral edema, hemorrhage or abnormal fluid collections.  Intracranial atherosclerotic  disease noted.  Small vessel ischemic changes noted.  Basal ganglia calcifications again seen.  Falx and arachnoid granulation calcifications.    The skull and scalp are  intact.  Stable patchy left ethmoid air cell disease.  Leftward nasal septal deviation.  The rest of the paranasal sinuses, mastoid air cells, middle ears and ear canals are clear. The globes are intact.                               X-Ray Cervical Spine AP And Lateral (Final result)  Result time 02/17/22 16:16:30    Final result by Dayne Mcgowan MD (02/17/22 16:16:30)                 Impression:      No acute abnormality.      Electronically signed by: Juan M Oscar  Date:    02/17/2022  Time:    16:16             Narrative:    EXAMINATION:  XR CERVICAL SPINE AP LATERAL    CLINICAL HISTORY:  XR CERVICAL SPINE AP LATERALCervicalgia    COMPARISON:  None    FINDINGS:  Multiple radiographic views  were obtained.    No evidence of acute fracture or dislocation.  Mild degenerative joint disease.  Senescent changes.                               X-Ray Shoulder 2 or More Views Left (Final result)  Result time 02/17/22 16:15:29    Final result by Dayne Mcgowan MD (02/17/22 16:15:29)                 Impression:      As above      Electronically signed by: Juan M Oscar  Date:    02/17/2022  Time:    16:15             Narrative:    EXAMINATION:  XR SHOULDER COMPLETE 2 OR MORE VIEWS LEFT    CLINICAL HISTORY:  left shoulder pain;    TECHNIQUE:  Two or three views of the left shoulder were performed.    COMPARISON:  None    FINDINGS:  Stent in the left axillary region.  Degenerative joint disease of the glenohumeral joint.  Vascular clips noted.  Decreased bone mineral density.  No acute osseous injury.                                 Medications   HYDROcodone-acetaminophen 5-325 mg per tablet 1 tablet (1 tablet Oral Given 2/17/22 1550)          I discussed with patient and/or family/caretaker that negative X-ray does not rule out occult fracture or other  soft tissue injury.  Persistent pain greater than 7-10 days or increased pain requires follow up, specifically with orthopedics.     I discussed with patient and/or family/caretaker that evaluation in the ED does not suggest any emergent or life threatening medical conditions requiring immediate intervention beyond what was provided in the ED, and I believe patient is safe for discharge.  Regardless, an unremarkable evaluation in the ED does not preclude the development or presence of a serious of life threatening condition. As such, patient was instructed to return immediately for any worsening or change in current symptoms.                   Clinical Impression:   Final diagnoses:  [M54.2] Neck pain  [S09.90XA] Injury of head, initial encounter (Primary)  [W19.XXXA] Fall, initial encounter          ED Disposition Condition    Discharge Stable        ED Prescriptions     None        Follow-up Information     Follow up With Specialties Details Why Contact Info    Chuck Grider MD Internal Medicine In 1 week  [id number]  [facility] [address]  [phone]             Oren Liriano Jr., [facility]  02/17/22 [id number]

## 2022-04-30 ENCOUNTER — HOSPITAL ENCOUNTER (EMERGENCY)
Facility: HOSPITAL | Age: 57
Discharge: HOME | End: 2022-04-30
Attending: EMERGENCY MEDICINE
Payer: MEDICARE

## 2022-04-30 VITALS
BODY MASS INDEX: 37.83 KG/M2 | HEIGHT: 67 IN | HEART RATE: 77 BPM | SYSTOLIC BLOOD PRESSURE: 180 MMHG | DIASTOLIC BLOOD PRESSURE: 76 MMHG | TEMPERATURE: 99 F | RESPIRATION RATE: 16 BRPM | OXYGEN SATURATION: 99 % | WEIGHT: 241.06 LBS

## 2022-04-30 DIAGNOSIS — H10.31 ACUTE CONJUNCTIVITIS OF RIGHT EYE, UNSPECIFIED ACUTE CONJUNCTIVITIS TYPE: ICD-10-CM

## 2022-04-30 DIAGNOSIS — H57.11 ACUTE RIGHT EYE PAIN: Primary | ICD-10-CM

## 2022-04-30 PROCEDURE — 99284 EMERGENCY DEPT VISIT MOD MDM: CPT | Mod: 25

## 2022-04-30 PROCEDURE — 25000003 PHARM REV CODE 250: Performed by: NURSE PRACTITIONER

## 2022-04-30 RX ORDER — HYDROCODONE BITARTRATE AND ACETAMINOPHEN 5; 325 MG/1; MG/1
1 TABLET ORAL
Status: COMPLETED | OUTPATIENT
Start: 2022-04-30 | End: 2022-04-30

## 2022-04-30 RX ORDER — ERYTHROMYCIN 5 MG/G
OINTMENT OPHTHALMIC
Qty: 3.5 G | Refills: 0 | Status: SHIPPED | OUTPATIENT
Start: 2022-04-30

## 2022-04-30 RX ORDER — HYDROCODONE BITARTRATE AND ACETAMINOPHEN 5; 325 MG/1; MG/1
1 TABLET ORAL EVERY 6 HOURS PRN
Qty: 12 TABLET | Refills: 0 | OUTPATIENT
Start: 2022-04-30 | End: 2022-05-31

## 2022-04-30 RX ADMIN — HYDROCODONE BITARTRATE AND ACETAMINOPHEN 1 TABLET: 5; 325 TABLET ORAL at 01:04

## 2022-04-30 RX ADMIN — FLUORESCEIN SODIUM AND BENOXINATE HYDROCHLORIDE 1 DROP: 2.5; 4 SOLUTION OPHTHALMIC at 12:04

## 2022-04-30 NOTE — ED NOTES
Pt presents to ED with R eye pain. Reports woke up yesterday with eye irritated, swollen, and reddened. Denies trauma.

## 2022-04-30 NOTE — ED PROVIDER NOTES
Encounter Date: 2022       History     Chief Complaint   Patient presents with    Eye Pain     States pain to right eye x 2 days, pain is getting worse, blurry vision, pain with light.     57-year-old female with complaint of right eye pain and redness for the past 2-3 days.  Patient reports right eye has been tearing over the past day.  Patient reports mild blurred vision.  Denies fever chills.  Denies injury to the eye.  No other complaints.        Review of patient's allergies indicates:   Allergen Reactions    Morphine Itching    Sulfa (sulfonamide antibiotics) Rash     Past Medical History:   Diagnosis Date    Asthma     CHF (congestive heart failure)     CKD (chronic kidney disease) stage 5, GFR less than 15 ml/min     Depression     Diabetes mellitus     Dialysis patient     Hypercholesterolemia     Hypertension     PAF (paroxysmal atrial fibrillation)      Past Surgical History:   Procedure Laterality Date    AV FISTULA PLACEMENT      CARDIAC ELECTROPHYSIOLOGY MAPPING AND ABLATION      CATHETERIZATION OF BOTH LEFT AND RIGHT HEART N/A 2018    Procedure: CATHETERIZATION, HEART, BOTH LEFT AND RIGHT;  Surgeon: Enrique Jj MD;  Location: [facility] CATH LAB;  Service: Cardiology;  Laterality: N/A;  To follow his cardioversion case in [facility]     SECTION      TUMOR REMOVAL      L lung     Family History   Problem Relation Age of Onset    Heart disease Mother     Diabetes Father     Stroke Father     Heart attack Sister      Social History     Tobacco Use    Smoking status: Never Smoker    Smokeless tobacco: Never Used   Substance Use Topics    Alcohol use: No    Drug use: No     Review of Systems   Constitutional: Negative for fever.   HENT: Negative for sore throat.    Eyes: Positive for pain and redness.   Respiratory: Negative for shortness of breath.    Cardiovascular: Negative for chest pain.   Gastrointestinal: Negative for nausea.   Genitourinary: Negative for  dysuria.   Musculoskeletal: Negative for back pain.   Skin: Negative for rash.   Neurological: Negative for weakness.   Hematological: Does not bruise/bleed easily.       Physical Exam     Initial Vitals [04/30/22 1235]   BP Pulse Resp Temp SpO2   (!) 180/76 77 16 98.5 °F (36.9 °C) 99 %      MAP       --         Physical Exam    Nursing note and vitals reviewed.  Constitutional: She appears well-developed and well-nourished.   HENT:   Head: Normocephalic and atraumatic.   Eyes: EOM are normal. Pupils are equal, round, and reactive to light.   Mild erythema of right conjunctivae     Neck: Neck supple.   Normal range of motion.  Cardiovascular: Normal rate, regular rhythm, normal heart sounds and intact distal pulses.   Pulmonary/Chest: Breath sounds normal.   Abdominal: Abdomen is soft. There is no abdominal tenderness. There is no rebound and no guarding.   Musculoskeletal:         General: Normal range of motion.      Cervical back: Normal range of motion and neck supple.     Neurological: She is alert and oriented to person, place, and time. She has normal strength and normal reflexes.   Skin: Skin is warm and dry.   Psychiatric: She has a normal mood and affect. Her behavior is normal. Thought content normal.         ED Course   Procedures  Labs Reviewed - No data to display       Imaging Results    None          Medications   HYDROcodone-acetaminophen 5-325 mg per tablet 1 tablet (has no administration in time range)   fluorescein-benoxinate 0.25-0.4% ophthalmic solution 1 drop (1 drop Right Eye Given by Other 4/30/22 1252)         1:03 PM  IOP = 15 in right eye, PERRLA, EOMI, fluoroscene drops placed in right eye, no foreign bodies, abrasions, or ulcerations noted                 Clinical Impression:   Final diagnoses:  [H57.11] Acute right eye pain (Primary)  [H10.31] Acute conjunctivitis of right eye, unspecified acute conjunctivitis type          ED Disposition Condition    Discharge Stable        ED  Prescriptions     Medication Sig Dispense Start Date End Date Auth. Provider    erythromycin (ROMYCIN) ophthalmic ointment Place a 1/4 inch ribbon of ointment into the right lower eyelid every 6 hours 3.5 g 4/30/2022  Jamil Cleveland NP    HYDROcodone-acetaminophen (NORCO) 5-325 mg per tablet Take 1 tablet by mouth every 6 (six) hours as needed for Pain. 12 tablet 4/30/2022  Jamil Cleveland NP        Follow-up Information     Follow up With Specialties Details Why Contact Info    [facility]  Schedule an appointment as soon as possible for a visit in 2 days  [phone]           Jamil Cleveland NP  04/30/22 2490

## 2022-05-31 ENCOUNTER — HOSPITAL ENCOUNTER (EMERGENCY)
Facility: HOSPITAL | Age: 57
Discharge: HOME | End: 2022-05-31
Attending: EMERGENCY MEDICINE
Payer: MEDICARE

## 2022-05-31 VITALS
RESPIRATION RATE: 20 BRPM | SYSTOLIC BLOOD PRESSURE: 156 MMHG | DIASTOLIC BLOOD PRESSURE: 82 MMHG | WEIGHT: 244.19 LBS | HEART RATE: 88 BPM | HEIGHT: 67 IN | OXYGEN SATURATION: 98 % | BODY MASS INDEX: 38.33 KG/M2 | TEMPERATURE: 99 F

## 2022-05-31 DIAGNOSIS — M53.3 SACRAL PAIN: ICD-10-CM

## 2022-05-31 DIAGNOSIS — S30.0XXA CONTUSION OF SACRUM, INITIAL ENCOUNTER: ICD-10-CM

## 2022-05-31 DIAGNOSIS — L02.91 ABSCESS: Primary | ICD-10-CM

## 2022-05-31 PROCEDURE — 10060 I&D ABSCESS SIMPLE/SINGLE: CPT

## 2022-05-31 PROCEDURE — 99284 EMERGENCY DEPT VISIT MOD MDM: CPT | Mod: 25

## 2022-05-31 PROCEDURE — 25000003 PHARM REV CODE 250: Performed by: NURSE PRACTITIONER

## 2022-05-31 RX ORDER — HYDROCODONE BITARTRATE AND ACETAMINOPHEN 5; 325 MG/1; MG/1
1 TABLET ORAL EVERY 4 HOURS PRN
Qty: 18 TABLET | Refills: 0 | OUTPATIENT
Start: 2022-05-31 | End: 2022-07-31

## 2022-05-31 RX ORDER — CLINDAMYCIN HYDROCHLORIDE 150 MG/1
450 CAPSULE ORAL EVERY 8 HOURS
Qty: 90 CAPSULE | Refills: 0 | Status: SHIPPED | OUTPATIENT
Start: 2022-05-31 | End: 2022-06-10

## 2022-05-31 RX ORDER — LIDOCAINE HYDROCHLORIDE 10 MG/ML
10 INJECTION, SOLUTION EPIDURAL; INFILTRATION; INTRACAUDAL; PERINEURAL
Status: COMPLETED | OUTPATIENT
Start: 2022-05-31 | End: 2022-05-31

## 2022-05-31 RX ORDER — HYDROCODONE BITARTRATE AND ACETAMINOPHEN 5; 325 MG/1; MG/1
1 TABLET ORAL
Status: COMPLETED | OUTPATIENT
Start: 2022-05-31 | End: 2022-05-31

## 2022-05-31 RX ADMIN — HYDROCODONE BITARTRATE AND ACETAMINOPHEN 1 TABLET: 5; 325 TABLET ORAL at 11:05

## 2022-05-31 RX ADMIN — LIDOCAINE HYDROCHLORIDE 100 MG: 10 INJECTION, SOLUTION EPIDURAL; INFILTRATION; INTRACAUDAL at 08:05

## 2022-06-01 NOTE — ED PROVIDER NOTES
Encounter Date: 2022       History     Chief Complaint   Patient presents with    Cyst     Pt reports three knots underneath dialysis arm. Pt also reports a fall and soreness to the right side of buttocks.      Patient is a 57-year-old female presents with complaints of tailbone pain and right posterior thigh pain after fall yesterday.  She reports falling on her but.  Denies any head injury or loss of consciousness.  Also reports abscess to the left axilla.  Onset was yesterday.  Denies any drainage from the area.        Review of patient's allergies indicates:   Allergen Reactions    Morphine Itching    Sulfa (sulfonamide antibiotics) Rash     Past Medical History:   Diagnosis Date    Asthma     CHF (congestive heart failure)     CKD (chronic kidney disease) stage 5, GFR less than 15 ml/min     Depression     Diabetes mellitus     Dialysis patient     Hypercholesterolemia     Hypertension     PAF (paroxysmal atrial fibrillation)      Past Surgical History:   Procedure Laterality Date    AV FISTULA PLACEMENT      CARDIAC ELECTROPHYSIOLOGY MAPPING AND ABLATION      CATHETERIZATION OF BOTH LEFT AND RIGHT HEART N/A 2018    Procedure: CATHETERIZATION, HEART, BOTH LEFT AND RIGHT;  Surgeon: Enrique Jj MD;  Location: [facility] CATH LAB;  Service: Cardiology;  Laterality: N/A;  To [facility]version [facility]     SECTION      TUMOR REMOVAL      L lung     Family History   Problem Relation Age of Onset    Heart disease Mother     Diabetes Father     Stroke Father     Heart attack Sister      Social History     Tobacco Use    Smoking status: Never Smoker    Smokeless tobacco: Never Used   Substance Use Topics    Alcohol use: No    Drug use: No     Review of Systems   Constitutional: Negative for fever.   HENT: Negative for sore throat.    Respiratory: Negative for shortness of breath.    Cardiovascular: Negative for chest pain.   Gastrointestinal: Negative for nausea.    Genitourinary: Negative for dysuria.   Musculoskeletal: Positive for arthralgias and myalgias. Negative for back pain.   Skin: Positive for wound (Abscess, left axilla). Negative for rash.   Neurological: Negative for weakness.   Hematological: Does not bruise/bleed easily.       Physical Exam     Initial Vitals [05/31/22 1937]   BP Pulse Resp Temp SpO2   (!) 177/79 94 18 99 °F (37.2 °C) 100 %      MAP       --         Physical Exam    Nursing note and vitals reviewed.  Constitutional: She appears well-developed and well-nourished.   HENT:   Head: Normocephalic and atraumatic.   Eyes: EOM are normal. Pupils are equal, round, and reactive to light.   Neck: Neck supple.   Normal range of motion.  Cardiovascular: Normal rate, regular rhythm, normal heart sounds and intact distal pulses.   Pulmonary/Chest: Breath sounds normal.   Abdominal: Abdomen is soft. Bowel sounds are normal.   Musculoskeletal:         General: Tenderness (Sacral) present. Normal range of motion.      Cervical back: Normal range of motion and neck supple.     Neurological: She is alert and oriented to person, place, and time. She has normal strength and normal reflexes.   Skin: Skin is warm and dry. Abscess ( noted to the left axilla, mild induration and fluctuance noted.) noted.         ED Course   I & D - Incision and Drainage    Date/Time: 5/31/2022 10:39 PM  Location procedure was performed: [facility] EMERGENCY DEPARTMENT  Performed by: Breezy Nguyen NP  Authorized by: Breezy Nguyen NP   Type: abscess  Location: Left axilla.  Anesthesia: local infiltration    Anesthesia:  Local Anesthetic: lidocaine 1% without epinephrine  Anesthetic total: 4 mL  Scalpel size: 11  Incision type: single straight  Complexity: simple  Drainage: pus and  bloody  Drainage amount: moderate  Wound treatment: incision,  wound left open,  drainage,  deloculation and  expression of material  Patient tolerance: Patient tolerated the procedure well with no immediate  complications        Labs Reviewed - No data to display       Imaging Results          X-Ray Sacrum And Coccyx (Final result)  Result time 05/31/22 22:15:52    Final result by Nathaniel Vanegas MD (05/31/22 22:15:52)                 Impression:      No definite acute abnormality identified.  Clinical correlation and further evaluation as warranted.      Electronically signed by: Nathaniel Vanegas  Date:    05/31/2022  Time:    22:15             Narrative:    EXAMINATION:  XR SACRUM AND COCCYX    CLINICAL HISTORY:  Sacrococcygeal disorders, not elsewhere classified    TECHNIQUE:  Two or three views of the sacrum and coccyx were performed.    COMPARISON:  None    FINDINGS:  Large calcified uterine fibroid.    Stable alignment of the sacrum and coccyx.  No definite acute abnormality.                                 Medications   LIDOcaine (PF) 10 mg/ml (1%) injection 100 mg (100 mg Infiltration Given 5/31/22 2035)     Medical Decision Making:   ED Management:  Patient instructed take medications as prescribed and follow-up with primary care physician.  Patient to return to the emergency room with any worsening symptoms.  No distress noted time of discharge.                      Clinical Impression:   Final diagnoses:  [M53.3] Sacral pain  [L02.91] Abscess (Primary)  [S30.0XXA] Contusion of sacrum, initial encounter          ED Disposition Condition    Discharge Stable        ED Prescriptions     Medication Sig Dispense Start Date End Date Auth. Provider    HYDROcodone-acetaminophen (NORCO) 5-325 mg per tablet Take 1 tablet by mouth every 4 (four) hours as needed for Pain. 18 tablet 5/31/2022  Breezy Nguyen NP    clindamycin (CLEOCIN) 150 MG capsule Take 3 capsules (450 mg total) by mouth every 8 (eight) hours. for 10 days 90 capsule 5/31/2022 6/10/2022 Breezy Nguyen NP        Follow-up Information     Follow up With Specialties Details Why Contact Info    Chuck Grider MD Internal Medicine  As needed [id number]  [facility] [address]  [phone]             Breezy Nguyen, NP  05/31/22 2096

## 2022-07-04 ENCOUNTER — HOSPITAL ENCOUNTER (EMERGENCY)
Facility: HOSPITAL | Age: 57
Discharge: HOME | End: 2022-07-04
Attending: EMERGENCY MEDICINE
Payer: MEDICARE

## 2022-07-04 VITALS
HEART RATE: 76 BPM | TEMPERATURE: 98 F | DIASTOLIC BLOOD PRESSURE: 86 MMHG | WEIGHT: 244 LBS | RESPIRATION RATE: 20 BRPM | BODY MASS INDEX: 38.3 KG/M2 | SYSTOLIC BLOOD PRESSURE: 152 MMHG | OXYGEN SATURATION: 96 % | HEIGHT: 67 IN

## 2022-07-04 DIAGNOSIS — R06.02 SOB (SHORTNESS OF BREATH): ICD-10-CM

## 2022-07-04 DIAGNOSIS — N18.6 ESRD (END STAGE RENAL DISEASE) ON DIALYSIS: Primary | ICD-10-CM

## 2022-07-04 DIAGNOSIS — Z99.2 ESRD (END STAGE RENAL DISEASE) ON DIALYSIS: Primary | ICD-10-CM

## 2022-07-04 DIAGNOSIS — J81.0 ACUTE PULMONARY EDEMA: ICD-10-CM

## 2022-07-04 LAB
ALBUMIN SERPL BCP-MCNC: 3 G/DL (ref 3.5–5.2)
ALP SERPL-CCNC: 67 U/L (ref 55–135)
ALT SERPL W/O P-5'-P-CCNC: 28 U/L (ref 10–44)
ANION GAP SERPL CALC-SCNC: 15 MMOL/L (ref 8–16)
AST SERPL-CCNC: 26 U/L (ref 10–40)
BASOPHILS # BLD AUTO: 0.04 K/UL (ref 0–0.2)
BASOPHILS NFR BLD: 0.4 % (ref 0–1.9)
BILIRUB SERPL-MCNC: 0.6 MG/DL (ref 0.1–1)
BNP SERPL-MCNC: 1287 PG/ML (ref 0–99)
BUN SERPL-MCNC: 57 MG/DL (ref 6–20)
CALCIUM SERPL-MCNC: 8.4 MG/DL (ref 8.7–10.5)
CHLORIDE SERPL-SCNC: 108 MMOL/L (ref 95–110)
CO2 SERPL-SCNC: 19 MMOL/L (ref 23–29)
CREAT SERPL-MCNC: 12.9 MG/DL (ref 0.5–1.4)
DIFFERENTIAL METHOD: ABNORMAL
EOSINOPHIL # BLD AUTO: 0.3 K/UL (ref 0–0.5)
EOSINOPHIL NFR BLD: 2.7 % (ref 0–8)
ERYTHROCYTE [DISTWIDTH] IN BLOOD BY AUTOMATED COUNT: 13.7 % (ref 11.5–14.5)
EST. GFR  (AFRICAN AMERICAN): 3 ML/MIN/1.73 M^2
EST. GFR  (NON AFRICAN AMERICAN): 3 ML/MIN/1.73 M^2
GLUCOSE SERPL-MCNC: 85 MG/DL (ref 70–110)
HCT VFR BLD AUTO: 30.4 % (ref 37–48.5)
HGB BLD-MCNC: 9.3 G/DL (ref 12–16)
IMM GRANULOCYTES # BLD AUTO: 0.03 K/UL (ref 0–0.04)
IMM GRANULOCYTES NFR BLD AUTO: 0.3 % (ref 0–0.5)
LYMPHOCYTES # BLD AUTO: 1.5 K/UL (ref 1–4.8)
LYMPHOCYTES NFR BLD: 15.8 % (ref 18–48)
MAGNESIUM SERPL-MCNC: 2.2 MG/DL (ref 1.6–2.6)
MCH RBC QN AUTO: 26.9 PG (ref 27–31)
MCHC RBC AUTO-ENTMCNC: 30.6 G/DL (ref 32–36)
MCV RBC AUTO: 88 FL (ref 82–98)
MONOCYTES # BLD AUTO: 0.6 K/UL (ref 0.3–1)
MONOCYTES NFR BLD: 6.6 % (ref 4–15)
NEUTROPHILS # BLD AUTO: 6.9 K/UL (ref 1.8–7.7)
NEUTROPHILS NFR BLD: 74.2 % (ref 38–73)
NRBC BLD-RTO: 0 /100 WBC
PHOSPHATE SERPL-MCNC: 6.9 MG/DL (ref 2.7–4.5)
PLATELET # BLD AUTO: 240 K/UL (ref 150–450)
PMV BLD AUTO: 8.9 FL (ref 9.2–12.9)
POTASSIUM SERPL-SCNC: 4.6 MMOL/L (ref 3.5–5.1)
PROT SERPL-MCNC: 6.7 G/DL (ref 6–8.4)
RBC # BLD AUTO: 3.46 M/UL (ref 4–5.4)
SARS-COV-2 RDRP RESP QL NAA+PROBE: NEGATIVE
SODIUM SERPL-SCNC: 142 MMOL/L (ref 136–145)
TROPONIN I SERPL DL<=0.01 NG/ML-MCNC: <0.006 NG/ML (ref 0–0.03)
WBC # BLD AUTO: 9.31 K/UL (ref 3.9–12.7)

## 2022-07-04 PROCEDURE — 93010 EKG 12-LEAD: ICD-10-PCS | Mod: ,,, | Performed by: INTERNAL MEDICINE

## 2022-07-04 PROCEDURE — 83735 ASSAY OF MAGNESIUM: CPT | Performed by: EMERGENCY MEDICINE

## 2022-07-04 PROCEDURE — 93005 ELECTROCARDIOGRAM TRACING: CPT

## 2022-07-04 PROCEDURE — 83880 ASSAY OF NATRIURETIC PEPTIDE: CPT | Performed by: EMERGENCY MEDICINE

## 2022-07-04 PROCEDURE — 93010 ELECTROCARDIOGRAM REPORT: CPT | Mod: ,,, | Performed by: INTERNAL MEDICINE

## 2022-07-04 PROCEDURE — 84100 ASSAY OF PHOSPHORUS: CPT | Performed by: EMERGENCY MEDICINE

## 2022-07-04 PROCEDURE — 80053 COMPREHEN METABOLIC PANEL: CPT | Performed by: EMERGENCY MEDICINE

## 2022-07-04 PROCEDURE — 99291 CRITICAL CARE FIRST HOUR: CPT

## 2022-07-04 PROCEDURE — 25000003 PHARM REV CODE 250: Performed by: EMERGENCY MEDICINE

## 2022-07-04 PROCEDURE — 84484 ASSAY OF TROPONIN QUANT: CPT | Performed by: EMERGENCY MEDICINE

## 2022-07-04 PROCEDURE — 85025 COMPLETE CBC W/AUTO DIFF WBC: CPT | Performed by: EMERGENCY MEDICINE

## 2022-07-04 PROCEDURE — U0002 COVID-19 LAB TEST NON-CDC: HCPCS | Performed by: EMERGENCY MEDICINE

## 2022-07-04 RX ADMIN — NITROGLYCERIN 1 INCH: 20 OINTMENT TOPICAL at 04:07

## 2022-07-04 NOTE — ED PROVIDER NOTES
SCRIBE #1 NOTE: I, Lauren Recio, am scribing for, and in the presence of, Mónica Laird MD. I have scribed the entire note.       History     Chief Complaint   Patient presents with    Shortness of Breath     Chest pain, spo2 RA 89% arrived om 6L NC, pt was given 325 ASA and NTG x2, pt due for dialysis this am     Review of patient's allergies indicates:   Allergen Reactions    Morphine Itching    Sulfa (sulfonamide antibiotics) Rash         History of Present Illness     HPI    7/4/2022, 4:38 AM  History obtained from the patient      History of Present Illness: Cordell Angulo is a 57 y.o. female patient with a PMHx of DM, HTN, CKD, CHF, hypercholesterolemia, asthma, PAF, and dialysis pt who presents to the Emergency Department for evaluation of SOB which happens every week. Pt c/o fluid retention and doctor not removing all of it. Symptoms are constant and moderate in severity. No mitigating or exacerbating factors reported. Associated sxs include difficulty urinating, N/V, CP and weight loss. Patient denies any fever, numbness, syncope, dizziness, HA, chills, diaphoresis, back pain and all other sxs at this time. Pt is due for dialysis today at 5 am. No further complaints or concerns at this time.       Arrival mode:  EMS     PCP: Chuck Grider MD        Past Medical History:  Past Medical History:   Diagnosis Date    Asthma     CHF (congestive heart failure)     CKD (chronic kidney disease) stage 5, GFR less than 15 ml/min     Depression     Diabetes mellitus     Dialysis patient     Hypercholesterolemia     Hypertension     PAF (paroxysmal atrial fibrillation)        Past Surgical History:  Past Surgical History:   Procedure Laterality Date    AV FISTULA PLACEMENT      CARDIAC ELECTROPHYSIOLOGY MAPPING AND ABLATION      CATHETERIZATION OF BOTH LEFT AND RIGHT HEART N/A 12/28/2018    Procedure: CATHETERIZATION, HEART, BOTH LEFT AND RIGHT;  Surgeon: nErique Jj MD;  Location: [facility]  CATH LAB;  Service: Cardiology;  Laterality: N/A;  To follow his cardioversion case in [facility]     SECTION      TUMOR REMOVAL      L lung         Family History:  Family History   Problem Relation Age of Onset    Heart disease Mother     Diabetes Father     Stroke Father     Heart attack Sister        Social History:  Social History     Tobacco Use    Smoking status: Never Smoker    Smokeless tobacco: Never Used   Substance and Sexual Activity    Alcohol use: No    Drug use: No    Sexual activity: Not Currently        Review of Systems     Review of Systems   Constitutional: Positive for unexpected weight change (decreased). Negative for chills, diaphoresis and fever.   HENT: Negative for sore throat.    Respiratory: Positive for shortness of breath.    Cardiovascular: Positive for chest pain.   Gastrointestinal: Positive for nausea and vomiting. Negative for diarrhea.   Genitourinary: Positive for difficulty urinating. Negative for dysuria.   Musculoskeletal: Negative for back pain.   Skin: Negative for rash.   Neurological: Negative for dizziness, syncope, weakness, numbness and headaches.   Hematological: Does not bruise/bleed easily.   All other systems reviewed and are negative.     Physical Exam     Initial Vitals [22 0400]   BP Pulse Resp Temp SpO2   (!) 162/95 83 (!) 26 98.7 °F (37.1 °C) 98 %      MAP       --          Physical Exam  Nursing Notes and Vital Signs Reviewed.  Constitutional: Patient is in mild distress. Overweight.  Head: Atraumatic. Normocephalic.  Eyes: PERRL. EOM intact. Conjunctivae are not pale. No scleral icterus.  ENT: Mucous membranes are moist. Oropharynx is clear and symmetric.    Neck: Supple. Full ROM. No lymphadenopathy.  Cardiovascular: Regular rate. Regular rhythm. No murmurs, rubs, or gallops. Distal pulses are 2+ and symmetric.  Pulmonary/Chest: No respiratory distress. Clear to auscultation bilaterally. Rales at lung base.  Abdominal: Soft and  non-distended.  There is no tenderness. No rebound, guarding, or rigidity. Good bowel sounds.  Genitourinary: No CVA tenderness  Musculoskeletal: Moves all extremities. No obvious deformities. No edema. No calf tenderness.  Skin: Warm and dry.  Neurological:  Alert, awake, and appropriate.  Normal speech.  No acute focal neurological deficits are appreciated.  Psychiatric: Normal affect. Good eye contact. Appropriate in content.     ED Course   Critical Care    Date/Time: 7/4/2022 5:32 AM  Performed by: Mónica Laird MD  Authorized by: Mónica Laird MD   Direct patient critical care time: 15 minutes  Additional history critical care time: 10 minutes  Ordering / reviewing critical care time: 10 minutes  Documentation critical care time: 5 minutes  Consulting other physicians critical care time: 5 minutes  Total critical care time (exclusive of procedural time) : 45 minutes  Critical care time was exclusive of separately billable procedures and treating other patients and teaching time.  Critical care was necessary to treat or prevent imminent or life-threatening deterioration of the following conditions: acute pulmonary edema and CHF.  Critical care was time spent personally by me on the following activities: development of treatment plan with patient or surrogate, blood draw for specimens, discussions with consultants, interpretation of cardiac output measurements, evaluation of patient's response to treatment, obtaining history from patient or surrogate, examination of patient, ordering and performing treatments and interventions, ordering and review of radiographic studies, ordering and review of laboratory studies, pulse oximetry, re-evaluation of patient's condition and review of old charts.        ED Vital Signs:  Vitals:    07/04/22 0400 07/04/22 0436 07/04/22 0518   BP: (!) 162/95 (!) 151/80 (!) 152/86   Pulse: 83 79 76   Resp: (!) 26 20 20   Temp: 98.7 °F (37.1 °C)  98.2 °F (36.8 °C)   TempSrc: Oral  Oral  "  SpO2: 98% 99% 96%   Weight: 110.7 kg (244 lb)     Height: 5' 7" (1.702 m)         Abnormal Lab Results:  Labs Reviewed   CBC W/ AUTO DIFFERENTIAL - Abnormal; Notable for the following components:       Result Value    RBC 3.46 (*)     Hemoglobin 9.3 (*)     Hematocrit 30.4 (*)     MCH 26.9 (*)     MCHC 30.6 (*)     MPV 8.9 (*)     Gran % 74.2 (*)     Lymph % 15.8 (*)     All other components within normal limits   COMPREHENSIVE METABOLIC PANEL - Abnormal; Notable for the following components:    CO2 19 (*)     BUN 57 (*)     Creatinine 12.9 (*)     Calcium 8.4 (*)     Albumin 3.0 (*)     eGFR if  3 (*)     eGFR if non  3 (*)     All other components within normal limits   B-TYPE NATRIURETIC PEPTIDE - Abnormal; Notable for the following components:    BNP 1,287 (*)     All other components within normal limits   PHOSPHORUS - Abnormal; Notable for the following components:    Phosphorus 6.9 (*)     All other components within normal limits   MAGNESIUM   TROPONIN I   SARS-COV-2 RNA AMPLIFICATION, QUAL        All Lab Results:  Results for orders placed or performed during the hospital encounter of 07/04/22   CBC auto differential   Result Value Ref Range    WBC 9.31 3.90 - 12.70 K/uL    RBC 3.46 (L) 4.00 - 5.40 M/uL    Hemoglobin 9.3 (L) 12.0 - 16.0 g/dL    Hematocrit 30.4 (L) 37.0 - 48.5 %    MCV 88 82 - 98 fL    MCH 26.9 (L) 27.0 - 31.0 pg    MCHC 30.6 (L) 32.0 - 36.0 g/dL    RDW 13.7 11.5 - 14.5 %    Platelets 240 150 - 450 K/uL    MPV 8.9 (L) 9.2 - 12.9 fL    Immature Granulocytes 0.3 0.0 - 0.5 %    Gran # (ANC) 6.9 1.8 - 7.7 K/uL    Immature Grans (Abs) 0.03 0.00 - 0.04 K/uL    Lymph # 1.5 1.0 - 4.8 K/uL    Mono # 0.6 0.3 - 1.0 K/uL    Eos # 0.3 0.0 - 0.5 K/uL    Baso # 0.04 0.00 - 0.20 K/uL    nRBC 0 0 /100 WBC    Gran % 74.2 (H) 38.0 - 73.0 %    Lymph % 15.8 (L) 18.0 - 48.0 %    Mono % 6.6 4.0 - 15.0 %    Eosinophil % 2.7 0.0 - 8.0 %    Basophil % 0.4 0.0 - 1.9 %    Differential " Method Automated    Comprehensive metabolic panel   Result Value Ref Range    Sodium 142 136 - 145 mmol/L    Potassium 4.6 3.5 - 5.1 mmol/L    Chloride 108 95 - 110 mmol/L    CO2 19 (L) 23 - 29 mmol/L    Glucose 85 70 - 110 mg/dL    BUN 57 (H) 6 - 20 mg/dL    Creatinine 12.9 (H) 0.5 - 1.4 mg/dL    Calcium 8.4 (L) 8.7 - 10.5 mg/dL    Total Protein 6.7 6.0 - 8.4 g/dL    Albumin 3.0 (L) 3.5 - 5.2 g/dL    Total Bilirubin 0.6 0.1 - 1.0 mg/dL    Alkaline Phosphatase 67 55 - 135 U/L    AST 26 10 - 40 U/L    ALT 28 10 - 44 U/L    Anion Gap 15 8 - 16 mmol/L    eGFR if African American 3 (A) >60 mL/min/1.73 m^2    eGFR if non African American 3 (A) >60 mL/min/1.73 m^2   Brain natriuretic peptide   Result Value Ref Range    BNP 1,287 (H) 0 - 99 pg/mL   Magnesium   Result Value Ref Range    Magnesium 2.2 1.6 - 2.6 mg/dL   Phosphorus   Result Value Ref Range    Phosphorus 6.9 (H) 2.7 - 4.5 mg/dL   Troponin I   Result Value Ref Range    Troponin I <0.006 0.000 - 0.026 ng/mL   COVID-19 Rapid Screening   Result Value Ref Range    SARS-CoV-2 RNA, Amplification, Qual Negative Negative         Imaging Results:  Imaging Results          X-Ray Chest AP Portable (In process)                5:12 AM: CXR - Pulmonary edema.    The EKG was ordered, reviewed, and independently interpreted by the ED provider.  Interpretation time: 4:41  Rate: 77 BPM  Rhythm: normal sinus rhythm  Interpretation: Incomplete left bundle branch block  Moderate voltage criteria for LVH, may be normal variant  Nonspecific T wave abnormality. No STEMI.         The Emergency Provider reviewed the vital signs and test results, which are outlined above.     ED Discussion     5:13 AM: Hospital is transporting pt to dialysis appointment.    5:33 AM: Reassessed pt at this time. Discussed with pt all pertinent ED information and results. Discussed pt dx and plan of tx. Gave pt all f/u and return to the ED instructions. All questions and concerns were addressed at this time.  Pt expresses understanding of information and instructions, and is comfortable with plan to discharge. Pt is stable for discharge.    I discussed with patient and/or family/caretaker that evaluation in the ED does not suggest any emergent or life threatening medical conditions requiring immediate intervention beyond what was provided in the ED, and I believe patient is safe for discharge.  Regardless, an unremarkable evaluation in the ED does not preclude the development or presence of a serious of life threatening condition. As such, patient was instructed to return immediately for any worsening or change in current symptoms.         Medical Decision Making:   Clinical Tests:   Lab Tests: Reviewed and Ordered  Radiological Study: Ordered and Reviewed  Medical Tests: Ordered and Reviewed           ED Medication(s):  Medications   nitroGLYCERIN 2% TD oint ointment 1 inch (1 inch Topical (Top) Given 7/4/22 0441)       Discharge Medication List as of 7/4/2022  5:15 AM           Follow-up Information     Chuck Grider MD In 2 days.    Specialty: Internal Medicine  Contact information:  [address] [facility] [id number] [phone]             [facility] - Emergency Dept..    Specialty: Emergency Medicine  Why: As needed, If symptoms worsen  Contact information:  [address] [facility] [address] [phone]                           Scribe Attestation:   Scribe #1: I performed the above scribed service and the documentation accurately describes the services I performed. I attest to the accuracy of the note.     Attending:   Physician Attestation Statement for Scribe #1: I, Mónica Laird MD, personally performed the services described in this documentation, as scribed by Lauren Recio, in my presence, and it is both accurate and complete.           Clinical Impression       ICD-10-CM ICD-9-CM   1. ESRD (end stage renal disease) on dialysis  N18.6 585.6    Z99.2 V45.11   2. SOB  (shortness of breath)  R06.02 786.05   3. Acute pulmonary edema  J81.0 518.4               Mónica Laird MD  07/04/22 0614

## 2022-07-15 ENCOUNTER — HOSPITAL ENCOUNTER (EMERGENCY)
Facility: HOSPITAL | Age: 57
Discharge: HOME | End: 2022-07-15
Attending: EMERGENCY MEDICINE
Payer: COMMERCIAL

## 2022-07-15 VITALS
HEART RATE: 107 BPM | SYSTOLIC BLOOD PRESSURE: 204 MMHG | OXYGEN SATURATION: 98 % | WEIGHT: 233.81 LBS | DIASTOLIC BLOOD PRESSURE: 84 MMHG | BODY MASS INDEX: 36.62 KG/M2 | TEMPERATURE: 100 F | RESPIRATION RATE: 18 BRPM

## 2022-07-15 DIAGNOSIS — U07.1 COVID-19: Primary | ICD-10-CM

## 2022-07-15 DIAGNOSIS — R05.9 COUGH: ICD-10-CM

## 2022-07-15 LAB
CTP QC/QA: YES
CTP QC/QA: YES
POC MOLECULAR INFLUENZA A AGN: NEGATIVE
POC MOLECULAR INFLUENZA B AGN: NEGATIVE
SARS-COV-2 RDRP RESP QL NAA+PROBE: POSITIVE

## 2022-07-15 PROCEDURE — 87502 INFLUENZA DNA AMP PROBE: CPT

## 2022-07-15 PROCEDURE — U0002 COVID-19 LAB TEST NON-CDC: HCPCS | Performed by: PHYSICIAN ASSISTANT

## 2022-07-15 PROCEDURE — 99283 EMERGENCY DEPT VISIT LOW MDM: CPT | Mod: 25

## 2022-07-15 RX ORDER — PROMETHAZINE HYDROCHLORIDE AND DEXTROMETHORPHAN HYDROBROMIDE 6.25; 15 MG/5ML; MG/5ML
5 SYRUP ORAL EVERY 4 HOURS PRN
Qty: 180 ML | Refills: 0 | Status: SHIPPED | OUTPATIENT
Start: 2022-07-15

## 2022-07-15 NOTE — FIRST PROVIDER EVALUATION
Medical screening exam completed.  I have conducted a focused provider triage encounter, findings are as follows:    Brief history of present illness:  States congestion with cough and fatigue starting 2 days ago. States seh was cleaning with clorox and pinesol and it did start after but not during. Unsure if related. On dialysis M, W, F. Hx of htn and didn't take meds yet today but denies any other complaints.      Vitals:    07/15/22 1354   BP: (S) (!) 204/84  Comment: didnt take meds today   BP Location: Right arm   Patient Position: Sitting   Pulse: 107   Resp: 18   Temp: 100.2 °F (37.9 °C)   TempSrc: Oral   SpO2: 98%   Weight: 106.1 kg (233 lb 12.8 oz)       Pertinent physical exam:  RRR, Lungs CTA. NAD    Brief workup plan:  Flu, COVID, CXR, and follow from there    Preliminary workup initiated; this workup will be continued and followed by the physician or advanced practice provider that is assigned to the patient when roomed.

## 2022-07-15 NOTE — DISCHARGE INSTRUCTIONS
Cough medication as prescribed (Do not take compazine while taking)  You will need to discuss possible Paxlovid (antiviral) use with Nephrologist as the use in dialysis patients is not fully known.    Rest  Tylenol otc  PCP follow up  Return  with new/worsening complaints.

## 2022-07-15 NOTE — ED PROVIDER NOTES
Encounter Date: 7/15/2022       History     Chief Complaint   Patient presents with    Nasal Congestion     Nasal congestion since Monday      58 yo female reports starting with congestion Monday. 2 days ago cleaning with clorox and pine sol. States later started coughing. Unsure if related. Has felt some fatigue. Denies chills.  Low grade fever here.  No pain in chest or shortness of breath. Reports  Some chest congestion that feels like it is in the upper chest. Denies any increased swelling, orthopnea, increased MORALES or other complaints.  Has been compliant with dialysis without complications.  Did not yet take bp meds today and long hx of poorly controlled htn with frequent elevated readings.  Hx of CHF but states symptoms do not feel consistent with her CHF.         Review of patient's allergies indicates:   Allergen Reactions    Morphine Itching    Sulfa (sulfonamide antibiotics) Rash     Past Medical History:   Diagnosis Date    Asthma     CHF (congestive heart failure)     CKD (chronic kidney disease) stage 5, GFR less than 15 ml/min     Depression     Diabetes mellitus     Dialysis patient     Hypercholesterolemia     Hypertension     PAF (paroxysmal atrial fibrillation)      Past Surgical History:   Procedure Laterality Date    AV FISTULA PLACEMENT      CARDIAC ELECTROPHYSIOLOGY MAPPING AND ABLATION      CATHETERIZATION OF BOTH LEFT AND RIGHT HEART N/A 2018    Procedure: CATHETERIZATION, HEART, BOTH LEFT AND RIGHT;  Surgeon: Enrique Jj MD;  Location: [facility] CATH LAB;  Service: Cardiology;  Laterality: N/A;  To follow his cardioversion case in [facility]     SECTION      TUMOR REMOVAL      L lung     Family History   Problem Relation Age of Onset    Heart disease Mother     Diabetes Father     Stroke Father     Heart attack Sister      Social History     Tobacco Use    Smoking status: Never Smoker    Smokeless tobacco: Never Used   Substance Use Topics    Alcohol use:  No    Drug use: No     Review of Systems   Constitutional: Positive for fatigue. Negative for fever.   HENT: Positive for congestion, postnasal drip and sore throat.    Respiratory: Positive for cough. Negative for shortness of breath.    Cardiovascular: Negative for chest pain.   Gastrointestinal: Negative for abdominal pain and nausea.   Genitourinary: Negative for dysuria.   Musculoskeletal: Negative for back pain.   Skin: Negative for rash.   Neurological: Negative for weakness.   Hematological: Does not bruise/bleed easily.   All other systems reviewed and are negative.      Physical Exam     Initial Vitals [07/15/22 1354]   BP Pulse Resp Temp SpO2   (S) (!) 204/84 107 18 100.2 °F (37.9 °C) 98 %      MAP       --         Physical Exam    Nursing note and vitals reviewed.  Constitutional: She appears well-developed and well-nourished. No distress.   HENT:   Head: Normocephalic and atraumatic.   Right Ear: External ear normal.   Left Ear: External ear normal.   Nose: Mucosal edema present.   Mouth/Throat: Oropharynx is clear and moist.   Neck: Neck supple.   Normal range of motion.  Cardiovascular: Normal rate, regular rhythm and normal heart sounds.   Pulmonary/Chest: Breath sounds normal. No respiratory distress. She has no wheezes. She has no rhonchi. She has no rales.   Musculoskeletal:      Cervical back: Normal range of motion and neck supple.     Lymphadenopathy:     She has no cervical adenopathy.   Neurological: She is alert and oriented to person, place, and time.   Skin: Skin is warm and dry. No rash noted.   Psychiatric: She has a normal mood and affect. Thought content normal.         ED Course   Procedures  Labs Reviewed   SARS-COV-2 RDRP GENE - Abnormal; Notable for the following components:       Result Value    POC Rapid COVID Positive (*)     All other components within normal limits    Narrative:     This test utilizes isothermal nucleic acid amplification   technology to detect the SARS-CoV-2  RdRp nucleic acid segment.   The analytical sensitivity (limit of detection) is 125 genome   equivalents/mL.   A POSITIVE result implies infection with the SARS-CoV-2 virus;   the patient is presumed to be contagious.     A NEGATIVE result means that SARS-CoV-2 nucleic acids are not   present above the limit of detection. A NEGATIVE result should be   treated as presumptive. It does not rule out the possibility of   COVID-19 and should not be the sole basis for treatment decisions.   If COVID-19 is strongly suspected based on clinical and exposure   history, re-testing using an alternate molecular assay should be   considered.   This test is only for use under the Food and Drug   Administration s Emergency Use Authorization (EUA).   Commercial kits are provided by [employer].   Performance characteristics of the EUA have been independently   verified by [facility] Department of   Pathology and Laboratory Medicine.   _________________________________________________________________   The authorized Fact Sheet for Healthcare Providers and the authorized Fact   Sheet for Patients of the ID NOW COVID-19 are available on the FDA   website:     https://www.fda.gov/media/263360/download  https://www.fda.gov/media/577802/download           POCT INFLUENZA A/B MOLECULAR    Narrative:     This test utilizes isothermal nucleic acid amplification   technology to detect the SARS-CoV-2 RdRp nucleic acid segment.   The analytical sensitivity (limit of detection) is 125 genome   equivalents/mL.   A POSITIVE result implies infection with the SARS-CoV-2 virus;   the patient is presumed to be contagious.     A NEGATIVE result means that SARS-CoV-2 nucleic acids are not   present above the limit of detection. A NEGATIVE result should be   treated as presumptive. It does not rule out the possibility of   COVID-19 and should not be the sole basis for treatment decisions.   If COVID-19 is strongly suspected based on  clinical and exposure   history, re-testing using an alternate molecular assay should be   considered.   This test is only for use under the Food and Drug   Administration s Emergency Use Authorization (EUA).   Commercial kits are provided by [employer].   Performance characteristics of the EUA have been independently   verified by [facility] Department of   Pathology and Laboratory Medicine.   _________________________________________________________________   The authorized Fact Sheet for Healthcare Providers and the authorized Fact   Sheet for Patients of the ID NOW COVID-19 are available on the FDA   website:     https://www.fda.gov/media/714681/download  https://www.fda.gov/media/670614/download                  Imaging Results          X-Ray Chest PA And Lateral (Final result)  Result time 07/15/22 14:25:59    Final result by Kvng Banks MD (07/15/22 14:25:59)                 Impression:      1.  Negative for acute process involving the chest.    2.  Stable findings as noted above.      Electronically signed by: Kvng Banks MD  Date:    07/15/2022  Time:    14:25             Narrative:    EXAMINATION:  XR CHEST PA AND LATERAL    CLINICAL HISTORY:  Cough, unspecified    COMPARISON:  Studies dating back to February 10, 2020    FINDINGS:  Reticular interstitial changes throughout the lungs again seen.  The lungs are otherwise clear.  The cardiac silhouette size is on the upper limits of normal.  The trachea is midline and the mediastinal width is normal. Negative for focal infiltrate, effusion or pneumothorax. Pulmonary vasculature is normal. Negative for osseous abnormalities. Convex-right curvature of the thoracic spine with marginal spondylosis.  Ectatic and tortuous aorta with aortic arch calcifications.  Stable vascular stent in the left axillary region.  Stable degenerative changes of the shoulder girdles.                                 Medications - No data to display  Medical  Decision Making:   Clinical Tests:   Lab Tests: Ordered and Reviewed  Radiological Study: Ordered and Reviewed  Spoke with hospital pharmacist. At this time paxlovid not indicated for those on HD. Recommend discussion with Nephrologist. This information was given to patient. NAD at this time, non-toxic, and no shortness of breath. She has been vaccinated and had a booster shot. Advised informing nephrologist today.               ED Course as of 07/15/22 1548   Fri Jul 15, 2022   1422 SARS-CoV-2 RNA, Amplification, Qual(!): Positive [KF]   1422 POC Molecular Influenza A Ag: Negative [KF]   1422 POC Molecular Influenza B Ag: Negative [KF]   1508 FINDINGS:  Reticular interstitial changes throughout the lungs again seen.  The lungs are otherwise clear.  The cardiac silhouette size is on the upper limits of normal.  The trachea is midline and the mediastinal width is normal. Negative for focal infiltrate, effusion or pneumothorax. Pulmonary vasculature is normal. Negative for osseous abnormalities. Convex-right curvature of the thoracic spine with marginal spondylosis.  Ectatic and tortuous aorta with aortic arch calcifications.  Stable vascular stent in the left axillary region.  Stable degenerative changes of the shoulder girdles.     Impression:     1.  Negative for acute process involving the chest.     2.  Stable findings as noted above. [KF]      ED Course User Index  [KF] Laya Bobo PA-C             Clinical Impression:   Final diagnoses:  [R05.9] Cough  [U07.1] COVID-19 (Primary)          ED Disposition Condition    Discharge Stable        ED Prescriptions     Medication Sig Dispense Start Date End Date Auth. Provider    promethazine-dextromethorphan (PROMETHAZINE-DM) 6.25-15 mg/5 mL Syrp Take 5 mLs by mouth every 4 (four) hours as needed (cough). 180 mL 7/15/2022  Laya Bobo PA-C        Follow-up Information    None          Laya Bobo PA-C  07/15/22 2999

## 2022-07-31 ENCOUNTER — HOSPITAL ENCOUNTER (EMERGENCY)
Facility: HOSPITAL | Age: 57
Discharge: HOME | End: 2022-07-31
Attending: EMERGENCY MEDICINE
Payer: MEDICARE

## 2022-07-31 VITALS
BODY MASS INDEX: 36.99 KG/M2 | DIASTOLIC BLOOD PRESSURE: 68 MMHG | OXYGEN SATURATION: 99 % | HEIGHT: 67 IN | HEART RATE: 74 BPM | RESPIRATION RATE: 18 BRPM | WEIGHT: 235.69 LBS | TEMPERATURE: 98 F | SYSTOLIC BLOOD PRESSURE: 143 MMHG

## 2022-07-31 DIAGNOSIS — L02.811 ABSCESS, SCALP: Primary | ICD-10-CM

## 2022-07-31 PROCEDURE — 99284 EMERGENCY DEPT VISIT MOD MDM: CPT

## 2022-07-31 RX ORDER — CLINDAMYCIN HYDROCHLORIDE 300 MG/1
300 CAPSULE ORAL EVERY 6 HOURS
Qty: 28 CAPSULE | Refills: 0 | Status: SHIPPED | OUTPATIENT
Start: 2022-07-31 | End: 2022-08-07

## 2022-07-31 RX ORDER — HYDROCODONE BITARTRATE AND ACETAMINOPHEN 5; 325 MG/1; MG/1
1 TABLET ORAL EVERY 12 HOURS PRN
Qty: 10 TABLET | Refills: 0 | Status: SHIPPED | OUTPATIENT
Start: 2022-07-31

## 2022-07-31 NOTE — ED PROVIDER NOTES
"Encounter Date: 2022       History     Chief Complaint   Patient presents with    OTHER      Pt c/o a "knot" on her head after what she believes to be an insect bite, pt reports that it is painful and causing her to have HA.      57-year-old female with complaint of pain and swelling with lesion to left frontal scalp for the past 2-3 days no fever chills.  Reports constant aching pain.        Review of patient's allergies indicates:   Allergen Reactions    Morphine Itching    Sulfa (sulfonamide antibiotics) Rash     Past Medical History:   Diagnosis Date    Asthma     CHF (congestive heart failure)     CKD (chronic kidney disease) stage 5, GFR less than 15 ml/min     Depression     Diabetes mellitus     Dialysis patient     Hypercholesterolemia     Hypertension     PAF (paroxysmal atrial fibrillation)      Past Surgical History:   Procedure Laterality Date    AV FISTULA PLACEMENT      CARDIAC ELECTROPHYSIOLOGY MAPPING AND ABLATION      CATHETERIZATION OF BOTH LEFT AND RIGHT HEART N/A 2018    Procedure: CATHETERIZATION, HEART, BOTH LEFT AND RIGHT;  Surgeon: Enrique Jj MD;  Location: [facility] CATH LAB;  Service: Cardiology;  Laterality: N/A;  To follow his cardioversion case [facility]     SECTION      TUMOR REMOVAL      L lung     Family History   Problem Relation Age of Onset    Heart disease Mother     Diabetes Father     Stroke Father     Heart attack Sister      Social History     Tobacco Use    Smoking status: Never Smoker    Smokeless tobacco: Never Used   Substance Use Topics    Alcohol use: No    Drug use: No     Review of Systems   Constitutional: Negative for fever.   HENT: Negative for sore throat.    Respiratory: Negative for shortness of breath.    Cardiovascular: Negative for chest pain.   Gastrointestinal: Negative for nausea.   Genitourinary: Negative for dysuria.   Musculoskeletal: Negative for back pain.   Skin: Positive for wound. Negative for rash. "   Neurological: Negative for weakness.   Hematological: Does not bruise/bleed easily.       Physical Exam     Initial Vitals [07/31/22 0852]   BP Pulse Resp Temp SpO2   (!) 143/68 74 18 98.3 °F (36.8 °C) 99 %      MAP       --         Physical Exam    Nursing note and vitals reviewed.  Constitutional: She appears well-developed and well-nourished.   HENT:   Head: Normocephalic and atraumatic.   Eyes: Conjunctivae and EOM are normal. Pupils are equal, round, and reactive to light.   Neck: Neck supple.   Normal range of motion.  Cardiovascular: Normal rate, regular rhythm, normal heart sounds and intact distal pulses.   Pulmonary/Chest: Breath sounds normal.   Abdominal: Abdomen is soft. There is no abdominal tenderness. There is no rebound and no guarding.   Musculoskeletal:         General: Normal range of motion.      Cervical back: Normal range of motion and neck supple.     Neurological: She is alert and oriented to person, place, and time. She has normal strength and normal reflexes.   Skin: Skin is warm and dry.   1 pustule with 1 cm surrounding induration left frontal scalp, no erythema   Psychiatric: She has a normal mood and affect. Her behavior is normal. Thought content normal.         ED Course   Procedures  Labs Reviewed - No data to display       Imaging Results    None          Medications - No data to display                   9:09 AM  Postural prepped with alcohol, probed with 18 gauge needle, pus expressed    Clinical Impression:   Final diagnoses:  [L02.811] Abscess, scalp (Primary)          ED Disposition Condition    Discharge Stable        ED Prescriptions     Medication Sig Dispense Start Date End Date Auth. Provider    clindamycin (CLEOCIN) 300 MG capsule Take 1 capsule (300 mg total) by mouth every 6 (six) hours. for 7 days 28 capsule 7/31/2022 8/7/2022 Jamil Cleveland, TARAH    HYDROcodone-acetaminophen (NORCO) 5-325 mg per tablet Take 1 tablet by mouth every 12 (twelve) hours as needed for  Pain. 10 tablet 7/31/2022  Jamil Cleveland NP        Follow-up Information     Follow up With Specialties Details Why Contact Info    Chuck Grider MD Internal Medicine Schedule an appointment as soon as possible for a visit   [address]  [place] [address]  [phone]             Jamil Cleveland NP  07/31/22 0909

## 2022-08-04 ENCOUNTER — HOSPITAL ENCOUNTER (EMERGENCY)
Facility: HOSPITAL | Age: 57
Discharge: HOME | End: 2022-08-05
Attending: EMERGENCY MEDICINE
Payer: MEDICARE

## 2022-08-04 VITALS
RESPIRATION RATE: 25 BRPM | DIASTOLIC BLOOD PRESSURE: 64 MMHG | OXYGEN SATURATION: 98 % | TEMPERATURE: 98 F | HEART RATE: 74 BPM | BODY MASS INDEX: 36.91 KG/M2 | HEIGHT: 67 IN | SYSTOLIC BLOOD PRESSURE: 144 MMHG

## 2022-08-04 DIAGNOSIS — H53.8 BLURRY VISION: ICD-10-CM

## 2022-08-04 DIAGNOSIS — L02.01 FACIAL ABSCESS: Primary | ICD-10-CM

## 2022-08-04 LAB
BASOPHILS # BLD AUTO: 0.04 K/UL (ref 0–0.2)
BASOPHILS NFR BLD: 0.4 % (ref 0–1.9)
DIFFERENTIAL METHOD: ABNORMAL
EOSINOPHIL # BLD AUTO: 0.2 K/UL (ref 0–0.5)
EOSINOPHIL NFR BLD: 1.9 % (ref 0–8)
ERYTHROCYTE [DISTWIDTH] IN BLOOD BY AUTOMATED COUNT: 15.4 % (ref 11.5–14.5)
HCT VFR BLD AUTO: 34.8 % (ref 37–48.5)
HEP C VIRUS HOLD SPECIMEN: NORMAL
HGB BLD-MCNC: 10.8 G/DL (ref 12–16)
IMM GRANULOCYTES # BLD AUTO: 0.02 K/UL (ref 0–0.04)
IMM GRANULOCYTES NFR BLD AUTO: 0.2 % (ref 0–0.5)
LYMPHOCYTES # BLD AUTO: 2.1 K/UL (ref 1–4.8)
LYMPHOCYTES NFR BLD: 21 % (ref 18–48)
MCH RBC QN AUTO: 27.1 PG (ref 27–31)
MCHC RBC AUTO-ENTMCNC: 31 G/DL (ref 32–36)
MCV RBC AUTO: 87 FL (ref 82–98)
MONOCYTES # BLD AUTO: 0.8 K/UL (ref 0.3–1)
MONOCYTES NFR BLD: 7.8 % (ref 4–15)
NEUTROPHILS # BLD AUTO: 6.8 K/UL (ref 1.8–7.7)
NEUTROPHILS NFR BLD: 68.7 % (ref 38–73)
NRBC BLD-RTO: 0 /100 WBC
PLATELET # BLD AUTO: 305 K/UL (ref 150–450)
PMV BLD AUTO: 9.6 FL (ref 9.2–12.9)
RBC # BLD AUTO: 3.99 M/UL (ref 4–5.4)
WBC # BLD AUTO: 9.86 K/UL (ref 3.9–12.7)

## 2022-08-04 PROCEDURE — 86803 HEPATITIS C AB TEST: CPT | Performed by: EMERGENCY MEDICINE

## 2022-08-04 PROCEDURE — 10060 I&D ABSCESS SIMPLE/SINGLE: CPT

## 2022-08-04 PROCEDURE — 87389 HIV-1 AG W/HIV-1&-2 AB AG IA: CPT | Performed by: EMERGENCY MEDICINE

## 2022-08-04 PROCEDURE — 85025 COMPLETE CBC W/AUTO DIFF WBC: CPT | Performed by: NURSE PRACTITIONER

## 2022-08-04 PROCEDURE — 99284 EMERGENCY DEPT VISIT MOD MDM: CPT | Mod: 25

## 2022-08-04 PROCEDURE — 25000003 PHARM REV CODE 250: Performed by: EMERGENCY MEDICINE

## 2022-08-04 RX ORDER — LINEZOLID 600 MG/1
600 TABLET, FILM COATED ORAL ONCE
Status: COMPLETED | OUTPATIENT
Start: 2022-08-04 | End: 2022-08-04

## 2022-08-04 RX ORDER — LIDOCAINE HYDROCHLORIDE 20 MG/ML
5 INJECTION, SOLUTION INFILTRATION; PERINEURAL
Status: COMPLETED | OUTPATIENT
Start: 2022-08-04 | End: 2022-08-04

## 2022-08-04 RX ADMIN — LIDOCAINE HYDROCHLORIDE 5 ML: 20 INJECTION, SOLUTION INFILTRATION; PERINEURAL at 11:08

## 2022-08-04 RX ADMIN — LINEZOLID 600 MG: 600 TABLET, FILM COATED ORAL at 11:08

## 2022-08-05 LAB
HCV AB SERPL QL IA: NEGATIVE
HIV 1+2 AB+HIV1 P24 AG SERPL QL IA: NEGATIVE

## 2022-08-05 PROCEDURE — 10060 I&D ABSCESS SIMPLE/SINGLE: CPT

## 2022-08-05 RX ORDER — LINEZOLID 600 MG/1
600 TABLET, FILM COATED ORAL EVERY 12 HOURS
Qty: 14 TABLET | Refills: 0 | Status: SHIPPED | OUTPATIENT
Start: 2022-08-05 | End: 2022-08-12

## 2022-08-05 NOTE — ED NOTES
AAOX4, APPEARS CALM/NO DISTRESS NOTED. RR REG/UNLAB. DENIES CP/SOB. SKIN W/D/I. ABSCESS NOTED TO L LATERAL SKULL AT HAIRLINE. NO DRAINAGE NOTED/ BROWN IN COLOR. LOCALIZED EDEMA NOTED. DIALYSIS SHUNT NOTED TO LUE. +/+ MOVEMENT/SENSATION X 4 EXTREM. PT REPORTS 8/10 HEADACHE, DENIES NEURO DEFICITS.

## 2022-08-05 NOTE — ED PROVIDER NOTES
SCRIBE #1 NOTE: I, Caro López, am scribing for, and in the presence of, Jonny Swenson MD. I have scribed the entire note.       History     Chief Complaint   Patient presents with    Facial Swelling     Facial swelling with fever at home. Pt had I&D for abscess Sunday.     Review of patient's allergies indicates:   Allergen Reactions    Morphine Itching    Sulfa (sulfonamide antibiotics) Rash         History of Present Illness     HPI    8/4/2022, 10:52 PM  History obtained from the patient      History of Present Illness: Cordell Angulo is a 57 y.o. female patient with a PMHx of asthma, CHF, CKD, DM, dialysis patient, and HTN who presents to the Emergency Department for evaluation of facial swelling which onset gradually since 7/31. Pt had an abscess on the left side of her forehead drained on 7/31. Pt has now noticed swelling and numbness on the left side of her face. Symptoms are constant and moderate in severity. No mitigating or exacerbating factors reported. Associated sxs include dizziness and fever. Patient denies any CP, SOB, weakness, nausea, vomiting, and all other sxs at this time. No further complaints or concerns at this time.       Arrival mode: EMS    PCP: Chuck Grider MD        Past Medical History:  Past Medical History:   Diagnosis Date    Asthma     CHF (congestive heart failure)     CKD (chronic kidney disease) stage 5, GFR less than 15 ml/min     Depression     Diabetes mellitus     Dialysis patient     Hypercholesterolemia     Hypertension     PAF (paroxysmal atrial fibrillation)        Past Surgical History:  Past Surgical History:   Procedure Laterality Date    AV FISTULA PLACEMENT      CARDIAC ELECTROPHYSIOLOGY MAPPING AND ABLATION      CATHETERIZATION OF BOTH LEFT AND RIGHT HEART N/A 12/28/2018    Procedure: CATHETERIZATION, HEART, BOTH LEFT AND RIGHT;  Surgeon: Enrique Jj MD;  Location: [facility] CATH LAB;  Service: Cardiology;  Laterality: N/A;  To  follow his cardioversion case in [facility]     SECTION      TUMOR REMOVAL      L lung         Family History:  Family History   Problem Relation Age of Onset    Heart disease Mother     Diabetes Father     Stroke Father     Heart attack Sister        Social History:  Social History     Tobacco Use    Smoking status: Never Smoker    Smokeless tobacco: Never Used   Substance and Sexual Activity    Alcohol use: No    Drug use: No    Sexual activity: Not Currently        Review of Systems     Review of Systems   Constitutional: Positive for fever.   HENT: Positive for facial swelling (L side). Negative for sore throat.    Respiratory: Negative for shortness of breath.    Cardiovascular: Negative for chest pain.   Gastrointestinal: Negative for nausea and vomiting.   Genitourinary: Negative for dysuria.   Musculoskeletal: Negative for back pain.   Skin: Positive for wound (L forehead abscess). Negative for rash.   Neurological: Positive for dizziness and numbness (L side of face). Negative for weakness.   Hematological: Does not bruise/bleed easily.   All other systems reviewed and are negative.       Physical Exam     Initial Vitals [22 2107]   BP Pulse Resp Temp SpO2   (!) 156/64 82 16 98.2 °F (36.8 °C) 98 %      MAP       --          Physical Exam  Nursing Notes and Vital Signs Reviewed.  Constitutional: Patient is in no acute distress.  Head: Atraumatic. Normocephalic.  Eyes:  EOM intact. Conjunctivae are not pale. No scleral icterus.  ENT: Mucous membranes are moist. Oropharynx is clear and symmetric.    Neck: Supple. Full ROM.   Cardiovascular: Regular rate. Regular rhythm. No murmurs, rubs, or gallops. Distal pulses are 2+ and symmetric.  Pulmonary/Chest: No respiratory distress. Clear to auscultation bilaterally. No wheezing or rales.  Abdominal: Soft and non-distended.  There is no tenderness.  No rebound, guarding, or rigidity.   Musculoskeletal: Moves all extremities. No obvious  "deformities. No edema.   Skin: Warm and dry. 2cm fluctuant abscess to the left forehead with mild induration.   Neurological:  Alert, awake, and appropriate.  Normal speech.  No acute focal neurological deficits are appreciated.  Psychiatric: Normal affect. Good eye contact. Appropriate in content.     ED Course   I & D - Incision and Drainage    Date/Time: 2022 12:06 AM  Location procedure was performed: [facility] EMERGENCY DEPARTMENT  Performed by: Jonny Swenson MD  Authorized by: Jonny Swenson MD   Consent Done: Yes  Consent: Verbal consent obtained.  Risks and benefits: risks, benefits and alternatives were discussed  Consent given by: patient  Patient understanding: patient states understanding of the procedure being performed  Patient consent: the patient's understanding of the procedure matches consent given  Procedure consent: procedure consent matches procedure scheduled  Test results: test results available and properly labeled  Site marked: the operative site was marked  Imaging studies: imaging studies available  Required items: required blood products, implants, devices, and special equipment available  Patient identity confirmed: , MRN and name  Time out: Immediately prior to procedure a "time out" was called to verify the correct patient, procedure, equipment, support staff and site/side marked as required.  Type: abscess  Body area: head/neck  Location details: scalp  Anesthesia: local infiltration    Anesthesia:  Local Anesthetic: lidocaine 1% without epinephrine    Patient sedated: no  Scalpel size: 11  Incision type: single straight  Complexity: simple  Drainage: pus  Drainage amount: moderate  Wound treatment: incision  Complications: No  Specimens: No  Implants: No  Patient tolerance: Patient tolerated the procedure well with no immediate complications        ED Vital Signs:  Vitals:    22 2107 22 2337   BP: (!) 156/64 (!) 144/64   Pulse: 82 74   Resp: 16 (!) " "25   Temp: 98.2 °F (36.8 °C)    TempSrc: Oral    SpO2: 98% 98%   Height: 5' 7" (1.702 m)        Abnormal Lab Results:  Labs Reviewed   CBC W/ AUTO DIFFERENTIAL - Abnormal; Notable for the following components:       Result Value    RBC 3.99 (*)     Hemoglobin 10.8 (*)     Hematocrit 34.8 (*)     MCHC 31.0 (*)     RDW 15.4 (*)     All other components within normal limits    Narrative:     Release to patient->Immediate   HEP C VIRUS HOLD SPECIMEN    Narrative:     Release to patient->Immediate   HIV 1 / 2 ANTIBODY   HEPATITIS C ANTIBODY   COMPREHENSIVE METABOLIC PANEL        All Lab Results:  Results for orders placed or performed during the hospital encounter of 08/04/22   CBC auto differential   Result Value Ref Range    WBC 9.86 3.90 - 12.70 K/uL    RBC 3.99 (L) 4.00 - 5.40 M/uL    Hemoglobin 10.8 (L) 12.0 - 16.0 g/dL    Hematocrit 34.8 (L) 37.0 - 48.5 %    MCV 87 82 - 98 fL    MCH 27.1 27.0 - 31.0 pg    MCHC 31.0 (L) 32.0 - 36.0 g/dL    RDW 15.4 (H) 11.5 - 14.5 %    Platelets 305 150 - 450 K/uL    MPV 9.6 9.2 - 12.9 fL    Immature Granulocytes 0.2 0.0 - 0.5 %    Gran # (ANC) 6.8 1.8 - 7.7 K/uL    Immature Grans (Abs) 0.02 0.00 - 0.04 K/uL    Lymph # 2.1 1.0 - 4.8 K/uL    Mono # 0.8 0.3 - 1.0 K/uL    Eos # 0.2 0.0 - 0.5 K/uL    Baso # 0.04 0.00 - 0.20 K/uL    nRBC 0 0 /100 WBC    Gran % 68.7 38.0 - 73.0 %    Lymph % 21.0 18.0 - 48.0 %    Mono % 7.8 4.0 - 15.0 %    Eosinophil % 1.9 0.0 - 8.0 %    Basophil % 0.4 0.0 - 1.9 %    Differential Method Automated    HCV Virus Hold Specimen   Result Value Ref Range    HEP C Virus Hold Specimen Hold for HCV sendout          Imaging Results:  Imaging Results          CT Maxillofacial Without Contrast (Final result)  Result time 08/04/22 23:30:34    Final result by Dayne Mcgowan MD (08/04/22 23:30:34)                 Impression:      Moderate mucosal thickening of the right maxillary sinus    No definite drainable abscess    Likely reactive prominent cervical lymph " nodes.    Slightly more superficial fat stranding in the anterior right pre maxillary subcutaneous fat      Electronically signed by: Juan M Oscar  Date:    08/04/2022  Time:    23:30             Narrative:    EXAMINATION:  CT MAXILLOFACIAL WITHOUT CONTRAST    CLINICAL HISTORY:  Maxillary/facial abscess;    TECHNIQUE:  Low dose axial images, sagittal and coronal reformations were obtained through the face.  Contrast was not administered.    COMPARISON:  None    FINDINGS:  Moderate mucosal thickening of the right maxillary sinus.  Bilateral resection of the medial wall of the maxillary sinus suspected.  Correlate to surgical history.  Orbits are unremarkable.  Paranasal sinuses are otherwise clear.  Calcification of the palatine tonsils noted.  Asymmetric sublingual space identified.  Left jugular chain lymph node at the level of the angle of mandible measures 18 by 12 mm.  No acute fracture is identified.  Slightly more prominent this subcutaneous fat stranding in the right anterior maxillary subcutaneous fat.  Bilateral prominent sternocleidomastoid and jugular chain lymph nodes particularly at the level of the parotid glands                               CT Head Without Contrast (Final result)  Result time 08/04/22 23:14:38    Final result by Dayne Mcgowan MD (08/04/22 23:14:38)                 Impression:      No acute abnormality.    Atrophy and chronic white matter changes    Moderate right maxillary sinus opacification    All CT scans   are performed using dose optimization techniques including the following: automated exposure control; adjustment of the mA and/or kV; use of iterative reconstruction technique.  Dose modulation was employed for ALARA by means of: Automated exposure control; adjustment of the mA and/or kV according to patient size (this includes techniques or standardized protocols for targeted exams where dose is matched to indication/reason for exam; i.e. extremities or head); and/or use of  iterative reconstructive technique.      Electronically signed by: Juan M Oscar  Date:    08/04/2022  Time:    23:14             Narrative:    EXAMINATION:  CT HEAD WITHOUT CONTRAST    CLINICAL HISTORY:  Dizziness, persistent/recurrent, cardiac or vascular cause suspected; Other visual disturbances    TECHNIQUE:  Low dose axial CT images obtained throughout the head without intravenous contrast. Sagittal and coronal reconstructions were performed.    COMPARISON:  None.    FINDINGS:  Atrophy and chronic white matter changes no extra-axial blood or fluid collections.    No parenchymal mass, hemorrhage, edema or major vascular distribution infarct.    Skull/extracranial contents (limited evaluation): No fracture.  Moderate opacification of right maxillary sinus                                          The Emergency Provider reviewed the vital signs and test results, which are outlined above.     ED Discussion       12:25 AM: Reassessed pt at this time. Discussed with pt all pertinent ED information and results. Discussed pt dx and plan of tx. Gave pt all f/u and return to the ED instructions. All questions and concerns were addressed at this time. Pt expresses understanding of information and instructions, and is comfortable with plan to discharge. Pt is stable for discharge.    I discussed with patient and/or family/caretaker that evaluation in the ED does not suggest any emergent or life threatening medical conditions requiring immediate intervention beyond what was provided in the ED, and I believe patient is safe for discharge.  Regardless, an unremarkable evaluation in the ED does not preclude the development or presence of a serious of life threatening condition. As such, patient was instructed to return immediately for any worsening or change in current symptoms.         Medical Decision Making:   Clinical Tests:   Lab Tests: Ordered and Reviewed  Radiological Study: Ordered and Reviewed           ED  Medication(s):  Medications   LIDOcaine HCL 20 mg/ml (2%) injection 5 mL (5 mLs Infiltration Given by Provider 8/4/22 7820)   linezolid tablet 600 mg (600 mg Oral Given 8/4/22 5399)       New Prescriptions    No medications on file               Scribe Attestation:   Scribe #1: I performed the above scribed service and the documentation accurately describes the services I performed. I attest to the accuracy of the note.     Attending:   Physician Attestation Statement for Scribe #1: I, Jonny Swenson MD, personally performed the services described in this documentation, as scribed by Caro López, in my presence, and it is both accurate and complete.           Clinical Impression       ICD-10-CM ICD-9-CM   1. Facial abscess  L02.01 682.0   2. Blurry vision  H53.8 368.8       Disposition:   Disposition: Discharged  Condition: Stable         Jonny Swenson MD  08/05/22 1750

## 2022-08-05 NOTE — ED NOTES
AAOX4, APPEARS CALM/NO DISTRESS NOTED. RR REG/UNLAB. DENIES CP.SOB. COMPLAINS OF SEVERE HEAD PAIN, MD NOTIFIED. WOUND TO L LATERAL SKULL DRESSED FOLLOWING I&D.  AT BEDSIDE. EDU PROVIDED REGARDING CARE PLAN AND D/C.

## 2022-08-05 NOTE — FIRST PROVIDER EVALUATION
Medical screening exam completed.  I have conducted a focused provider triage encounter, findings are as follows:    Brief history of present illness:  Patient complains of left-sided facial pain after an I&D of an abscess recently    There were no vitals filed for this visit.    Pertinent physical exam:  nad    Brief workup plan: labs    Preliminary workup initiated; this workup will be continued and followed by the physician or advanced practice provider that is assigned to the patient when roomed.

## 2022-11-30 ENCOUNTER — HOSPITAL ENCOUNTER (EMERGENCY)
Facility: HOSPITAL | Age: 57
Discharge: HOME | End: 2022-12-01
Attending: EMERGENCY MEDICINE
Payer: MEDICARE

## 2022-11-30 VITALS
TEMPERATURE: 98 F | SYSTOLIC BLOOD PRESSURE: 145 MMHG | HEART RATE: 82 BPM | RESPIRATION RATE: 18 BRPM | OXYGEN SATURATION: 98 % | HEIGHT: 67 IN | DIASTOLIC BLOOD PRESSURE: 70 MMHG | BODY MASS INDEX: 36.91 KG/M2

## 2022-11-30 DIAGNOSIS — R00.2 PALPITATIONS: ICD-10-CM

## 2022-11-30 DIAGNOSIS — L73.2 HIDRADENITIS SUPPURATIVA: Primary | ICD-10-CM

## 2022-11-30 LAB
ALBUMIN SERPL BCP-MCNC: 2.9 G/DL (ref 3.5–5.2)
ALP SERPL-CCNC: 82 U/L (ref 55–135)
ALT SERPL W/O P-5'-P-CCNC: 21 U/L (ref 10–44)
ANION GAP SERPL CALC-SCNC: 13 MMOL/L (ref 8–16)
AST SERPL-CCNC: 12 U/L (ref 10–40)
BASOPHILS # BLD AUTO: 0.04 K/UL (ref 0–0.2)
BASOPHILS NFR BLD: 0.5 % (ref 0–1.9)
BILIRUB SERPL-MCNC: 0.4 MG/DL (ref 0.1–1)
BNP SERPL-MCNC: 289 PG/ML (ref 0–99)
BUN SERPL-MCNC: 27 MG/DL (ref 6–20)
CALCIUM SERPL-MCNC: 8.9 MG/DL (ref 8.7–10.5)
CHLORIDE SERPL-SCNC: 102 MMOL/L (ref 95–110)
CO2 SERPL-SCNC: 24 MMOL/L (ref 23–29)
CREAT SERPL-MCNC: 6.8 MG/DL (ref 0.5–1.4)
DIFFERENTIAL METHOD: ABNORMAL
EOSINOPHIL # BLD AUTO: 0.2 K/UL (ref 0–0.5)
EOSINOPHIL NFR BLD: 2.4 % (ref 0–8)
ERYTHROCYTE [DISTWIDTH] IN BLOOD BY AUTOMATED COUNT: 15.2 % (ref 11.5–14.5)
EST. GFR  (NO RACE VARIABLE): 7 ML/MIN/1.73 M^2
GLUCOSE SERPL-MCNC: 170 MG/DL (ref 70–110)
HCT VFR BLD AUTO: 34.8 % (ref 37–48.5)
HGB BLD-MCNC: 10.8 G/DL (ref 12–16)
IMM GRANULOCYTES # BLD AUTO: 0.01 K/UL (ref 0–0.04)
IMM GRANULOCYTES NFR BLD AUTO: 0.1 % (ref 0–0.5)
LYMPHOCYTES # BLD AUTO: 1.1 K/UL (ref 1–4.8)
LYMPHOCYTES NFR BLD: 13.5 % (ref 18–48)
MCH RBC QN AUTO: 27.9 PG (ref 27–31)
MCHC RBC AUTO-ENTMCNC: 31 G/DL (ref 32–36)
MCV RBC AUTO: 90 FL (ref 82–98)
MONOCYTES # BLD AUTO: 0.6 K/UL (ref 0.3–1)
MONOCYTES NFR BLD: 6.8 % (ref 4–15)
NEUTROPHILS # BLD AUTO: 6.2 K/UL (ref 1.8–7.7)
NEUTROPHILS NFR BLD: 76.7 % (ref 38–73)
NRBC BLD-RTO: 0 /100 WBC
PLATELET # BLD AUTO: 183 K/UL (ref 150–450)
PMV BLD AUTO: 8.3 FL (ref 9.2–12.9)
POTASSIUM SERPL-SCNC: 3.5 MMOL/L (ref 3.5–5.1)
PROT SERPL-MCNC: 7.3 G/DL (ref 6–8.4)
RBC # BLD AUTO: 3.87 M/UL (ref 4–5.4)
SODIUM SERPL-SCNC: 139 MMOL/L (ref 136–145)
TROPONIN I SERPL DL<=0.01 NG/ML-MCNC: 0.01 NG/ML (ref 0–0.03)
WBC # BLD AUTO: 8.07 K/UL (ref 3.9–12.7)

## 2022-11-30 PROCEDURE — 85025 COMPLETE CBC W/AUTO DIFF WBC: CPT | Performed by: NURSE PRACTITIONER

## 2022-11-30 PROCEDURE — 84484 ASSAY OF TROPONIN QUANT: CPT | Performed by: NURSE PRACTITIONER

## 2022-11-30 PROCEDURE — 93005 ELECTROCARDIOGRAM TRACING: CPT

## 2022-11-30 PROCEDURE — 80053 COMPREHEN METABOLIC PANEL: CPT | Performed by: NURSE PRACTITIONER

## 2022-11-30 PROCEDURE — 93010 ELECTROCARDIOGRAM REPORT: CPT | Mod: ,,, | Performed by: STUDENT IN AN ORGANIZED HEALTH CARE EDUCATION/TRAINING PROGRAM

## 2022-11-30 PROCEDURE — 83880 ASSAY OF NATRIURETIC PEPTIDE: CPT | Performed by: NURSE PRACTITIONER

## 2022-11-30 PROCEDURE — 93010 EKG 12-LEAD: ICD-10-PCS | Mod: ,,, | Performed by: STUDENT IN AN ORGANIZED HEALTH CARE EDUCATION/TRAINING PROGRAM

## 2022-11-30 PROCEDURE — 99285 EMERGENCY DEPT VISIT HI MDM: CPT | Mod: 25

## 2022-11-30 RX ORDER — DOXYCYCLINE 100 MG/1
100 CAPSULE ORAL 2 TIMES DAILY
Qty: 14 CAPSULE | Refills: 0 | Status: SHIPPED | OUTPATIENT
Start: 2022-11-30 | End: 2022-12-07

## 2022-11-30 NOTE — FIRST PROVIDER EVALUATION
"Medical screening examination initiated.  I have conducted a focused provider triage encounter, findings are as follows:    Brief history of present illness:  Patient reports that her heart is racing making her feel short of breath at times    Vitals:    11/30/22 1616   BP: 111/76   Pulse: 97   Resp: 18   Temp: 98 °F (36.7 °C)   TempSrc: Oral   SpO2: 100%   Height: 5' 7" (1.702 m)       Pertinent physical exam:  NAD    Brief workup plan:  Cardiac workup    Preliminary workup initiated; this workup will be continued and followed by the physician or advanced practice provider that is assigned to the patient when roomed.  "

## 2022-11-30 NOTE — ED PROVIDER NOTES
SCRIBE #1 NOTE: I, Caro López, am scribing for, and in the presence of, Milton Vega MD. I have scribed the entire note.       History     Chief Complaint   Patient presents with    Weakness     Pt reports she feels her heart racing, then it slows down, making her feel weak. No CP at present time. Recent med dose changes     Review of patient's allergies indicates:   Allergen Reactions    Morphine Itching    Sulfa (sulfonamide antibiotics) Rash         History of Present Illness     HPI    11/30/2022, 5:23 PM  History obtained from the patient      History of Present Illness: Cordell Angulo is a 57 y.o. female patient with a PMHx of asthma, CHF, CKD, DM, HTN, and paroxysmal atrial fibrillation who presents to the Emergency Department for evaluation of palpitations. Pt is a dialysis patient  and c/o palpitations with her heart rate dropping down into the 40s and then up into the 160s. She reports that, when she gets the palpitations, she gets short of breath and weak. Pt has not taken her metoprolol in a couple days. Symptoms are constant and moderate in severity. No mitigating or exacerbating factors reported. Patient denies any fever, CP, nausea, vomiting, numbness, dizziness, and all other sxs at this time. No further complaints or concerns at this time.       Arrival mode: Ambulance Service    PCP: Chuck Grider MD        Past Medical History:  Past Medical History:   Diagnosis Date    Asthma     CHF (congestive heart failure)     CKD (chronic kidney disease) stage 5, GFR less than 15 ml/min     Depression     Diabetes mellitus     Dialysis patient     Hypercholesterolemia     Hypertension     PAF (paroxysmal atrial fibrillation)        Past Surgical History:  Past Surgical History:   Procedure Laterality Date    AV FISTULA PLACEMENT      CARDIAC ELECTROPHYSIOLOGY MAPPING AND ABLATION      CATHETERIZATION OF BOTH LEFT AND RIGHT HEART N/A 12/28/2018    Procedure: CATHETERIZATION, HEART, BOTH LEFT AND  RIGHT;  Surgeon: Enrique Jj MD;  Location: [facility] CATH LAB;  Service: Cardiology;  Laterality: N/A;  To follow his cardioversion case in [facility]     SECTION      TUMOR REMOVAL      L lung         Family History:  Family History   Problem Relation Age of Onset    Heart disease Mother     Diabetes Father     Stroke Father     Heart attack Sister        Social History:  Social History     Tobacco Use    Smoking status: Never    Smokeless tobacco: Never   Substance and Sexual Activity    Alcohol use: No    Drug use: No    Sexual activity: Not Currently        Review of Systems     Review of Systems   Constitutional:  Negative for fever.   HENT:  Negative for sore throat.    Respiratory:  Positive for shortness of breath.    Cardiovascular:  Positive for palpitations. Negative for chest pain.   Gastrointestinal:  Negative for nausea and vomiting.   Genitourinary:  Negative for dysuria.   Musculoskeletal:  Negative for back pain.   Skin:  Negative for rash.   Neurological:  Positive for weakness. Negative for dizziness and numbness.   Hematological:  Does not bruise/bleed easily.   All other systems reviewed and are negative.     Physical Exam     Initial Vitals [22 1616]   BP Pulse Resp Temp SpO2   111/76 97 18 98 °F (36.7 °C) 100 %      MAP       --          Physical Exam  Nursing Notes and Vital Signs Reviewed.  Constitutional: Patient is in no acute distress. Well-developed and well-nourished.  Head: Atraumatic. Normocephalic.  Eyes: PERRL. EOM intact. Conjunctivae are not pale. No scleral icterus.  ENT: Mucous membranes are moist. Oropharynx is clear and symmetric.    Neck: Supple. Full ROM. No lymphadenopathy.  Cardiovascular: Regular rate. Regular rhythm. No murmurs, rubs, or gallops. Distal pulses are 2+ and symmetric.  Pulmonary/Chest: No respiratory distress. Clear to auscultation bilaterally. No wheezing or rales.  Abdominal: Soft and non-distended.  There is no tenderness.  No rebound,  "guarding, or rigidity. Good bowel sounds.  Genitourinary: No CVA tenderness  Musculoskeletal: Moves all extremities. No obvious deformities. No edema. No calf tenderness.  Skin: Warm and dry.  Neurological:  Alert, awake, and appropriate.  Normal speech.  No acute focal neurological deficits are appreciated.  Psychiatric: Normal affect. Good eye contact. Appropriate in content.     ED Course   Procedures  ED Vital Signs:  Vitals:    11/30/22 1616 11/30/22 1648 11/30/22 1700 11/30/22 1800   BP: 111/76  (!) 141/65 (!) 145/70   Pulse: 97 72 84 82   Resp: 18  18 18   Temp: 98 °F (36.7 °C)      TempSrc: Oral      SpO2: 100%  99% 98%   Height: 5' 7" (1.702 m)          Abnormal Lab Results:  Labs Reviewed   B-TYPE NATRIURETIC PEPTIDE - Abnormal; Notable for the following components:       Result Value     (*)     All other components within normal limits   CBC W/ AUTO DIFFERENTIAL - Abnormal; Notable for the following components:    RBC 3.87 (*)     Hemoglobin 10.8 (*)     Hematocrit 34.8 (*)     MCHC 31.0 (*)     RDW 15.2 (*)     MPV 8.3 (*)     Gran % 76.7 (*)     Lymph % 13.5 (*)     All other components within normal limits   COMPREHENSIVE METABOLIC PANEL - Abnormal; Notable for the following components:    Glucose 170 (*)     BUN 27 (*)     Creatinine 6.8 (*)     Albumin 2.9 (*)     eGFR 7 (*)     All other components within normal limits   TROPONIN I        All Lab Results:  Results for orders placed or performed during the hospital encounter of 11/30/22   Brain natriuretic peptide   Result Value Ref Range     (H) 0 - 99 pg/mL   CBC auto differential   Result Value Ref Range    WBC 8.07 3.90 - 12.70 K/uL    RBC 3.87 (L) 4.00 - 5.40 M/uL    Hemoglobin 10.8 (L) 12.0 - 16.0 g/dL    Hematocrit 34.8 (L) 37.0 - 48.5 %    MCV 90 82 - 98 fL    MCH 27.9 27.0 - 31.0 pg    MCHC 31.0 (L) 32.0 - 36.0 g/dL    RDW 15.2 (H) 11.5 - 14.5 %    Platelets 183 150 - 450 K/uL    MPV 8.3 (L) 9.2 - 12.9 fL    Immature " Granulocytes 0.1 0.0 - 0.5 %    Gran # (ANC) 6.2 1.8 - 7.7 K/uL    Immature Grans (Abs) 0.01 0.00 - 0.04 K/uL    Lymph # 1.1 1.0 - 4.8 K/uL    Mono # 0.6 0.3 - 1.0 K/uL    Eos # 0.2 0.0 - 0.5 K/uL    Baso # 0.04 0.00 - 0.20 K/uL    nRBC 0 0 /100 WBC    Gran % 76.7 (H) 38.0 - 73.0 %    Lymph % 13.5 (L) 18.0 - 48.0 %    Mono % 6.8 4.0 - 15.0 %    Eosinophil % 2.4 0.0 - 8.0 %    Basophil % 0.5 0.0 - 1.9 %    Differential Method Automated    Comprehensive metabolic panel   Result Value Ref Range    Sodium 139 136 - 145 mmol/L    Potassium 3.5 3.5 - 5.1 mmol/L    Chloride 102 95 - 110 mmol/L    CO2 24 23 - 29 mmol/L    Glucose 170 (H) 70 - 110 mg/dL    BUN 27 (H) 6 - 20 mg/dL    Creatinine 6.8 (H) 0.5 - 1.4 mg/dL    Calcium 8.9 8.7 - 10.5 mg/dL    Total Protein 7.3 6.0 - 8.4 g/dL    Albumin 2.9 (L) 3.5 - 5.2 g/dL    Total Bilirubin 0.4 0.1 - 1.0 mg/dL    Alkaline Phosphatase 82 55 - 135 U/L    AST 12 10 - 40 U/L    ALT 21 10 - 44 U/L    Anion Gap 13 8 - 16 mmol/L    eGFR 7 (A) >60 mL/min/1.73 m^2   Troponin I   Result Value Ref Range    Troponin I 0.010 0.000 - 0.026 ng/mL         Imaging Results:  Imaging Results              X-Ray Chest 1 View (Final result)  Result time 11/30/22 16:36:13      Final result by MYA Chatman Sr., MD (11/30/22 16:36:13)                   Impression:      1. The lungs are clear.  2. The size of the heart is prominent.  This may be secondary to magnification.  3. There are mild osteoarthritic changes in the left glenohumeral joint.  .      Electronically signed by: Kavin Chatman MD  Date:    11/30/2022  Time:    16:36               Narrative:    EXAMINATION:  XR CHEST 1 VIEW    CLINICAL HISTORY:  Palpitations    COMPARISON:  07/15/2022    FINDINGS:  The size of the heart is prominent.  The lungs are clear. There is no pneumothorax.  The costophrenic angles are sharp.  There are mild osteoarthritic changes in the left glenohumeral joint.                                       The EKG was  ordered, reviewed, and independently interpreted by the ED provider.  Interpretation time: 16:37  Rate: 81 BPM  Rhythm: normal sinus rhythm with sinus arrhythmia  Interpretation: LVH with repolarization abnormality. Prolonged QT. No STEMI.             The Emergency Provider reviewed the vital signs and test results, which are outlined above.     ED Discussion       8:03 PM: Reassessed pt at this time.   Discussed with pt all pertinent ED information and results. Discussed pt dx and plan of tx. Gave pt all f/u and return to the ED instructions. All questions and concerns were addressed at this time. Pt expresses understanding of information and instructions, and is comfortable with plan to discharge. Pt is stable for discharge.    I discussed with patient and/or family/caretaker that evaluation in the ED does not suggest any emergent or life threatening medical conditions requiring immediate intervention beyond what was provided in the ED, and I believe patient is safe for discharge.  Regardless, an unremarkable evaluation in the ED does not preclude the development or presence of a serious of life threatening condition. As such, patient was instructed to return immediately for any worsening or change in current symptoms.         Medical Decision Making:   Clinical Tests:   Lab Tests: Ordered and Reviewed  Radiological Study: Ordered and Reviewed  Medical Tests: Ordered and Reviewed         ED Medication(s):  Medications - No data to display    Discharge Medication List as of 11/30/2022  8:14 PM        START taking these medications    Details   doxycycline (VIBRAMYCIN) 100 MG Cap Take 1 capsule (100 mg total) by mouth 2 (two) times daily. for 7 days, Starting Wed 11/30/2022, Until Wed 12/7/2022, Normal              Follow-up Information       Call  Chuck Grider MD.    Specialty: Internal Medicine  Contact information:  [address]  [facility] [address] [phone]               [facility] - Emergency  Dept..    Specialty: Emergency Medicine  Why: As needed, If symptoms worsen  Contact information:  [address] [facility] [address] [phone]             Lawrence Sims Md, MD.    Specialties: Cardiology, Internal Medicine  Contact information:  [address]  [place] [address]  [phone]                                 Scribe Attestation:   Scribe #1: I performed the above scribed service and the documentation accurately describes the services I performed. I attest to the accuracy of the note.     Attending:   Physician Attestation Statement for Scribe #1: I, Milton Vega MD, personally performed the services described in this documentation, as scribed by Caro López, in my presence, and it is both accurate and complete.           Clinical Impression       ICD-10-CM ICD-9-CM   1. Hidradenitis suppurativa  L73.2 705.83   2. Palpitations  R00.2 785.1       Disposition:   Disposition: Discharged  Condition: Stable       Milton Vega MD  12/10/22 0344

## 2023-03-17 ENCOUNTER — HOSPITAL ENCOUNTER (EMERGENCY)
Facility: HOSPITAL | Age: 58
Discharge: HOME | End: 2023-03-18
Attending: EMERGENCY MEDICINE
Payer: MEDICARE

## 2023-03-17 DIAGNOSIS — R53.1 WEAKNESS GENERALIZED: ICD-10-CM

## 2023-03-17 DIAGNOSIS — R42 DIZZINESS: Primary | ICD-10-CM

## 2023-03-17 LAB
BASOPHILS # BLD AUTO: 0.05 K/UL (ref 0–0.2)
BASOPHILS NFR BLD: 0.5 % (ref 0–1.9)
DIFFERENTIAL METHOD: ABNORMAL
EOSINOPHIL # BLD AUTO: 0.2 K/UL (ref 0–0.5)
EOSINOPHIL NFR BLD: 2 % (ref 0–8)
ERYTHROCYTE [DISTWIDTH] IN BLOOD BY AUTOMATED COUNT: 14.2 % (ref 11.5–14.5)
HCT VFR BLD AUTO: 37.5 % (ref 37–48.5)
HGB BLD-MCNC: 11.9 G/DL (ref 12–16)
IMM GRANULOCYTES # BLD AUTO: 0.06 K/UL (ref 0–0.04)
IMM GRANULOCYTES NFR BLD AUTO: 0.7 % (ref 0–0.5)
LACTATE SERPL-SCNC: 2 MMOL/L (ref 0.5–2.2)
LYMPHOCYTES # BLD AUTO: 1.6 K/UL (ref 1–4.8)
LYMPHOCYTES NFR BLD: 17.4 % (ref 18–48)
MCH RBC QN AUTO: 28.1 PG (ref 27–31)
MCHC RBC AUTO-ENTMCNC: 31.7 G/DL (ref 32–36)
MCV RBC AUTO: 88 FL (ref 82–98)
MONOCYTES # BLD AUTO: 0.7 K/UL (ref 0.3–1)
MONOCYTES NFR BLD: 7 % (ref 4–15)
NEUTROPHILS # BLD AUTO: 6.7 K/UL (ref 1.8–7.7)
NEUTROPHILS NFR BLD: 72.4 % (ref 38–73)
NRBC BLD-RTO: 0 /100 WBC
PLATELET # BLD AUTO: 153 K/UL (ref 150–450)
PMV BLD AUTO: 9.5 FL (ref 9.2–12.9)
RBC # BLD AUTO: 4.24 M/UL (ref 4–5.4)
WBC # BLD AUTO: 9.22 K/UL (ref 3.9–12.7)

## 2023-03-17 PROCEDURE — 83605 ASSAY OF LACTIC ACID: CPT | Performed by: EMERGENCY MEDICINE

## 2023-03-17 PROCEDURE — 80053 COMPREHEN METABOLIC PANEL: CPT | Performed by: EMERGENCY MEDICINE

## 2023-03-17 PROCEDURE — 83735 ASSAY OF MAGNESIUM: CPT | Performed by: EMERGENCY MEDICINE

## 2023-03-17 PROCEDURE — 93005 ELECTROCARDIOGRAM TRACING: CPT

## 2023-03-17 PROCEDURE — 84484 ASSAY OF TROPONIN QUANT: CPT | Performed by: EMERGENCY MEDICINE

## 2023-03-17 PROCEDURE — 84100 ASSAY OF PHOSPHORUS: CPT | Performed by: EMERGENCY MEDICINE

## 2023-03-17 PROCEDURE — 99285 EMERGENCY DEPT VISIT HI MDM: CPT | Mod: 25

## 2023-03-17 PROCEDURE — 93010 ELECTROCARDIOGRAM REPORT: CPT | Mod: ,,, | Performed by: INTERNAL MEDICINE

## 2023-03-17 PROCEDURE — 93010 EKG 12-LEAD: ICD-10-PCS | Mod: ,,, | Performed by: INTERNAL MEDICINE

## 2023-03-17 PROCEDURE — 85025 COMPLETE CBC W/AUTO DIFF WBC: CPT | Performed by: EMERGENCY MEDICINE

## 2023-03-18 VITALS
HEIGHT: 67 IN | BODY MASS INDEX: 34.06 KG/M2 | HEART RATE: 76 BPM | WEIGHT: 217 LBS | RESPIRATION RATE: 16 BRPM | TEMPERATURE: 99 F | DIASTOLIC BLOOD PRESSURE: 60 MMHG | SYSTOLIC BLOOD PRESSURE: 95 MMHG | OXYGEN SATURATION: 100 %

## 2023-03-18 LAB
ALBUMIN SERPL BCP-MCNC: 3.2 G/DL (ref 3.5–5.2)
ALP SERPL-CCNC: 75 U/L (ref 55–135)
ALT SERPL W/O P-5'-P-CCNC: 25 U/L (ref 10–44)
ANION GAP SERPL CALC-SCNC: 14 MMOL/L (ref 8–16)
AST SERPL-CCNC: 19 U/L (ref 10–40)
BILIRUB SERPL-MCNC: 0.3 MG/DL (ref 0.1–1)
BUN SERPL-MCNC: 30 MG/DL (ref 6–20)
CALCIUM SERPL-MCNC: 8.7 MG/DL (ref 8.7–10.5)
CHLORIDE SERPL-SCNC: 103 MMOL/L (ref 95–110)
CO2 SERPL-SCNC: 23 MMOL/L (ref 23–29)
CREAT SERPL-MCNC: 7.8 MG/DL (ref 0.5–1.4)
EST. GFR  (NO RACE VARIABLE): 5 ML/MIN/1.73 M^2
GLUCOSE SERPL-MCNC: 158 MG/DL (ref 70–110)
MAGNESIUM SERPL-MCNC: 2.2 MG/DL (ref 1.6–2.6)
PHOSPHATE SERPL-MCNC: 4.9 MG/DL (ref 2.7–4.5)
POTASSIUM SERPL-SCNC: 4.8 MMOL/L (ref 3.5–5.1)
PROT SERPL-MCNC: 7.6 G/DL (ref 6–8.4)
SODIUM SERPL-SCNC: 140 MMOL/L (ref 136–145)
TROPONIN I SERPL DL<=0.01 NG/ML-MCNC: 0.02 NG/ML (ref 0–0.03)

## 2023-03-18 NOTE — ED PROVIDER NOTES
SCRIBE #1 NOTE: I, Aide Nguyen, am scribing for, and in the presence of, Mónica Laird MD. I have scribed the entire note.       History     Chief Complaint   Patient presents with    Weakness     Pt c/o generalized weakness and dizziness, possible syncopal episode, denies chest pain, only tolerated 2 hours of dialysis today     Review of patient's allergies indicates:   Allergen Reactions    Morphine Itching and Rash    Sulfa (sulfonamide antibiotics) Rash and Other (See Comments)     Other reaction(s): Flushing (Red Skin)         History of Present Illness     HPI    3/17/2023, 11:44 PM  History obtained from the patient      History of Present Illness: Cordell Angulo is a 58 y.o. female patient with a PMHx of CHF, CKD, DM, dialysis, HTN, and paroxysmal atrial fibrillation who presents to the Emergency Department for evaluation of generalized weakness which onset today. Symptoms are constant and moderate in severity. Pt reports feeling weak today and only able to withstand 2 hours of dialysis. Moreover, pt reports an episode of syncope getting into the bathtub PTA. No mitigating or exacerbating factors reported. Associated sxs include dizziness and fever (Tmax 100.0). Patient denies any rhinorrhea, nausea, vomiting, diarrhea, cough, sore throat, HA, and all other sxs at this time. No prior Tx reported. Pt reports hypotension for the last few months. Pt was on lisinopril, but ceased because of low blood pressure. Pt had surgery two weeks ago to drain an abscess. Dr. Lozano at [facility] noted yesterday no areas of active infection. No further complaints or concerns at this time.       Arrival mode: EMS      PCP: Chuck Grider MD        Past Medical History:  Past Medical History:   Diagnosis Date    Asthma     CHF (congestive heart failure)     CKD (chronic kidney disease) stage 5, GFR less than 15 ml/min     Depression     Diabetes mellitus     Dialysis patient     Hypercholesterolemia     Hypertension     PAF  (paroxysmal atrial fibrillation)        Past Surgical History:  Past Surgical History:   Procedure Laterality Date    AV FISTULA PLACEMENT      CARDIAC ELECTROPHYSIOLOGY MAPPING AND ABLATION      CATHETERIZATION OF BOTH LEFT AND RIGHT HEART N/A 2018    Procedure: CATHETERIZATION, HEART, BOTH LEFT AND RIGHT;  Surgeon: Enrique Jj MD;  Location: [facility] CATH LAB;  Service: Cardiology;  Laterality: N/A;  To follow his cardioversion case in [facility]     SECTION      TUMOR REMOVAL      L lung         Family History:  Family History   Problem Relation Age of Onset    Heart disease Mother     Diabetes Father     Stroke Father     Heart attack Sister        Social History:  Social History     Tobacco Use    Smoking status: Never    Smokeless tobacco: Never   Substance and Sexual Activity    Alcohol use: No    Drug use: No    Sexual activity: Not Currently        Review of Systems     Review of Systems   Constitutional:  Positive for fever (Tmax:100.0).   HENT:  Negative for rhinorrhea and sore throat.    Respiratory:  Negative for cough and shortness of breath.    Cardiovascular:  Negative for chest pain.        (+) hypotension   Gastrointestinal:  Negative for diarrhea, nausea and vomiting.   Genitourinary:  Negative for dysuria.   Musculoskeletal:  Negative for back pain.   Skin:  Negative for rash.   Neurological:  Positive for syncope and weakness (generalized). Negative for headaches.   Hematological:  Does not bruise/bleed easily.   All other systems reviewed and are negative.     Physical Exam     Initial Vitals [23 2230]   BP Pulse Resp Temp SpO2   117/76 85 18 98.4 °F (36.9 °C) 98 %      MAP       --          Physical Exam  Nursing Notes and Vital Signs Reviewed.  Constitutional: Patient is in no acute distress. Well-developed and well-nourished.  Head: Atraumatic. Normocephalic.  Eyes: PERRL. EOM intact. Conjunctivae are not pale. No scleral icterus.  ENT: Mucous membranes are moist.  "Oropharynx is clear and symmetric.    Neck: Supple. Full ROM. No lymphadenopathy.  Cardiovascular: Regular rate. Regular rhythm. No murmurs, rubs, or gallops. Distal pulses are 2+ and symmetric.  Pulmonary/Chest: No respiratory distress. Clear to auscultation bilaterally. No wheezing or rales.  Abdominal: Soft and non-distended.  There is no tenderness.  No rebound, guarding, or rigidity. Good bowel sounds.  Genitourinary: No CVA tenderness  Musculoskeletal: Moves all extremities. No obvious deformities. No edema. No calf tenderness.  Skin: Warm and dry.  Neurological:  Alert, awake, and appropriate.  Normal speech.  No acute focal neurological deficits are appreciated.  Psychiatric: Normal affect. Good eye contact. Appropriate in content.     ED Course   Procedures  ED Vital Signs:  Vitals:    03/17/23 2230 03/17/23 2320 03/17/23 2326 03/17/23 2328   BP: 117/76 (!) 90/50 121/60 (!) 113/58   Pulse: 85 78 98 94   Resp: 18  (!) 100    Temp: 98.4 °F (36.9 °C)      TempSrc: Oral      SpO2: 98% 100%  100%   Weight: 98.4 kg (217 lb)      Height: 5' 7" (1.702 m)       03/17/23 2329 03/17/23 2335 03/18/23 0227   BP: 121/60  95/60   Pulse: 91  76   Resp: 14 20 16   Temp:   98.9 °F (37.2 °C)   TempSrc:   Oral   SpO2: 100%  100%   Weight:      Height:          Abnormal Lab Results:  Labs Reviewed   CBC W/ AUTO DIFFERENTIAL - Abnormal; Notable for the following components:       Result Value    Hemoglobin 11.9 (*)     MCHC 31.7 (*)     Immature Granulocytes 0.7 (*)     Immature Grans (Abs) 0.06 (*)     Lymph % 17.4 (*)     All other components within normal limits   COMPREHENSIVE METABOLIC PANEL - Abnormal; Notable for the following components:    Glucose 158 (*)     BUN 30 (*)     Creatinine 7.8 (*)     Albumin 3.2 (*)     eGFR 5 (*)     All other components within normal limits   PHOSPHORUS - Abnormal; Notable for the following components:    Phosphorus 4.9 (*)     All other components within normal limits   LACTIC ACID, " PLASMA   MAGNESIUM   TROPONIN I        All Lab Results:  Results for orders placed or performed during the hospital encounter of 03/17/23   CBC auto differential   Result Value Ref Range    WBC 9.22 3.90 - 12.70 K/uL    RBC 4.24 4.00 - 5.40 M/uL    Hemoglobin 11.9 (L) 12.0 - 16.0 g/dL    Hematocrit 37.5 37.0 - 48.5 %    MCV 88 82 - 98 fL    MCH 28.1 27.0 - 31.0 pg    MCHC 31.7 (L) 32.0 - 36.0 g/dL    RDW 14.2 11.5 - 14.5 %    Platelets 153 150 - 450 K/uL    MPV 9.5 9.2 - 12.9 fL    Immature Granulocytes 0.7 (H) 0.0 - 0.5 %    Gran # (ANC) 6.7 1.8 - 7.7 K/uL    Immature Grans (Abs) 0.06 (H) 0.00 - 0.04 K/uL    Lymph # 1.6 1.0 - 4.8 K/uL    Mono # 0.7 0.3 - 1.0 K/uL    Eos # 0.2 0.0 - 0.5 K/uL    Baso # 0.05 0.00 - 0.20 K/uL    nRBC 0 0 /100 WBC    Gran % 72.4 38.0 - 73.0 %    Lymph % 17.4 (L) 18.0 - 48.0 %    Mono % 7.0 4.0 - 15.0 %    Eosinophil % 2.0 0.0 - 8.0 %    Basophil % 0.5 0.0 - 1.9 %    Differential Method Automated    Lactic acid, plasma   Result Value Ref Range    Lactate (Lactic Acid) 2.0 0.5 - 2.2 mmol/L   Comprehensive metabolic panel   Result Value Ref Range    Sodium 140 136 - 145 mmol/L    Potassium 4.8 3.5 - 5.1 mmol/L    Chloride 103 95 - 110 mmol/L    CO2 23 23 - 29 mmol/L    Glucose 158 (H) 70 - 110 mg/dL    BUN 30 (H) 6 - 20 mg/dL    Creatinine 7.8 (H) 0.5 - 1.4 mg/dL    Calcium 8.7 8.7 - 10.5 mg/dL    Total Protein 7.6 6.0 - 8.4 g/dL    Albumin 3.2 (L) 3.5 - 5.2 g/dL    Total Bilirubin 0.3 0.1 - 1.0 mg/dL    Alkaline Phosphatase 75 55 - 135 U/L    AST 19 10 - 40 U/L    ALT 25 10 - 44 U/L    Anion Gap 14 8 - 16 mmol/L    eGFR 5 (A) >60 mL/min/1.73 m^2   Magnesium   Result Value Ref Range    Magnesium 2.2 1.6 - 2.6 mg/dL   Phosphorus   Result Value Ref Range    Phosphorus 4.9 (H) 2.7 - 4.5 mg/dL   Troponin I   Result Value Ref Range    Troponin I 0.022 0.000 - 0.026 ng/mL         Imaging Results:  Imaging Results              X-Ray Chest AP Portable (Final result)  Result time 03/18/23 07:12:20       Final result by Luciano Myles MD (03/18/23 07:12:20)                   Impression:      No acute finding in the chest.      Electronically signed by: Luciano Myles  Date:    03/18/2023  Time:    07:12               Narrative:    EXAMINATION:  XR CHEST AP PORTABLE    CLINICAL HISTORY:  Weakness    FINDINGS:  Comparison: 11/30/2022    Mediastinal silhouette is within normal limits.  The lungs are clear.  No pneumothorax or pleural effusion.  No acute osseous finding.  Left subclavian vascular stent.                                   1:05 AM: Dr. Laird provided an independent interpretation of Chest X-Ray results: No acute findings.      The EKG was ordered, reviewed, and independently interpreted by the ED provider.  Interpretation time: 23:19  Rate: 85 BPM  Rhythm: normal sinus rhythm  Interpretation: Minimal voltage criteria for LVH, may be normal variant ([place] product). Anterior infarct, age undetermined. T wave abnormality, consider inferior ischemia. No STEMI.             The Emergency Provider reviewed the vital signs and test results, which are outlined above.     ED Discussion       2:25 AM: Reassessed pt at this time.  Discussed with pt all pertinent ED information and results. Discussed pt dx and plan of tx. Gave pt all f/u and return to the ED instructions. All questions and concerns were addressed at this time. Pt expresses understanding of information and instructions, and is comfortable with plan to discharge. Pt is stable for discharge.    I discussed with patient and/or family/caretaker that evaluation in the ED does not suggest any emergent or life threatening medical conditions requiring immediate intervention beyond what was provided in the ED, and I believe patient is safe for discharge.  Regardless, an unremarkable evaluation in the ED does not preclude the development or presence of a serious of life threatening condition. As such, patient was instructed to return immediately for any  worsening or change in current symptoms.         Medical Decision Making:   History:   I obtained history from: someone other than patient, primary care / consultant and another health care provider.       <> Summary of History: PCP, other facility, and surgeon.  Old Medical Records: I decided to obtain old medical records.  Clinical Tests:   Lab Tests: Ordered and Reviewed  Radiological Study: Reviewed and Ordered  Medical Tests: Ordered and Reviewed         ED Medication(s):  Medications - No data to display    Discharge Medication List as of 3/18/2023  2:22 AM           Follow-up Information       Chuck Grider MD In 2 days.    Specialty: Internal Medicine  Contact information:  [address] [facility] [address]  [phone]               [facility] - Emergency Dept..    Specialty: Emergency Medicine  Why: As needed, If symptoms worsen  Contact information:  [address] [facility] [address] [phone]                               Scribe Attestation:   Scribe #1: I performed the above scribed service and the documentation accurately describes the services I performed. I attest to the accuracy of the note.     Attending:   Physician Attestation Statement for Scribe #1: I, Mónica Laird MD, personally performed the services described in this documentation, as scribed by Aide Nguyen, in my presence, and it is both accurate and complete.           Clinical Impression       ICD-10-CM ICD-9-CM   1. Dizziness  R42 780.4   2. Weakness generalized  R53.1 780.79       Disposition:   Disposition: Discharged  Condition: Stable       Mónica Laird MD  03/19/23 4730

## 2024-01-30 NOTE — CONSULTS
[facility] -   Nephrology  Consult Note        Patient Name: Cordell Angulo  MRN: [id number]  Admission Date: 12/7/2020  Hospital Length of Stay: 0 days  Attending Provider: Denise Fraser MD   Primary Care Physician: Chuck Grider MD  Principal Problem:<principal problem not specified>    Consults  Subjective:     HPI: Patient is a 55-year-old female with ESRD on hemodialysis on Monday-Wed- Friday schedule in [facility] under my care.  Patient has recently become COVID-19 positive.  Patient since then has been moved to the [facility] unit on a Yoofsub-Wcdsmfwf-Fubdzpsq schedule.  Last dialysis was Saturday.  Patient comes to the hospital in the emergency room with short worsening shortness of breath.  Patient seen and evaluated at bedside.  No acute indication for dialysis.  Patient has mild hypoxemia.  Chest x-ray was stable.  Patient probably may qualify for home O2.    Past Medical History:   Diagnosis Date    Asthma     CHF (congestive heart failure)     CKD (chronic kidney disease) stage 5, GFR less than 15 ml/min     Depression     Diabetes mellitus     Dialysis patient     Hypercholesterolemia     Hypertension     PAF (paroxysmal atrial fibrillation)        Past Surgical History:   Procedure Laterality Date    AV FISTULA PLACEMENT      CARDIAC ELECTROPHYSIOLOGY MAPPING AND ABLATION      CATHETERIZATION OF BOTH LEFT AND RIGHT HEART N/A 12/28/2018    Procedure: CATHETERIZATION, HEART, BOTH LEFT AND RIGHT;  Surgeon: Enrique Jj MD;  Location: [facility] CATH LAB;  Service: Cardiology;  Laterality: N/A;  To follow his cardioversion case in [facility]    cesarian section      TUMOR REMOVAL      L lung       Review of patient's allergies indicates:   Allergen Reactions    Morphine Itching    Sulfa (sulfonamide antibiotics) Rash     No current facility-administered medications for this encounter.      Current Outpatient Medications   Medication    albuterol  (PROVENTIL/VENTOLIN HFA) 90 mcg/actuation inhaler    ALPRAZolam (XANAX) 0.5 MG tablet    aspirin (ECOTRIN) 81 MG EC tablet    citalopram (CELEXA) 20 MG tablet    ELIQUIS 5 mg Tab    ferric citrate (AURYXIA ORAL)    insulin (BASAGLAR KWIKPEN U-100 INSULIN) glargine 100 units/mL (3mL) SubQ pen    lisinopril (PRINIVIL,ZESTRIL) 40 MG tablet    metoprolol succinate (TOPROL-XL) 25 MG 24 hr tablet    pantoprazole (PROTONIX) 40 MG tablet    pravastatin (PRAVACHOL) 40 MG tablet    sevelamer carbonate (RENVELA) 800 mg Tab    sucralfate (CARAFATE) 100 mg/mL suspension    VELPHORO 500 mg Chew    vitamin renal formula, B-complex-vitamin c-folic acid, (NEPHROCAP) 1 mg Cap    benzocaine 10 % Oint    vitamin renal formula, B-complex-vitamin c-folic acid, (NEPHROCAP) 1 mg Cap     Family History     Problem Relation (Age of Onset)    Diabetes Father    Heart disease Mother        Tobacco Use    Smoking status: Never Smoker    Smokeless tobacco: Never Used   Substance and Sexual Activity    Alcohol use: No    Drug use: No    Sexual activity: Not Currently     Review of Systems   Constitutional: Positive for activity change, appetite change and chills. Negative for diaphoresis, fatigue, fever and unexpected weight change.   HENT: Negative for congestion, dental problem, drooling, postnasal drip, rhinorrhea and voice change.    Eyes: Negative for discharge.   Respiratory: Positive for cough, chest tightness, shortness of breath and wheezing. Negative for apnea, choking and stridor.    Cardiovascular: Negative for chest pain, palpitations and leg swelling.   Gastrointestinal: Negative for abdominal distention, blood in stool, constipation, diarrhea, nausea, rectal pain and vomiting.   Endocrine: Negative for cold intolerance, heat intolerance, polydipsia and polyuria.   Genitourinary: Negative for decreased urine volume, difficulty urinating, dysuria, enuresis, flank pain, frequency, hematuria and urgency.    Musculoskeletal: Negative for arthralgias, back pain, gait problem and joint swelling.   Skin: Negative for rash.   Allergic/Immunologic: Negative for food allergies and immunocompromised state.   Neurological: Negative for dizziness, tremors, syncope, numbness and headaches.   Hematological: Does not bruise/bleed easily.   Psychiatric/Behavioral: Negative for agitation, behavioral problems and self-injury. The patient is not nervous/anxious and is not hyperactive.    All other systems reviewed and are negative.    Objective:     Vital Signs (Most Recent):  Temp: (!) 101 °F (38.3 °C) (12/07/20 1748)  Pulse: 81 (12/07/20 1700)  Resp: (!) 22 (12/07/20 1700)  BP: (!) 189/80 (12/07/20 1539)  SpO2: 100 % (12/07/20 1700)  O2 Device (Oxygen Therapy): nasal cannula (12/07/20 1700) Vital Signs (24h Range):  Temp:  [101 °F (38.3 °C)-102.4 °F (39.1 °C)] 101 °F (38.3 °C)  Pulse:  [76-92] 81  Resp:  [20-22] 22  SpO2:  [95 %-100 %] 100 %  BP: (145-189)/(80-90) 189/80        There is no height or weight on file to calculate BMI.  There is no height or weight on file to calculate BSA.    No intake/output data recorded.    Physical Exam  Vitals signs and nursing note reviewed.   Constitutional:       Appearance: She is well-developed.   HENT:      Head: Normocephalic and atraumatic.   Eyes:      Conjunctiva/sclera: Conjunctivae normal.      Pupils: Pupils are equal, round, and reactive to light.   Neck:      Musculoskeletal: Full passive range of motion without pain, normal range of motion and neck supple. No edema.      Thyroid: No thyroid mass or thyromegaly.      Vascular: No carotid bruit.   Cardiovascular:      Rate and Rhythm: Normal rate and regular rhythm.  No extrasystoles are present.     Chest Wall: PMI is displaced.      Pulses: Normal pulses.      Heart sounds: S1 normal and S2 normal. No murmur. No friction rub.      Comments: RV heave loud P2   Pulmonary:      Effort: Pulmonary effort is normal. No accessory muscle  [Normal] : soft, non-tender, non-distended, no masses palpated, no HSM and normal bowel sounds usage or respiratory distress.      Breath sounds: Wheezing present. No rales.   Chest:      Chest wall: No tenderness.   Abdominal:      General: Bowel sounds are normal. There is no distension.      Palpations: Abdomen is soft. There is no mass.      Tenderness: There is no abdominal tenderness. There is no rebound.      Hernia: No hernia is present.   Musculoskeletal: Normal range of motion.         General: No tenderness.      Comments: Left upper arm fistula is positive for bruit and thrill   Skin:     General: Skin is warm and dry.      Coloration: Skin is not pale.      Findings: No bruising, ecchymosis, erythema or rash.      Nails: There is no clubbing.     Neurological:      Mental Status: She is alert and oriented to person, place, and time.      Cranial Nerves: No cranial nerve deficit.      Sensory: No sensory deficit.      Motor: No abnormal muscle tone.      Coordination: Coordination normal.      Deep Tendon Reflexes: Reflexes are normal and symmetric.   Psychiatric:         Speech: Speech normal.         Behavior: Behavior normal.         Thought Content: Thought content normal.         Judgment: Judgment normal.         Significant Labs:  ABGs:   Recent Labs   Lab 12/07/20  1657   PH 7.423   PCO2 39.2   HCO3 25.6   POCSATURATED 91*   BE 1     CBC:   Recent Labs   Lab 12/07/20  1542   WBC 6.76   RBC 3.88*   HGB 10.3*   HCT 34.2*      MCV 88   MCH 26.5*   MCHC 30.1*     CMP:   Recent Labs   Lab 12/07/20  1542   GLU 97   CALCIUM 7.4*   ALBUMIN 2.7*   PROT 7.1      K 4.1   CO2 28   CL 97   BUN 31*   CREATININE 11.8*   ALKPHOS 69   ALT 16   AST 21   BILITOT 0.5     All labs within the past 24 hours have been reviewed.    Significant Imaging:  Labs: Reviewed  X-Ray: Reviewed    Assessment/Plan:     ESRD (end stage renal disease)  Patient is a 55-year-old female with ESRD on hemodialysis on Monday-Wed- Friday schedule in [facility] under my care.  Patient has recently become COVID-19 positive.   Patient since then has been moved to the [facility] unit on a Hathpzb-Dndponpa-Cemojioq schedule.  Last dialysis was Saturday.  Patient comes to the hospital in the emergency room with short worsening shortness of breath.  Patient seen and evaluated at bedside.  No acute indication for dialysis.  Patient has mild hypoxemia.  Chest x-ray was stable.  Patient probably may qualify for home O2.          Thank you for your consult.     Lola Pina MD   Nephrology  [facility]

## 2024-04-13 ENCOUNTER — HOSPITAL ENCOUNTER (EMERGENCY)
Facility: HOSPITAL | Age: 59
Discharge: HOME | End: 2024-04-13
Attending: EMERGENCY MEDICINE
Payer: MEDICARE

## 2024-04-13 VITALS
WEIGHT: 211 LBS | HEART RATE: 88 BPM | RESPIRATION RATE: 16 BRPM | OXYGEN SATURATION: 100 % | SYSTOLIC BLOOD PRESSURE: 164 MMHG | BODY MASS INDEX: 33.05 KG/M2 | DIASTOLIC BLOOD PRESSURE: 85 MMHG | TEMPERATURE: 98 F

## 2024-04-13 DIAGNOSIS — I10 CHRONIC HYPERTENSION: ICD-10-CM

## 2024-04-13 DIAGNOSIS — I48.0 PAROXYSMAL ATRIAL FIBRILLATION: ICD-10-CM

## 2024-04-13 DIAGNOSIS — E66.01 MORBID OBESITY: ICD-10-CM

## 2024-04-13 DIAGNOSIS — Z79.01 CHRONIC ANTICOAGULATION: ICD-10-CM

## 2024-04-13 DIAGNOSIS — Z99.2 ESRD ON DIALYSIS: ICD-10-CM

## 2024-04-13 DIAGNOSIS — N18.6 ESRD ON DIALYSIS: ICD-10-CM

## 2024-04-13 DIAGNOSIS — R00.2 PALPITATIONS: Primary | ICD-10-CM

## 2024-04-13 LAB
ALBUMIN SERPL BCP-MCNC: 3.1 G/DL (ref 3.5–5.2)
ALP SERPL-CCNC: 80 U/L (ref 55–135)
ALT SERPL W/O P-5'-P-CCNC: 14 U/L (ref 10–44)
ANION GAP SERPL CALC-SCNC: 12 MMOL/L (ref 8–16)
AST SERPL-CCNC: 13 U/L (ref 10–40)
BASOPHILS # BLD AUTO: 0.05 K/UL (ref 0–0.2)
BASOPHILS NFR BLD: 0.6 % (ref 0–1.9)
BILIRUB SERPL-MCNC: 0.7 MG/DL (ref 0.1–1)
BNP SERPL-MCNC: 191 PG/ML (ref 0–99)
BUN SERPL-MCNC: 36 MG/DL (ref 8–23)
CALCIUM SERPL-MCNC: 9.6 MG/DL (ref 8.7–10.5)
CHLORIDE SERPL-SCNC: 102 MMOL/L (ref 95–110)
CO2 SERPL-SCNC: 24 MMOL/L (ref 23–29)
CREAT SERPL-MCNC: 8.3 MG/DL (ref 0.5–1.4)
DIFFERENTIAL METHOD BLD: ABNORMAL
EOSINOPHIL # BLD AUTO: 0.1 K/UL (ref 0–0.5)
EOSINOPHIL NFR BLD: 1.6 % (ref 0–8)
ERYTHROCYTE [DISTWIDTH] IN BLOOD BY AUTOMATED COUNT: 14.5 % (ref 11.5–14.5)
EST. GFR  (NO RACE VARIABLE): 5 ML/MIN/1.73 M^2
GLUCOSE SERPL-MCNC: 104 MG/DL (ref 70–110)
HCT VFR BLD AUTO: 37.1 % (ref 37–48.5)
HGB BLD-MCNC: 11.8 G/DL (ref 12–16)
IMM GRANULOCYTES # BLD AUTO: 0.02 K/UL (ref 0–0.04)
IMM GRANULOCYTES NFR BLD AUTO: 0.3 % (ref 0–0.5)
LYMPHOCYTES # BLD AUTO: 1.4 K/UL (ref 1–4.8)
LYMPHOCYTES NFR BLD: 17.7 % (ref 18–48)
MAGNESIUM SERPL-MCNC: 2.2 MG/DL (ref 1.6–2.6)
MCH RBC QN AUTO: 28.9 PG (ref 27–31)
MCHC RBC AUTO-ENTMCNC: 31.8 G/DL (ref 32–36)
MCV RBC AUTO: 91 FL (ref 82–98)
MONOCYTES # BLD AUTO: 0.5 K/UL (ref 0.3–1)
MONOCYTES NFR BLD: 6.8 % (ref 4–15)
NEUTROPHILS # BLD AUTO: 5.8 K/UL (ref 1.8–7.7)
NEUTROPHILS NFR BLD: 73 % (ref 38–73)
NRBC BLD-RTO: 0 /100 WBC
PLATELET # BLD AUTO: 265 K/UL (ref 150–450)
PMV BLD AUTO: 8.7 FL (ref 9.2–12.9)
POTASSIUM SERPL-SCNC: 4.3 MMOL/L (ref 3.5–5.1)
PROT SERPL-MCNC: 7.9 G/DL (ref 6–8.4)
RBC # BLD AUTO: 4.09 M/UL (ref 4–5.4)
SODIUM SERPL-SCNC: 138 MMOL/L (ref 136–145)
TROPONIN I SERPL DL<=0.01 NG/ML-MCNC: 0.02 NG/ML (ref 0–0.03)
WBC # BLD AUTO: 7.96 K/UL (ref 3.9–12.7)

## 2024-04-13 PROCEDURE — 80053 COMPREHEN METABOLIC PANEL: CPT | Performed by: NURSE PRACTITIONER

## 2024-04-13 PROCEDURE — 84484 ASSAY OF TROPONIN QUANT: CPT | Performed by: NURSE PRACTITIONER

## 2024-04-13 PROCEDURE — 99285 EMERGENCY DEPT VISIT HI MDM: CPT | Mod: 25

## 2024-04-13 PROCEDURE — 83880 ASSAY OF NATRIURETIC PEPTIDE: CPT | Performed by: NURSE PRACTITIONER

## 2024-04-13 PROCEDURE — 93010 ELECTROCARDIOGRAM REPORT: CPT | Mod: ,,, | Performed by: INTERNAL MEDICINE

## 2024-04-13 PROCEDURE — 93005 ELECTROCARDIOGRAM TRACING: CPT

## 2024-04-13 PROCEDURE — 85025 COMPLETE CBC W/AUTO DIFF WBC: CPT | Performed by: NURSE PRACTITIONER

## 2024-04-13 PROCEDURE — 83735 ASSAY OF MAGNESIUM: CPT | Performed by: NURSE PRACTITIONER

## 2024-04-13 NOTE — FIRST PROVIDER EVALUATION
Medical screening examination initiated.  I have conducted a focused provider triage encounter, findings are as follows:    Brief history of present illness:  Patient states that she keeps going in and out of atrial fibrillation.    Vitals:    04/13/24 1229   BP: (!) 164/85   BP Location: Right arm   Patient Position: Sitting   Pulse: 88   Resp: 16   Temp: 97.7 °F (36.5 °C)   TempSrc: Oral   SpO2: 100%   Weight: 95.7 kg (211 lb)       Pertinent physical exam:  NAD    Brief workup plan:  Labs    Preliminary workup initiated; this workup will be continued and followed by the physician or advanced practice provider that is assigned to the patient when roomed.

## 2024-04-13 NOTE — ED PROVIDER NOTES
SCRIBE #1 NOTE: I, Dm Ramsey, am scribing for, and in the presence of, Denise Fraser MD. I have scribed the entire note.       History     Chief Complaint   Patient presents with    Tachycardia     Pt states she has a hx of afib and her heart rate has been going up and down rapidly over the past couple of days.      Review of patient's allergies indicates:   Allergen Reactions    Morphine Itching and Rash    Sulfa (sulfonamide antibiotics) Rash and Other (See Comments)     Other reaction(s): Flushing (Red Skin)         History of Present Illness     HPI    4/13/2024, 2:55 PM  History obtained from the patient      History of Present Illness: Cordell Angulo is a 59 y.o. female patient with a PMHx of DM, AFIB, CKD, depression, HLD, asthma, and PAF who presents to the Emergency Department for evaluation of elevated HR followed by decreased HR which onset gradually within the past couple of days. Pt notes HR was 145 and then shortly after in the 40s. Symptoms are intermittent and moderate in severity. No mitigating or exacerbating factors reported. Associated sxs include palpitations and increased BP. Patient denies any back pain, fever, SOB, CP, and all other sxs at this time. No Prior Tx. Pt has been off Lisinopril for over 2 years. Pt has an appointment with Cardiology April 24th. Pt normally takes 25 mg of Metoprolol daily. No further complaints or concerns at this time.       Arrival mode: Personal vehicle    PCP: Chuck Grider MD        Past Medical History:  Past Medical History:   Diagnosis Date    Asthma     CHF (congestive heart failure)     CKD (chronic kidney disease) stage 5, GFR less than 15 ml/min     Depression     Diabetes mellitus     Dialysis patient     Hypercholesterolemia     Hypertension     PAF (paroxysmal atrial fibrillation)        Past Surgical History:  Past Surgical History:   Procedure Laterality Date    AV FISTULA PLACEMENT      CARDIAC ELECTROPHYSIOLOGY MAPPING AND  ABLATION      CATHETERIZATION OF BOTH LEFT AND RIGHT HEART N/A 2018    Procedure: CATHETERIZATION, HEART, BOTH LEFT AND RIGHT;  Surgeon: Enrique Jj MD;  Location: [facility] CATH LAB;  Service: Cardiology;  Laterality: N/A;  To follow his cardioversion [facility]     SECTION      TUMOR REMOVAL      L lung         Family History:  Family History   Problem Relation Name Age of Onset    Heart disease Mother      Diabetes Father      Stroke Father      Heart attack Sister         Social History:  Social History     Tobacco Use    Smoking status: Never    Smokeless tobacco: Never   Substance and Sexual Activity    Alcohol use: No    Drug use: No    Sexual activity: Not Currently        Review of Systems     Review of Systems   Constitutional:  Negative for chills and fever.   HENT:  Negative for congestion and sore throat.    Respiratory:  Negative for cough and shortness of breath.    Cardiovascular:  Positive for palpitations. Negative for chest pain.        (+) elevated BP  (+) fluctuating, high highs and low lows HR    Gastrointestinal:  Negative for abdominal pain, nausea and vomiting.   Genitourinary:  Negative for dysuria.   Musculoskeletal:  Negative for back pain.   Skin:  Negative for rash.   Neurological:  Negative for dizziness, weakness, light-headedness, numbness and headaches.   Hematological:  Does not bruise/bleed easily.   All other systems reviewed and are negative.       Physical Exam     Initial Vitals [24 1229]   BP Pulse Resp Temp SpO2   (!) 164/85 88 16 97.7 °F (36.5 °C) 100 %      MAP       --          Physical Exam  Nursing Notes and Vital Signs Reviewed.  Constitutional: Patient is in no acute distress. Well-developed and well-nourished.  Head: Atraumatic. Normocephalic.  Eyes: PERRL. EOM intact. Conjunctivae are not pale. No scleral icterus.  ENT: Mucous membranes are moist. Oropharynx is clear and symmetric.    Neck: Supple. Full ROM. No  lymphadenopathy.  Cardiovascular: Regular rate. Regular rhythm. No murmurs, rubs, or gallops. Distal pulses are 2+ and symmetric.  Pulmonary/Chest: No respiratory distress. Clear to auscultation bilaterally. No wheezing or rales.  Abdominal: Soft and non-distended.  There is no tenderness.  No rebound, guarding, or rigidity. Good bowel sounds.  Genitourinary: No CVA tenderness  Musculoskeletal: Moves all extremities. No obvious deformities. No edema. No calf tenderness.  Skin: Warm and dry.  Neurological:  Alert, awake, and appropriate.  Normal speech.  No acute focal neurological deficits are appreciated.  Psychiatric: Normal affect. Good eye contact. Appropriate in content.     ED Course   Procedures  ED Vital Signs:  Vitals:    04/13/24 1229   BP: (!) 164/85   Pulse: 88   Resp: 16   Temp: 97.7 °F (36.5 °C)   TempSrc: Oral   SpO2: 100%   Weight: 95.7 kg (211 lb)       Abnormal Lab Results:  Labs Reviewed   B-TYPE NATRIURETIC PEPTIDE - Abnormal; Notable for the following components:       Result Value     (*)     All other components within normal limits   CBC W/ AUTO DIFFERENTIAL - Abnormal; Notable for the following components:    Hemoglobin 11.8 (*)     MCHC 31.8 (*)     MPV 8.7 (*)     Lymph % 17.7 (*)     All other components within normal limits   COMPREHENSIVE METABOLIC PANEL - Abnormal; Notable for the following components:    BUN 36 (*)     Creatinine 8.3 (*)     Albumin 3.1 (*)     eGFR 5 (*)     All other components within normal limits   MAGNESIUM   TROPONIN I        All Lab Results:  Results for orders placed or performed during the hospital encounter of 04/13/24   Brain natriuretic peptide   Result Value Ref Range     (H) 0 - 99 pg/mL   CBC auto differential   Result Value Ref Range    WBC 7.96 3.90 - 12.70 K/uL    RBC 4.09 4.00 - 5.40 M/uL    Hemoglobin 11.8 (L) 12.0 - 16.0 g/dL    Hematocrit 37.1 37.0 - 48.5 %    MCV 91 82 - 98 fL    MCH 28.9 27.0 - 31.0 pg    MCHC 31.8 (L) 32.0 - 36.0  g/dL    RDW 14.5 11.5 - 14.5 %    Platelets 265 150 - 450 K/uL    MPV 8.7 (L) 9.2 - 12.9 fL    Immature Granulocytes 0.3 0.0 - 0.5 %    Gran # (ANC) 5.8 1.8 - 7.7 K/uL    Immature Grans (Abs) 0.02 0.00 - 0.04 K/uL    Lymph # 1.4 1.0 - 4.8 K/uL    Mono # 0.5 0.3 - 1.0 K/uL    Eos # 0.1 0.0 - 0.5 K/uL    Baso # 0.05 0.00 - 0.20 K/uL    nRBC 0 0 /100 WBC    Gran % 73.0 38.0 - 73.0 %    Lymph % 17.7 (L) 18.0 - 48.0 %    Mono % 6.8 4.0 - 15.0 %    Eosinophil % 1.6 0.0 - 8.0 %    Basophil % 0.6 0.0 - 1.9 %    Differential Method Automated    Comprehensive metabolic panel   Result Value Ref Range    Sodium 138 136 - 145 mmol/L    Potassium 4.3 3.5 - 5.1 mmol/L    Chloride 102 95 - 110 mmol/L    CO2 24 23 - 29 mmol/L    Glucose 104 70 - 110 mg/dL    BUN 36 (H) 8 - 23 mg/dL    Creatinine 8.3 (H) 0.5 - 1.4 mg/dL    Calcium 9.6 8.7 - 10.5 mg/dL    Total Protein 7.9 6.0 - 8.4 g/dL    Albumin 3.1 (L) 3.5 - 5.2 g/dL    Total Bilirubin 0.7 0.1 - 1.0 mg/dL    Alkaline Phosphatase 80 55 - 135 U/L    AST 13 10 - 40 U/L    ALT 14 10 - 44 U/L    eGFR 5 (A) >60 mL/min/1.73 m^2    Anion Gap 12 8 - 16 mmol/L   Magnesium   Result Value Ref Range    Magnesium 2.2 1.6 - 2.6 mg/dL   Troponin I   Result Value Ref Range    Troponin I 0.021 0.000 - 0.026 ng/mL   EKG 12-lead   Result Value Ref Range    QRS Duration 92 ms    [facility] QTC Calculation 467 ms         Imaging Results:  Imaging Results              X-Ray Chest 1 View (Final result)  Result time 04/13/24 13:29:48      Final result by Nathaniel Vanegas MD (04/13/24 13:29:48)                   Impression:      No acute abnormality.      Electronically signed by: Nathaniel Vanegas  Date:    04/13/2024  Time:    13:29               Narrative:    EXAMINATION:  XR CHEST 1 VIEW    CLINICAL HISTORY:  Palpitations    TECHNIQUE:  Single frontal view of the chest was performed.    COMPARISON:  Multiple priors.    FINDINGS:  The lungs are clear, with normal appearance of pulmonary vasculature and no  pleural effusion or pneumothorax.    The cardiac silhouette is normal in size. The hilar and mediastinal contours are unremarkable.    Bones are intact.                                       The EKG was ordered, reviewed, and independently interpreted by the ED provider.  Interpretation time: 12:24  Rate: 90 BPM  Rhythm: normal sinus rhythm  Interpretation: Moderate voltage criteria for LVH, may be nroaml variant ( R in  a VL, [place] [employer]). Nonspecifci T wave abnormality. No STEMI.             The Emergency Provider reviewed the vital signs and test results, which are outlined above.     ED Discussion       2:56 PM: Reassessed pt at this time.  Discussed with pt all pertinent ED information and results. Discussed pt dx and plan of tx. Gave pt all f/u and return to the ED instructions. All questions and concerns were addressed at this time. Pt expresses understanding of information and instructions, and is comfortable with plan to discharge. Pt is stable for discharge.    I discussed with patient and/or family/caretaker that evaluation in the ED does not suggest any emergent or life threatening medical conditions requiring immediate intervention beyond what was provided in the ED, and I believe patient is safe for discharge.  Regardless, an unremarkable evaluation in the ED does not preclude the development or presence of a serious of life threatening condition. As such, patient was instructed to return immediately for any worsening or change in current symptoms.        Medical Decision Making  DDX: 1. Symptomatic atrial fib 2. Electrolyte dysfunction 3. Anxiety    ECG reviewed and patient in normal sinus rhythm, lab work reviewed and kidney function at baseline patient is on dialysis, CXR no overt pulmonary edema, cardiac markers are normal, electrolytes normal, bnp mildly elevated, reassurance provided, admission initially considered but not indicated with otherwise normal labs and NSR on ECG, advised to follow  up with her cardiologist, reasons to return given.     Amount and/or Complexity of Data Reviewed  External Data Reviewed: labs, ECG and notes.  Labs: ordered. Decision-making details documented in ED Course.  Radiology: ordered. Decision-making details documented in ED Course.  ECG/medicine tests: ordered and independent interpretation performed. Decision-making details documented in ED Course.    Risk  Decision regarding hospitalization.  Diagnosis or treatment significantly limited by social determinants of health.                ED Medication(s):  Medications - No data to display    Discharge Medication List as of 4/13/2024  2:56 PM           Follow-up Information       Tal Mo MD. Schedule an appointment as soon as possible for a visit in 2 days.    Specialty: Cardiology  Why: Return to the Emergency Room, If symptoms worsen  Contact information:  [address]  [address]  [facility]  [place] [address]  [phone]                                 Scribe Attestation:   Scribe #1: I performed the above scribed service and the documentation accurately describes the services I performed. I attest to the accuracy of the note.     Attending:   Physician Attestation Statement for Scribe #1: I, Denise Fraser MD, personally performed the services described in this documentation, as scribed by Dm Ramsey, in my presence, and it is both accurate and complete.           Clinical Impression       ICD-10-CM ICD-9-CM   1. Palpitations  R00.2 785.1   2. Chronic hypertension  I10 401.9   3. ESRD on dialysis  N18.6 585.6    Z99.2 V45.11   4. Chronic anticoagulation  Z79.01 V58.61   5. Paroxysmal atrial fibrillation  I48.0 427.31   6. Morbid obesity  E66.01 278.01       Disposition:   Disposition: Discharged  Condition: Stable        Denise Fraser MD  04/18/24 0920

## 2024-04-14 LAB
OHS QRS DURATION: 92 MS
OHS QTC CALCULATION: 467 MS

## 2025-05-25 ENCOUNTER — HOSPITAL ENCOUNTER (EMERGENCY)
Facility: HOSPITAL | Age: 60
Discharge: HOME | End: 2025-05-25
Attending: EMERGENCY MEDICINE
Payer: COMMERCIAL

## 2025-05-25 VITALS
OXYGEN SATURATION: 99 % | RESPIRATION RATE: 17 BRPM | DIASTOLIC BLOOD PRESSURE: 59 MMHG | TEMPERATURE: 98 F | SYSTOLIC BLOOD PRESSURE: 120 MMHG | HEART RATE: 92 BPM

## 2025-05-25 DIAGNOSIS — E16.2 HYPOGLYCEMIA: ICD-10-CM

## 2025-05-25 DIAGNOSIS — R07.9 CHEST PAIN: ICD-10-CM

## 2025-05-25 DIAGNOSIS — N18.6 ESRD (END STAGE RENAL DISEASE): ICD-10-CM

## 2025-05-25 DIAGNOSIS — I48.0 PAROXYSMAL ATRIAL FIBRILLATION WITH RVR: Primary | ICD-10-CM

## 2025-05-25 LAB
ABSOLUTE EOSINOPHIL (OHS): 0.33 K/UL
ABSOLUTE MONOCYTE (OHS): 0.61 K/UL (ref 0.3–1)
ABSOLUTE NEUTROPHIL COUNT (OHS): 5.14 K/UL (ref 1.8–7.7)
ALBUMIN SERPL BCP-MCNC: 2.7 G/DL (ref 3.5–5.2)
ALP SERPL-CCNC: 94 UNIT/L (ref 40–150)
ALT SERPL W/O P-5'-P-CCNC: 16 UNIT/L (ref 10–44)
ANION GAP (OHS): 14 MMOL/L (ref 8–16)
AST SERPL-CCNC: 12 UNIT/L (ref 11–45)
BASOPHILS # BLD AUTO: 0.05 K/UL
BASOPHILS NFR BLD AUTO: 0.7 %
BILIRUB SERPL-MCNC: 0.4 MG/DL (ref 0.1–1)
BNP SERPL-MCNC: 771 PG/ML (ref 0–99)
BUN SERPL-MCNC: 47 MG/DL (ref 8–23)
CALCIUM SERPL-MCNC: 8.4 MG/DL (ref 8.7–10.5)
CHLORIDE SERPL-SCNC: 110 MMOL/L (ref 95–110)
CO2 SERPL-SCNC: 17 MMOL/L (ref 23–29)
CREAT SERPL-MCNC: 9.3 MG/DL (ref 0.5–1.4)
ERYTHROCYTE [DISTWIDTH] IN BLOOD BY AUTOMATED COUNT: 16.5 % (ref 11.5–14.5)
GFR SERPLBLD CREATININE-BSD FMLA CKD-EPI: 4 ML/MIN/1.73/M2
GLUCOSE SERPL-MCNC: 120 MG/DL (ref 70–110)
HCT VFR BLD AUTO: 30.8 % (ref 37–48.5)
HGB BLD-MCNC: 9.7 GM/DL (ref 12–16)
IMM GRANULOCYTES # BLD AUTO: 0.03 K/UL (ref 0–0.04)
IMM GRANULOCYTES NFR BLD AUTO: 0.4 % (ref 0–0.5)
LYMPHOCYTES # BLD AUTO: 1.5 K/UL (ref 1–4.8)
MCH RBC QN AUTO: 30.3 PG (ref 27–31)
MCHC RBC AUTO-ENTMCNC: 31.5 G/DL (ref 32–36)
MCV RBC AUTO: 96 FL (ref 82–98)
NUCLEATED RBC (/100WBC) (OHS): 0 /100 WBC
PLATELET # BLD AUTO: 154 K/UL (ref 150–450)
PMV BLD AUTO: 9 FL (ref 9.2–12.9)
POTASSIUM SERPL-SCNC: 4.4 MMOL/L (ref 3.5–5.1)
PROT SERPL-MCNC: 6.8 GM/DL (ref 6–8.4)
RBC # BLD AUTO: 3.2 M/UL (ref 4–5.4)
RELATIVE EOSINOPHIL (OHS): 4.3 %
RELATIVE LYMPHOCYTE (OHS): 19.6 % (ref 18–48)
RELATIVE MONOCYTE (OHS): 8 % (ref 4–15)
RELATIVE NEUTROPHIL (OHS): 67 % (ref 38–73)
SODIUM SERPL-SCNC: 141 MMOL/L (ref 136–145)
TROPONIN I SERPL DL<=0.01 NG/ML-MCNC: 0.02 NG/ML
WBC # BLD AUTO: 7.66 K/UL (ref 3.9–12.7)

## 2025-05-25 PROCEDURE — 93010 ELECTROCARDIOGRAM REPORT: CPT | Mod: ,,, | Performed by: INTERNAL MEDICINE

## 2025-05-25 PROCEDURE — 84484 ASSAY OF TROPONIN QUANT: CPT | Performed by: EMERGENCY MEDICINE

## 2025-05-25 PROCEDURE — 80053 COMPREHEN METABOLIC PANEL: CPT | Performed by: EMERGENCY MEDICINE

## 2025-05-25 PROCEDURE — 93005 ELECTROCARDIOGRAM TRACING: CPT

## 2025-05-25 PROCEDURE — 94761 N-INVAS EAR/PLS OXIMETRY MLT: CPT

## 2025-05-25 PROCEDURE — 83880 ASSAY OF NATRIURETIC PEPTIDE: CPT | Performed by: EMERGENCY MEDICINE

## 2025-05-25 PROCEDURE — 85025 COMPLETE CBC W/AUTO DIFF WBC: CPT | Performed by: EMERGENCY MEDICINE

## 2025-05-25 PROCEDURE — 99285 EMERGENCY DEPT VISIT HI MDM: CPT | Mod: 25

## 2025-05-25 NOTE — ED PROVIDER NOTES
SCRIBE #1 NOTE: I, Lissette Montelongo, am scribing for, and in the presence of, Milton Vega MD. I have scribed the entire note.       History     Chief Complaint   Patient presents with    Chest Pain     Afib RVR with rate of 170 upon EMS arrival. Rate and CP improved after 5mg metoprolol.     Review of patient's allergies indicates:   Allergen Reactions    Morphine Itching and Rash    Sulfa (sulfonamide antibiotics) Rash and Other (See Comments)     Other reaction(s): Flushing (Red Skin)         History of Present Illness     HPI    5/25/2025, 7:13 AM  History obtained from the patient and medical records      History of Present Illness: Cordell Angulo is a 60 y.o. female patient with a PMHx of DM, HTN, CKD Stage 5, depression, CHF, asthma, PAF, and gastroparesis who presents to the Emergency Department for evaluation following an episode of A-fib this morning. This morning, she was attempting to eat peanut butter crackers, began vomiting, and noticed she was in A-fib. Upon EMS arrival, pt heart rate in the 170s. She was given dose of metoprolol with improvement to heart rate. Pt notes her blood sugar dropped into the 50s last night, which is why she was trying to eat the peanut butter. She notes she has episodes of hypoglycemia frequently and is not currently on any DM medications. She mentions she is on Eliquis and Metoprolol. Pt missed her dose of metoprolol last night and took it this morning instead. Patient denies any fever, SOB, CP, or back pain. Pt expresses desire to be discharged due to anniversary party. No further complaints or concerns at this time.       Arrival mode: EMS    PCP: Chuck Grider MD        Past Medical History:  Past Medical History:   Diagnosis Date    Asthma     CHF (congestive heart failure)     CKD (chronic kidney disease) stage 5, GFR less than 15 ml/min     Depression     Diabetes mellitus     Dialysis patient     Hypercholesterolemia     Hypertension     PAF (paroxysmal  atrial fibrillation)        Past Surgical History:  Past Surgical History:   Procedure Laterality Date    AV FISTULA PLACEMENT      CARDIAC ELECTROPHYSIOLOGY MAPPING AND ABLATION      CATHETERIZATION OF BOTH LEFT AND RIGHT HEART N/A 2018    Procedure: CATHETERIZATION, HEART, BOTH LEFT AND RIGHT;  Surgeon: Enrique Jj MD;  Location: [facility] CATH LAB;  Service: Cardiology;  Laterality: N/A;  To follow his cardioversion case in [facility]     SECTION      TUMOR REMOVAL      L lung         Family History:  Family History   Problem Relation Name Age of Onset    Heart disease Mother      Diabetes Father      Stroke Father      Heart attack Sister         Social History:  Social History     Tobacco Use    Smoking status: Never    Smokeless tobacco: Never   Substance and Sexual Activity    Alcohol use: No    Drug use: No    Sexual activity: Not Currently        Review of Systems     Review of Systems   Constitutional:  Negative for fever.   HENT:  Negative for sore throat.    Respiratory:  Negative for shortness of breath.    Cardiovascular:  Positive for palpitations. Negative for chest pain.   Gastrointestinal:  Positive for vomiting. Negative for nausea.   Endocrine:        (+) hypoglycemia   Genitourinary:  Negative for dysuria.   Musculoskeletal:  Negative for back pain.   Skin:  Negative for rash.   Neurological:  Negative for weakness.   Hematological:  Does not bruise/bleed easily.   All other systems reviewed and are negative.     Physical Exam     Initial Vitals [25 0417]   BP Pulse Resp Temp SpO2   103/61 86 16 98 °F (36.7 °C) 95 %      MAP       --          Physical Exam  Nursing Notes and Vital Signs Reviewed.  Constitutional: Patient is in no acute distress. Well-developed and well-nourished.  Head: Atraumatic. Normocephalic.  Eyes: PERRL. EOM intact. Conjunctivae are not pale. No scleral icterus.  ENT: Mucous membranes are moist. Oropharynx is clear and symmetric.    Neck: Supple. Full ROM.  No lymphadenopathy.  Cardiovascular: Regular rate. Irregularly irregular rhythm. No murmurs, rubs, or gallops. Distal pulses are 2+ and symmetric.  Pulmonary/Chest: No respiratory distress. Clear to auscultation bilaterally. No wheezing or rales.  Abdominal: Soft and non-distended.  There is no tenderness.  No rebound, guarding, or rigidity. Good bowel sounds.  Genitourinary: No CVA tenderness.  Musculoskeletal: Moves all extremities. No obvious deformities. No edema. No calf tenderness.  Skin: Warm and dry.  Neurological:  Alert, awake, and appropriate.  Normal speech.  No acute focal neurological deficits are appreciated.  Psychiatric: Normal affect. Good eye contact. Appropriate in content.     ED Course   Procedures  ED Vital Signs:  Vitals:    05/25/25 0417 05/25/25 0500 05/25/25 0600 05/25/25 0630   BP: 103/61 128/75 131/70 132/62   Pulse: 86 86 85 88   Resp: 16 (!) 22 19 17   Temp: 98 °F (36.7 °C)      TempSrc: Oral      SpO2: 95% 97% 100% 99%    05/25/25 0702   BP: 130/67   Pulse: 85   Resp: 18   Temp:    TempSrc:    SpO2: 98%       Abnormal Lab Results:  Labs Reviewed   COMPREHENSIVE METABOLIC PANEL - Abnormal       Result Value    Sodium 141      Potassium 4.4      Chloride 110      CO2 17 (*)     Glucose 120 (*)     BUN 47 (*)     Creatinine 9.3 (*)     Calcium 8.4 (*)     Protein Total 6.8      Albumin 2.7 (*)     Bilirubin Total 0.4      ALP 94      AST 12      ALT 16      Anion Gap 14      eGFR 4 (*)    B-TYPE NATRIURETIC PEPTIDE - Abnormal     (*)    CBC WITH DIFFERENTIAL - Abnormal    WBC 7.66      RBC 3.20 (*)     HGB 9.7 (*)     HCT 30.8 (*)     MCV 96      MCH 30.3      MCHC 31.5 (*)     RDW 16.5 (*)     Platelet Count 154      MPV 9.0 (*)     Nucleated RBC 0      Neut % 67.0      Lymph % 19.6      Mono % 8.0      Eos % 4.3      Basophil % 0.7      Imm Grans % 0.4      Neut # 5.14      Lymph # 1.50      Mono # 0.61      Eos # 0.33      Baso # 0.05      Imm Grans # 0.03     TROPONIN I -  Normal    Troponin-I 0.022     CBC W/ AUTO DIFFERENTIAL    Narrative:     The following orders were created for panel order CBC auto differential.  Procedure                               Abnormality         Status                     ---------                               -----------         ------                     CBC with Differential[[id number]]       Abnormal            Final result                 Please view results for these tests on the individual orders.        All Lab Results:  Results for orders placed or performed during the hospital encounter of 05/25/25   Comprehensive metabolic panel    Collection Time: 05/25/25  5:05 AM   Result Value Ref Range    Sodium 141 136 - 145 mmol/L    Potassium 4.4 3.5 - 5.1 mmol/L    Chloride 110 95 - 110 mmol/L    CO2 17 (L) 23 - 29 mmol/L    Glucose 120 (H) 70 - 110 mg/dL    BUN 47 (H) 8 - 23 mg/dL    Creatinine 9.3 (H) 0.5 - 1.4 mg/dL    Calcium 8.4 (L) 8.7 - 10.5 mg/dL    Protein Total 6.8 6.0 - 8.4 gm/dL    Albumin 2.7 (L) 3.5 - 5.2 g/dL    Bilirubin Total 0.4 0.1 - 1.0 mg/dL    ALP 94 40 - 150 unit/L    AST 12 11 - 45 unit/L    ALT 16 10 - 44 unit/L    Anion Gap 14 8 - 16 mmol/L    eGFR 4 (L) >60 mL/min/1.73/m2   Brain Natriuretic Peptide    Collection Time: 05/25/25  5:05 AM   Result Value Ref Range     (H) 0 - 99 pg/mL   Troponin I    Collection Time: 05/25/25  5:05 AM   Result Value Ref Range    Troponin-I 0.022 <=0.026 ng/mL   CBC with Differential    Collection Time: 05/25/25  5:05 AM   Result Value Ref Range    WBC 7.66 3.90 - 12.70 K/uL    RBC 3.20 (L) 4.00 - 5.40 M/uL    HGB 9.7 (L) 12.0 - 16.0 gm/dL    HCT 30.8 (L) 37.0 - 48.5 %    MCV 96 82 - 98 fL    MCH 30.3 27.0 - 31.0 pg    MCHC 31.5 (L) 32.0 - 36.0 g/dL    RDW 16.5 (H) 11.5 - 14.5 %    Platelet Count 154 150 - 450 K/uL    MPV 9.0 (L) 9.2 - 12.9 fL    Nucleated RBC 0 <=0 /100 WBC    Neut % 67.0 38 - 73 %    Lymph % 19.6 18 - 48 %    Mono % 8.0 4 - 15 %    Eos % 4.3 <=8 %    Basophil % 0.7  <=1.9 %    Imm Grans % 0.4 0.0 - 0.5 %    Neut # 5.14 1.8 - 7.7 K/uL    Lymph # 1.50 1 - 4.8 K/uL    Mono # 0.61 0.3 - 1 K/uL    Eos # 0.33 <=0.5 K/uL    Baso # 0.05 <=0.2 K/uL    Imm Grans # 0.03 0.00 - 0.04 K/uL       Imaging Results:  Imaging Results              X-Ray Chest AP Portable (Final result)  Result time 05/25/25 06:55:24      Final result by Nathaniel Vanegas MD (05/25/25 06:55:24)                   Impression:     As above.    Finalized on: 5/25/2025 6:55 AM By:  Nathaniel Vanegas MD  [facility]# [id number]      2025-05-25 06:57:25.074     [facility]               Narrative:    EXAM: XR CHEST AP PORTABLE    CLINICAL HISTORY: Chest pain.    FINDINGS:  Heart is enlarged.  Bibasilar opacities possibly atelectasis or edema.  No pleural fluid or pneumothorax.                                         The EKG was ordered, reviewed, and independently interpreted by the ED provider.  Interpretation time: 05:20  Rate: 76 BPM  Rhythm: atrial fibrillation  Interpretation: ST and T wave abnormality, consider inferior ischemia. ST and T wave abnormality, consider anterolateral ischemia. No STEMI.             The Emergency Provider reviewed the vital signs and test results, which are outlined above.     ED Discussion     7:42 AM: Reassessed pt at this time. Discussed with patient all pertinent ED information and results. Discussed pt dx and plan of tx. Gave the patient all f/u and return to the ED instructions. All questions and concerns were addressed at this time. Patient expresses understanding of information and instructions, and is comfortable with plan to discharge. Pt is stable for discharge.     I discussed with patient that evaluation in the ED does not suggest any emergent or life threatening medical conditions requiring immediate intervention beyond what was provided in the ED, and I believe patient is safe for discharge. Regardless, an unremarkable evaluation in the ED does not preclude the development or presence of a  serious or life threatening condition. As such, I instructed that the patient is to return immediately for any worsening or change in current symptoms.         Medical Decision Making  60-year-old female who reports a past medical history of gastroparesis and frequent episodes of hypoglycemia.  She wears a Dexcom.  She has not take any diabetic medications.  She was alerted that her glucose was in the 50s last night, which is reportedly a frequent occurrence for patient.  She woke up and attempted to eat some peanut butter, but had a vomiting episode and then started feeling palpitations.  She does have a history of paroxysmal atrial fibrillation.  On scene with paramedics, her heart rate was in the 170s.  She was given a dose of IV metoprolol and arrived to the emergency department in atrial fibrillation, but rate controlled.  She has remained rate controlled for several hours.  She is politely requesting discharge stating that she has a family event today that she would like to attend.  Advised her to continue her metoprolol and Eliquis.  Of note she skipped last night's dose of metoprolol.  Follow up with Cardiology.  ER return precautions provided    Amount and/or Complexity of Data Reviewed  Independent Historian: EMS     Details: EMS provided the history that she was in AFib with RVR with a rate of 170 on scene and was given 5 of metoprolol EN route  External Data Reviewed: notes.     Details: Past medical history, medications, allergies reviewed  Labs: ordered. Decision-making details documented in ED Course.  Radiology: ordered. Decision-making details documented in ED Course.  ECG/medicine tests: ordered and independent interpretation performed. Decision-making details documented in ED Course.    Risk  Prescription drug management.                ED Medication(s):  Medications - No data to display    New Prescriptions    No medications on file        Follow-up Information       Follow-up with your  cardiologist.               [facility] - Emergency Dept..    Specialty: Emergency Medicine  Why: As needed, If symptoms worsen  Contact information:  [address]  [facility] [address] [phone]                               Scribe Attestation:   Scribe #1: I performed the above scribed service and the documentation accurately describes the services I performed. I attest to the accuracy of the note.     Attending:   Physician Attestation Statement for Scribe #1: I, Milton Vega MD, personally performed the services described in this documentation, as scribed by Lissette Montelongo, in my presence, and it is both accurate and complete.           Clinical Impression       ICD-10-CM ICD-9-CM   1. Paroxysmal atrial fibrillation with RVR  I48.0 427.31   2. Chest pain  R07.9 786.50   3. Hypoglycemia  E16.2 251.2   4. ESRD (end stage renal disease)  N18.6 585.6       Disposition:   Disposition: Discharged  Condition: Stable         Milton Vega MD  05/25/25 3037

## 2025-05-26 LAB
OHS QRS DURATION: 102 MS
OHS QTC CALCULATION: 454 MS

## 2025-08-07 NOTE — ED NOTES
Dr. Guerra at bedside, RT at bedside for BIPAP   PCP reports: Last eye exam [facility] Eye exam visit, [facility] eye [facility]---11/20/25